# Patient Record
Sex: MALE | Race: WHITE | NOT HISPANIC OR LATINO | Employment: OTHER | URBAN - NONMETROPOLITAN AREA
[De-identification: names, ages, dates, MRNs, and addresses within clinical notes are randomized per-mention and may not be internally consistent; named-entity substitution may affect disease eponyms.]

---

## 2019-01-01 ENCOUNTER — APPOINTMENT (OUTPATIENT)
Dept: MRI IMAGING | Facility: HOSPITAL | Age: 64
End: 2019-01-01

## 2019-01-01 ENCOUNTER — APPOINTMENT (OUTPATIENT)
Dept: GENERAL RADIOLOGY | Facility: HOSPITAL | Age: 64
End: 2019-01-01

## 2019-01-01 ENCOUNTER — APPOINTMENT (OUTPATIENT)
Dept: ULTRASOUND IMAGING | Facility: HOSPITAL | Age: 64
End: 2019-01-01

## 2019-01-01 ENCOUNTER — ANESTHESIA EVENT (OUTPATIENT)
Dept: ICU | Facility: HOSPITAL | Age: 64
End: 2019-01-01

## 2019-01-01 ENCOUNTER — APPOINTMENT (OUTPATIENT)
Dept: CT IMAGING | Facility: HOSPITAL | Age: 64
End: 2019-01-01

## 2019-01-01 ENCOUNTER — APPOINTMENT (OUTPATIENT)
Dept: CARDIOLOGY | Facility: HOSPITAL | Age: 64
End: 2019-01-01

## 2019-01-01 ENCOUNTER — HOSPITAL ENCOUNTER (INPATIENT)
Facility: HOSPITAL | Age: 64
LOS: 13 days | Discharge: NURSING, REHAB OR LONG-TERM CARE | End: 2019-09-13
Attending: NEUROLOGICAL SURGERY | Admitting: NEUROLOGICAL SURGERY

## 2019-01-01 ENCOUNTER — ANESTHESIA (OUTPATIENT)
Dept: ICU | Facility: HOSPITAL | Age: 64
End: 2019-01-01

## 2019-01-01 VITALS
HEIGHT: 74 IN | RESPIRATION RATE: 18 BRPM | HEART RATE: 60 BPM | WEIGHT: 269 LBS | OXYGEN SATURATION: 95 % | BODY MASS INDEX: 34.52 KG/M2 | TEMPERATURE: 98.3 F | DIASTOLIC BLOOD PRESSURE: 54 MMHG | SYSTOLIC BLOOD PRESSURE: 133 MMHG

## 2019-01-01 DIAGNOSIS — R13.12 OROPHARYNGEAL DYSPHAGIA: Primary | ICD-10-CM

## 2019-01-01 DIAGNOSIS — E66.9 OBESITY (BMI 30-39.9): ICD-10-CM

## 2019-01-01 DIAGNOSIS — Z74.09 IMPAIRED MOBILITY AND ADLS: ICD-10-CM

## 2019-01-01 DIAGNOSIS — N18.30 CKD (CHRONIC KIDNEY DISEASE) STAGE 3, GFR 30-59 ML/MIN (HCC): ICD-10-CM

## 2019-01-01 DIAGNOSIS — N28.89 LEFT RENAL MASS: ICD-10-CM

## 2019-01-01 DIAGNOSIS — I61.0 NONTRAUMATIC SUBCORTICAL HEMORRHAGE OF LEFT CEREBRAL HEMISPHERE (HCC): ICD-10-CM

## 2019-01-01 DIAGNOSIS — R00.1 BRADYCARDIA: ICD-10-CM

## 2019-01-01 DIAGNOSIS — K59.03 DRUG-INDUCED CONSTIPATION: ICD-10-CM

## 2019-01-01 DIAGNOSIS — Z78.9 IMPAIRED MOBILITY AND ADLS: ICD-10-CM

## 2019-01-01 DIAGNOSIS — R47.1 DYSARTHRIA: ICD-10-CM

## 2019-01-01 DIAGNOSIS — E10.8 TYPE 1 DIABETES MELLITUS WITH COMPLICATION (HCC): ICD-10-CM

## 2019-01-01 DIAGNOSIS — I50.32 DIASTOLIC CHF, CHRONIC (HCC): ICD-10-CM

## 2019-01-01 DIAGNOSIS — E78.5 HYPERLIPIDEMIA, UNSPECIFIED HYPERLIPIDEMIA TYPE: ICD-10-CM

## 2019-01-01 DIAGNOSIS — I16.0 HYPERTENSIVE URGENCY: ICD-10-CM

## 2019-01-01 DIAGNOSIS — R31.0 GROSS HEMATURIA: ICD-10-CM

## 2019-01-01 DIAGNOSIS — Z74.09 IMPAIRED MOBILITY: ICD-10-CM

## 2019-01-01 DIAGNOSIS — I61.0 NONTRAUMATIC SUBCORTICAL HEMORRHAGE OF LEFT CEREBRAL HEMISPHERE (HCC): Primary | ICD-10-CM

## 2019-01-01 DIAGNOSIS — I63.412 CEREBROVASCULAR ACCIDENT (CVA) DUE TO EMBOLISM OF LEFT MIDDLE CEREBRAL ARTERY (HCC): ICD-10-CM

## 2019-01-01 LAB
ABO GROUP BLD: NORMAL
ALBUMIN SERPL-MCNC: 3.7 G/DL (ref 3.5–5)
ALBUMIN/GLOB SERPL: 1.2 G/DL (ref 1.1–2.5)
ALP SERPL-CCNC: 79 U/L (ref 24–120)
ALT SERPL W P-5'-P-CCNC: 31 U/L (ref 0–54)
ANION GAP SERPL CALCULATED.3IONS-SCNC: 10 MMOL/L (ref 5–15)
ANION GAP SERPL CALCULATED.3IONS-SCNC: 3 MMOL/L (ref 4–13)
ANION GAP SERPL CALCULATED.3IONS-SCNC: 4 MMOL/L (ref 4–13)
ANION GAP SERPL CALCULATED.3IONS-SCNC: 4 MMOL/L (ref 4–13)
ANION GAP SERPL CALCULATED.3IONS-SCNC: 5 MMOL/L (ref 4–13)
ANION GAP SERPL CALCULATED.3IONS-SCNC: 5 MMOL/L (ref 4–13)
ANION GAP SERPL CALCULATED.3IONS-SCNC: 6 MMOL/L (ref 4–13)
ANION GAP SERPL CALCULATED.3IONS-SCNC: 7 MMOL/L (ref 4–13)
ANION GAP SERPL CALCULATED.3IONS-SCNC: 8 MMOL/L (ref 5–15)
ANION GAP SERPL CALCULATED.3IONS-SCNC: 9 MMOL/L (ref 5–15)
APTT PPP: 26.7 SECONDS (ref 24.1–35)
ARTICHOKE IGE QN: 93 MG/DL (ref 0–99)
AST SERPL-CCNC: 43 U/L (ref 7–45)
BASOPHILS # BLD AUTO: 0.02 10*3/MM3 (ref 0–0.2)
BASOPHILS # BLD AUTO: 0.03 10*3/MM3 (ref 0–0.2)
BASOPHILS NFR BLD AUTO: 0.2 % (ref 0–1.5)
BASOPHILS NFR BLD AUTO: 0.3 % (ref 0–1.5)
BH CV ECHO MEAS - AO MAX PG (FULL): 8.6 MMHG
BH CV ECHO MEAS - AO MAX PG: 12.8 MMHG
BH CV ECHO MEAS - AO MEAN PG (FULL): 4 MMHG
BH CV ECHO MEAS - AO MEAN PG: 6 MMHG
BH CV ECHO MEAS - AO ROOT AREA (BSA CORRECTED): 1.3
BH CV ECHO MEAS - AO ROOT AREA: 7.1 CM^2
BH CV ECHO MEAS - AO ROOT DIAM: 3 CM
BH CV ECHO MEAS - AO V2 MAX: 179 CM/SEC
BH CV ECHO MEAS - AO V2 MEAN: 114 CM/SEC
BH CV ECHO MEAS - AO V2 VTI: 35.5 CM
BH CV ECHO MEAS - AVA(I,A): 2 CM^2
BH CV ECHO MEAS - AVA(I,D): 2 CM^2
BH CV ECHO MEAS - AVA(V,A): 2 CM^2
BH CV ECHO MEAS - AVA(V,D): 2 CM^2
BH CV ECHO MEAS - BSA(HAYCOCK): 2.5 M^2
BH CV ECHO MEAS - BSA: 2.4 M^2
BH CV ECHO MEAS - BZI_BMI: 32 KILOGRAMS/M^2
BH CV ECHO MEAS - BZI_METRIC_HEIGHT: 188 CM
BH CV ECHO MEAS - BZI_METRIC_WEIGHT: 112.9 KG
BH CV ECHO MEAS - EDV(CUBED): 135.8 ML
BH CV ECHO MEAS - EDV(MOD-SP4): 244 ML
BH CV ECHO MEAS - EDV(TEICH): 126.1 ML
BH CV ECHO MEAS - EF(CUBED): 74.7 %
BH CV ECHO MEAS - EF(MOD-SP4): 63.7 %
BH CV ECHO MEAS - EF(TEICH): 66.3 %
BH CV ECHO MEAS - ESV(CUBED): 34.3 ML
BH CV ECHO MEAS - ESV(MOD-SP4): 88.5 ML
BH CV ECHO MEAS - ESV(TEICH): 42.5 ML
BH CV ECHO MEAS - FS: 36.8 %
BH CV ECHO MEAS - IVS/LVPW: 1.4
BH CV ECHO MEAS - IVSD: 1.7 CM
BH CV ECHO MEAS - LA DIMENSION: 4.8 CM
BH CV ECHO MEAS - LA/AO: 1.6
BH CV ECHO MEAS - LAT PEAK E' VEL: 7 CM/SEC
BH CV ECHO MEAS - LV DIASTOLIC VOL/BSA (35-75): 102.3 ML/M^2
BH CV ECHO MEAS - LV MASS(C)D: 313.6 GRAMS
BH CV ECHO MEAS - LV MASS(C)DI: 131.5 GRAMS/M^2
BH CV ECHO MEAS - LV MAX PG: 4.2 MMHG
BH CV ECHO MEAS - LV MEAN PG: 2 MMHG
BH CV ECHO MEAS - LV SYSTOLIC VOL/BSA (12-30): 37.1 ML/M^2
BH CV ECHO MEAS - LV V1 MAX: 103 CM/SEC
BH CV ECHO MEAS - LV V1 MEAN: 68.9 CM/SEC
BH CV ECHO MEAS - LV V1 VTI: 20.8 CM
BH CV ECHO MEAS - LVIDD: 5.1 CM
BH CV ECHO MEAS - LVIDS: 3.3 CM
BH CV ECHO MEAS - LVLD AP4: 8.8 CM
BH CV ECHO MEAS - LVLS AP4: 7.9 CM
BH CV ECHO MEAS - LVOT AREA (M): 3.5 CM^2
BH CV ECHO MEAS - LVOT AREA: 3.5 CM^2
BH CV ECHO MEAS - LVOT DIAM: 2.1 CM
BH CV ECHO MEAS - LVPWD: 1.2 CM
BH CV ECHO MEAS - MED PEAK E' VEL: 6.89 CM/SEC
BH CV ECHO MEAS - MR MAX PG: 33.4 MMHG
BH CV ECHO MEAS - MR MAX VEL: 289 CM/SEC
BH CV ECHO MEAS - MV A MAX VEL: 64 CM/SEC
BH CV ECHO MEAS - MV DEC TIME: 0.26 SEC
BH CV ECHO MEAS - MV E MAX VEL: 99 CM/SEC
BH CV ECHO MEAS - MV E/A: 1.5
BH CV ECHO MEAS - SI(AO): 105.2 ML/M^2
BH CV ECHO MEAS - SI(CUBED): 42.5 ML/M^2
BH CV ECHO MEAS - SI(LVOT): 30.2 ML/M^2
BH CV ECHO MEAS - SI(MOD-SP4): 65.2 ML/M^2
BH CV ECHO MEAS - SI(TEICH): 35 ML/M^2
BH CV ECHO MEAS - SV(AO): 250.9 ML
BH CV ECHO MEAS - SV(CUBED): 101.5 ML
BH CV ECHO MEAS - SV(LVOT): 72 ML
BH CV ECHO MEAS - SV(MOD-SP4): 155.5 ML
BH CV ECHO MEAS - SV(TEICH): 83.5 ML
BH CV ECHO MEAS - TR MAX VEL: 311 CM/SEC
BH CV ECHO MEASUREMENTS AVERAGE E/E' RATIO: 14.25
BILIRUB SERPL-MCNC: 1.8 MG/DL (ref 0.1–1)
BLD GP AB SCN SERPL QL: NEGATIVE
BUN BLD-MCNC: 22 MG/DL (ref 8–23)
BUN BLD-MCNC: 23 MG/DL (ref 5–21)
BUN BLD-MCNC: 24 MG/DL (ref 8–23)
BUN BLD-MCNC: 26 MG/DL (ref 5–21)
BUN BLD-MCNC: 26 MG/DL (ref 8–23)
BUN BLD-MCNC: 27 MG/DL (ref 5–21)
BUN BLD-MCNC: 27 MG/DL (ref 5–21)
BUN BLD-MCNC: 28 MG/DL (ref 5–21)
BUN BLD-MCNC: 29 MG/DL (ref 5–21)
BUN/CREAT SERPL: 14.6 (ref 7–25)
BUN/CREAT SERPL: 14.9 (ref 7–25)
BUN/CREAT SERPL: 15.5 (ref 7–25)
BUN/CREAT SERPL: 16.8 (ref 7–25)
BUN/CREAT SERPL: 16.8 (ref 7–25)
BUN/CREAT SERPL: 16.9 (ref 7–25)
BUN/CREAT SERPL: 17 (ref 7–25)
BUN/CREAT SERPL: 17.1 (ref 7–25)
BUN/CREAT SERPL: 17.1 (ref 7–25)
BUN/CREAT SERPL: 17.4 (ref 7–25)
BUN/CREAT SERPL: 17.4 (ref 7–25)
BUN/CREAT SERPL: 17.5 (ref 7–25)
BUN/CREAT SERPL: 18.1 (ref 7–25)
BUN/CREAT SERPL: 18.7 (ref 7–25)
CALCIUM SPEC-SCNC: 8.2 MG/DL (ref 8.4–10.4)
CALCIUM SPEC-SCNC: 8.5 MG/DL (ref 8.4–10.4)
CALCIUM SPEC-SCNC: 8.5 MG/DL (ref 8.6–10.5)
CALCIUM SPEC-SCNC: 8.6 MG/DL (ref 8.4–10.4)
CALCIUM SPEC-SCNC: 8.6 MG/DL (ref 8.4–10.4)
CALCIUM SPEC-SCNC: 8.7 MG/DL (ref 8.4–10.4)
CALCIUM SPEC-SCNC: 8.7 MG/DL (ref 8.6–10.5)
CALCIUM SPEC-SCNC: 8.8 MG/DL (ref 8.4–10.4)
CALCIUM SPEC-SCNC: 8.8 MG/DL (ref 8.4–10.4)
CALCIUM SPEC-SCNC: 8.8 MG/DL (ref 8.6–10.5)
CALCIUM SPEC-SCNC: 8.9 MG/DL (ref 8.4–10.4)
CALCIUM SPEC-SCNC: 8.9 MG/DL (ref 8.4–10.4)
CALCIUM SPEC-SCNC: 9 MG/DL (ref 8.4–10.4)
CALCIUM SPEC-SCNC: 9.4 MG/DL (ref 8.4–10.4)
CHLORIDE SERPL-SCNC: 101 MMOL/L (ref 98–107)
CHLORIDE SERPL-SCNC: 103 MMOL/L (ref 98–107)
CHLORIDE SERPL-SCNC: 105 MMOL/L (ref 98–107)
CHLORIDE SERPL-SCNC: 107 MMOL/L (ref 98–110)
CHLORIDE SERPL-SCNC: 108 MMOL/L (ref 98–110)
CHLORIDE SERPL-SCNC: 109 MMOL/L (ref 98–110)
CHLORIDE SERPL-SCNC: 112 MMOL/L (ref 98–110)
CHLORIDE SERPL-SCNC: 112 MMOL/L (ref 98–110)
CHLORIDE SERPL-SCNC: 113 MMOL/L (ref 98–110)
CHLORIDE SERPL-SCNC: 113 MMOL/L (ref 98–110)
CHLORIDE SERPL-SCNC: 115 MMOL/L (ref 98–110)
CHLORIDE SERPL-SCNC: 117 MMOL/L (ref 98–110)
CHLORIDE SERPL-SCNC: 117 MMOL/L (ref 98–110)
CHLORIDE SERPL-SCNC: 118 MMOL/L (ref 98–110)
CHOLEST SERPL-MCNC: 149 MG/DL (ref 130–200)
CO2 SERPL-SCNC: 22 MMOL/L (ref 24–31)
CO2 SERPL-SCNC: 23 MMOL/L (ref 24–31)
CO2 SERPL-SCNC: 24 MMOL/L (ref 24–31)
CO2 SERPL-SCNC: 25 MMOL/L (ref 24–31)
CO2 SERPL-SCNC: 27 MMOL/L (ref 22–29)
CO2 SERPL-SCNC: 29 MMOL/L (ref 22–29)
CO2 SERPL-SCNC: 29 MMOL/L (ref 22–29)
CO2 SERPL-SCNC: 29 MMOL/L (ref 24–31)
CREAT BLD-MCNC: 1.51 MG/DL (ref 0.76–1.27)
CREAT BLD-MCNC: 1.52 MG/DL (ref 0.5–1.4)
CREAT BLD-MCNC: 1.52 MG/DL (ref 0.5–1.4)
CREAT BLD-MCNC: 1.54 MG/DL (ref 0.5–1.4)
CREAT BLD-MCNC: 1.55 MG/DL (ref 0.5–1.4)
CREAT BLD-MCNC: 1.55 MG/DL (ref 0.76–1.27)
CREAT BLD-MCNC: 1.55 MG/DL (ref 0.76–1.27)
CREAT BLD-MCNC: 1.59 MG/DL (ref 0.5–1.4)
CREAT BLD-MCNC: 1.6 MG/DL (ref 0.5–1.4)
CREAT BLD-MCNC: 1.6 MG/DL (ref 0.5–1.4)
CREAT BLD-MCNC: 1.61 MG/DL (ref 0.5–1.4)
CREAT BLD-MCNC: 1.61 MG/DL (ref 0.5–1.4)
DEPRECATED RDW RBC AUTO: 39.4 FL (ref 37–54)
DEPRECATED RDW RBC AUTO: 40.8 FL (ref 37–54)
DEPRECATED RDW RBC AUTO: 41.7 FL (ref 37–54)
DOPAMINE 24H UR-MRATE: 257 UG/24 HR (ref 0–510)
DOPAMINE UR-MCNC: 78 UG/L
EOSINOPHIL # BLD AUTO: 0.04 10*3/MM3 (ref 0–0.4)
EOSINOPHIL # BLD AUTO: 0.1 10*3/MM3 (ref 0–0.4)
EOSINOPHIL NFR BLD AUTO: 0.4 % (ref 0.3–6.2)
EOSINOPHIL NFR BLD AUTO: 0.8 % (ref 0.3–6.2)
EPINEPH 24H UR-MRATE: 26 UG/24 HR (ref 0–20)
EPINEPH UR-MCNC: 8 UG/L
ERYTHROCYTE [DISTWIDTH] IN BLOOD BY AUTOMATED COUNT: 12.9 % (ref 12.3–15.4)
ERYTHROCYTE [DISTWIDTH] IN BLOOD BY AUTOMATED COUNT: 13.3 % (ref 12.3–15.4)
ERYTHROCYTE [DISTWIDTH] IN BLOOD BY AUTOMATED COUNT: 13.6 % (ref 12.3–15.4)
GFR SERPL CREATININE-BSD FRML MDRD: 44 ML/MIN/1.73
GFR SERPL CREATININE-BSD FRML MDRD: 46 ML/MIN/1.73
GFR SERPL CREATININE-BSD FRML MDRD: 47 ML/MIN/1.73
GLOBULIN UR ELPH-MCNC: 3 GM/DL
GLUCOSE BLD-MCNC: 107 MG/DL (ref 65–99)
GLUCOSE BLD-MCNC: 108 MG/DL (ref 65–99)
GLUCOSE BLD-MCNC: 113 MG/DL (ref 65–99)
GLUCOSE BLD-MCNC: 113 MG/DL (ref 70–100)
GLUCOSE BLD-MCNC: 80 MG/DL (ref 70–100)
GLUCOSE BLD-MCNC: 81 MG/DL (ref 70–100)
GLUCOSE BLD-MCNC: 84 MG/DL (ref 70–100)
GLUCOSE BLD-MCNC: 85 MG/DL (ref 70–100)
GLUCOSE BLD-MCNC: 86 MG/DL (ref 70–100)
GLUCOSE BLD-MCNC: 88 MG/DL (ref 70–100)
GLUCOSE BLD-MCNC: 95 MG/DL (ref 70–100)
GLUCOSE BLD-MCNC: 96 MG/DL (ref 70–100)
GLUCOSE BLD-MCNC: 97 MG/DL (ref 70–100)
GLUCOSE BLD-MCNC: 98 MG/DL (ref 70–100)
GLUCOSE BLDC GLUCOMTR-MCNC: 100 MG/DL (ref 70–130)
GLUCOSE BLDC GLUCOMTR-MCNC: 103 MG/DL (ref 70–130)
GLUCOSE BLDC GLUCOMTR-MCNC: 103 MG/DL (ref 70–130)
GLUCOSE BLDC GLUCOMTR-MCNC: 105 MG/DL (ref 70–130)
GLUCOSE BLDC GLUCOMTR-MCNC: 106 MG/DL (ref 70–130)
GLUCOSE BLDC GLUCOMTR-MCNC: 107 MG/DL (ref 70–130)
GLUCOSE BLDC GLUCOMTR-MCNC: 107 MG/DL (ref 70–130)
GLUCOSE BLDC GLUCOMTR-MCNC: 108 MG/DL (ref 70–130)
GLUCOSE BLDC GLUCOMTR-MCNC: 109 MG/DL (ref 70–130)
GLUCOSE BLDC GLUCOMTR-MCNC: 110 MG/DL (ref 70–130)
GLUCOSE BLDC GLUCOMTR-MCNC: 112 MG/DL (ref 70–130)
GLUCOSE BLDC GLUCOMTR-MCNC: 113 MG/DL (ref 70–130)
GLUCOSE BLDC GLUCOMTR-MCNC: 116 MG/DL (ref 70–130)
GLUCOSE BLDC GLUCOMTR-MCNC: 119 MG/DL (ref 70–130)
GLUCOSE BLDC GLUCOMTR-MCNC: 119 MG/DL (ref 70–130)
GLUCOSE BLDC GLUCOMTR-MCNC: 126 MG/DL (ref 70–130)
GLUCOSE BLDC GLUCOMTR-MCNC: 129 MG/DL (ref 70–130)
GLUCOSE BLDC GLUCOMTR-MCNC: 129 MG/DL (ref 70–130)
GLUCOSE BLDC GLUCOMTR-MCNC: 138 MG/DL (ref 70–130)
GLUCOSE BLDC GLUCOMTR-MCNC: 142 MG/DL (ref 70–130)
GLUCOSE BLDC GLUCOMTR-MCNC: 147 MG/DL (ref 70–130)
GLUCOSE BLDC GLUCOMTR-MCNC: 151 MG/DL (ref 70–130)
GLUCOSE BLDC GLUCOMTR-MCNC: 62 MG/DL (ref 70–130)
GLUCOSE BLDC GLUCOMTR-MCNC: 68 MG/DL (ref 70–130)
GLUCOSE BLDC GLUCOMTR-MCNC: 70 MG/DL (ref 70–130)
GLUCOSE BLDC GLUCOMTR-MCNC: 77 MG/DL (ref 70–130)
GLUCOSE BLDC GLUCOMTR-MCNC: 78 MG/DL (ref 70–130)
GLUCOSE BLDC GLUCOMTR-MCNC: 82 MG/DL (ref 70–130)
GLUCOSE BLDC GLUCOMTR-MCNC: 85 MG/DL (ref 70–130)
GLUCOSE BLDC GLUCOMTR-MCNC: 86 MG/DL (ref 70–130)
GLUCOSE BLDC GLUCOMTR-MCNC: 87 MG/DL (ref 70–130)
GLUCOSE BLDC GLUCOMTR-MCNC: 88 MG/DL (ref 70–130)
GLUCOSE BLDC GLUCOMTR-MCNC: 90 MG/DL (ref 70–130)
GLUCOSE BLDC GLUCOMTR-MCNC: 92 MG/DL (ref 70–130)
GLUCOSE BLDC GLUCOMTR-MCNC: 92 MG/DL (ref 70–130)
GLUCOSE BLDC GLUCOMTR-MCNC: 93 MG/DL (ref 70–130)
GLUCOSE BLDC GLUCOMTR-MCNC: 94 MG/DL (ref 70–130)
GLUCOSE BLDC GLUCOMTR-MCNC: 95 MG/DL (ref 70–130)
GLUCOSE BLDC GLUCOMTR-MCNC: 96 MG/DL (ref 70–130)
GLUCOSE BLDC GLUCOMTR-MCNC: 96 MG/DL (ref 70–130)
GLUCOSE BLDC GLUCOMTR-MCNC: 98 MG/DL (ref 70–130)
GLUCOSE BLDC GLUCOMTR-MCNC: 99 MG/DL (ref 70–130)
HBA1C MFR BLD: 5 % (ref 4.8–5.9)
HCT VFR BLD AUTO: 33.3 % (ref 37.5–51)
HCT VFR BLD AUTO: 38.7 % (ref 37.5–51)
HCT VFR BLD AUTO: 39.4 % (ref 37.5–51)
HDLC SERPL-MCNC: 38 MG/DL
HGB BLD-MCNC: 10.8 G/DL (ref 13–17.7)
HGB BLD-MCNC: 12.6 G/DL (ref 13–17.7)
HGB BLD-MCNC: 12.7 G/DL (ref 13–17.7)
IMM GRANULOCYTES # BLD AUTO: 0.04 10*3/MM3 (ref 0–0.05)
IMM GRANULOCYTES # BLD AUTO: 0.05 10*3/MM3 (ref 0–0.05)
IMM GRANULOCYTES NFR BLD AUTO: 0.4 % (ref 0–0.5)
IMM GRANULOCYTES NFR BLD AUTO: 0.4 % (ref 0–0.5)
INR PPP: 0.98 (ref 0.91–1.09)
LDLC/HDLC SERPL: 2.42 {RATIO}
LEFT ATRIUM VOLUME INDEX: 38.7 ML/M2
LEFT ATRIUM VOLUME: 92.4 CM3
LV EF 2D ECHO EST: 65 %
LYMPHOCYTES # BLD AUTO: 1.24 10*3/MM3 (ref 0.7–3.1)
LYMPHOCYTES # BLD AUTO: 1.25 10*3/MM3 (ref 0.7–3.1)
LYMPHOCYTES NFR BLD AUTO: 10.5 % (ref 19.6–45.3)
LYMPHOCYTES NFR BLD AUTO: 12 % (ref 19.6–45.3)
MAXIMAL PREDICTED HEART RATE: 157 BPM
MCH RBC QN AUTO: 27 PG (ref 26.6–33)
MCH RBC QN AUTO: 27.2 PG (ref 26.6–33)
MCH RBC QN AUTO: 27.5 PG (ref 26.6–33)
MCHC RBC AUTO-ENTMCNC: 32.2 G/DL (ref 31.5–35.7)
MCHC RBC AUTO-ENTMCNC: 32.4 G/DL (ref 31.5–35.7)
MCHC RBC AUTO-ENTMCNC: 32.6 G/DL (ref 31.5–35.7)
MCV RBC AUTO: 83.3 FL (ref 79–97)
MCV RBC AUTO: 84.3 FL (ref 79–97)
MCV RBC AUTO: 84.4 FL (ref 79–97)
MONOCYTES # BLD AUTO: 0.64 10*3/MM3 (ref 0.1–0.9)
MONOCYTES # BLD AUTO: 0.89 10*3/MM3 (ref 0.1–0.9)
MONOCYTES NFR BLD AUTO: 6.2 % (ref 5–12)
MONOCYTES NFR BLD AUTO: 7.4 % (ref 5–12)
NEUTROPHILS # BLD AUTO: 8.34 10*3/MM3 (ref 1.7–7)
NEUTROPHILS # BLD AUTO: 9.64 10*3/MM3 (ref 1.7–7)
NEUTROPHILS NFR BLD AUTO: 80.7 % (ref 42.7–76)
NEUTROPHILS NFR BLD AUTO: 80.7 % (ref 42.7–76)
NOREPINEPH 24H UR-MRATE: 158 UG/24 HR (ref 0–135)
NOREPINEPH UR-MCNC: 48 UG/L
NRBC BLD AUTO-RTO: 0 /100 WBC (ref 0–0.2)
NRBC BLD AUTO-RTO: 0 /100 WBC (ref 0–0.2)
PLATELET # BLD AUTO: 189 10*3/MM3 (ref 140–450)
PLATELET # BLD AUTO: 199 10*3/MM3 (ref 140–450)
PLATELET # BLD AUTO: 233 10*3/MM3 (ref 140–450)
PMV BLD AUTO: 10.8 FL (ref 6–12)
PMV BLD AUTO: 10.9 FL (ref 6–12)
PMV BLD AUTO: 11.9 FL (ref 6–12)
POTASSIUM BLD-SCNC: 4 MMOL/L (ref 3.5–5.2)
POTASSIUM BLD-SCNC: 4 MMOL/L (ref 3.5–5.2)
POTASSIUM BLD-SCNC: 4.1 MMOL/L (ref 3.5–5.3)
POTASSIUM BLD-SCNC: 4.2 MMOL/L (ref 3.5–5.2)
POTASSIUM BLD-SCNC: 4.2 MMOL/L (ref 3.5–5.3)
POTASSIUM BLD-SCNC: 4.4 MMOL/L (ref 3.5–5.3)
POTASSIUM BLD-SCNC: 4.5 MMOL/L (ref 3.5–5.3)
POTASSIUM BLD-SCNC: 4.6 MMOL/L (ref 3.5–5.3)
POTASSIUM BLD-SCNC: 4.7 MMOL/L (ref 3.5–5.3)
POTASSIUM BLD-SCNC: 4.7 MMOL/L (ref 3.5–5.3)
POTASSIUM BLD-SCNC: 4.8 MMOL/L (ref 3.5–5.3)
POTASSIUM BLD-SCNC: 4.9 MMOL/L (ref 3.5–5.3)
PROT SERPL-MCNC: 6.7 G/DL (ref 6.3–8.7)
PROTHROMBIN TIME: 13.3 SECONDS (ref 11.9–14.6)
RBC # BLD AUTO: 4 10*6/MM3 (ref 4.14–5.8)
RBC # BLD AUTO: 4.59 10*6/MM3 (ref 4.14–5.8)
RBC # BLD AUTO: 4.67 10*6/MM3 (ref 4.14–5.8)
RH BLD: POSITIVE
SODIUM BLD-SCNC: 138 MMOL/L (ref 135–145)
SODIUM BLD-SCNC: 140 MMOL/L (ref 135–145)
SODIUM BLD-SCNC: 140 MMOL/L (ref 136–145)
SODIUM BLD-SCNC: 140 MMOL/L (ref 136–145)
SODIUM BLD-SCNC: 141 MMOL/L (ref 135–145)
SODIUM BLD-SCNC: 141 MMOL/L (ref 136–145)
SODIUM BLD-SCNC: 143 MMOL/L (ref 135–145)
SODIUM BLD-SCNC: 143 MMOL/L (ref 135–145)
SODIUM BLD-SCNC: 144 MMOL/L (ref 135–145)
SODIUM BLD-SCNC: 146 MMOL/L (ref 135–145)
STRESS TARGET HR: 133 BPM
T&S EXPIRATION DATE: NORMAL
TRIGL SERPL-MCNC: 95 MG/DL (ref 0–149)
TSH SERPL DL<=0.05 MIU/L-ACNC: 1.55 UIU/ML (ref 0.47–4.68)
VMA 24H UR-MRATE: 18.5 MG/24 HR (ref 0–7.5)
VMA UR-MCNC: 5.6 MG/L
WBC NRBC COR # BLD: 10.33 10*3/MM3 (ref 3.4–10.8)
WBC NRBC COR # BLD: 11.95 10*3/MM3 (ref 3.4–10.8)
WBC NRBC COR # BLD: 8.1 10*3/MM3 (ref 3.4–10.8)

## 2019-01-01 PROCEDURE — 85027 COMPLETE CBC AUTOMATED: CPT | Performed by: INTERNAL MEDICINE

## 2019-01-01 PROCEDURE — 99231 SBSQ HOSP IP/OBS SF/LOW 25: CPT | Performed by: NURSE PRACTITIONER

## 2019-01-01 PROCEDURE — 94762 N-INVAS EAR/PLS OXIMTRY CONT: CPT

## 2019-01-01 PROCEDURE — 82962 GLUCOSE BLOOD TEST: CPT

## 2019-01-01 PROCEDURE — 99233 SBSQ HOSP IP/OBS HIGH 50: CPT | Performed by: PSYCHIATRY & NEUROLOGY

## 2019-01-01 PROCEDURE — 25810000003 SODIUM CHLORIDE 0.9 % WITH KCL 20 MEQ 20-0.9 MEQ/L-% SOLUTION: Performed by: NEUROLOGICAL SURGERY

## 2019-01-01 PROCEDURE — 97535 SELF CARE MNGMENT TRAINING: CPT

## 2019-01-01 PROCEDURE — 97110 THERAPEUTIC EXERCISES: CPT

## 2019-01-01 PROCEDURE — 25010000002 PERFLUTREN 6.52 MG/ML SUSPENSION: Performed by: NEUROLOGICAL SURGERY

## 2019-01-01 PROCEDURE — 97535 SELF CARE MNGMENT TRAINING: CPT | Performed by: OCCUPATIONAL THERAPIST

## 2019-01-01 PROCEDURE — 92526 ORAL FUNCTION THERAPY: CPT | Performed by: SPEECH-LANGUAGE PATHOLOGIST

## 2019-01-01 PROCEDURE — 85730 THROMBOPLASTIN TIME PARTIAL: CPT | Performed by: NEUROLOGICAL SURGERY

## 2019-01-01 PROCEDURE — 94799 UNLISTED PULMONARY SVC/PX: CPT

## 2019-01-01 PROCEDURE — 97162 PT EVAL MOD COMPLEX 30 MIN: CPT

## 2019-01-01 PROCEDURE — 93880 EXTRACRANIAL BILAT STUDY: CPT | Performed by: SURGERY

## 2019-01-01 PROCEDURE — 99239 HOSP IP/OBS DSCHRG MGMT >30: CPT | Performed by: NURSE PRACTITIONER

## 2019-01-01 PROCEDURE — 80048 BASIC METABOLIC PNL TOTAL CA: CPT | Performed by: FAMILY MEDICINE

## 2019-01-01 PROCEDURE — 86850 RBC ANTIBODY SCREEN: CPT | Performed by: NEUROLOGICAL SURGERY

## 2019-01-01 PROCEDURE — 71045 X-RAY EXAM CHEST 1 VIEW: CPT

## 2019-01-01 PROCEDURE — 97110 THERAPEUTIC EXERCISES: CPT | Performed by: OCCUPATIONAL THERAPIST

## 2019-01-01 PROCEDURE — 97530 THERAPEUTIC ACTIVITIES: CPT

## 2019-01-01 PROCEDURE — 86901 BLOOD TYPING SEROLOGIC RH(D): CPT | Performed by: NEUROLOGICAL SURGERY

## 2019-01-01 PROCEDURE — 92610 EVALUATE SWALLOWING FUNCTION: CPT | Performed by: SPEECH-LANGUAGE PATHOLOGIST

## 2019-01-01 PROCEDURE — 99222 1ST HOSP IP/OBS MODERATE 55: CPT | Performed by: UROLOGY

## 2019-01-01 PROCEDURE — 83036 HEMOGLOBIN GLYCOSYLATED A1C: CPT | Performed by: NEUROLOGICAL SURGERY

## 2019-01-01 PROCEDURE — 25010000002 HYDRALAZINE PER 20 MG: Performed by: NEUROLOGICAL SURGERY

## 2019-01-01 PROCEDURE — 82384 ASSAY THREE CATECHOLAMINES: CPT | Performed by: FAMILY MEDICINE

## 2019-01-01 PROCEDURE — 99232 SBSQ HOSP IP/OBS MODERATE 35: CPT | Performed by: NEUROLOGICAL SURGERY

## 2019-01-01 PROCEDURE — 93306 TTE W/DOPPLER COMPLETE: CPT

## 2019-01-01 PROCEDURE — 84585 ASSAY OF URINE VMA: CPT | Performed by: FAMILY MEDICINE

## 2019-01-01 PROCEDURE — 92526 ORAL FUNCTION THERAPY: CPT

## 2019-01-01 PROCEDURE — 80061 LIPID PANEL: CPT | Performed by: PSYCHIATRY & NEUROLOGY

## 2019-01-01 PROCEDURE — 93306 TTE W/DOPPLER COMPLETE: CPT | Performed by: INTERNAL MEDICINE

## 2019-01-01 PROCEDURE — 70450 CT HEAD/BRAIN W/O DYE: CPT

## 2019-01-01 PROCEDURE — 76775 US EXAM ABDO BACK WALL LIM: CPT

## 2019-01-01 PROCEDURE — 97168 OT RE-EVAL EST PLAN CARE: CPT

## 2019-01-01 PROCEDURE — 85025 COMPLETE CBC W/AUTO DIFF WBC: CPT | Performed by: NEUROLOGICAL SURGERY

## 2019-01-01 PROCEDURE — 97167 OT EVAL HIGH COMPLEX 60 MIN: CPT | Performed by: OCCUPATIONAL THERAPIST

## 2019-01-01 PROCEDURE — 99232 SBSQ HOSP IP/OBS MODERATE 35: CPT | Performed by: UROLOGY

## 2019-01-01 PROCEDURE — 99223 1ST HOSP IP/OBS HIGH 75: CPT | Performed by: NEUROLOGICAL SURGERY

## 2019-01-01 PROCEDURE — 80048 BASIC METABOLIC PNL TOTAL CA: CPT | Performed by: NEUROLOGICAL SURGERY

## 2019-01-01 PROCEDURE — 99223 1ST HOSP IP/OBS HIGH 75: CPT | Performed by: PSYCHIATRY & NEUROLOGY

## 2019-01-01 PROCEDURE — 94760 N-INVAS EAR/PLS OXIMETRY 1: CPT

## 2019-01-01 PROCEDURE — 85610 PROTHROMBIN TIME: CPT | Performed by: NEUROLOGICAL SURGERY

## 2019-01-01 PROCEDURE — 70553 MRI BRAIN STEM W/O & W/DYE: CPT

## 2019-01-01 PROCEDURE — A9577 INJ MULTIHANCE: HCPCS | Performed by: NEUROLOGICAL SURGERY

## 2019-01-01 PROCEDURE — 0 GADOBENATE DIMEGLUMINE 529 MG/ML SOLUTION: Performed by: NEUROLOGICAL SURGERY

## 2019-01-01 PROCEDURE — 93880 EXTRACRANIAL BILAT STUDY: CPT

## 2019-01-01 PROCEDURE — 93005 ELECTROCARDIOGRAM TRACING: CPT | Performed by: INTERNAL MEDICINE

## 2019-01-01 PROCEDURE — 97530 THERAPEUTIC ACTIVITIES: CPT | Performed by: OCCUPATIONAL THERAPIST

## 2019-01-01 PROCEDURE — 03HY32Z INSERTION OF MONITORING DEVICE INTO UPPER ARTERY, PERCUTANEOUS APPROACH: ICD-10-PCS | Performed by: NURSE ANESTHETIST, CERTIFIED REGISTERED

## 2019-01-01 PROCEDURE — 94660 CPAP INITIATION&MGMT: CPT

## 2019-01-01 PROCEDURE — 84443 ASSAY THYROID STIM HORMONE: CPT | Performed by: PSYCHIATRY & NEUROLOGY

## 2019-01-01 PROCEDURE — 74018 RADEX ABDOMEN 1 VIEW: CPT

## 2019-01-01 PROCEDURE — 93010 ELECTROCARDIOGRAM REPORT: CPT | Performed by: INTERNAL MEDICINE

## 2019-01-01 PROCEDURE — 80053 COMPREHEN METABOLIC PANEL: CPT | Performed by: NEUROLOGICAL SURGERY

## 2019-01-01 PROCEDURE — 86900 BLOOD TYPING SEROLOGIC ABO: CPT | Performed by: NEUROLOGICAL SURGERY

## 2019-01-01 PROCEDURE — 99231 SBSQ HOSP IP/OBS SF/LOW 25: CPT | Performed by: NEUROLOGICAL SURGERY

## 2019-01-01 RX ORDER — HYDROCHLOROTHIAZIDE 25 MG/1
25 TABLET ORAL DAILY
Status: DISCONTINUED | OUTPATIENT
Start: 2019-01-01 | End: 2019-01-01 | Stop reason: HOSPADM

## 2019-01-01 RX ORDER — ACETAMINOPHEN 650 MG/1
650 SUPPOSITORY RECTAL EVERY 4 HOURS PRN
Status: DISCONTINUED | OUTPATIENT
Start: 2019-01-01 | End: 2019-01-01 | Stop reason: HOSPADM

## 2019-01-01 RX ORDER — OXYCODONE HYDROCHLORIDE AND ACETAMINOPHEN 5; 325 MG/1; MG/1
1 TABLET ORAL EVERY 4 HOURS PRN
Status: DISCONTINUED | OUTPATIENT
Start: 2019-01-01 | End: 2019-01-01 | Stop reason: HOSPADM

## 2019-01-01 RX ORDER — CLONIDINE HYDROCHLORIDE 0.1 MG/1
0.1 TABLET ORAL ONCE
Status: COMPLETED | OUTPATIENT
Start: 2019-01-01 | End: 2019-01-01

## 2019-01-01 RX ORDER — SENNA AND DOCUSATE SODIUM 50; 8.6 MG/1; MG/1
1 TABLET, FILM COATED ORAL 2 TIMES DAILY
Status: DISCONTINUED | OUTPATIENT
Start: 2019-01-01 | End: 2019-01-01 | Stop reason: HOSPADM

## 2019-01-01 RX ORDER — TRAZODONE HYDROCHLORIDE 50 MG/1
50 TABLET ORAL NIGHTLY PRN
Status: DISCONTINUED | OUTPATIENT
Start: 2019-01-01 | End: 2019-01-01

## 2019-01-01 RX ORDER — PANTOPRAZOLE SODIUM 40 MG/1
40 TABLET, DELAYED RELEASE ORAL
Status: DISCONTINUED | OUTPATIENT
Start: 2019-01-01 | End: 2019-01-01 | Stop reason: HOSPADM

## 2019-01-01 RX ORDER — NICOTINE POLACRILEX 4 MG
15 LOZENGE BUCCAL
Status: DISCONTINUED | OUTPATIENT
Start: 2019-01-01 | End: 2019-01-01 | Stop reason: HOSPADM

## 2019-01-01 RX ORDER — TERAZOSIN 10 MG/1
10 CAPSULE ORAL NIGHTLY
Status: DISCONTINUED | OUTPATIENT
Start: 2019-01-01 | End: 2019-01-01 | Stop reason: HOSPADM

## 2019-01-01 RX ORDER — ONDANSETRON 2 MG/ML
4 INJECTION INTRAMUSCULAR; INTRAVENOUS EVERY 6 HOURS PRN
Status: DISCONTINUED | OUTPATIENT
Start: 2019-01-01 | End: 2019-01-01 | Stop reason: HOSPADM

## 2019-01-01 RX ORDER — SENNA AND DOCUSATE SODIUM 50; 8.6 MG/1; MG/1
1 TABLET, FILM COATED ORAL 2 TIMES DAILY
Qty: 60 TABLET | Refills: 0
Start: 2019-01-01

## 2019-01-01 RX ORDER — CLONIDINE HYDROCHLORIDE 0.1 MG/1
0.1 TABLET ORAL EVERY 12 HOURS SCHEDULED
Status: DISCONTINUED | OUTPATIENT
Start: 2019-01-01 | End: 2019-01-01

## 2019-01-01 RX ORDER — LIDOCAINE 50 MG/G
1 PATCH TOPICAL
Status: DISCONTINUED | OUTPATIENT
Start: 2019-01-01 | End: 2019-01-01 | Stop reason: HOSPADM

## 2019-01-01 RX ORDER — SIMVASTATIN 40 MG
40 TABLET ORAL NIGHTLY
COMMUNITY

## 2019-01-01 RX ORDER — LABETALOL HYDROCHLORIDE 5 MG/ML
10 INJECTION, SOLUTION INTRAVENOUS
Status: DISCONTINUED | OUTPATIENT
Start: 2019-01-01 | End: 2019-01-01

## 2019-01-01 RX ORDER — HYDRALAZINE HYDROCHLORIDE 20 MG/ML
10 INJECTION INTRAMUSCULAR; INTRAVENOUS
Status: DISCONTINUED | OUTPATIENT
Start: 2019-01-01 | End: 2019-01-01 | Stop reason: HOSPADM

## 2019-01-01 RX ORDER — ACETAMINOPHEN 160 MG/5ML
650 SOLUTION ORAL EVERY 4 HOURS PRN
Status: DISCONTINUED | OUTPATIENT
Start: 2019-01-01 | End: 2019-01-01 | Stop reason: HOSPADM

## 2019-01-01 RX ORDER — AMLODIPINE BESYLATE 10 MG/1
10 TABLET ORAL
Status: DISCONTINUED | OUTPATIENT
Start: 2019-01-01 | End: 2019-01-01 | Stop reason: HOSPADM

## 2019-01-01 RX ORDER — TERAZOSIN 5 MG/1
5 CAPSULE ORAL ONCE
Status: COMPLETED | OUTPATIENT
Start: 2019-01-01 | End: 2019-01-01

## 2019-01-01 RX ORDER — ACETAMINOPHEN 325 MG/1
650 TABLET ORAL EVERY 4 HOURS PRN
Status: DISCONTINUED | OUTPATIENT
Start: 2019-01-01 | End: 2019-01-01 | Stop reason: HOSPADM

## 2019-01-01 RX ORDER — HYDRALAZINE HYDROCHLORIDE 20 MG/ML
10 INJECTION INTRAMUSCULAR; INTRAVENOUS
Status: DISCONTINUED | OUTPATIENT
Start: 2019-01-01 | End: 2019-01-01

## 2019-01-01 RX ORDER — BISACODYL 10 MG
10 SUPPOSITORY, RECTAL RECTAL ONCE
Status: COMPLETED | OUTPATIENT
Start: 2019-01-01 | End: 2019-01-01

## 2019-01-01 RX ORDER — LABETALOL HYDROCHLORIDE 5 MG/ML
10 INJECTION, SOLUTION INTRAVENOUS
Status: DISCONTINUED | OUTPATIENT
Start: 2019-01-01 | End: 2019-01-01 | Stop reason: HOSPADM

## 2019-01-01 RX ORDER — HYDROCHLOROTHIAZIDE 25 MG/1
12.5 TABLET ORAL DAILY
Status: DISCONTINUED | OUTPATIENT
Start: 2019-01-01 | End: 2019-01-01

## 2019-01-01 RX ORDER — TERAZOSIN 2 MG/1
2 CAPSULE ORAL NIGHTLY
Status: DISCONTINUED | OUTPATIENT
Start: 2019-01-01 | End: 2019-01-01

## 2019-01-01 RX ORDER — GLIPIZIDE 5 MG/1
5 TABLET ORAL
Status: DISCONTINUED | OUTPATIENT
Start: 2019-01-01 | End: 2019-01-01 | Stop reason: HOSPADM

## 2019-01-01 RX ORDER — LIDOCAINE 50 MG/G
1 PATCH TOPICAL
Qty: 30 PATCH | Refills: 0
Start: 2019-01-01

## 2019-01-01 RX ORDER — TRAZODONE HYDROCHLORIDE 50 MG/1
50 TABLET ORAL NIGHTLY PRN
COMMUNITY

## 2019-01-01 RX ORDER — CLONIDINE HYDROCHLORIDE 0.1 MG/1
0.1 TABLET ORAL 2 TIMES DAILY
COMMUNITY
End: 2019-01-01 | Stop reason: HOSPADM

## 2019-01-01 RX ORDER — SODIUM CHLORIDE 0.9 % (FLUSH) 0.9 %
10 SYRINGE (ML) INJECTION AS NEEDED
Status: DISCONTINUED | OUTPATIENT
Start: 2019-01-01 | End: 2019-01-01 | Stop reason: HOSPADM

## 2019-01-01 RX ORDER — DEXTROSE MONOHYDRATE 25 G/50ML
25 INJECTION, SOLUTION INTRAVENOUS
Status: DISCONTINUED | OUTPATIENT
Start: 2019-01-01 | End: 2019-01-01 | Stop reason: HOSPADM

## 2019-01-01 RX ORDER — SODIUM CHLORIDE AND POTASSIUM CHLORIDE 150; 900 MG/100ML; MG/100ML
100 INJECTION, SOLUTION INTRAVENOUS CONTINUOUS
Status: DISCONTINUED | OUTPATIENT
Start: 2019-01-01 | End: 2019-01-01

## 2019-01-01 RX ORDER — TRAZODONE HYDROCHLORIDE 100 MG/1
100 TABLET ORAL NIGHTLY
Status: DISCONTINUED | OUTPATIENT
Start: 2019-01-01 | End: 2019-01-01 | Stop reason: HOSPADM

## 2019-01-01 RX ORDER — HYDRALAZINE HYDROCHLORIDE 50 MG/1
50 TABLET, FILM COATED ORAL EVERY 8 HOURS SCHEDULED
Status: DISCONTINUED | OUTPATIENT
Start: 2019-01-01 | End: 2019-01-01

## 2019-01-01 RX ORDER — LOSARTAN POTASSIUM 50 MG/1
100 TABLET ORAL DAILY
Status: DISCONTINUED | OUTPATIENT
Start: 2019-01-01 | End: 2019-01-01 | Stop reason: SDUPTHER

## 2019-01-01 RX ORDER — TRAZODONE HYDROCHLORIDE 100 MG/1
100 TABLET ORAL NIGHTLY
Qty: 30 TABLET | Refills: 0
Start: 2019-01-01

## 2019-01-01 RX ORDER — OXYCODONE AND ACETAMINOPHEN 7.5; 325 MG/1; MG/1
2 TABLET ORAL EVERY 4 HOURS PRN
Status: DISCONTINUED | OUTPATIENT
Start: 2019-01-01 | End: 2019-01-01

## 2019-01-01 RX ORDER — ONDANSETRON 4 MG/1
4 TABLET, FILM COATED ORAL EVERY 6 HOURS PRN
Status: DISCONTINUED | OUTPATIENT
Start: 2019-01-01 | End: 2019-01-01 | Stop reason: HOSPADM

## 2019-01-01 RX ORDER — CLONIDINE HYDROCHLORIDE 0.3 MG/1
0.3 TABLET ORAL EVERY 12 HOURS SCHEDULED
Qty: 30 TABLET | Refills: 0
Start: 2019-01-01

## 2019-01-01 RX ORDER — LOSARTAN POTASSIUM 50 MG/1
100 TABLET ORAL
Status: DISCONTINUED | OUTPATIENT
Start: 2019-01-01 | End: 2019-01-01 | Stop reason: HOSPADM

## 2019-01-01 RX ORDER — ATORVASTATIN CALCIUM 10 MG/1
20 TABLET, FILM COATED ORAL NIGHTLY
Status: DISCONTINUED | OUTPATIENT
Start: 2019-01-01 | End: 2019-01-01 | Stop reason: HOSPADM

## 2019-01-01 RX ORDER — METOPROLOL SUCCINATE 100 MG/1
100 TABLET, EXTENDED RELEASE ORAL
Status: DISCONTINUED | OUTPATIENT
Start: 2019-01-01 | End: 2019-01-01

## 2019-01-01 RX ORDER — GLIMEPIRIDE 2 MG/1
2 TABLET ORAL
COMMUNITY

## 2019-01-01 RX ORDER — ECHINACEA PURPUREA EXTRACT 125 MG
2 TABLET ORAL 4 TIMES DAILY PRN
Status: DISCONTINUED | OUTPATIENT
Start: 2019-01-01 | End: 2019-01-01 | Stop reason: HOSPADM

## 2019-01-01 RX ORDER — AMLODIPINE BESYLATE 10 MG/1
10 TABLET ORAL
Qty: 30 TABLET | Refills: 0
Start: 2019-01-01

## 2019-01-01 RX ORDER — HYDRALAZINE HYDROCHLORIDE 100 MG/1
100 TABLET, FILM COATED ORAL EVERY 8 HOURS SCHEDULED
Qty: 90 TABLET | Refills: 0
Start: 2019-01-01

## 2019-01-01 RX ORDER — OXYCODONE HYDROCHLORIDE AND ACETAMINOPHEN 5; 325 MG/1; MG/1
1 TABLET ORAL EVERY 4 HOURS PRN
Qty: 30 TABLET | Refills: 0
Start: 2019-01-01

## 2019-01-01 RX ORDER — SODIUM CHLORIDE 0.9 % (FLUSH) 0.9 %
10 SYRINGE (ML) INJECTION EVERY 12 HOURS SCHEDULED
Status: DISCONTINUED | OUTPATIENT
Start: 2019-01-01 | End: 2019-01-01 | Stop reason: HOSPADM

## 2019-01-01 RX ORDER — CLONIDINE HYDROCHLORIDE 0.1 MG/1
0.1 TABLET ORAL EVERY 12 HOURS SCHEDULED
Status: DISCONTINUED | OUTPATIENT
Start: 2019-01-01 | End: 2019-01-01 | Stop reason: SDUPTHER

## 2019-01-01 RX ORDER — CLONIDINE HYDROCHLORIDE 0.2 MG/1
0.2 TABLET ORAL EVERY 12 HOURS SCHEDULED
Status: DISCONTINUED | OUTPATIENT
Start: 2019-01-01 | End: 2019-01-01

## 2019-01-01 RX ORDER — ATROPA BELLADONNA AND OPIUM 16.2; 6 MG/1; MG/1
60 SUPPOSITORY RECTAL EVERY 8 HOURS PRN
Status: DISCONTINUED | OUTPATIENT
Start: 2019-01-01 | End: 2019-01-01 | Stop reason: HOSPADM

## 2019-01-01 RX ORDER — DOXAZOSIN 2 MG/1
2 TABLET ORAL NIGHTLY
COMMUNITY

## 2019-01-01 RX ORDER — TERAZOSIN 5 MG/1
5 CAPSULE ORAL NIGHTLY
Status: DISCONTINUED | OUTPATIENT
Start: 2019-01-01 | End: 2019-01-01

## 2019-01-01 RX ORDER — AMLODIPINE BESYLATE 5 MG/1
5 TABLET ORAL
Status: DISCONTINUED | OUTPATIENT
Start: 2019-01-01 | End: 2019-01-01

## 2019-01-01 RX ORDER — HYDROCHLOROTHIAZIDE 25 MG/1
25 TABLET ORAL DAILY
Qty: 30 TABLET | Refills: 0
Start: 2019-01-01

## 2019-01-01 RX ORDER — ASPIRIN 81 MG/1
81 TABLET ORAL DAILY
COMMUNITY
End: 2019-01-01 | Stop reason: HOSPADM

## 2019-01-01 RX ORDER — LOSARTAN POTASSIUM 50 MG/1
50 TABLET ORAL
Status: DISCONTINUED | OUTPATIENT
Start: 2019-01-01 | End: 2019-01-01

## 2019-01-01 RX ORDER — CLONIDINE HYDROCHLORIDE 0.3 MG/1
0.3 TABLET ORAL EVERY 12 HOURS SCHEDULED
Status: DISCONTINUED | OUTPATIENT
Start: 2019-01-01 | End: 2019-01-01 | Stop reason: HOSPADM

## 2019-01-01 RX ORDER — LABETALOL HYDROCHLORIDE 5 MG/ML
20 INJECTION, SOLUTION INTRAVENOUS ONCE
Status: COMPLETED | OUTPATIENT
Start: 2019-01-01 | End: 2019-01-01

## 2019-01-01 RX ORDER — LOSARTAN POTASSIUM 100 MG/1
100 TABLET ORAL DAILY
COMMUNITY

## 2019-01-01 RX ORDER — METOPROLOL TARTRATE 50 MG/1
50 TABLET, FILM COATED ORAL EVERY 12 HOURS SCHEDULED
Status: DISCONTINUED | OUTPATIENT
Start: 2019-01-01 | End: 2019-01-01

## 2019-01-01 RX ORDER — OMEPRAZOLE 20 MG/1
20 CAPSULE, DELAYED RELEASE ORAL DAILY
COMMUNITY

## 2019-01-01 RX ORDER — HYDRALAZINE HYDROCHLORIDE 50 MG/1
100 TABLET, FILM COATED ORAL EVERY 8 HOURS SCHEDULED
Status: DISCONTINUED | OUTPATIENT
Start: 2019-01-01 | End: 2019-01-01 | Stop reason: HOSPADM

## 2019-01-01 RX ADMIN — SODIUM CHLORIDE 5 MG/HR: 9 INJECTION, SOLUTION INTRAVENOUS at 11:50

## 2019-01-01 RX ADMIN — METOPROLOL TARTRATE 25 MG: 25 TABLET, FILM COATED ORAL at 09:09

## 2019-01-01 RX ADMIN — HYDRALAZINE HYDROCHLORIDE 100 MG: 50 TABLET ORAL at 05:23

## 2019-01-01 RX ADMIN — HYDRALAZINE HYDROCHLORIDE 100 MG: 50 TABLET ORAL at 06:18

## 2019-01-01 RX ADMIN — SENNOSIDES AND DOCUSATE SODIUM 1 TABLET: 8.6; 5 TABLET ORAL at 08:17

## 2019-01-01 RX ADMIN — LOSARTAN POTASSIUM 100 MG: 50 TABLET, FILM COATED ORAL at 08:31

## 2019-01-01 RX ADMIN — METOPROLOL TARTRATE 25 MG: 25 TABLET, FILM COATED ORAL at 09:03

## 2019-01-01 RX ADMIN — SODIUM CHLORIDE 12.5 MG/HR: 9 INJECTION, SOLUTION INTRAVENOUS at 18:19

## 2019-01-01 RX ADMIN — OXYCODONE HYDROCHLORIDE AND ACETAMINOPHEN 1 TABLET: 5; 325 TABLET ORAL at 09:09

## 2019-01-01 RX ADMIN — HYDROCHLOROTHIAZIDE 12.5 MG: 25 TABLET ORAL at 08:50

## 2019-01-01 RX ADMIN — HYDRALAZINE HYDROCHLORIDE 100 MG: 50 TABLET ORAL at 21:18

## 2019-01-01 RX ADMIN — SODIUM CHLORIDE 15 MG/HR: 9 INJECTION, SOLUTION INTRAVENOUS at 18:37

## 2019-01-01 RX ADMIN — SODIUM CHLORIDE 12.5 MG/HR: 9 INJECTION, SOLUTION INTRAVENOUS at 14:07

## 2019-01-01 RX ADMIN — GLIPIZIDE 5 MG: 5 TABLET ORAL at 09:12

## 2019-01-01 RX ADMIN — TRAZODONE HYDROCHLORIDE 50 MG: 50 TABLET ORAL at 21:08

## 2019-01-01 RX ADMIN — OXYCODONE HYDROCHLORIDE AND ACETAMINOPHEN 1 TABLET: 5; 325 TABLET ORAL at 04:09

## 2019-01-01 RX ADMIN — POLYETHYLENE GLYCOL 3350 17 G: 17 POWDER, FOR SOLUTION ORAL at 09:29

## 2019-01-01 RX ADMIN — SODIUM CHLORIDE 15 MG/HR: 9 INJECTION, SOLUTION INTRAVENOUS at 20:25

## 2019-01-01 RX ADMIN — AMLODIPINE BESYLATE 10 MG: 10 TABLET ORAL at 08:28

## 2019-01-01 RX ADMIN — AMLODIPINE BESYLATE 10 MG: 10 TABLET ORAL at 09:54

## 2019-01-01 RX ADMIN — SENNOSIDES AND DOCUSATE SODIUM 1 TABLET: 8.6; 5 TABLET ORAL at 22:08

## 2019-01-01 RX ADMIN — SODIUM CHLORIDE 12.5 MG/HR: 9 INJECTION, SOLUTION INTRAVENOUS at 09:39

## 2019-01-01 RX ADMIN — HYDRALAZINE HYDROCHLORIDE 10 MG: 20 INJECTION INTRAMUSCULAR; INTRAVENOUS at 21:04

## 2019-01-01 RX ADMIN — OXYCODONE HYDROCHLORIDE AND ACETAMINOPHEN 1 TABLET: 5; 325 TABLET ORAL at 05:08

## 2019-01-01 RX ADMIN — SENNOSIDES AND DOCUSATE SODIUM 1 TABLET: 8.6; 5 TABLET ORAL at 09:30

## 2019-01-01 RX ADMIN — HYDRALAZINE HYDROCHLORIDE 100 MG: 50 TABLET ORAL at 14:54

## 2019-01-01 RX ADMIN — SODIUM CHLORIDE 7.5 MG/HR: 9 INJECTION, SOLUTION INTRAVENOUS at 15:30

## 2019-01-01 RX ADMIN — SODIUM CHLORIDE 12.5 MG/HR: 9 INJECTION, SOLUTION INTRAVENOUS at 09:36

## 2019-01-01 RX ADMIN — CLONIDINE HYDROCHLORIDE 0.3 MG: 0.3 TABLET ORAL at 20:28

## 2019-01-01 RX ADMIN — SODIUM CHLORIDE 12.5 MG/HR: 9 INJECTION, SOLUTION INTRAVENOUS at 21:44

## 2019-01-01 RX ADMIN — CLONIDINE HYDROCHLORIDE 0.1 MG: 0.1 TABLET ORAL at 09:12

## 2019-01-01 RX ADMIN — HYDRALAZINE HYDROCHLORIDE 100 MG: 50 TABLET ORAL at 21:24

## 2019-01-01 RX ADMIN — SODIUM CHLORIDE, PRESERVATIVE FREE 10 ML: 5 INJECTION INTRAVENOUS at 09:54

## 2019-01-01 RX ADMIN — TRAZODONE HYDROCHLORIDE 100 MG: 100 TABLET ORAL at 20:11

## 2019-01-01 RX ADMIN — TRAZODONE HYDROCHLORIDE 100 MG: 100 TABLET ORAL at 22:08

## 2019-01-01 RX ADMIN — OXYCODONE HYDROCHLORIDE AND ACETAMINOPHEN 1 TABLET: 5; 325 TABLET ORAL at 09:54

## 2019-01-01 RX ADMIN — OXYCODONE HYDROCHLORIDE AND ACETAMINOPHEN 1 TABLET: 5; 325 TABLET ORAL at 20:16

## 2019-01-01 RX ADMIN — POTASSIUM CHLORIDE AND SODIUM CHLORIDE 100 ML/HR: 900; 150 INJECTION, SOLUTION INTRAVENOUS at 07:56

## 2019-01-01 RX ADMIN — SODIUM CHLORIDE 15 MG/HR: 9 INJECTION, SOLUTION INTRAVENOUS at 19:43

## 2019-01-01 RX ADMIN — SODIUM CHLORIDE 15 MG/HR: 9 INJECTION, SOLUTION INTRAVENOUS at 12:27

## 2019-01-01 RX ADMIN — ATORVASTATIN CALCIUM 20 MG: 10 TABLET, FILM COATED ORAL at 20:17

## 2019-01-01 RX ADMIN — SODIUM CHLORIDE, PRESERVATIVE FREE 10 ML: 5 INJECTION INTRAVENOUS at 21:09

## 2019-01-01 RX ADMIN — HYDRALAZINE HYDROCHLORIDE 100 MG: 50 TABLET ORAL at 05:24

## 2019-01-01 RX ADMIN — SODIUM CHLORIDE 10 MG/HR: 9 INJECTION, SOLUTION INTRAVENOUS at 15:46

## 2019-01-01 RX ADMIN — SODIUM CHLORIDE 5 MG/HR: 9 INJECTION, SOLUTION INTRAVENOUS at 09:36

## 2019-01-01 RX ADMIN — HYDRALAZINE HYDROCHLORIDE 10 MG: 20 INJECTION INTRAMUSCULAR; INTRAVENOUS at 05:40

## 2019-01-01 RX ADMIN — HYDROCHLOROTHIAZIDE 12.5 MG: 25 TABLET ORAL at 09:53

## 2019-01-01 RX ADMIN — SODIUM CHLORIDE 15 MG/HR: 9 INJECTION, SOLUTION INTRAVENOUS at 18:27

## 2019-01-01 RX ADMIN — OXYCODONE HYDROCHLORIDE AND ACETAMINOPHEN 2 TABLET: 7.5; 325 TABLET ORAL at 21:24

## 2019-01-01 RX ADMIN — TRAZODONE HYDROCHLORIDE 100 MG: 100 TABLET ORAL at 21:24

## 2019-01-01 RX ADMIN — OXYCODONE HYDROCHLORIDE AND ACETAMINOPHEN 1 TABLET: 7.5; 325 TABLET ORAL at 15:37

## 2019-01-01 RX ADMIN — CLONIDINE HYDROCHLORIDE 0.3 MG: 0.3 TABLET ORAL at 08:49

## 2019-01-01 RX ADMIN — SODIUM CHLORIDE 15 MG/HR: 9 INJECTION, SOLUTION INTRAVENOUS at 22:14

## 2019-01-01 RX ADMIN — HYDRALAZINE HYDROCHLORIDE 100 MG: 50 TABLET ORAL at 21:14

## 2019-01-01 RX ADMIN — HYDRALAZINE HYDROCHLORIDE 100 MG: 50 TABLET ORAL at 22:01

## 2019-01-01 RX ADMIN — SODIUM CHLORIDE 7.5 MG/HR: 9 INJECTION, SOLUTION INTRAVENOUS at 05:21

## 2019-01-01 RX ADMIN — METOPROLOL TARTRATE 50 MG: 50 TABLET ORAL at 09:12

## 2019-01-01 RX ADMIN — OXYCODONE HYDROCHLORIDE AND ACETAMINOPHEN 2 TABLET: 7.5; 325 TABLET ORAL at 19:27

## 2019-01-01 RX ADMIN — SODIUM CHLORIDE 12.5 MG/HR: 9 INJECTION, SOLUTION INTRAVENOUS at 16:07

## 2019-01-01 RX ADMIN — LABETALOL HYDROCHLORIDE 10 MG: 5 INJECTION, SOLUTION INTRAVENOUS at 08:59

## 2019-01-01 RX ADMIN — HYDRALAZINE HYDROCHLORIDE 100 MG: 50 TABLET ORAL at 14:52

## 2019-01-01 RX ADMIN — SODIUM CHLORIDE, PRESERVATIVE FREE 10 ML: 5 INJECTION INTRAVENOUS at 08:51

## 2019-01-01 RX ADMIN — SODIUM CHLORIDE, PRESERVATIVE FREE 10 ML: 5 INJECTION INTRAVENOUS at 20:45

## 2019-01-01 RX ADMIN — OXYCODONE HYDROCHLORIDE AND ACETAMINOPHEN 1 TABLET: 5; 325 TABLET ORAL at 15:11

## 2019-01-01 RX ADMIN — OXYCODONE HYDROCHLORIDE AND ACETAMINOPHEN 1 TABLET: 5; 325 TABLET ORAL at 13:45

## 2019-01-01 RX ADMIN — METOPROLOL TARTRATE 25 MG: 25 TABLET, FILM COATED ORAL at 21:20

## 2019-01-01 RX ADMIN — OXYCODONE HYDROCHLORIDE AND ACETAMINOPHEN 1 TABLET: 5; 325 TABLET ORAL at 12:29

## 2019-01-01 RX ADMIN — CLONIDINE HYDROCHLORIDE 0.3 MG: 0.3 TABLET ORAL at 09:02

## 2019-01-01 RX ADMIN — LABETALOL HYDROCHLORIDE 10 MG: 5 INJECTION, SOLUTION INTRAVENOUS at 03:13

## 2019-01-01 RX ADMIN — TRAZODONE HYDROCHLORIDE 50 MG: 50 TABLET ORAL at 21:19

## 2019-01-01 RX ADMIN — AMLODIPINE BESYLATE 10 MG: 10 TABLET ORAL at 09:13

## 2019-01-01 RX ADMIN — SODIUM CHLORIDE 15 MG/HR: 9 INJECTION, SOLUTION INTRAVENOUS at 03:25

## 2019-01-01 RX ADMIN — LABETALOL HYDROCHLORIDE 10 MG: 5 INJECTION, SOLUTION INTRAVENOUS at 21:10

## 2019-01-01 RX ADMIN — OXYCODONE HYDROCHLORIDE AND ACETAMINOPHEN 1 TABLET: 5; 325 TABLET ORAL at 03:53

## 2019-01-01 RX ADMIN — SODIUM CHLORIDE 5 MG/HR: 9 INJECTION, SOLUTION INTRAVENOUS at 16:08

## 2019-01-01 RX ADMIN — SODIUM CHLORIDE 10 MG/HR: 9 INJECTION, SOLUTION INTRAVENOUS at 01:01

## 2019-01-01 RX ADMIN — OXYCODONE HYDROCHLORIDE AND ACETAMINOPHEN 1 TABLET: 5; 325 TABLET ORAL at 16:09

## 2019-01-01 RX ADMIN — POLYETHYLENE GLYCOL 3350 17 G: 17 POWDER, FOR SOLUTION ORAL at 08:18

## 2019-01-01 RX ADMIN — TERAZOSIN HYDROCHLORIDE 10 MG: 10 CAPSULE ORAL at 20:28

## 2019-01-01 RX ADMIN — TERAZOSIN HYDROCHLORIDE 10 MG: 10 CAPSULE ORAL at 21:21

## 2019-01-01 RX ADMIN — METOPROLOL SUCCINATE 100 MG: 100 TABLET, FILM COATED, EXTENDED RELEASE ORAL at 21:15

## 2019-01-01 RX ADMIN — HYDRALAZINE HYDROCHLORIDE 10 MG: 20 INJECTION INTRAMUSCULAR; INTRAVENOUS at 05:13

## 2019-01-01 RX ADMIN — TERAZOSIN HYDROCHLORIDE 10 MG: 10 CAPSULE ORAL at 22:02

## 2019-01-01 RX ADMIN — LOSARTAN POTASSIUM 100 MG: 50 TABLET, FILM COATED ORAL at 08:50

## 2019-01-01 RX ADMIN — POTASSIUM CHLORIDE AND SODIUM CHLORIDE 100 ML/HR: 900; 150 INJECTION, SOLUTION INTRAVENOUS at 03:45

## 2019-01-01 RX ADMIN — SODIUM CHLORIDE 15 MG/HR: 9 INJECTION, SOLUTION INTRAVENOUS at 17:54

## 2019-01-01 RX ADMIN — OXYCODONE HYDROCHLORIDE AND ACETAMINOPHEN 1 TABLET: 5; 325 TABLET ORAL at 02:20

## 2019-01-01 RX ADMIN — HYDRALAZINE HYDROCHLORIDE 100 MG: 50 TABLET ORAL at 08:27

## 2019-01-01 RX ADMIN — LABETALOL HYDROCHLORIDE 20 MG: 5 INJECTION INTRAVENOUS at 09:23

## 2019-01-01 RX ADMIN — LIDOCAINE 1 PATCH: 50 PATCH TOPICAL at 08:27

## 2019-01-01 RX ADMIN — POTASSIUM CHLORIDE AND SODIUM CHLORIDE 100 ML/HR: 900; 150 INJECTION, SOLUTION INTRAVENOUS at 16:08

## 2019-01-01 RX ADMIN — CLONIDINE HYDROCHLORIDE 0.3 MG: 0.3 TABLET ORAL at 08:51

## 2019-01-01 RX ADMIN — OXYCODONE HYDROCHLORIDE AND ACETAMINOPHEN 1 TABLET: 5; 325 TABLET ORAL at 12:39

## 2019-01-01 RX ADMIN — HYDRALAZINE HYDROCHLORIDE 100 MG: 50 TABLET ORAL at 20:55

## 2019-01-01 RX ADMIN — CLONIDINE HYDROCHLORIDE 0.3 MG: 0.3 TABLET ORAL at 08:28

## 2019-01-01 RX ADMIN — HYDROCHLOROTHIAZIDE 25 MG: 25 TABLET ORAL at 08:31

## 2019-01-01 RX ADMIN — HYDRALAZINE HYDROCHLORIDE 100 MG: 50 TABLET ORAL at 21:08

## 2019-01-01 RX ADMIN — SODIUM CHLORIDE 10 MG/HR: 9 INJECTION, SOLUTION INTRAVENOUS at 00:48

## 2019-01-01 RX ADMIN — TERAZOSIN HYDROCHLORIDE 10 MG: 10 CAPSULE ORAL at 21:08

## 2019-01-01 RX ADMIN — POTASSIUM CHLORIDE AND SODIUM CHLORIDE 100 ML/HR: 900; 150 INJECTION, SOLUTION INTRAVENOUS at 17:10

## 2019-01-01 RX ADMIN — SODIUM CHLORIDE, PRESERVATIVE FREE 10 ML: 5 INJECTION INTRAVENOUS at 22:02

## 2019-01-01 RX ADMIN — TRAZODONE HYDROCHLORIDE 100 MG: 100 TABLET ORAL at 22:01

## 2019-01-01 RX ADMIN — ATORVASTATIN CALCIUM 20 MG: 10 TABLET, FILM COATED ORAL at 20:55

## 2019-01-01 RX ADMIN — OXYCODONE HYDROCHLORIDE AND ACETAMINOPHEN 1 TABLET: 5; 325 TABLET ORAL at 10:13

## 2019-01-01 RX ADMIN — HYDRALAZINE HYDROCHLORIDE 10 MG: 20 INJECTION INTRAMUSCULAR; INTRAVENOUS at 12:35

## 2019-01-01 RX ADMIN — CLONIDINE HYDROCHLORIDE 0.3 MG: 0.3 TABLET ORAL at 09:30

## 2019-01-01 RX ADMIN — TERAZOSIN HYDROCHLORIDE 10 MG: 10 CAPSULE ORAL at 20:39

## 2019-01-01 RX ADMIN — SENNOSIDES AND DOCUSATE SODIUM 1 TABLET: 8.6; 5 TABLET ORAL at 20:55

## 2019-01-01 RX ADMIN — CLONIDINE HYDROCHLORIDE 0.3 MG: 0.3 TABLET ORAL at 21:24

## 2019-01-01 RX ADMIN — SODIUM CHLORIDE 15 MG/HR: 9 INJECTION, SOLUTION INTRAVENOUS at 16:59

## 2019-01-01 RX ADMIN — OXYCODONE HYDROCHLORIDE AND ACETAMINOPHEN 2 TABLET: 7.5; 325 TABLET ORAL at 23:32

## 2019-01-01 RX ADMIN — CLONIDINE HYDROCHLORIDE 0.3 MG: 0.3 TABLET ORAL at 08:09

## 2019-01-01 RX ADMIN — OXYCODONE HYDROCHLORIDE AND ACETAMINOPHEN 1 TABLET: 5; 325 TABLET ORAL at 22:32

## 2019-01-01 RX ADMIN — HYDRALAZINE HYDROCHLORIDE 100 MG: 50 TABLET ORAL at 14:18

## 2019-01-01 RX ADMIN — ATORVASTATIN CALCIUM 20 MG: 10 TABLET, FILM COATED ORAL at 22:02

## 2019-01-01 RX ADMIN — ATORVASTATIN CALCIUM 20 MG: 10 TABLET, FILM COATED ORAL at 22:08

## 2019-01-01 RX ADMIN — TERAZOSIN HYDROCHLORIDE 10 MG: 10 CAPSULE ORAL at 21:24

## 2019-01-01 RX ADMIN — AMLODIPINE BESYLATE 10 MG: 10 TABLET ORAL at 09:09

## 2019-01-01 RX ADMIN — LABETALOL HYDROCHLORIDE 10 MG: 5 INJECTION, SOLUTION INTRAVENOUS at 22:05

## 2019-01-01 RX ADMIN — HYDRALAZINE HYDROCHLORIDE 10 MG: 20 INJECTION INTRAMUSCULAR; INTRAVENOUS at 05:12

## 2019-01-01 RX ADMIN — ACETAMINOPHEN 650 MG: 325 TABLET, FILM COATED ORAL at 20:17

## 2019-01-01 RX ADMIN — OXYCODONE HYDROCHLORIDE AND ACETAMINOPHEN 1 TABLET: 5; 325 TABLET ORAL at 21:12

## 2019-01-01 RX ADMIN — SODIUM CHLORIDE 7.5 MG/HR: 9 INJECTION, SOLUTION INTRAVENOUS at 05:09

## 2019-01-01 RX ADMIN — AMLODIPINE BESYLATE 10 MG: 10 TABLET ORAL at 08:49

## 2019-01-01 RX ADMIN — OXYCODONE HYDROCHLORIDE AND ACETAMINOPHEN 1 TABLET: 7.5; 325 TABLET ORAL at 00:05

## 2019-01-01 RX ADMIN — SODIUM CHLORIDE, PRESERVATIVE FREE 10 ML: 5 INJECTION INTRAVENOUS at 21:10

## 2019-01-01 RX ADMIN — POLYETHYLENE GLYCOL 3350 17 G: 17 POWDER, FOR SOLUTION ORAL at 08:31

## 2019-01-01 RX ADMIN — LABETALOL HYDROCHLORIDE 10 MG: 5 INJECTION, SOLUTION INTRAVENOUS at 18:11

## 2019-01-01 RX ADMIN — HYDRALAZINE HYDROCHLORIDE 10 MG: 20 INJECTION INTRAMUSCULAR; INTRAVENOUS at 23:34

## 2019-01-01 RX ADMIN — CLONIDINE HYDROCHLORIDE 0.3 MG: 0.3 TABLET ORAL at 21:08

## 2019-01-01 RX ADMIN — GLIPIZIDE 5 MG: 5 TABLET ORAL at 08:49

## 2019-01-01 RX ADMIN — HYDRALAZINE HYDROCHLORIDE 75 MG: 50 TABLET ORAL at 21:08

## 2019-01-01 RX ADMIN — HYDRALAZINE HYDROCHLORIDE 100 MG: 50 TABLET ORAL at 05:12

## 2019-01-01 RX ADMIN — SODIUM CHLORIDE, PRESERVATIVE FREE 10 ML: 5 INJECTION INTRAVENOUS at 09:34

## 2019-01-01 RX ADMIN — SODIUM CHLORIDE, PRESERVATIVE FREE 10 ML: 5 INJECTION INTRAVENOUS at 22:19

## 2019-01-01 RX ADMIN — HYDRALAZINE HYDROCHLORIDE 10 MG: 20 INJECTION INTRAMUSCULAR; INTRAVENOUS at 09:10

## 2019-01-01 RX ADMIN — SODIUM CHLORIDE 5 MG: 9 INJECTION, SOLUTION INTRAVENOUS at 09:47

## 2019-01-01 RX ADMIN — SODIUM CHLORIDE, PRESERVATIVE FREE 10 ML: 5 INJECTION INTRAVENOUS at 11:55

## 2019-01-01 RX ADMIN — PANTOPRAZOLE SODIUM 40 MG: 40 TABLET, DELAYED RELEASE ORAL at 05:12

## 2019-01-01 RX ADMIN — TERAZOSIN HYDROCHLORIDE 10 MG: 10 CAPSULE ORAL at 20:17

## 2019-01-01 RX ADMIN — HYDRALAZINE HYDROCHLORIDE 10 MG: 20 INJECTION INTRAMUSCULAR; INTRAVENOUS at 08:51

## 2019-01-01 RX ADMIN — LOSARTAN POTASSIUM 100 MG: 50 TABLET, FILM COATED ORAL at 09:29

## 2019-01-01 RX ADMIN — TRAZODONE HYDROCHLORIDE 50 MG: 50 TABLET ORAL at 20:28

## 2019-01-01 RX ADMIN — SODIUM CHLORIDE, PRESERVATIVE FREE 10 ML: 5 INJECTION INTRAVENOUS at 08:02

## 2019-01-01 RX ADMIN — SODIUM CHLORIDE 10 MG/HR: 9 INJECTION, SOLUTION INTRAVENOUS at 08:25

## 2019-01-01 RX ADMIN — HYDRALAZINE HYDROCHLORIDE 50 MG: 50 TABLET ORAL at 15:02

## 2019-01-01 RX ADMIN — TERAZOSIN HYDROCHLORIDE 2 MG: 2 CAPSULE ORAL at 22:14

## 2019-01-01 RX ADMIN — PANTOPRAZOLE SODIUM 40 MG: 40 TABLET, DELAYED RELEASE ORAL at 06:12

## 2019-01-01 RX ADMIN — TERAZOSIN HYDROCHLORIDE 5 MG: 5 CAPSULE ORAL at 21:20

## 2019-01-01 RX ADMIN — CLONIDINE HYDROCHLORIDE 0.3 MG: 0.3 TABLET ORAL at 22:09

## 2019-01-01 RX ADMIN — OXYCODONE HYDROCHLORIDE AND ACETAMINOPHEN 1 TABLET: 5; 325 TABLET ORAL at 08:30

## 2019-01-01 RX ADMIN — SODIUM CHLORIDE 10 MG/HR: 9 INJECTION, SOLUTION INTRAVENOUS at 21:00

## 2019-01-01 RX ADMIN — AMLODIPINE BESYLATE 10 MG: 10 TABLET ORAL at 08:51

## 2019-01-01 RX ADMIN — HYDROCHLOROTHIAZIDE 25 MG: 25 TABLET ORAL at 08:17

## 2019-01-01 RX ADMIN — CLONIDINE HYDROCHLORIDE 0.1 MG: 0.1 TABLET ORAL at 17:38

## 2019-01-01 RX ADMIN — CLONIDINE HYDROCHLORIDE 0.3 MG: 0.3 TABLET ORAL at 08:31

## 2019-01-01 RX ADMIN — TERAZOSIN HYDROCHLORIDE 10 MG: 10 CAPSULE ORAL at 20:11

## 2019-01-01 RX ADMIN — LABETALOL HYDROCHLORIDE 10 MG: 5 INJECTION, SOLUTION INTRAVENOUS at 19:23

## 2019-01-01 RX ADMIN — METOPROLOL TARTRATE 25 MG: 25 TABLET, FILM COATED ORAL at 16:08

## 2019-01-01 RX ADMIN — BISACODYL 10 MG: 10 SUPPOSITORY RECTAL at 12:15

## 2019-01-01 RX ADMIN — TRAZODONE HYDROCHLORIDE 100 MG: 100 TABLET ORAL at 21:08

## 2019-01-01 RX ADMIN — SODIUM CHLORIDE, PRESERVATIVE FREE 10 ML: 5 INJECTION INTRAVENOUS at 09:29

## 2019-01-01 RX ADMIN — SENNOSIDES AND DOCUSATE SODIUM 1 TABLET: 8.6; 5 TABLET ORAL at 15:17

## 2019-01-01 RX ADMIN — SODIUM CHLORIDE 15 MG/HR: 9 INJECTION, SOLUTION INTRAVENOUS at 05:31

## 2019-01-01 RX ADMIN — PANTOPRAZOLE SODIUM 40 MG: 40 TABLET, DELAYED RELEASE ORAL at 05:23

## 2019-01-01 RX ADMIN — SODIUM CHLORIDE 12.5 MG/HR: 9 INJECTION, SOLUTION INTRAVENOUS at 06:45

## 2019-01-01 RX ADMIN — OXYCODONE HYDROCHLORIDE AND ACETAMINOPHEN 1 TABLET: 7.5; 325 TABLET ORAL at 16:59

## 2019-01-01 RX ADMIN — SODIUM CHLORIDE, PRESERVATIVE FREE 10 ML: 5 INJECTION INTRAVENOUS at 21:12

## 2019-01-01 RX ADMIN — HYDROCHLOROTHIAZIDE 12.5 MG: 25 TABLET ORAL at 11:52

## 2019-01-01 RX ADMIN — SODIUM CHLORIDE, PRESERVATIVE FREE 10 ML: 5 INJECTION INTRAVENOUS at 09:13

## 2019-01-01 RX ADMIN — ACETAMINOPHEN 650 MG: 650 SUPPOSITORY RECTAL at 21:08

## 2019-01-01 RX ADMIN — SODIUM CHLORIDE 15 MG/HR: 9 INJECTION, SOLUTION INTRAVENOUS at 20:16

## 2019-01-01 RX ADMIN — METOPROLOL SUCCINATE 100 MG: 100 TABLET, FILM COATED, EXTENDED RELEASE ORAL at 10:16

## 2019-01-01 RX ADMIN — OXYCODONE HYDROCHLORIDE AND ACETAMINOPHEN 2 TABLET: 7.5; 325 TABLET ORAL at 09:53

## 2019-01-01 RX ADMIN — GLIPIZIDE 5 MG: 5 TABLET ORAL at 08:09

## 2019-01-01 RX ADMIN — TRAZODONE HYDROCHLORIDE 100 MG: 100 TABLET ORAL at 20:56

## 2019-01-01 RX ADMIN — METOPROLOL TARTRATE 50 MG: 50 TABLET ORAL at 20:39

## 2019-01-01 RX ADMIN — LOSARTAN POTASSIUM 100 MG: 50 TABLET, FILM COATED ORAL at 08:18

## 2019-01-01 RX ADMIN — CLONIDINE HYDROCHLORIDE 0.3 MG: 0.3 TABLET ORAL at 08:18

## 2019-01-01 RX ADMIN — SODIUM CHLORIDE 15 MG/HR: 9 INJECTION, SOLUTION INTRAVENOUS at 14:00

## 2019-01-01 RX ADMIN — SODIUM CHLORIDE, PRESERVATIVE FREE 10 ML: 5 INJECTION INTRAVENOUS at 22:27

## 2019-01-01 RX ADMIN — SODIUM CHLORIDE 12.5 MG/HR: 9 INJECTION, SOLUTION INTRAVENOUS at 22:44

## 2019-01-01 RX ADMIN — CLONIDINE HYDROCHLORIDE 0.1 MG: 0.1 TABLET ORAL at 10:51

## 2019-01-01 RX ADMIN — LOSARTAN POTASSIUM 100 MG: 50 TABLET, FILM COATED ORAL at 08:28

## 2019-01-01 RX ADMIN — AMLODIPINE BESYLATE 10 MG: 10 TABLET ORAL at 08:31

## 2019-01-01 RX ADMIN — GLIPIZIDE 5 MG: 5 TABLET ORAL at 08:31

## 2019-01-01 RX ADMIN — SODIUM CHLORIDE 12.5 MG/HR: 9 INJECTION, SOLUTION INTRAVENOUS at 20:35

## 2019-01-01 RX ADMIN — TERAZOSIN HYDROCHLORIDE 10 MG: 10 CAPSULE ORAL at 20:56

## 2019-01-01 RX ADMIN — GADOBENATE DIMEGLUMINE 20 ML: 529 INJECTION, SOLUTION INTRAVENOUS at 16:30

## 2019-01-01 RX ADMIN — TRAZODONE HYDROCHLORIDE 50 MG: 50 TABLET ORAL at 02:37

## 2019-01-01 RX ADMIN — OXYCODONE HYDROCHLORIDE AND ACETAMINOPHEN 1 TABLET: 5; 325 TABLET ORAL at 08:14

## 2019-01-01 RX ADMIN — GLIPIZIDE 5 MG: 5 TABLET ORAL at 07:55

## 2019-01-01 RX ADMIN — CLONIDINE HYDROCHLORIDE 0.1 MG: 0.1 TABLET ORAL at 20:39

## 2019-01-01 RX ADMIN — SODIUM CHLORIDE 7.5 MG/HR: 9 INJECTION, SOLUTION INTRAVENOUS at 01:33

## 2019-01-01 RX ADMIN — SODIUM CHLORIDE 5 MG/HR: 9 INJECTION, SOLUTION INTRAVENOUS at 05:08

## 2019-01-01 RX ADMIN — HYDRALAZINE HYDROCHLORIDE 10 MG: 20 INJECTION INTRAMUSCULAR; INTRAVENOUS at 11:56

## 2019-01-01 RX ADMIN — AMLODIPINE BESYLATE 10 MG: 10 TABLET ORAL at 09:02

## 2019-01-01 RX ADMIN — GLIPIZIDE 5 MG: 5 TABLET ORAL at 08:13

## 2019-01-01 RX ADMIN — AMLODIPINE BESYLATE 5 MG: 5 TABLET ORAL at 18:19

## 2019-01-01 RX ADMIN — PERFLUTREN: 6.52 INJECTION, SUSPENSION INTRAVENOUS at 11:38

## 2019-01-01 RX ADMIN — AMLODIPINE BESYLATE 10 MG: 10 TABLET ORAL at 08:09

## 2019-01-01 RX ADMIN — TERAZOSIN HYDROCHLORIDE 5 MG: 5 CAPSULE ORAL at 11:50

## 2019-01-01 RX ADMIN — SODIUM CHLORIDE, PRESERVATIVE FREE 10 ML: 5 INJECTION INTRAVENOUS at 08:32

## 2019-01-01 RX ADMIN — LIDOCAINE 1 PATCH: 50 PATCH TOPICAL at 08:19

## 2019-01-01 RX ADMIN — GLIPIZIDE 5 MG: 5 TABLET ORAL at 09:02

## 2019-01-01 RX ADMIN — POTASSIUM CHLORIDE AND SODIUM CHLORIDE 100 ML/HR: 900; 150 INJECTION, SOLUTION INTRAVENOUS at 11:22

## 2019-01-01 RX ADMIN — OXYCODONE HYDROCHLORIDE AND ACETAMINOPHEN 2 TABLET: 7.5; 325 TABLET ORAL at 21:19

## 2019-01-01 RX ADMIN — SODIUM CHLORIDE, PRESERVATIVE FREE 10 ML: 5 INJECTION INTRAVENOUS at 21:16

## 2019-01-01 RX ADMIN — CLONIDINE HYDROCHLORIDE 0.3 MG: 0.3 TABLET ORAL at 20:56

## 2019-01-01 RX ADMIN — SENNOSIDES AND DOCUSATE SODIUM 1 TABLET: 8.6; 5 TABLET ORAL at 08:31

## 2019-01-01 RX ADMIN — SODIUM CHLORIDE, PRESERVATIVE FREE 10 ML: 5 INJECTION INTRAVENOUS at 08:14

## 2019-01-01 RX ADMIN — SODIUM CHLORIDE, PRESERVATIVE FREE 10 ML: 5 INJECTION INTRAVENOUS at 21:36

## 2019-01-01 RX ADMIN — HYDRALAZINE HYDROCHLORIDE 100 MG: 50 TABLET ORAL at 06:12

## 2019-01-01 RX ADMIN — METOPROLOL SUCCINATE 100 MG: 100 TABLET, FILM COATED, EXTENDED RELEASE ORAL at 11:56

## 2019-01-01 RX ADMIN — CLONIDINE HYDROCHLORIDE 0.3 MG: 0.3 TABLET ORAL at 22:02

## 2019-01-01 RX ADMIN — HYDRALAZINE HYDROCHLORIDE 75 MG: 50 TABLET ORAL at 05:07

## 2019-01-01 RX ADMIN — OXYCODONE HYDROCHLORIDE AND ACETAMINOPHEN 2 TABLET: 7.5; 325 TABLET ORAL at 00:53

## 2019-01-01 RX ADMIN — SODIUM CHLORIDE, PRESERVATIVE FREE 10 ML: 5 INJECTION INTRAVENOUS at 21:33

## 2019-01-01 RX ADMIN — POTASSIUM CHLORIDE AND SODIUM CHLORIDE 100 ML/HR: 900; 150 INJECTION, SOLUTION INTRAVENOUS at 03:12

## 2019-01-01 RX ADMIN — SODIUM CHLORIDE 15 MG/HR: 9 INJECTION, SOLUTION INTRAVENOUS at 15:50

## 2019-01-01 RX ADMIN — SODIUM CHLORIDE 15 MG/HR: 9 INJECTION, SOLUTION INTRAVENOUS at 14:03

## 2019-01-01 RX ADMIN — OXYCODONE HYDROCHLORIDE AND ACETAMINOPHEN 1 TABLET: 5; 325 TABLET ORAL at 04:38

## 2019-01-01 RX ADMIN — CLONIDINE HYDROCHLORIDE 0.1 MG: 0.1 TABLET ORAL at 08:13

## 2019-01-01 RX ADMIN — PANTOPRAZOLE SODIUM 40 MG: 40 TABLET, DELAYED RELEASE ORAL at 05:25

## 2019-01-01 RX ADMIN — OXYCODONE HYDROCHLORIDE AND ACETAMINOPHEN 2 TABLET: 7.5; 325 TABLET ORAL at 21:20

## 2019-01-01 RX ADMIN — ACETAMINOPHEN 650 MG: 325 TABLET, FILM COATED ORAL at 16:06

## 2019-01-01 RX ADMIN — OXYCODONE HYDROCHLORIDE AND ACETAMINOPHEN 2 TABLET: 7.5; 325 TABLET ORAL at 03:31

## 2019-01-01 RX ADMIN — POLYETHYLENE GLYCOL 3350 17 G: 17 POWDER, FOR SOLUTION ORAL at 15:16

## 2019-01-01 RX ADMIN — CLONIDINE HYDROCHLORIDE 0.3 MG: 0.3 TABLET ORAL at 20:19

## 2019-01-01 RX ADMIN — POTASSIUM CHLORIDE AND SODIUM CHLORIDE 100 ML/HR: 900; 150 INJECTION, SOLUTION INTRAVENOUS at 17:40

## 2019-01-01 RX ADMIN — CLONIDINE HYDROCHLORIDE 0.3 MG: 0.3 TABLET ORAL at 09:53

## 2019-01-01 RX ADMIN — HYDRALAZINE HYDROCHLORIDE 100 MG: 50 TABLET ORAL at 22:08

## 2019-01-01 RX ADMIN — GLIPIZIDE 5 MG: 5 TABLET ORAL at 08:06

## 2019-01-01 RX ADMIN — SENNOSIDES AND DOCUSATE SODIUM 1 TABLET: 8.6; 5 TABLET ORAL at 21:08

## 2019-01-01 RX ADMIN — ATORVASTATIN CALCIUM 20 MG: 10 TABLET, FILM COATED ORAL at 21:08

## 2019-01-01 RX ADMIN — CLONIDINE HYDROCHLORIDE 0.1 MG: 0.1 TABLET ORAL at 10:36

## 2019-01-01 RX ADMIN — CLONIDINE HYDROCHLORIDE 0.3 MG: 0.3 TABLET ORAL at 21:09

## 2019-01-01 RX ADMIN — SODIUM CHLORIDE 7.5 MG/HR: 9 INJECTION, SOLUTION INTRAVENOUS at 09:50

## 2019-01-01 RX ADMIN — OXYCODONE HYDROCHLORIDE AND ACETAMINOPHEN 1 TABLET: 5; 325 TABLET ORAL at 05:14

## 2019-01-01 RX ADMIN — SODIUM CHLORIDE 5 MG/HR: 9 INJECTION, SOLUTION INTRAVENOUS at 22:09

## 2019-01-01 RX ADMIN — SODIUM CHLORIDE 7.5 MG/HR: 9 INJECTION, SOLUTION INTRAVENOUS at 07:42

## 2019-01-01 RX ADMIN — HYDRALAZINE HYDROCHLORIDE 100 MG: 50 TABLET ORAL at 13:45

## 2019-01-01 RX ADMIN — OXYCODONE HYDROCHLORIDE AND ACETAMINOPHEN 2 TABLET: 7.5; 325 TABLET ORAL at 00:02

## 2019-01-01 RX ADMIN — OXYCODONE HYDROCHLORIDE AND ACETAMINOPHEN 1 TABLET: 7.5; 325 TABLET ORAL at 21:04

## 2019-01-01 RX ADMIN — SODIUM CHLORIDE 15 MG/HR: 9 INJECTION, SOLUTION INTRAVENOUS at 11:58

## 2019-01-01 RX ADMIN — SODIUM CHLORIDE, PRESERVATIVE FREE 10 ML: 5 INJECTION INTRAVENOUS at 09:43

## 2019-01-01 RX ADMIN — TRAZODONE HYDROCHLORIDE 100 MG: 100 TABLET ORAL at 20:17

## 2019-01-01 RX ADMIN — LIDOCAINE 1 PATCH: 50 PATCH TOPICAL at 14:18

## 2019-01-01 RX ADMIN — SODIUM CHLORIDE, PRESERVATIVE FREE 10 ML: 5 INJECTION INTRAVENOUS at 10:54

## 2019-01-01 RX ADMIN — HYDRALAZINE HYDROCHLORIDE 10 MG: 20 INJECTION INTRAMUSCULAR; INTRAVENOUS at 20:13

## 2019-01-01 RX ADMIN — LABETALOL HYDROCHLORIDE 10 MG: 5 INJECTION, SOLUTION INTRAVENOUS at 19:57

## 2019-01-01 RX ADMIN — CLONIDINE HYDROCHLORIDE 0.1 MG: 0.1 TABLET ORAL at 20:13

## 2019-01-01 RX ADMIN — GLIPIZIDE 5 MG: 5 TABLET ORAL at 09:30

## 2019-01-01 RX ADMIN — LOSARTAN POTASSIUM 50 MG: 50 TABLET, FILM COATED ORAL at 11:56

## 2019-01-01 RX ADMIN — OXYCODONE HYDROCHLORIDE AND ACETAMINOPHEN 2 TABLET: 7.5; 325 TABLET ORAL at 15:08

## 2019-01-01 RX ADMIN — OXYCODONE HYDROCHLORIDE AND ACETAMINOPHEN 1 TABLET: 5; 325 TABLET ORAL at 13:30

## 2019-01-01 RX ADMIN — SODIUM CHLORIDE, PRESERVATIVE FREE 10 ML: 5 INJECTION INTRAVENOUS at 20:17

## 2019-01-01 RX ADMIN — OXYCODONE HYDROCHLORIDE AND ACETAMINOPHEN 2 TABLET: 7.5; 325 TABLET ORAL at 16:06

## 2019-01-01 RX ADMIN — SODIUM CHLORIDE 12.5 MG/HR: 9 INJECTION, SOLUTION INTRAVENOUS at 08:13

## 2019-01-01 RX ADMIN — METFORMIN HYDROCHLORIDE 850 MG: 850 TABLET ORAL at 08:13

## 2019-01-01 RX ADMIN — AMLODIPINE BESYLATE 10 MG: 10 TABLET ORAL at 09:30

## 2019-01-01 RX ADMIN — OXYCODONE HYDROCHLORIDE AND ACETAMINOPHEN 1 TABLET: 5; 325 TABLET ORAL at 10:51

## 2019-01-01 RX ADMIN — PANTOPRAZOLE SODIUM 40 MG: 40 TABLET, DELAYED RELEASE ORAL at 05:24

## 2019-01-01 RX ADMIN — HYDRALAZINE HYDROCHLORIDE 100 MG: 50 TABLET ORAL at 13:24

## 2019-01-01 RX ADMIN — OXYCODONE HYDROCHLORIDE AND ACETAMINOPHEN 2 TABLET: 7.5; 325 TABLET ORAL at 07:10

## 2019-01-01 RX ADMIN — LABETALOL HYDROCHLORIDE 10 MG: 5 INJECTION, SOLUTION INTRAVENOUS at 20:05

## 2019-01-01 RX ADMIN — AMLODIPINE BESYLATE 10 MG: 10 TABLET ORAL at 08:19

## 2019-01-01 RX ADMIN — CLONIDINE HYDROCHLORIDE 0.3 MG: 0.3 TABLET ORAL at 20:11

## 2019-01-01 RX ADMIN — HYDROCHLOROTHIAZIDE 12.5 MG: 25 TABLET ORAL at 09:29

## 2019-01-01 RX ADMIN — OXYCODONE HYDROCHLORIDE AND ACETAMINOPHEN 1 TABLET: 5; 325 TABLET ORAL at 18:13

## 2019-01-01 RX ADMIN — HYDRALAZINE HYDROCHLORIDE 100 MG: 50 TABLET ORAL at 13:42

## 2019-01-01 RX ADMIN — LOSARTAN POTASSIUM 50 MG: 50 TABLET, FILM COATED ORAL at 08:51

## 2019-01-01 RX ADMIN — OXYCODONE HYDROCHLORIDE AND ACETAMINOPHEN 1 TABLET: 5; 325 TABLET ORAL at 11:31

## 2019-01-01 RX ADMIN — HYDRALAZINE HYDROCHLORIDE 100 MG: 50 TABLET ORAL at 05:25

## 2019-01-01 RX ADMIN — HYDRALAZINE HYDROCHLORIDE 100 MG: 50 TABLET ORAL at 15:44

## 2019-01-01 RX ADMIN — SODIUM CHLORIDE 12.5 MG/HR: 9 INJECTION, SOLUTION INTRAVENOUS at 11:30

## 2019-01-01 RX ADMIN — SODIUM CHLORIDE 5 MG/HR: 9 INJECTION, SOLUTION INTRAVENOUS at 03:35

## 2019-01-01 RX ADMIN — GLIPIZIDE 5 MG: 5 TABLET ORAL at 09:53

## 2019-01-01 RX ADMIN — SODIUM CHLORIDE, PRESERVATIVE FREE 10 ML: 5 INJECTION INTRAVENOUS at 20:07

## 2019-01-01 RX ADMIN — LABETALOL HYDROCHLORIDE 10 MG: 5 INJECTION, SOLUTION INTRAVENOUS at 02:38

## 2019-01-01 RX ADMIN — HYDRALAZINE HYDROCHLORIDE 10 MG: 20 INJECTION INTRAMUSCULAR; INTRAVENOUS at 22:09

## 2019-01-01 RX ADMIN — LOSARTAN POTASSIUM 100 MG: 50 TABLET, FILM COATED ORAL at 09:53

## 2019-01-01 RX ADMIN — TERAZOSIN HYDROCHLORIDE 10 MG: 10 CAPSULE ORAL at 22:09

## 2019-01-01 RX ADMIN — HYDRALAZINE HYDROCHLORIDE 10 MG: 20 INJECTION INTRAMUSCULAR; INTRAVENOUS at 10:07

## 2019-01-01 RX ADMIN — GLIPIZIDE 5 MG: 5 TABLET ORAL at 09:03

## 2019-08-31 PROBLEM — I61.9 ICH (INTRACEREBRAL HEMORRHAGE) (HCC): Status: ACTIVE | Noted: 2019-01-01

## 2019-09-01 PROBLEM — E78.5 HYPERLIPIDEMIA: Status: ACTIVE | Noted: 2019-01-01

## 2019-09-01 PROBLEM — E11.9 DIABETES MELLITUS (HCC): Status: ACTIVE | Noted: 2019-01-01

## 2019-09-01 PROBLEM — I16.0 HYPERTENSIVE URGENCY: Status: ACTIVE | Noted: 2019-01-01

## 2019-09-01 NOTE — PROGRESS NOTES
"Neurosurgery Daily Progress Note    Assessment:   Phan Eastman is a 64 y.o. male with a significant comorbidity diabetes with diabetic neuropathy, intracerebral ischemic stroke x3.  He presents with a new problem of sudden onset of right-sided weakness and slurred speech. Physical exam findings of right-sided hemiparesis and right facial droop.  Their imaging shows 2.7 x 2.1 acute thalamic intracerebral hemorrhage.    DDX:     ICH (intracerebral hemorrhage) (CMS/[facility])    Patient Active Problem List   Diagnosis   • ICH (intracerebral hemorrhage) (CMS/[facility])       Plan:   Neuro: mild worsening of right sided weakness and dysarthria   CT stable   Most likely worsening edema   Cont to monitor     CV: on cardene, labetolol and hydralazine with marginal blood pressures.  Consult hospitalist  Skin: Rash on legs.  Monitor for now.   Pulm: PAUL  :  PAUL  FEN: puree diet per speech.  Speech to reevaluate given verbal decline.   Endocrine: blood glucose well controlled  GI: PAUL  ID: PAUL  Heme:  DVT prophylaxis held for brain bleed.   Pain: Well controlled  Dispo: Pending BP control      Chief complaint:   Right sided weakness and speech decline    Subjective  Can not communicate    Temp:  [97.5 °F (36.4 °C)-98.2 °F (36.8 °C)] 98.1 °F (36.7 °C)  Heart Rate:  [60-95] 85  Resp:  [18-24] 24  BP: (120-169)/(51-91) 128/77  Arterial Line BP: (109-175)/(38-64) 134/45    Objective    Neurologic Exam     Mental Status   Oriented to person, place, and time.   Level of consciousness: alert  \"I can so so\".  Repetition.  Cursing intact.   Receptive aphasia and nonsensicle speech     Cranial Nerves     CN III, IV, VI   Pupils are equal, round, and reactive to light.  Extraocular motions are normal.     CN V   Facial sensation intact.     CN VII   Facial expression full, symmetric.   Left facial weakness: central    Motor Exam   Right arm pronator drift: absent  Left arm pronator drift: absent    Strength   Right deltoid: 1/5  Left deltoid: " 5/5  Right biceps: 2/5  Left biceps: 5/5  Right triceps: 2/5  Left triceps: 5/5  Right interossei: 2/5  Right iliopsoas: 1/5  Left iliopsoas: 5/5  Right quadriceps: 1/5  Left quadriceps: 5/5  Right peroneal: 1/5  Right gastroc: 1/5  Left gastroc: 5/5    Sensory Exam   Light touch normal.       Drains:      Imaging Results (last 24 hours)     Procedure Component Value Units Date/Time    CT Head Without Contrast [[id number]] Collected:  09/01/19 1230     Updated:  09/01/19 1237    Narrative:       EXAMINATION: CT HEAD WO CONTRAST- 9/1/2019 12:30 PM CDT     HISTORY: Follow-up intracranial hemorrhage. Garbled speech.  Extension  of right upper extremity weakness,.; R13.12-Dysphagia, oropharyngeal  phase.     DOSE: 777 mGycm (Automatic exposure control technique was implemented in  an effort to keep the radiation dose as low as possible without  compromising image quality)     REPORT: Spiral CT of the head was performed without contrast,  reconstructed coronal and sagittal images are also reviewed.     COMPARISON: CT head without contrast 08/31/2019.     Again demonstrated is acute focal parenchymal hematoma in the left a  ganglion, measuring approximately 2.2 x 2.5 cm, compared with 2.7 x 2.1  cm before, essentially unchanged, given differences in slice selection  and measuring technique. No intraventricular extension of hemorrhage is  seen. There is no extra-axial hemorrhage. Moderate diffuse atrophy is  identified as well as advanced chronic small vessel white matter  ischemic disease. This is stable. Small focal lacunar infarcts are noted  in the basal ganglia, these appear chronic. There is also an old infarct  within the superior-medial aspect of the right cerebellum which is  unchanged. Bone windows are unremarkable.       Impression:       Stable head CT with compared with yesterday, with no  significant change in the left basal ganglia parenchymal hematoma,  likely related to hypertensive type hemorrhage.        This report was finalized on 09/01/2019 12:34 by Dr. Figueroa Golden MD.        Lab Results (last 24 hours)     Procedure Component Value Units Date/Time    POC Glucose Once [[id number]]  (Normal) Collected:  09/01/19 1233    Specimen:  Blood Updated:  09/01/19 1256     Glucose 82 mg/dL      Comment: : [id number] Jaleesa Donislis  JMeter ID: [id number]       POC Glucose Once [[id number]]  (Normal) Collected:  09/01/19 0757    Specimen:  Blood Updated:  09/01/19 0817     Glucose 107 mg/dL      Comment: : [id number] Jaleesa Sweetie  JMeter ID: [id number]       POC Glucose Once [[id number]]  (Normal) Collected:  08/31/19 2147    Specimen:  Blood Updated:  08/31/19 2158     Glucose 129 mg/dL      Comment: : [id number] Martin AmyMeter ID: [id number]       POC Glucose Once [[id number]]  (Abnormal) Collected:  08/31/19 1734    Specimen:  Blood Updated:  08/31/19 1745     Glucose 142 mg/dL      Comment: : [id number] Garrett AmandaMeter ID: [id number]             [id number]  Raf Wyatt MD

## 2019-09-01 NOTE — CONSULTS
"      [facility] Medicine Consult  Consults    Date of Admission: 8/31/2019  Date of Consult: 09/01/19    Primary Care Physician: Emmanuel Mclain MD  Referring Physician: Dr. Georgiana Wyatt  Chief Complaint/Reason for Consultation: Management of hypertension, intracerebral hemorrhage    Subjective   History of Present Illness  This 64-year-old white male with a history of diabetes mellitus, diabetic neuropathy, ischemic strokes x3 presented to our facility from an outside hospital with sudden onset of right-sided weakness and speech difficulty.  Imaging performed on 8/31 shows an acute intraparenchymal hematoma in the left basal ganglia measuring 2.1 x 2.7 cm.  Also noted was a chronic lacunar infarction in the basal ganglia as well as a chronic appearing infarct in the medial aspect of the posterior superior cerebellum.  Repeat imaging of the brain obtained on 9/1 shows no significant change.  MRI scan of the brain showed a small 5 mm focal acute lacunar white matter infarct in the left centrum semi-ovale with no associated hemorrhage.  On evaluation the patient responds to questioning by saying \"okay\" repetitively to different questions.      Review of Systems   Constitutional: Positive for activity change.   HENT: Negative.    Eyes: Negative.    Respiratory: Negative.    Cardiovascular: Negative.    Gastrointestinal: Negative.    Endocrine: Negative.    Genitourinary: Negative.    Musculoskeletal: Positive for gait problem.   Skin: Negative.    Allergic/Immunologic: Negative.    Neurological: Positive for facial asymmetry, speech difficulty, weakness and numbness.        Otherwise complete ROS is negative except as mentioned above.    Past Medical History:   Past Medical History:   Diagnosis Date   • Diabetes mellitus (CMS/HCC)    • GERD (gastroesophageal reflux disease)    • Hyperlipidemia    • Hypertension    • Neuropathy    • Osteoarthritis    • TIA (transient " ischemic attack)      Past Surgical History:  Past Surgical History:   Procedure Laterality Date   • CARPAL TUNNEL RELEASE Bilateral    • EYE SURGERY     • KNEE SURGERY Bilateral    • NECK SURGERY     • RETINAL DETACHMENT SURGERY     • ROTATOR CUFF REPAIR       Social History:  reports that he has never smoked. He has never used smokeless tobacco. He reports that he does not drink alcohol or use drugs.    Family History: family history is not on file.     Allergies:   Allergies   Allergen Reactions   • Iodine Shortness Of Breath   • Lisinopril Rash   • Red Dye Rash     Medications: Scheduled Meds:  amLODIPine 5 mg Oral Q24H   glipiZIDE 5 mg Oral QAM AC   insulin lispro 0-9 Units Subcutaneous 4x Daily With Meals & Nightly   metFORMIN 850 mg Oral BID With Meals   metoprolol tartrate 25 mg Oral Q12H   sodium chloride 10 mL Intravenous Q12H   terazosin 5 mg Oral Nightly     Continuous Infusions:  niCARdipine 5-15 mg/hr Last Rate: 12.5 mg/hr (09/01/19 1607)   sodium chloride 0.9 % with KCl 20 mEq 100 mL/hr Last Rate: 100 mL/hr (08/31/19 1122)     PRN Meds:.•  acetaminophen **OR** acetaminophen **OR** acetaminophen  •  dextrose  •  dextrose  •  glucagon (human recombinant)  •  hydrALAZINE  •  labetalol  •  ondansetron **OR** ondansetron  •  oxyCODONE-acetaminophen  •  oxyCODONE-acetaminophen  •  sodium chloride  •  traZODone    Objective   Objective    Physical Exam:   Temp:  [97.5 °F (36.4 °C)-98.3 °F (36.8 °C)] 98.3 °F (36.8 °C)  Heart Rate:  [65-95] 89  Resp:  [18-24] 24  BP: (120-169)/(51-91) 162/65  Arterial Line BP: (109-175)/(38-66) 143/50  Physical Exam   Constitutional: He appears well-developed and well-nourished. He is cooperative. No distress.   HENT:   Head: Normocephalic and atraumatic.   Right Ear: External ear normal.   Left Ear: External ear normal.   Nose: Nose normal.   Mouth/Throat: Oropharynx is clear and moist.   Eyes: Conjunctivae and EOM are normal. No scleral icterus.   Neck: Normal range of  motion. Neck supple. No JVD present. No tracheal deviation present.   Cardiovascular: Normal rate, regular rhythm and normal heart sounds.   No murmur heard.  Pulmonary/Chest: Effort normal and breath sounds normal. No respiratory distress.   Abdominal: Soft. Bowel sounds are normal. He exhibits no mass. There is no tenderness.   Musculoskeletal: Normal range of motion. He exhibits edema (trace BLE).   Neurological: He is alert. He is disoriented. He exhibits abnormal muscle tone (right hemiparesis). Coordination abnormal.   Responds to questioning by repeating the same word.  Ignores the right side.   Skin: Skin is warm and dry. No rash noted.   Psychiatric: His speech is delayed and slurred. He is slowed. Cognition and memory are impaired. He is inattentive.       Results Reviewed:  I have personally reviewed current lab, radiology, and data and agree with results.  Lab Results (last 24 hours)     Procedure Component Value Units Date/Time    Comprehensive Metabolic Panel [[id number]]  (Abnormal) Collected:  09/01/19 1412    Specimen:  Blood Updated:  09/01/19 1435     Glucose 88 mg/dL      BUN 23 mg/dL      Creatinine 1.54 mg/dL      Sodium 141 mmol/L      Potassium 4.1 mmol/L      Chloride 112 mmol/L      CO2 23.0 mmol/L      Calcium 8.8 mg/dL      Total Protein 6.7 g/dL      Albumin 3.70 g/dL      ALT (SGPT) 31 U/L      AST (SGOT) 43 U/L      Alkaline Phosphatase 79 U/L      Total Bilirubin 1.8 mg/dL      eGFR Non African Amer 46 mL/min/1.73      Globulin 3.0 gm/dL      A/G Ratio 1.2 g/dL      BUN/Creatinine Ratio 14.9     Anion Gap 6.0 mmol/L     Narrative:       GFR Normal >60  Chronic Kidney Disease <60  Kidney Failure <15    CBC & Differential [[id number]] Collected:  09/01/19 1412    Specimen:  Blood Updated:  09/01/19 1422    Narrative:       The following orders were created for panel order CBC & Differential.  Procedure                               Abnormality         Status                     ---------                                -----------         ------                     CBC Auto Differential[[id number]]        Abnormal            Final result                 Please view results for these tests on the individual orders.    CBC Auto Differential [[id number]]  (Abnormal) Collected:  09/01/19 1412    Specimen:  Blood Updated:  09/01/19 1422     WBC 11.95 10*3/mm3      RBC 4.67 10*6/mm3      Hemoglobin 12.7 g/dL      Hematocrit 39.4 %      MCV 84.4 fL      MCH 27.2 pg      MCHC 32.2 g/dL      RDW 13.6 %      RDW-SD 41.7 fl      MPV 10.8 fL      Platelets 199 10*3/mm3      Neutrophil % 80.7 %      Lymphocyte % 10.5 %      Monocyte % 7.4 %      Eosinophil % 0.8 %      Basophil % 0.2 %      Immature Grans % 0.4 %      Neutrophils, Absolute 9.64 10*3/mm3      Lymphocytes, Absolute 1.25 10*3/mm3      Monocytes, Absolute 0.89 10*3/mm3      Eosinophils, Absolute 0.10 10*3/mm3      Basophils, Absolute 0.02 10*3/mm3      Immature Grans, Absolute 0.05 10*3/mm3      nRBC 0.0 /100 WBC     POC Glucose Once [[id number]]  (Normal) Collected:  09/01/19 1233    Specimen:  Blood Updated:  09/01/19 1256     Glucose 82 mg/dL      Comment: : [id number] Jaleesa Les  JMeter ID: [id number]       POC Glucose Once [[id number]]  (Normal) Collected:  09/01/19 0757    Specimen:  Blood Updated:  09/01/19 0817     Glucose 107 mg/dL      Comment: : [id number] Jaleesa Les  JMeter ID: [id number]       POC Glucose Once [[id number]]  (Normal) Collected:  08/31/19 2147    Specimen:  Blood Updated:  08/31/19 2158     Glucose 129 mg/dL      Comment: : [id number] Martin AmyMeter ID: [id number]       POC Glucose Once [[id number]]  (Abnormal) Collected:  08/31/19 1734    Specimen:  Blood Updated:  08/31/19 1745     Glucose 142 mg/dL      Comment: : [id number] Garrett AmandaMeter ID: [id number]           Imaging Results (last 24 hours)     Procedure Component Value Units Date/Time    MRI Brain With & Without Contrast [[id number]] Collected:   09/01/19 1545     Updated:  09/01/19 1555    Narrative:       EXAMINATION: MRI BRAIN W WO CONTRAST- 9/1/2019 3:45 PM CDT     HISTORY: Intracranial hemorrhage with worsening neuro exam. Clinical  concern for stroke or underlying mass; R13.12-Dysphagia, oropharyngeal  phase.     REPORT: Multiplanar multisequence MR imaging of the brain was performed  with and without contrast. Comparison is made with CT of the head  without contrast 09/01/2019 1214 hours.     The diffusion-weighted images demonstrate mild diffusion restriction  along the rim of the previous identified parenchymal hematoma in the  left basal ganglia. There is a separate small linear focus of diffusion  restriction within the left centrum semiovale compared with an acute  white matter infarct. No mass is identified, there is no midline shift.  There is moderate diffuse atrophy. Mild dilation of the ventricular  system is noted. There is confluent increased FLAIR and T2 signal in the  periventricular and subcortical white matter tracts compatible with  advanced chronic small vessel white matter ischemic disease. Gradient  echo images show no additional sites of intraparenchymal hemorrhage.  There are small chronic lacunar infarcts in the basal ganglia and one in  the left anterior centrum semiovale. There is no abnormal contrast  enhancement. Motion artifact degrades the postcontrast sagittal and  coronal images, which had be repeated.       Impression:       1. Small, approximately 5 mm focal acute lacunar white matter infarct in  the left centrum semiovale without associated hemorrhage.  2. Stable 2.4 cm intraparenchymal hematoma in the left basal ganglia.  This shows no abnormal enhancement. There is no evidence of intracranial  neoplasm.  3. Diffuse atrophy and advanced chronic small vessel white matter  ischemic disease. There is a small chronic lacunar infarct in the  anterior left centrum semiovale and also within the basal  ganglia  bilaterally.  This report was finalized on 09/01/2019 15:52 by Dr. Figueroa Golden MD.    CT Head Without Contrast [[id number]] Collected:  09/01/19 1230     Updated:  09/01/19 1237    Narrative:       EXAMINATION: CT HEAD WO CONTRAST- 9/1/2019 12:30 PM CDT     HISTORY: Follow-up intracranial hemorrhage. Garbled speech.  Extension  of right upper extremity weakness,.; R13.12-Dysphagia, oropharyngeal  phase.     DOSE: 777 mGycm (Automatic exposure control technique was implemented in  an effort to keep the radiation dose as low as possible without  compromising image quality)     REPORT: Spiral CT of the head was performed without contrast,  reconstructed coronal and sagittal images are also reviewed.     COMPARISON: CT head without contrast 08/31/2019.     Again demonstrated is acute focal parenchymal hematoma in the left a  ganglion, measuring approximately 2.2 x 2.5 cm, compared with 2.7 x 2.1  cm before, essentially unchanged, given differences in slice selection  and measuring technique. No intraventricular extension of hemorrhage is  seen. There is no extra-axial hemorrhage. Moderate diffuse atrophy is  identified as well as advanced chronic small vessel white matter  ischemic disease. This is stable. Small focal lacunar infarcts are noted  in the basal ganglia, these appear chronic. There is also an old infarct  within the superior-medial aspect of the right cerebellum which is  unchanged. Bone windows are unremarkable.       Impression:       Stable head CT with compared with yesterday, with no  significant change in the left basal ganglia parenchymal hematoma,  likely related to hypertensive type hemorrhage.     This report was finalized on 09/01/2019 12:34 by Dr. Figueroa Golden MD.          Assessment / Plan   Assessment:     Active Hospital Problems    Diagnosis   • **ICH (intracerebral hemorrhage) (CMS/HCC)   • Diabetes mellitus (CMS/HCC)   • Hypertensive urgency   • Hyperlipidemia             Plan:   Amlodipine 5 mg p.o. Daily  Increase Hytrin to 5 mg p.o. at at bedtime  Discontinue clonidine  Agree with sliding scale insulin  Daily BMP    I discussed the patient's findings and my recommendations with the staff.    Gaston Cabrera DO  09/01/19  4:57 PM

## 2019-09-01 NOTE — PAYOR COMM NOTE
"ADMIT INPT 8-31-19  UR PHONE    [phone]      Pritesh Alva (64 y.o. Male)     Date of Birth Social Security Number Address Home Phone MRN    [id number]  [id number]   [facility] [phone] [id number]    Hoahaoism Marital Status          Samaritan        Admission Date Admission Type Admitting Provider Attending Provider Department, Room/Bed    8/31/19 Urgent Raf Wyatt MD [place], William Lee, MD [facility] INTENSIVE CARE, I009/1    Discharge Date Discharge Disposition Discharge Destination                       Attending Provider:  Raf Wyatt MD    Allergies:  Iodine, Lisinopril, Red Dye    Isolation:  None   Infection:  None   Code Status:  CPR    Ht:  188 cm (74\")   Wt:  115 kg (252 lb 14.4 oz)    Admission Cmt:  None   Principal Problem:  None                Active Insurance as of 8/31/2019     Primary Coverage     Payor Plan Insurance Group Employer/Plan Group    HUMANA MEDICARE REPLACEMENT HUMANA MEDICARE REPL [id number]     Payor Plan Address Payor Plan Phone Number Payor Plan Fax Number Effective Dates    [address] [address] [phone]  1/1/2018 - None Entered    [facility] [id number]       Subscriber Name Subscriber Birth Date Member ID       PRITESH ALVA 1955 [id number]                 Emergency Contacts      (Rel.) Home Phone Work Phone Mobile Phone    LAKEISHA ALVA (Sister) [phone] -- --               History & Physical      Raf Wyatt MD at 8/31/2019  8:41 AM          NEUROSURGERY INITIAL HOSPITAL ENCOUNTER    Assessment/Plan:   Pritesh Alva is a 64 y.o. male with a significant comorbidity diabetes with diabetic neuropathy, intracerebral ischemic stroke x3.  He presents with a new problem of sudden onset of right-sided weakness and slurred speech. Physical exam findings of right-sided hemiparesis and right facial droop.  Their imaging shows 2.7 x 2.1 acute thalamic " intracerebral hemorrhage.    Differential Diagnosis:   Intracerebral Hemorrhage: HTN, anticoagulation, trauma, neoplasm, vascular malformation, amyloid angiopathy, idiopathic.   ICH Score    Volume: 0 Points (ICH volume < 30 cm3)  Age: 0 Points (Age < 80 years)  GCS: 0 Points (GCS 13 to 15)  Location: 0 Points (Infratentorial Origin - No )  Intraventricular Extension: 0 Points (Intraventricular Extension Absent)  Total = 0    ICH Score Mortality Rate   0 0%   1 13%   2 26%   3 72%   4 94%   5 100%   6* 100%     References:  Stroke. 2001 Apr;32(4):891-7.  Neurocrit Care. 2004;1(1):53-60.    Recommendations:  Admit to ICU for every hour neuro checks  SBP < 140.  Cardene gtt  Repeat head CT shows no expansion.  No indication antiepileptic drugs  NPO with Speech evaluation, then diabetic diet    I discussed the patients findings and my recommendations with patient and nursing staff    Thank you very much for this interesting consult.     Level of Risk: High due to:  abrupt change in neurological status  MDM: High Complexity  Mod = 87606, High=99223  ___________________________________________________________________    Reason for consult right-sided weakness and intracerebral hemorrhage    Chief Complaint: Right-sided weakness    HPI: Phan Eastman is a 64 y.o. male with a significant comorbidity diabetes, hyperlipidemia, hypertension, diabetic neuropathy of the lower extremities, intracerebral ischemic stroke x3, ACDF.  The patient normally ambulates with a walker due to severe diabetic neuropathy.  Last night he was in his normal state of health until dinnertime.  He was eating on the island in his kitchen when he fell to the ground.  He had difficulty standing up and went to his walker.  He turned this over to get his cell phone out.  He also noted slurred speech.  He had no associated sensory deficits other than his pre-existing bilateral lower extremity numbness.  Due to his right-sided weakness he called his  family members and was taken to an outside emergency room.  A noncontrast head CT was performed which showed a 2 x 2.7 cm left thalamic and posterior limb of the internal capsule stroke.  Therefore he was transferred to [facility] for further evaluation.    On admission he is currently complaining of right-sided weakness.  He feels that this is been stable since last night.  He also has slurred speech.  He has no headache.  He does have a past medical history of hypertension.  Blood pressure on admission is 215.  He has no bowel or bladder incontinence.  He is resting comfortably in his pain is a 0 out of 10.    Review of Systems   HENT: Negative.    Eyes: Negative.    Respiratory: Negative.    Cardiovascular: Negative.    Gastrointestinal: Negative.    Endocrine: Negative.    Genitourinary: Negative.    Musculoskeletal: Positive for gait problem.   Skin: Negative.    Allergic/Immunologic: Negative.    Neurological: Positive for weakness and numbness.   Hematological: Negative.    Psychiatric/Behavioral: Negative.         Past Medical History:  has a past medical history of Diabetes mellitus (CMS/[facility]), GERD (gastroesophageal reflux disease), Hyperlipidemia, Hypertension, Neuropathy, Osteoarthritis, and TIA (transient ischemic attack).    Past Surgical History:  has a past surgical history that includes Rotator cuff repair; Knee surgery (Bilateral); Carpal tunnel release (Bilateral); Neck surgery; Retinal detachment surgery; and Eye surgery.    Family History: family history is not on file.    Social History:  reports that he has never smoked. He has never used smokeless tobacco. He reports that he does not drink alcohol or use drugs.    Allergies: Iodine; Lisinopril; and Red dye    Home Medications:   Current Facility-Administered Medications:   •  acetaminophen (TYLENOL) tablet 650 mg, 650 mg, Oral, Q4H PRN **OR** acetaminophen (TYLENOL) 160 MG/5ML solution 650 mg, 650 mg, Oral, Q4H PRN **OR** acetaminophen  (TYLENOL) suppository 650 mg, 650 mg, Rectal, Q4H PRN, Raf Wyatt MD  •  dextrose (D50W) 25 g/ 50mL Intravenous Solution 25 g, 25 g, Intravenous, Q15 Min PRN, Raf Wyatt MD  •  dextrose (GLUTOSE) oral gel 15 g, 15 g, Oral, Q15 Min PRN, Raf Wyatt MD  •  glipiZIDE (GLUCOTROL) tablet 5 mg, 5 mg, Oral, QAM AC, Raf Wyatt MD  •  glucagon (human recombinant) (GLUCAGEN DIAGNOSTIC) injection 1 mg, 1 mg, Subcutaneous, PRN, Raf Wyatt MD  •  insulin lispro (humaLOG) injection 0-9 Units, 0-9 Units, Subcutaneous, 4x Daily With Meals & Nightly, Raf Wyatt MD  •  metFORMIN (GLUCOPHAGE) tablet 850 mg, 850 mg, Oral, BID With Meals, Raf Wyatt MD  •  ondansetron (ZOFRAN) tablet 4 mg, 4 mg, Oral, Q6H PRN **OR** ondansetron (ZOFRAN) injection 4 mg, 4 mg, Intravenous, Q6H PRN, Raf Wyatt MD  •  oxyCODONE-acetaminophen (PERCOCET) 5-325 MG per tablet 1 tablet, 1 tablet, Oral, Q4H PRN, Raf Wyatt MD  •  oxyCODONE-acetaminophen (PERCOCET) 7.5-325 MG per tablet 2 tablet, 2 tablet, Oral, Q4H PRN, Raf Wyatt MD  •  sodium chloride 0.9 % flush 10 mL, 10 mL, Intravenous, Q12H, Raf Wyatt MD  •  sodium chloride 0.9 % flush 10 mL, 10 mL, Intravenous, PRN, Raf Wyatt MD  •  sodium chloride 0.9 % with KCl 20 mEq/L infusion, 100 mL/hr, Intravenous, Continuous, Raf Wyatt MD  •  terazosin (HYTRIN) capsule 2 mg, 2 mg, Oral, Nightly, Raf Wyatt MD  •  traZODone (DESYREL) tablet 50 mg, 50 mg, Oral, Nightly PRN, Raf Wyatt MD    Medications: Scheduled Meds:    glipiZIDE 5 mg Oral QAM AC   insulin lispro 0-9 Units Subcutaneous 4x Daily With Meals & Nightly   metFORMIN 850 mg Oral BID With Meals   sodium chloride 10 mL Intravenous Q12H   terazosin 2 mg Oral Nightly     Continuous Infusions:    sodium chloride 0.9 % with KCl 20 mEq 100 mL/hr     PRN Meds:.•   acetaminophen **OR** acetaminophen **OR** acetaminophen  •  dextrose  •  dextrose  •  glucagon (human recombinant)  •  ondansetron **OR** ondansetron  •  oxyCODONE-acetaminophen  •  oxyCODONE-acetaminophen  •  sodium chloride  •  traZODone    Vital Signs  Temp:  [97.6 °F (36.4 °C)-97.7 °F (36.5 °C)] 97.6 °F (36.4 °C)  Heart Rate:  [64-75] 75  Resp:  [16-18] 16  BP: (188-215)/(88-92) 215/88    Physical Exam  Physical Exam   Constitutional: He is oriented to person, place, and time. He appears well-developed and well-nourished.   HENT:   Head: Normocephalic and atraumatic.   Eyes: Conjunctivae and EOM are normal. Pupils are equal, round, and reactive to light.   Fundoscopic exam:       The right eye shows no exudate, no hemorrhage and no papilledema.        The left eye shows no exudate, no hemorrhage and no papilledema.   Neck: Normal range of motion. Neck supple.   Cardiovascular:   Pulses:       Dorsalis pedis pulses are 2+ on the right side, and 2+ on the left side.        Posterior tibial pulses are 2+ on the right side, and 2+ on the left side.   Pulmonary/Chest: Effort normal. No respiratory distress.     Vascular Status -  His right foot exhibits no edema. His left foot exhibits no edema.  Neurological: He is oriented to person, place, and time. He has a normal Finger-Nose-Finger Test, a normal Heel to [place] Test and a normal Tandem Gait Test. Gait normal.   Skin: Skin is warm. No rash noted. No erythema.   Psychiatric: His speech is slurred.       Neurologic Exam     Mental Status   Oriented to person, place, and time.   Registration: recalls 3 of 3 objects. Recall at 5 minutes: recalls 3 of 3 objects.   Attention: normal. Concentration: normal.   Speech: slurred   Level of consciousness: alert  Knowledge: consistent with education.   Able to name object. Able to read. Able to repeat. Able to write. Normal comprehension.     Cranial Nerves     CN II   Visual acuity: normal    CN III, IV, VI   Pupils are equal,  round, and reactive to light.  Extraocular motions are normal.   Diplopia: none    CN V   Facial sensation intact.   Right corneal reflex: normal  Left corneal reflex: normal    CN VII   Right facial weakness: central  Left facial weakness: none    CN VIII   Hearing: intact    CN IX, X   Palate: symmetric  Right gag reflex: normal  Left gag reflex: normal    CN XI   Right trapezius strength: normal  Left trapezius strength: normal    CN XII   Tongue deviation: none    Motor Exam   Right arm tone: normal  Left arm tone: normal  Right arm pronator drift: present  Left arm pronator drift: absent  Right leg tone: normal  Left leg tone: normal    Strength   Strength 5/5 except as noted.   Right deltoid: 4/5  Right biceps: 4/5  Right triceps: 4/5  Right interossei: 4/5  Right iliopsoas: 4/5  Right quadriceps: 4/5  Right anterior tibial: 4/5  Right gastroc: 4/5    Sensory Exam   Right arm light touch: normal  Left arm light touch: normal  Right leg light touch: decreased from knee  Left leg light touch: decreased from knee  Proprioception normal.   Stocking glove distribution numbness on the bilateral lower extremities.  Stable and long-standing.     Gait, Coordination, and Reflexes     Gait  Gait: normal    Coordination   Finger to nose coordination: normal  Heel to shin coordination: normal  Tandem walking coordination: normal    Reflexes   Reflexes 2+ except as noted.   Right plantar: normal  Left plantar: normal  Right Monreal: absent  Left Monreal: absent      Results Review:   Independent review and interpretation of imaging  Imaging Results (last 24 hours)     Procedure Component Value Units Date/Time    CT Head Without Contrast [[id number]] Collected:  08/31/19 0811     Updated:  08/31/19 0827    Narrative:       EXAMINATION: CT HEAD WO CONTRAST- 8/31/2019 8:11 AM CDT     HISTORY: Intracerebral hemorrhage.     DOSE: 813 mGycm (Automatic exposure control technique was implemented in  an effort to keep the radiation  dose as low as possible without  compromising image quality)     REPORT: Spiral CT of head was performed without contrast, reconstructed  coronal and sagittal images were also reviewed. No comparison studies.     There is an acute intraparenchymal hematoma centered within the left  basal ganglia, with involvement of the putamen, external capsule and  caudate nucleus. This measures approximately 2.1 x 2.7 cm, no  intraventricular extension is identified. No extra-axial hemorrhage is  identified. There is moderate diffuse cortical atrophy. There are small  chronic appearing lacunar infarcts in the basal ganglia. Mild  ventricular megaly is present. There is decreased attenuation within the  periventricular and subcortical white matter tracts compatible with  advanced chronic small vessel white matter ischemic disease. The  posterior fossa, there is a chronic appearing infarct in the medial  aspect of the posterior superior cerebellum. Review of bone windows is  unremarkable. There is normal aeration of the visualized paranasal  sinuses and mastoid air cells.       Impression:       1. Acute intraparenchymal hematoma centered within the left basal  ganglia, measuring approximately 2.1 x 2.7 cm, probably hypertensive  type bleed.  2. Moderate diffuse cortical atrophy. Chronic lacunar infarction noted  in the basal ganglia. This also small chronic appearing infarct in the  medial aspect of the posterior superior cerebellum.  3. Mild ventriculomegaly, no more than expected for the degree of  atrophy identified. There is no midline shift.     This report was finalized on 08/31/2019 08:24 by Dr. Figueroa Golden MD.        MRI brain:  MRI spine:   CT Head:              CT c-spine:  CT t-spine:  CT l-spine:  X-ray:    I reviewed the patient's new clinical results.  Lab Results (last 24 hours)     ** No results found for the last 24 hours. **          Raf Wyatt MD          Electronically signed by Yoselin  Raf Amaral MD at 8/31/2019  9:07 AM       ICU Vital Signs     Row Name 08/31/19 2104 08/31/19 2013 08/31/19 1845 08/31/19 1740 08/31/19 1700       Vitals    Pulse  82  79  73  67  68    BP  152/51  161/54  —  —  —       Oxygen Therapy    SpO2  —  —  87 %  (Abnormal)   94 %  93 %       Art Line    Arterial Line BP  —  —  143/49  131/43  147/50    Arterial Line MAP (mmHg)  —  —  74 mmHg  66 mmHg  75 mmHg    Row Name 08/31/19 1625 08/31/19 1615 08/31/19 1600 08/31/19 1520 08/31/19 1500       Vitals    Temp  —  —  98.2 °F (36.8 °C)  —  —    Temp src  —  —  Oral  —  —    Pulse  63  —  60  66  74    Heart Rate Source  —  —  Monitor  —  —    Resp  —  —  20  —  —    Resp Rate Source  —  —  Monitor  —  —    BP  —  —  148/67  —  167/71    Noninvasive MAP (mmHg)  —  —  87  —  94    BP Location  —  —  Left arm  —  —    BP Method  —  —  Automatic  —  —    Patient Position  —  —  Lying  —  —       Oxygen Therapy    SpO2  98 %  —  95 %  98 %  97 %    Pulse Oximetry Type  —  —  Continuous  —  —    Device (Oxygen Therapy)  —  room air  —  —  —       Art Line    Arterial Line BP  138/49  —  132/46  126/45  142/52    Arterial Line MAP (mmHg)  72 mmHg  —  68 mmHg  66 mmHg  78 mmHg    Row Name 08/31/19 1405 08/31/19 1400 08/31/19 1316 08/31/19 1305 08/31/19 1301       Vitals    Pulse  66  70  72  72  69    Resp  —  —  20  —  —    Resp Rate Source  —  —  Monitor  —  —    BP  146/74  —  157/71  155/75  155/75    Noninvasive MAP (mmHg)  92  —  —  97  —       Oxygen Therapy    SpO2  93 %  94 %  98 %  94 %  94 %       Art Line    Arterial Line BP  132/46  133/48  145/47  146/47  147/46    Arterial Line MAP (mmHg)  68 mmHg  72 mmHg  —  74 mmHg  —    Row Name 08/31/19 1246 08/31/19 1231 08/31/19 1223 08/31/19 1216 08/31/19 1201       Vitals    Temp  —  98 °F (36.7 °C)  —  —  —    Temp src  —  Oral  —  —  —    Pulse  —  77  —  75  77    Resp  —  16  —  17  —    Resp Rate Source  —  —  —  Visual  —    BP  —  143/83  —  —  —       Oxygen  "Therapy    SpO2  96 %  95 %  —  97 %  98 %    Device (Oxygen Therapy)  —  —  room air  —  —       Art Line    Arterial Line BP  146/53  161/59  —  154/51  164/57    Arterial Line MAP (mmHg)  —  —  —  80 mmHg  89 mmHg    Row Name 08/31/19 1146 08/31/19 1132 08/31/19 1131 08/31/19 1116 08/31/19 1108       Vitals    Pulse  71  64  65  65  63    Resp  —  —  —  —  19    BP  —  138/56  —  162/84  161/82    Noninvasive MAP (mmHg)  —  —  78  104  —       Oxygen Therapy    SpO2  97 %  95 %  96 %  90 %  98 %       Art Line    Arterial Line BP  156/56  144/47  145/47  154/46  163/50    Arterial Line MAP (mmHg)  84 mmHg  —  74 mmHg  76 mmHg  —    Row Name 08/31/19 1104 08/31/19 1101 08/31/19 1051 08/31/19 1046 08/31/19 1033       Vitals    Pulse  65  66  66  68  67    Resp  —  —  —  —  17    BP  —  164/74  —  165/79  134/84       Oxygen Therapy    SpO2  96 %  96 %  97 %  95 %  96 %       Art Line    Arterial Line BP  173/53  —  185/55  —  —    Row Name 08/31/19 1017 08/31/19 1002 08/31/19 0946 08/31/19 0945 08/31/19 0935       Height and Weight    Weight  —  —  —  115 kg (252 lb 14.4 oz)  —    Weight Method  —  —  —  Bed scale  —       Vitals    Temp  —  —  —  97.8 °F (36.6 °C)  —    Temp src  —  —  —  Oral  —    Pulse  67  61  60  —  —    Resp  —  —  19  —  —    BP  172/93  153/91  206/92  (Abnormal)   —  —    Noninvasive MAP (mmHg)  —  —  124  —  —       Oxygen Therapy    SpO2  96 %  96 %  96 %  —  —    Device (Oxygen Therapy)  —  —  —  —  room air    Row Name 08/31/19 0916 08/31/19 0733 08/31/19 0500             Height and Weight    Height  —  —  188 cm (74\")      Weight  —  —  117 kg (257 lb 1.6 oz)      Ideal Body Weight (IBW) (kg)  —  —  87.66      BSA (Calculated - sq m)  —  —  2.42 sq meters      BMI (Calculated)  —  —  33      Weight in (lb) to have BMI = 25  —  —  194.3         Vitals    Temp  —  97.6 °F (36.4 °C)  97.7 °F (36.5 °C)      Temp src  —  Oral  Oral      Pulse  112  75  64      Heart Rate Source  —  " "Monitor  Monitor      Resp  —  16  18      Resp Rate Source  —  Visual  Visual      BP  203/96  (Abnormal)   215/88  (Abnormal)   188/92  (Abnormal)       Noninvasive MAP (mmHg)  —  122  —      BP Location  Left arm  Right arm  Right arm      BP Method  Automatic  Automatic  Automatic      Patient Position  Lying  Lying  Lying         Oxygen Therapy    SpO2  —  98 %  99 %      Pulse Oximetry Type  —  Intermittent  Intermittent      Device (Oxygen Therapy)  —  room air  room air            Lines, Drains & Airways    Active LDAs     Name:   Placement date:   Placement time:   Site:   Days:    Peripheral IV 08/31/19 0943 Left Hand   08/31/19    0943    Hand   less than 1    Peripheral IV 08/31/19 0947 Left;Medial Hand   08/31/19    0947    Hand   less than 1    Arterial Line 08/31/19 Left Radial   08/31/19    1057 created via procedure documentation    Radial   less than 1         Inactive LDAs     Name:   Placement date:   Placement time:   Removal date:   Removal time:   Site:   Days:    [REMOVED] Peripheral IV 08/31/19 Right Forearm   08/31/19    —    08/31/19    0940    Forearm   less than 1                Hospital Medications (all)       Dose Frequency Start End    acetaminophen (TYLENOL) 160 MG/5ML solution 650 mg 650 mg Every 4 Hours PRN 8/31/2019     Sig - Route: Take 20.3 mL by mouth Every 4 (Four) Hours As Needed for Mild Pain . - Oral    Linked Group 1:  \"Or\" Linked Group Details        acetaminophen (TYLENOL) suppository 650 mg 650 mg Every 4 Hours PRN 8/31/2019     Sig - Route: Insert 1 suppository into the rectum Every 4 (Four) Hours As Needed for Mild Pain . - Rectal    Linked Group 1:  \"Or\" Linked Group Details        acetaminophen (TYLENOL) tablet 650 mg 650 mg Every 4 Hours PRN 8/31/2019     Sig - Route: Take 2 tablets by mouth Every 4 (Four) Hours As Needed for Mild Pain . - Oral    Linked Group 1:  \"Or\" Linked Group Details        CloNIDine (CATAPRES) tablet 0.1 mg 0.1 mg Every 12 Hours Scheduled " 8/31/2019     Sig - Route: Take 1 tablet by mouth Every 12 (Twelve) Hours. - Oral    dextrose (D50W) 25 g/ 50mL Intravenous Solution 25 g 25 g Every 15 Minutes PRN 8/31/2019     Sig - Route: Infuse 50 mL into a venous catheter Every 15 (Fifteen) Minutes As Needed for Low Blood Sugar (Blood Sugar Less Than 70). - Intravenous    dextrose (GLUTOSE) oral gel 15 g 15 g Every 15 Minutes PRN 8/31/2019     Sig - Route: Take 15 application by mouth Every 15 (Fifteen) Minutes As Needed for Low Blood Sugar (Blood sugar less than 70). - Oral    glipiZIDE (GLUCOTROL) tablet 5 mg 5 mg Every Morning Before Breakfast 8/31/2019     Sig - Route: Take 1 tablet by mouth Every Morning Before Breakfast. - Oral    glucagon (human recombinant) (GLUCAGEN DIAGNOSTIC) injection 1 mg 1 mg As Needed 8/31/2019     Sig - Route: Inject 1 mg under the skin into the appropriate area as directed As Needed for Low Blood Sugar (Blood Glucose Less Than 70). - Subcutaneous    hydrALAZINE (APRESOLINE) injection 10 mg 10 mg Every 15 Minutes PRN 8/31/2019     Sig - Route: Infuse 0.5 mL into a venous catheter Every 15 (Fifteen) Minutes As Needed for High Blood Pressure. - Intravenous    insulin lispro (humaLOG) injection 0-9 Units 0-9 Units 4 Times Daily With Meals & Nightly 8/31/2019     Sig - Route: Inject 0-9 Units under the skin into the appropriate area as directed 4 (Four) Times a Day With Meals & at Bedtime. - Subcutaneous    labetalol (NORMODYNE,TRANDATE) injection 10 mg 10 mg Every 15 Minutes PRN 8/31/2019     Sig - Route: Infuse 2 mL into a venous catheter Every 15 (Fifteen) Minutes As Needed for High Blood Pressure. - Intravenous    labetalol (NORMODYNE,TRANDATE) injection 20 mg 20 mg Once 8/31/2019 8/31/2019    Sig - Route: Infuse 4 mL into a venous catheter 1 (One) Time. - Intravenous    metFORMIN (GLUCOPHAGE) tablet 850 mg 850 mg 2 Times Daily With Meals 8/31/2019     Sig - Route: Take 1 tablet by mouth 2 (Two) Times a Day With Meals. - Oral     "niCARdipine (CARDENE) 25 mg/250 mL (0.1 mg/mL) 0.9% NS infusion 5-15 mg/hr Titrated 8/31/2019     Sig - Route: Infuse 5-15 mg/hr into a venous catheter Dose Adjusted By Provider As Needed. - Intravenous    ondansetron (ZOFRAN) injection 4 mg 4 mg Every 6 Hours PRN 8/31/2019     Sig - Route: Infuse 2 mL into a venous catheter Every 6 (Six) Hours As Needed for Nausea or Vomiting. - Intravenous    Linked Group 2:  \"Or\" Linked Group Details        ondansetron (ZOFRAN) tablet 4 mg 4 mg Every 6 Hours PRN 8/31/2019     Sig - Route: Take 1 tablet by mouth Every 6 (Six) Hours As Needed for Nausea or Vomiting. - Oral    Linked Group 2:  \"Or\" Linked Group Details        oxyCODONE-acetaminophen (PERCOCET) 5-325 MG per tablet 1 tablet 1 tablet Every 4 Hours PRN 8/31/2019 9/10/2019    Sig - Route: Take 1 tablet by mouth Every 4 (Four) Hours As Needed for Moderate Pain . - Oral    oxyCODONE-acetaminophen (PERCOCET) 7.5-325 MG per tablet 2 tablet 2 tablet Every 4 Hours PRN 8/31/2019 9/10/2019    Sig - Route: Take 2 tablets by mouth Every 4 (Four) Hours As Needed for Severe Pain . - Oral    sodium chloride 0.9 % flush 10 mL 10 mL Every 12 Hours Scheduled 8/31/2019     Sig - Route: Infuse 10 mL into a venous catheter Every 12 (Twelve) Hours. - Intravenous    sodium chloride 0.9 % flush 10 mL 10 mL As Needed 8/31/2019     Sig - Route: Infuse 10 mL into a venous catheter As Needed for Line Care. - Intravenous    sodium chloride 0.9 % with KCl 20 mEq/L infusion 100 mL/hr Continuous 8/31/2019     Sig - Route: Infuse 100 mL/hr into a venous catheter Continuous. - Intravenous    terazosin (HYTRIN) capsule 2 mg 2 mg Nightly 8/31/2019     Sig - Route: Take 1 capsule by mouth Every Night. - Oral    traZODone (DESYREL) tablet 50 mg 50 mg Nightly PRN 8/31/2019     Sig - Route: Take 1 tablet by mouth At Night As Needed for Sleep. - Oral    hydrALAZINE (APRESOLINE) injection 10 mg (Discontinued) 10 mg Every 15 Minutes 8/31/2019 8/31/2019    Sig - " Route: Infuse 0.5 mL into a venous catheter Every 15 (Fifteen) Minutes. - Intravenous    labetalol (NORMODYNE,TRANDATE) injection 10 mg (Discontinued) 10 mg Every 15 Minutes 8/31/2019 8/31/2019    Sig - Route: Infuse 2 mL into a venous catheter Every 15 (Fifteen) Minutes. - Intravenous          Blood Administration Record (From admission, onward)    None        Lab Results (last 48 hours)     Procedure Component Value Units Date/Time    POC Glucose Once [[id number]]  (Abnormal) Collected:  08/31/19 1734    Specimen:  Blood Updated:  08/31/19 1745     Glucose 142 mg/dL      Comment: : [id number] Tucker AmandaMeter ID: [id number]       POC Glucose Once [[id number]]  (Normal) Collected:  08/31/19 1133    Specimen:  Blood Updated:  08/31/19 1144     Glucose 126 mg/dL      Comment: : [id number] John ElizabethMeter ID: [id number]       Hemoglobin A1c [[id number]]  (Normal) Collected:  08/31/19 0924    Specimen:  Blood Updated:  08/31/19 0955     Hemoglobin A1C 5.0 %     Narrative:       Less than 6.0           Non-Diabetic Range  6.0-7.0                 ADA Therapeutic Target  Greater than 7.0        Action Suggested    Basic Metabolic Panel [[id number]]  (Abnormal) Collected:  08/31/19 0924    Specimen:  Blood Updated:  08/31/19 0954     Glucose 86 mg/dL      BUN 28 mg/dL      Creatinine 1.55 mg/dL      Sodium 143 mmol/L      Potassium 4.4 mmol/L      Chloride 108 mmol/L      CO2 29.0 mmol/L      Calcium 9.4 mg/dL      eGFR Non African Amer 46 mL/min/1.73      BUN/Creatinine Ratio 18.1     Anion Gap 6.0 mmol/L     Narrative:       GFR Normal >60  Chronic Kidney Disease <60  Kidney Failure <15    aPTT [[id number]]  (Normal) Collected:  08/31/19 0924    Specimen:  Blood Updated:  08/31/19 0952     PTT 26.7 seconds     Protime-INR [[id number]]  (Normal) Collected:  08/31/19 0924    Specimen:  Blood Updated:  08/31/19 0952     Protime 13.3 Seconds      INR 0.98    CBC Auto Differential [[id number]]  (Abnormal) Collected:   08/31/19 0924    Specimen:  Blood Updated:  08/31/19 0943     WBC 10.33 10*3/mm3      RBC 4.59 10*6/mm3      Hemoglobin 12.6 g/dL      Hematocrit 38.7 %      MCV 84.3 fL      MCH 27.5 pg      MCHC 32.6 g/dL      RDW 13.3 %      RDW-SD 40.8 fl      MPV 10.9 fL      Platelets 189 10*3/mm3      Neutrophil % 80.7 %      Lymphocyte % 12.0 %      Monocyte % 6.2 %      Eosinophil % 0.4 %      Basophil % 0.3 %      Immature Grans % 0.4 %      Neutrophils, Absolute 8.34 10*3/mm3      Lymphocytes, Absolute 1.24 10*3/mm3      Monocytes, Absolute 0.64 10*3/mm3      Eosinophils, Absolute 0.04 10*3/mm3      Basophils, Absolute 0.03 10*3/mm3      Immature Grans, Absolute 0.04 10*3/mm3      nRBC 0.0 /100 WBC           Lab Results (last 48 hours)     Procedure Component Value Units Date/Time    POC Glucose Once [[id number]]  (Abnormal) Collected:  08/31/19 1734    Specimen:  Blood Updated:  08/31/19 1745     Glucose 142 mg/dL      Comment: : [id number] Tucker AmandaMeter ID: [id number]       POC Glucose Once [[id number]]  (Normal) Collected:  08/31/19 1133    Specimen:  Blood Updated:  08/31/19 1144     Glucose 126 mg/dL      Comment: : [id number] John GregoryzabethMeter ID: [id number]       Hemoglobin A1c [[id number]]  (Normal) Collected:  08/31/19 0924    Specimen:  Blood Updated:  08/31/19 0955     Hemoglobin A1C 5.0 %     Narrative:       Less than 6.0           Non-Diabetic Range  6.0-7.0                 ADA Therapeutic Target  Greater than 7.0        Action Suggested    Basic Metabolic Panel [[id number]]  (Abnormal) Collected:  08/31/19 0924    Specimen:  Blood Updated:  08/31/19 0954     Glucose 86 mg/dL      BUN 28 mg/dL      Creatinine 1.55 mg/dL      Sodium 143 mmol/L      Potassium 4.4 mmol/L      Chloride 108 mmol/L      CO2 29.0 mmol/L      Calcium 9.4 mg/dL      eGFR Non African Amer 46 mL/min/1.73      BUN/Creatinine Ratio 18.1     Anion Gap 6.0 mmol/L     Narrative:       GFR Normal >60  Chronic Kidney Disease  <60  Kidney Failure <15    aPTT [[id number]]  (Normal) Collected:  08/31/19 0924    Specimen:  Blood Updated:  08/31/19 0952     PTT 26.7 seconds     Protime-INR [[id number]]  (Normal) Collected:  08/31/19 0924    Specimen:  Blood Updated:  08/31/19 0952     Protime 13.3 Seconds      INR 0.98    CBC Auto Differential [[id number]]  (Abnormal) Collected:  08/31/19 0924    Specimen:  Blood Updated:  08/31/19 0943     WBC 10.33 10*3/mm3      RBC 4.59 10*6/mm3      Hemoglobin 12.6 g/dL      Hematocrit 38.7 %      MCV 84.3 fL      MCH 27.5 pg      MCHC 32.6 g/dL      RDW 13.3 %      RDW-SD 40.8 fl      MPV 10.9 fL      Platelets 189 10*3/mm3      Neutrophil % 80.7 %      Lymphocyte % 12.0 %      Monocyte % 6.2 %      Eosinophil % 0.4 %      Basophil % 0.3 %      Immature Grans % 0.4 %      Neutrophils, Absolute 8.34 10*3/mm3      Lymphocytes, Absolute 1.24 10*3/mm3      Monocytes, Absolute 0.64 10*3/mm3      Eosinophils, Absolute 0.04 10*3/mm3      Basophils, Absolute 0.03 10*3/mm3      Immature Grans, Absolute 0.04 10*3/mm3      nRBC 0.0 /100 WBC           Orders (last 48 hrs)     Start     Ordered    08/31/19 2100  terazosin (HYTRIN) capsule 2 mg  Nightly      08/31/19 0743    08/31/19 2100  CloNIDine (CATAPRES) tablet 0.1 mg  Every 12 Hours Scheduled      08/31/19 1841 08/31/19 1930  labetalol (NORMODYNE,TRANDATE) injection 10 mg  Every 15 Minutes,   Status:  Discontinued      08/31/19 1841 08/31/19 1930  hydrALAZINE (APRESOLINE) injection 10 mg  Every 15 Minutes,   Status:  Discontinued      08/31/19 1841 08/31/19 1852  hydrALAZINE (APRESOLINE) injection 10 mg  Every 15 Minutes PRN      08/31/19 1852 08/31/19 1851  labetalol (NORMODYNE,TRANDATE) injection 10 mg  Every 15 Minutes PRN      08/31/19 1852 08/31/19 1746  POC Glucose Once  Once      08/31/19 1734    08/31/19 1145  POC Glucose Once  Once      08/31/19 1133    08/31/19 1141  SLP Plan of Care Cert / Re-Cert  Once     Comments:  Speech Language  Pathology Plan of Care  Initial Certification  Certification Period: 2019 - 2019    Patient Name: Phan Eastman  : 1955    (R13.12) Oropharyngeal dysphagia  (primary encounter diagnosis)                Assessment  SLP Assessment  Prior Level of Function-Communication: WFL  Prior Level of Function-Swallowing: no diet consistency restrictions  SLP Swallowing Diagnosis: mild, oral dysfunction, pharyngeal dysfunction  Plan of Care Reviewed With: patient  Swallow Criteria for Skilled Therapeutic Interventions Met: demonstrates skilled criteria      IP SLP Goals     Row Name 19 1030             Oral Nutrition/Hydration Goal 1 (SLP)    Oral Nutrition/Hydration Goal 1, SLP  LTG: Patient will tolerate LRD with   no overt s/s of aspiration.  -KW      Time Frame (Oral Nutrition/Hydration Goal 1, SLP)  by discharge  -KW      Barriers (Oral Nutrition/Hydration Goal 1, SLP)  n/a  -KW      Progress/Outcomes (Oral Nutrition/Hydration Goal 1, SLP)  goal ongoing    -KW         Labial Strengthening Goal 1 (SLP)    Activity (Labial Strengthening Goal 1, SLP)  increase labial tone  -KW      Increase Labial Tone  labial resistance exercises  -KW      [place]/Accuracy (Labial Strengthening Goal 1, SLP)  independently   (over 90% accuracy)  -KW      Time Frame (Labial Strengthening Goal 1, SLP)  short term goal (STG);by   discharge  -KW      Barriers (Labial Strengthening Goal 1, SLP)  n/a  -KW      Progress/Outcomes (Labial Strengthening Goal 1, SLP)  goal ongoing  -KW           Pharyngeal Strengthening Exercise Goal 1 (SLP)    Activity (Pharyngeal Strengthening Goal 1, SLP)  increase epiglottic   inversion and retroflexion;increase tongue base retraction  -KW      Increase Epiglottic Inversion and Retroflexion  Mendelsohn;falsetto  -KW        Increase Tongue Base Retraction  hard effortful swallow;kaleb  -KW      [place]/Accuracy (Pharyngeal Strengthening Goal 1, SLP)    independently (over 90%  accuracy)  -KW      Time Frame (Pharyngeal Strengthening Goal 1, SLP)  short term goal   (STG);by discharge  -KW      Barriers (Pharyngeal Strengthening Goal 1, SLP)  n/a  -KW      Progress/Outcomes (Pharyngeal Strengthening Goal 1, SLP)  goal ongoing    -KW        User Key  (r) = Recorded By, (t) = Taken By, (c) = Cosigned By    Initials Name Provider Type    Alessandra Jo MS CCC-SLP Speech and Language Pathologist                  Plan    SLP Plan  Therapy Frequency (Swallow): at least, 3 days per week  Predicted Duration Therapy Intervention (Days): until discharge        Alessandra Bright MS CCC-SLP  8/31/2019      By cosigning this order, either electronically or physically, I certify that the therapy services are furnished while this patient is under my care, the services outline above are required by this patient, and will be reviewed every 90 days.        M.D.:__________________________________________ Date: ______________    08/31/19 1140    08/31/19 1106  Diet Regular; Nectar / Syrup Thick; Cardiac, Consistent Carbohydrate  Diet Effective Now     Comments:  Ok for spoonsful of water or ice chips between meals.    08/31/19 1105    08/31/19 1100  POC Glucose 4x Daily AC & at Bedtime  4 Times Daily Before Meals & at Bedtime      08/31/19 0743    08/31/19 1045  niCARdipine (CARDENE) 25 mg/250 mL (0.1 mg/mL) 0.9% NS infusion  Titrated      08/31/19 0956    08/31/19 1015  labetalol (NORMODYNE,TRANDATE) injection 20 mg  Once      08/31/19 0919    08/31/19 0957  Insert Arterial Line  Continuous      08/31/19 0956    08/31/19 0900  sodium chloride 0.9 % flush 10 mL  Every 12 Hours Scheduled      08/31/19 0743    08/31/19 0852  Transfer Patient  Once      08/31/19 0852    08/31/19 0830  glipiZIDE (GLUCOTROL) tablet 5 mg  Every Morning Before Breakfast      08/31/19 0743    08/31/19 0830  metFORMIN (GLUCOPHAGE) tablet 850 mg  2 Times Daily With Meals      08/31/19 0743    08/31/19 0830  sodium chloride 0.9 %  with KCl 20 mEq/L infusion  Continuous      08/31/19 0743 08/31/19 0830  insulin lispro (humaLOG) injection 0-9 Units  4 Times Daily With Meals & Nightly      08/31/19 0743 08/31/19 0822  SLP Consult: Eval & Treat RN Dysphagia Screen Failed  Once      08/31/19 0822 08/31/19 0800  Vital Signs  Every 4 Hours      08/31/19 0743    08/31/19 0800  Neuro Checks  Every 4 Hours      08/31/19 0743    08/31/19 0800  Strict Intake & Output  Every Hour      08/31/19 0743 08/31/19 0743  Do NOT Hold Basal or Correction Scale Insulin When Patient is NPO, Hold Scheduled Mealtime (Bolus) Insulin if NPO  Continuous      08/31/19 0743 08/31/19 0743  Follow [facility] Hypoglycemia Standing Orders For Blood Glucose Less Than 70 mg/dL  Until Discontinued      08/31/19 0743 08/31/19 0743  Hypoglycemia Treatment - Alert Patient That is Not NPO and Can Safely Swallow  Until Discontinued     Comments:  Administer 4 oz Fruit Juice OR 4 oz Regular Soda OR 8 oz Milk OR 15-30 grams (1 tube) of Glucose Gel.  Recheck Blood Glucose 15 Minutes After Ingestion, Repeat Treatment & Continue to Recheck Blood Sugar Every 15 Minutes Until Blood Glucose is 70 mg/dL or Higher.  Once Blood Glucose is 70 mg/dL or Higher and if It Will Be more than 60 Minutes Until the Next Meal, Provide Appropriate Snack (Including Carbohydrate Food) Based on Meal Plan Order. Give Meal Tray As Soon As Possible.    08/31/19 0743 08/31/19 0743  Hypoglycemia Treatment - Patient Has IV Access - Unresponsive, NPO or Unable To Safely Swallow  Until Discontinued     Comments:  Administer 25g (50ml) D50W IV Push.  Recheck Blood Glucose 15 Minutes After Administration, if Blood Glucose Remains Less Than 70 mg/dl, Repeat Treatment   Recheck Blood Glucose 15 Minutes After 2nd Administration, if Blood Glucose Remains Less Than 70 mg/dL After 2nd Dose of D50W, Contact Provider for Further Treatment Orders & Consider Adding IVF With D5W for Maintenance    08/31/19 0743     08/31/19 0743  Hypoglycemia Treatment - Patient Without IV Access - Unresponsive, NPO or Unable To Safely Swallow  Until Discontinued     Comments:  Administer 1mg Glucagon SQ & Establish IV Access.  Turn Patient on Side - Nausea / Vomiting May Occur.  Recheck Blood Glucose 15 Minutes After Administration.  If Blood Glucose Remains Less Than 70, Administer 25g D50W IV Push (50ml).  Recheck Blood Glucose 15 Minutes After Administration of D50W, if Blood Glucose Remains Less Than 70 mg/dl, Contact Provider for Further Treatment Orders & Consider Adding IVF With D5 for Maintenance    08/31/19 0743    08/31/19 0743  Hypoglycemia Treatment - Document Event & Patient Response to Interventions EMR, Document Medications on MAR  Continuous      08/31/19 0743    08/31/19 0743  Notify Provider - Hypoglycemia Treatment  Until Discontinued      08/31/19 0743    08/31/19 0742  dextrose (GLUTOSE) oral gel 15 g  Every 15 Minutes PRN      08/31/19 0743    08/31/19 0742  dextrose (D50W) 25 g/ 50mL Intravenous Solution 25 g  Every 15 Minutes PRN      08/31/19 0743    08/31/19 0742  glucagon (human recombinant) (GLUCAGEN DIAGNOSTIC) injection 1 mg  As Needed      08/31/19 0743    08/31/19 0742  ondansetron (ZOFRAN) tablet 4 mg  Every 6 Hours PRN      08/31/19 0743    08/31/19 0742  ondansetron (ZOFRAN) injection 4 mg  Every 6 Hours PRN      08/31/19 0743    08/31/19 0742  oxyCODONE-acetaminophen (PERCOCET) 7.5-325 MG per tablet 2 tablet  Every 4 Hours PRN      08/31/19 0743    08/31/19 0741  oxyCODONE-acetaminophen (PERCOCET) 5-325 MG per tablet 1 tablet  Every 4 Hours PRN      08/31/19 0743    08/31/19 0741  acetaminophen (TYLENOL) tablet 650 mg  Every 4 Hours PRN      08/31/19 0743    08/31/19 0741  acetaminophen (TYLENOL) 160 MG/5ML solution 650 mg  Every 4 Hours PRN      08/31/19 0743    08/31/19 0741  acetaminophen (TYLENOL) suppository 650 mg  Every 4 Hours PRN      08/31/19 0743    08/31/19 0741  aPTT  STAT      08/31/19 0773     08/31/19 0741  Hemoglobin A1c  STAT      08/31/19 0743    08/31/19 0741  Protime-INR  STAT      08/31/19 0743    08/31/19 0741  Type & Screen  Once      08/31/19 0743    08/31/19 0740  Diet Regular  Diet Effective Now,   Status:  Canceled      08/31/19 0743    08/31/19 0740  OT Consult: Eval & Treat ambulation  Once      08/31/19 0743    08/31/19 0740  Basic Metabolic Panel  STAT      08/31/19 0743    08/31/19 0740  CBC Auto Differential  STAT      08/31/19 0743    08/31/19 0739  Place Sequential Compression Device  Once      08/31/19 0743    08/31/19 0739  Maintain Sequential Compression Device  Continuous      08/31/19 0743    08/31/19 0739  Ambulate Patient  Every Shift      08/31/19 0743    08/31/19 0739  Fall Precautions  Continuous      08/31/19 0743    08/31/19 0739  Notify Provider (With Default Parameters)  Until Discontinued      08/31/19 0743    08/31/19 0738  Code Status and Medical Interventions:  Continuous      08/31/19 0743    08/31/19 0738  Intake & Output  Every Shift      08/31/19 0743    08/31/19 0738  Weigh Patient  Once      08/31/19 0743    08/31/19 0738  Oxygen Therapy- Nasal Cannula; Titrate for SPO2: 90% - 95%  Continuous      08/31/19 0743    08/31/19 0738  Insert Peripheral IV  Once      08/31/19 0743    08/31/19 0738  Saline Lock & Maintain IV Access  Continuous      08/31/19 0743    08/31/19 0738  Inpatient Admission  Once      08/31/19 0743    08/31/19 0737  sodium chloride 0.9 % flush 10 mL  As Needed      08/31/19 0743    08/31/19 0737  traZODone (DESYREL) tablet 50 mg  Nightly PRN      08/31/19 0743    08/31/19 0737  CT Head Without Contrast  1 Time Imaging,   Status:  Canceled      08/31/19 0743    08/31/19 0736  PT Consult: Eval & Treat ambulation  Once      08/31/19 0736    08/31/19 0523  CT Head Without Contrast  1 Time Imaging      08/31/19 0523    --  metFORMIN (GLUCOPHAGE) 850 MG tablet  2 Times Daily With Meals      08/31/19 0532    --  doxazosin (CARDURA) 2 MG tablet   Nightly      08/31/19 0532    --  glimepiride (AMARYL) 2 MG tablet  Every Morning Before Breakfast      08/31/19 0532    --  traZODone (DESYREL) 50 MG tablet  Nightly PRN      08/31/19 0532    --  SCANNED - TELEMETRY        08/31/19 0000    Pending  NPO Diet  Diet Effective Now     Comments:  Should remain in effect until a complete SLP evaluation occurs and a superceding MD order is received.    Pending    --  SCANNED - TELEMETRY        08/31/19 0000    --  omeprazole (priLOSEC) 20 MG capsule  Daily      08/31/19 1358    --  aspirin 81 MG EC tablet  Daily      08/31/19 1358    --  CloNIDine (CATAPRES) 0.1 MG tablet  2 Times Daily      08/31/19 1359    --  simvastatin (ZOCOR) 40 MG tablet  Nightly      08/31/19 1359    --  losartan (COZAAR) 100 MG tablet  Daily      08/31/19 1359        Ventilator/Non-Invasive Ventilation Settings (From admission, onward)    None        Operative/Procedure Notes (all)     No notes of this type exist for this encounter.        Physician Progress Notes (last 72 hours) (Notes from 8/28/2019  9:24 PM through 8/31/2019  9:24 PM)     No notes of this type exist for this encounter.        Consult Notes (last 72 hours) (Notes from 8/28/2019  9:24 PM through 8/31/2019  9:24 PM)     No notes of this type exist for this encounter.        Alessandra Bright MS CCC-SLP   Speech and Language Pathologist   Speech Therapy   Plan of Care   Signed   Date of Service:  8/31/2019 11:39 AM   Creation Time:  8/31/2019 11:39 AM            Signed           Problem: Patient Care Overview  Goal: Plan of Care Review  Outcome: Ongoing (interventions implemented as appropriate)    08/31/19 1139   Coping/Psychosocial   Plan of Care Reviewed With patient   Plan of Care Review   Progress improving   OTHER   Outcome Summary Bedside Swallow Evaluation completed. Patient with right facial droop at rest and decreased retraction with smile. Overt strong cough with thin liquids causing lots of pain in ribs from fall. No  other overt s/s of aspiration with full range of consistencies. Some mild throat clearing with spoonful of water. Rec: 1) Ok for Regular consistency diet with nectar thick liquids 2) Ok for ice chips or water between meals 3) ST to follow.

## 2019-09-02 NOTE — THERAPY EVALUATION
Patient Name: Phan Eastman  : 1955    MRN: [id number]                              Today's Date: 2019       Admit Date: 2019    Visit Dx:     ICD-10-CM ICD-9-CM   1. Oropharyngeal dysphagia R13.12 787.22   2. Impaired mobility and ADLs Z74.09 799.89   3. Impaired mobility Z74.09 799.89     Patient Active Problem List   Diagnosis   • ICH (intracerebral hemorrhage) (CMS/Formerly [facility])   • Diabetes mellitus (CMS/Formerly [facility])   • Hypertensive urgency   • Hyperlipidemia     Past Medical History:   Diagnosis Date   • Diabetes mellitus (CMS/HCC)    • GERD (gastroesophageal reflux disease)    • Hyperlipidemia    • Hypertension    • Neuropathy    • Osteoarthritis    • TIA (transient ischemic attack)      Past Surgical History:   Procedure Laterality Date   • CARPAL TUNNEL RELEASE Bilateral    • EYE SURGERY     • KNEE SURGERY Bilateral    • NECK SURGERY     • RETINAL DETACHMENT SURGERY     • ROTATOR CUFF REPAIR       General Information     Row Name 19          PT Evaluation Time/Intention    Document Type  evaluation Sudden onset right sided weakness, slurred spech, fall. Head CT: Acute intraparenchymal hematoma centered within left basal ganglia  -JAKE     Mode of Treatment  physical therapy  -JAKE     Row Name 19 07          General Information    Patient Profile Reviewed?  yes  -JAKE     Prior Level of Function  independent:;all household mobility ambulates with a RW  -JAKE     Existing Precautions/Restrictions  fall  -JAKE     Barriers to Rehab  medically complex;previous functional deficit  -JAKE     Row Name 19 0720          Relationship/Environment    Lives With  alone;sibling(s) Sister  -JAKE     Row Name 19 0720          Resource/Environmental Concerns    Current Living Arrangements  home/apartment/condo  -JAKE     Row Name 19 07          Cognitive Assessment/Intervention- PT/OT    Orientation Status (Cognition)  oriented to;person  -JAKE     Row Name 19          Safety Issues,  Functional Mobility    Impairments Affecting Function (Mobility)  balance;cognition;coordination;endurance/activity tolerance;pain;range of motion (ROM);muscle tone abnormal;sensation/sensory awareness;strength  -JAKE       User Key  (r) = Recorded By, (t) = Taken By, (c) = Cosigned By    Initials Name Provider Type    Gilmer Bruno, PT DPT Physical Therapist        Mobility     Row Name 09/02/19 0720          Bed Mobility Assessment/Treatment    Bed Mobility Assessment/Treatment  supine-sit;sit-supine  -JAKE     Sit-Supine [place] (Bed Mobility)  maximum assist (25% patient effort);2 person assist  -JAKE     Supine-Sit-Supine [place] (Bed Mobility)  maximum assist (25% patient effort);2 person assist  -JAKE     Assistive Device (Bed Mobility)  draw sheet;head of bed elevated  -JAKE     Comment (Bed Mobility)  Monitor BP once EOB, /73, assisted pt back to bed and notified RN. /62 once fowlers in bed  (Significant)   -JAKE       User Key  (r) = Recorded By, (t) = Taken By, (c) = Cosigned By    Initials Name Provider Type    Gilmer Bruno PT DPT Physical Therapist        Obj/Interventions     Row Name 09/02/19 0720          General ROM    GENERAL ROM COMMENTS  L LE AROM impaired ~ 25-50%, R ankle PROM impaired 90% (hypertonic), R hip/knee PROM impaired ~ 50% (hypertonic)  -JAKE     Row Name 09/02/19 0720          MMT (Manual Muscle Testing)    General MMT Comments  L LE functionally 3+/5, R knee 2-/5  -JAKE     Row Name 09/02/19 0720          Static Sitting Balance    Level of [place] (Unsupported Sitting, Static Balance)  maximal assist, 25 to 49% patient effort;1 person assist;2 person assist Posterior lean  -JAKE     Sitting Position (Unsupported Sitting, Static Balance)  sitting on edge of bed  -JAKE     Row Name 09/02/19 0720          Sensory Assessment/Intervention    Sensory General Assessment  — Able to withdraw to pinch sensation in R LE. Vision: Able to track left and right and locate  objects on R side.   -JAKE       User Key  (r) = Recorded By, (t) = Taken By, (c) = Cosigned By    Initials Name Provider Type    Gilmer Bruno, PT DPT Physical Therapist        Goals/Plan     Row Name 09/02/19 0720          Bed Mobility Goal 1 (PT)    Activity/Assistive Device (Bed Mobility Goal 1, PT)  sit to supine/supine to sit  -JAKE     Turner Level/Cues Needed (Bed Mobility Goal 1, PT)  moderate assist (50-74% patient effort)  -JAKE     Time Frame (Bed Mobility Goal 1, PT)  long term goal (LTG);10 days  -JAKE     Progress/Outcomes (Bed Mobility Goal 1, PT)  goal ongoing  -JAKE     Row Name 09/02/19 0720          Transfer Goal 1 (PT)    Activity/Assistive Device (Transfer Goal 1, PT)  sit-to-stand/stand-to-sit;bed-to-chair/chair-to-bed  -JAKE     [place] Level/Cues Needed (Transfer Goal 1, PT)  moderate assist (50-74% patient effort);2 person assist  -JAKE     Time Frame (Transfer Goal 1, PT)  long term goal (LTG);10 days  -JAKE     Progress/Outcome (Transfer Goal 1, PT)  goal ongoing  -JAKE       User Key  (r) = Recorded By, (t) = Taken By, (c) = Cosigned By    Initials Name Provider Type    Gilmer Bruno, PT DPT Physical Therapist        Clinical Impression     Row Name 09/02/19 0720          Pain Assessment    Additional Documentation  Pain Scale: Numbers Pre/Post-Treatment (Group)  -JAKE     Row Name 09/02/19 0720          Pain Scale: FACES Pre/Post-Treatment    Pain: FACES Scale, Pretreatment  0-->no hurt  -JAKE     Row Name 09/02/19 0720          Plan of Care Review    Plan of Care Reviewed With  patient  -JAKE     [facility] Name 09/02/19 0720          Physical Therapy Clinical Impression    Patient/Family Goals Statement (PT Clinical Impression)  PT goal to increase strength.   -JAKE     Criteria for Skilled Interventions Met (PT Clinical Impression)  yes;treatment indicated  -JAKE     Rehab Potential (PT Clinical Summary)  fair, will monitor progress closely  -JAKE     Predicted Duration of Therapy (PT)  until  d/c  -JAKE     Row Name 09/02/19 0720          Vital Signs    Intra Systolic BP Rehab  186  (Significant)   -JAKE     Intra Treatment Diastolic BP  73  -JAKE     Post Systolic BP Rehab  153  -JAKE     Post Treatment Diastolic BP  62  -JAKE     Posttreatment Heart Rate (beats/min)  76  -JAKE     Post SpO2 (%)  94  -JAKE     O2 Delivery Post Treatment  room air  -JAKE     Pre Patient Position  Supine  -JAKE     Intra Patient Position  Sitting  -JAKE     Post Patient Position  Supine  -JAKE     Row Name 09/02/19 0720          Positioning and Restraints    Pre-Treatment Position  in bed  -JAKE     Post Treatment Position  bed  -JAKE     In Bed  notified nsg;fowlers;call light within reach;encouraged to call for assist;patient within staff view;RUE elevated;legs elevated  -JAKE       User Key  (r) = Recorded By, (t) = Taken By, (c) = Cosigned By    Initials Name Provider Type    Gilmer Bruno PT DPT Physical Therapist        Outcome Measures     Row Name 09/02/19 0720          How much help from another person do you currently need...    Turning from your back to your side while in flat bed without using bedrails?  2  -JAKE     Moving from lying on back to sitting on the side of a flat bed without bedrails?  2  -JAKE     Moving to and from a bed to a chair (including a wheelchair)?  1  -JAKE     Standing up from a chair using your arms (e.g., wheelchair, bedside chair)?  1  -JAKE     Climbing 3-5 steps with a railing?  1  -JAKE     To walk in hospital room?  1  -JAKE     AM-PAC 6 Clicks Score (PT)  8  -JAKE     Row Name 09/02/19 0720          Functional Assessment    Outcome Measure Options  AM-PAC 6 Clicks Basic Mobility (PT)  -JAKE       User Key  (r) = Recorded By, (t) = Taken By, (c) = Cosigned By    Initials Name Provider Type    Gilmer Bruno PT DPT Physical Therapist        Physical Therapy Education     Title: PT OT SLP Therapies (In Progress)     Topic: Physical Therapy (In Progress)     Point: Mobility training (Done)     Learning  Progress Summary           Patient Acceptance, E, VU,NR by JAKE at 9/2/2019  7:20 AM    Comment:  Educated pt on progression of PT POC and benefits of activity                               User Key     Initials Effective Dates Name Provider Type Discipline    JAKE 08/02/16 -  Gilmer Medrano PT DPT Physical Therapist PT              PT Recommendation and Plan  Planned Therapy Interventions (PT Eval): bed mobility training, transfer training, gait training, balance training, home exercise program, patient/family education, postural re-education, strengthening, ROM (range of motion), motor coordination training, stretching  Outcome Summary/Treatment Plan (PT)  Anticipated Discharge Disposition (PT): inpatient rehabilitation facility, skilled nursing facility  Plan of Care Reviewed With: patient  Outcome Summary: PT eval completed. He presents alert, confused and with slurred speech. He demos right sided weaknes, 2-/5 in R knee. He was able to withdraw to pinch sensation to right thigh. He is hypertonic in R LE. He needed max assist x 2 for all bed mobility. BP was monitored once EOB and found at 186/73. He was assisted back to bed and BP was 153/62, RN was notified on change in BP with activity. PT will continue to progress his funcitonal mobility, balance, and strength. I anticipate he will need further therapy at acute rehab vs SNF, pending progress to tolerate 3 hours of acute rehab.      Time Calculation:   PT Charges     Row Name 09/02/19 0836             Time Calculation    Start Time  0720  -JAKE      Stop Time  0815  -JAKE      Time Calculation (min)  55 min  -JAKE      PT Received On  09/02/19  -JAKE      PT Goal Re-Cert Due Date  09/12/19  -JAKE        User Key  (r) = Recorded By, (t) = Taken By, (c) = Cosigned By    Initials Name Provider Type    Gilmer Bruno, PT DPT Physical Therapist        Therapy Charges for Today     Code Description Service Date Service Provider Modifiers Qty    [id number] HC PT EVAL  MOD COMPLEXITY 4 9/2/2019 Gilmer Medrano, PT DPT GP 1          PT G-Codes  Outcome Measure Options: AM-PAC 6 Clicks Daily Activity (OT)  AM-PAC 6 Clicks Score (PT): 8  AM-PAC 6 Clicks Score (OT): 10    Gilmer Medrano, PT DPT  9/2/2019

## 2019-09-02 NOTE — PROGRESS NOTES
"Neurosurgery Daily Progress Note    Assessment:   Phan Eastman is a 64 y.o. male with a significant comorbidity diabetes with diabetic neuropathy, intracerebral ischemic stroke x3.  He presents with a new problem of sudden onset of right-sided weakness and slurred speech. Physical exam findings of right-sided hemiparesis and right facial droop.  Their imaging shows 2.7 x 2.1 acute thalamic intracerebral hemorrhage.    DDX:     ICH (intracerebral hemorrhage) (CMS/HCC)    Diabetes mellitus (CMS/HCC)    Hypertensive urgency    Hyperlipidemia    Patient Active Problem List   Diagnosis   • ICH (intracerebral hemorrhage) (CMS/HCC)   • Diabetes mellitus (CMS/HCC)   • Hypertensive urgency   • Hyperlipidemia       Plan:   Neuro: dense right hemiparesis.  Mild improvement in speech   CT stable   MRI with small left ischemic stroke.  Does not explain decline   Most likely worsening edema surrounding ICH   Cont to monitor   CV: on cardene, labetolol and hydralazine with marginal blood pressures.  Appreciate hospitalist  Skin: Rash on legs.  Monitor for now.   Pulm: CPAP at home.  Will start here  :  PAUL  FEN: puree diet per speech.  Speech to reevaluate given verbal decline.   Endocrine: blood glucose well controlled  GI: PAUL  ID: PAUL  Heme:  DVT prophylaxis held for brain bleed.   Pain: Well controlled  Dispo: Pending BP control      Chief complaint:   Right sided weakness and speech decline    Subjective  Can not communicate    Temp:  [97.9 °F (36.6 °C)-98.4 °F (36.9 °C)] 97.9 °F (36.6 °C)  Heart Rate:  [57-93] 58  Resp:  [6-24] 6  BP: (114-186)/(53-86) 126/60  Arterial Line BP: ()/() 114/43    Objective:  Vital signs: (most recent): Blood pressure 126/60, pulse 58, temperature 97.9 °F (36.6 °C), temperature source Axillary, resp. rate (!) 6, height 188 cm (74\"), weight 118 kg (260 lb), SpO2 97 %.        Neurologic Exam     Mental Status   Oriented to person.   Disoriented to place.   Disoriented to year.   "   Speech: slurred   Level of consciousness: drowsy  Able to name object. Unable to read. Unable to repeat. Unable to write. Abnormal comprehension.   Speech improving     Cranial Nerves     CN III, IV, VI   Pupils are equal, round, and reactive to light.  Extraocular motions are normal.     CN V   Facial sensation intact.     CN VII   Facial expression full, symmetric.   Left facial weakness: central    Motor Exam   Right arm pronator drift: absent  Left arm pronator drift: absent    Strength   Right deltoid: 1/5  Left deltoid: 5/5  Right biceps: 2/5  Left biceps: 5/5  Right triceps: 2/5  Left triceps: 5/5  Right interossei: 2/5  Right iliopsoas: 1/5  Left iliopsoas: 5/5  Right quadriceps: 1/5  Left quadriceps: 5/5  Right peroneal: 1/5  Right gastroc: 1/5  Left gastroc: 5/5    Sensory Exam   Light touch normal.       Drains:      Imaging Results (last 24 hours)     Procedure Component Value Units Date/Time    MRI Brain With & Without Contrast [[id number]] Collected:  09/01/19 1545     Updated:  09/01/19 1555    Narrative:       EXAMINATION: MRI BRAIN W WO CONTRAST- 9/1/2019 3:45 PM CDT     HISTORY: Intracranial hemorrhage with worsening neuro exam. Clinical  concern for stroke or underlying mass; R13.12-Dysphagia, oropharyngeal  phase.     REPORT: Multiplanar multisequence MR imaging of the brain was performed  with and without contrast. Comparison is made with CT of the head  without contrast 09/01/2019 1214 hours.     The diffusion-weighted images demonstrate mild diffusion restriction  along the rim of the previous identified parenchymal hematoma in the  left basal ganglia. There is a separate small linear focus of diffusion  restriction within the left centrum semiovale compared with an acute  white matter infarct. No mass is identified, there is no midline shift.  There is moderate diffuse atrophy. Mild dilation of the ventricular  system is noted. There is confluent increased FLAIR and T2 signal in  the  periventricular and subcortical white matter tracts compatible with  advanced chronic small vessel white matter ischemic disease. Gradient  echo images show no additional sites of intraparenchymal hemorrhage.  There are small chronic lacunar infarcts in the basal ganglia and one in  the left anterior centrum semiovale. There is no abnormal contrast  enhancement. Motion artifact degrades the postcontrast sagittal and  coronal images, which had be repeated.       Impression:       1. Small, approximately 5 mm focal acute lacunar white matter infarct in  the left centrum semiovale without associated hemorrhage.  2. Stable 2.4 cm intraparenchymal hematoma in the left basal ganglia.  This shows no abnormal enhancement. There is no evidence of intracranial  neoplasm.  3. Diffuse atrophy and advanced chronic small vessel white matter  ischemic disease. There is a small chronic lacunar infarct in the  anterior left centrum semiovale and also within the basal ganglia  bilaterally.  This report was finalized on 09/01/2019 15:52 by Dr. Figueroa Golden MD.    CT Head Without Contrast [[id number]] Collected:  09/01/19 1230     Updated:  09/01/19 1237    Narrative:       EXAMINATION: CT HEAD WO CONTRAST- 9/1/2019 12:30 PM CDT     HISTORY: Follow-up intracranial hemorrhage. Garbled speech.  Extension  of right upper extremity weakness,.; R13.12-Dysphagia, oropharyngeal  phase.     DOSE: 777 mGycm (Automatic exposure control technique was implemented in  an effort to keep the radiation dose as low as possible without  compromising image quality)     REPORT: Spiral CT of the head was performed without contrast,  reconstructed coronal and sagittal images are also reviewed.     COMPARISON: CT head without contrast 08/31/2019.     Again demonstrated is acute focal parenchymal hematoma in the left a  ganglion, measuring approximately 2.2 x 2.5 cm, compared with 2.7 x 2.1  cm before, essentially unchanged, given differences in slice  selection  and measuring technique. No intraventricular extension of hemorrhage is  seen. There is no extra-axial hemorrhage. Moderate diffuse atrophy is  identified as well as advanced chronic small vessel white matter  ischemic disease. This is stable. Small focal lacunar infarcts are noted  in the basal ganglia, these appear chronic. There is also an old infarct  within the superior-medial aspect of the right cerebellum which is  unchanged. Bone windows are unremarkable.       Impression:       Stable head CT with compared with yesterday, with no  significant change in the left basal ganglia parenchymal hematoma,  likely related to hypertensive type hemorrhage.     This report was finalized on 09/01/2019 12:34 by Dr. Figueroa Golden MD.        Lab Results (last 24 hours)     Procedure Component Value Units Date/Time    POC Glucose Once [[id number]]  (Normal) Collected:  09/02/19 0726    Specimen:  Blood Updated:  09/02/19 0806     Glucose 110 mg/dL      Comment: : [id number] Jaleesa León ID: [id number]       Basic Metabolic Panel [[id number]]  (Abnormal) Collected:  09/02/19 0350    Specimen:  Blood Updated:  09/02/19 0436     Glucose 96 mg/dL      BUN 26 mg/dL      Creatinine 1.55 mg/dL      Sodium 141 mmol/L      Potassium 4.6 mmol/L      Chloride 113 mmol/L      CO2 24.0 mmol/L      Calcium 8.2 mg/dL      eGFR Non African Amer 46 mL/min/1.73      BUN/Creatinine Ratio 16.8     Anion Gap 4.0 mmol/L     Narrative:       GFR Normal >60  Chronic Kidney Disease <60  Kidney Failure <15    POC Glucose Once [[id number]]  (Normal) Collected:  09/01/19 2021    Specimen:  Blood Updated:  09/01/19 2033     Glucose 113 mg/dL      Comment: : [id number] Dyoln BenitezMetjuliana ID: [id number]       POC Glucose Once [[id number]]  (Normal) Collected:  09/01/19 1718    Specimen:  Blood Updated:  09/01/19 1742     Glucose 116 mg/dL      Comment: : [id number] Jaleesa JARAeter ID: [id number]       Jose  Metabolic Panel [[id number]]  (Abnormal) Collected:  09/01/19 1412    Specimen:  Blood Updated:  09/01/19 1435     Glucose 88 mg/dL      BUN 23 mg/dL      Creatinine 1.54 mg/dL      Sodium 141 mmol/L      Potassium 4.1 mmol/L      Chloride 112 mmol/L      CO2 23.0 mmol/L      Calcium 8.8 mg/dL      Total Protein 6.7 g/dL      Albumin 3.70 g/dL      ALT (SGPT) 31 U/L      AST (SGOT) 43 U/L      Alkaline Phosphatase 79 U/L      Total Bilirubin 1.8 mg/dL      eGFR Non African Amer 46 mL/min/1.73      Globulin 3.0 gm/dL      A/G Ratio 1.2 g/dL      BUN/Creatinine Ratio 14.9     Anion Gap 6.0 mmol/L     Narrative:       GFR Normal >60  Chronic Kidney Disease <60  Kidney Failure <15    CBC & Differential [[id number]] Collected:  09/01/19 1412    Specimen:  Blood Updated:  09/01/19 1422    Narrative:       The following orders were created for panel order CBC & Differential.  Procedure                               Abnormality         Status                     ---------                               -----------         ------                     CBC Auto Differential[[id number]]        Abnormal            Final result                 Please view results for these tests on the individual orders.    CBC Auto Differential [[id number]]  (Abnormal) Collected:  09/01/19 1412    Specimen:  Blood Updated:  09/01/19 1422     WBC 11.95 10*3/mm3      RBC 4.67 10*6/mm3      Hemoglobin 12.7 g/dL      Hematocrit 39.4 %      MCV 84.4 fL      MCH 27.2 pg      MCHC 32.2 g/dL      RDW 13.6 %      RDW-SD 41.7 fl      MPV 10.8 fL      Platelets 199 10*3/mm3      Neutrophil % 80.7 %      Lymphocyte % 10.5 %      Monocyte % 7.4 %      Eosinophil % 0.8 %      Basophil % 0.2 %      Immature Grans % 0.4 %      Neutrophils, Absolute 9.64 10*3/mm3      Lymphocytes, Absolute 1.25 10*3/mm3      Monocytes, Absolute 0.89 10*3/mm3      Eosinophils, Absolute 0.10 10*3/mm3      Basophils, Absolute 0.02 10*3/mm3      Immature Grans, Absolute 0.05 10*3/mm3       nRBC 0.0 /100 WBC     POC Glucose Once [[id number]]  (Normal) Collected:  09/01/19 1233    Specimen:  Blood Updated:  09/01/19 1256     Glucose 82 mg/dL      Comment: : [id number] Jaleesa León ID: [id number]             [id number]  Raf Wyatt MD

## 2019-09-02 NOTE — PLAN OF CARE
Problem: Patient Care Overview  Goal: Plan of Care Review  Outcome: Ongoing (interventions implemented as appropriate)   09/02/19 1115   Coping/Psychosocial   Plan of Care Reviewed With patient   Plan of Care Review   Progress declining   OTHER   Outcome Summary Asked to re-eval swallow due to decline in neuro status with increased slurred speech and right sided weakness. Patient observed to have significant dysarthria including vocal quality with glottal price which was not previously observed. Unintelligible at times. Slower to respond. Trials of PO did not result in any overt s/s of aspiration, however, he did have oral holding requiring cues to swallow on almost all attempts at PO. He was able to initiate swallow with cues. Episodes of inability to focus on SLP observed. RN notified. Rec: 1) Downgrade to pureed with honey thick liquids 2) 1:1 supervision and assist with meals 3) Watch for complete swallow prior to additional bites 4) Discontinue feeding if patient not swallowing when cued 5) ST to follow.

## 2019-09-02 NOTE — PLAN OF CARE
Problem: Patient Care Overview  Goal: Plan of Care Review  Outcome: Ongoing (interventions implemented as appropriate)   09/02/19 0720   Coping/Psychosocial   Plan of Care Reviewed With patient   OTHER   Outcome Summary PT naveed completed. He presents alert, confused and with slurred speech. He demos right sided weaknes, 2-/5 in R knee. He was able to withdraw to pinch sensation to right thigh. He is hypertonic in R LE. He needed max assist x 2 for all bed mobility. BP was monitored once EOB and found at 186/73. He was assisted back to bed and BP was 153/62, RN was notified on change in BP with activity. PT will continue to progress his funcitonal mobility, balance, and strength. I anticipate he will need further therapy at acute rehab vs SNF, pending progress to tolerate 3 hours of acute rehab.

## 2019-09-02 NOTE — PROGRESS NOTES
Discharge Planning Assessment  [facility]     Patient Name: Phan Eastman  MRN: [id number]  Today's Date: 9/2/2019    Admit Date: 8/31/2019    Discharge Needs Assessment     Row Name 09/02/19 0958       Living Environment    Lives With  alone    Current Living Arrangements  home/apartment/condo    Primary Care Provided by  self    Provides Primary Care For  no one    Family Caregiver if Needed  sibling(s)    Quality of Family Relationships  helpful;involved;supportive    Able to Return to Prior Arrangements  yes       Resource/Environmental Concerns    Resource/Environmental Concerns  none    Transportation Concerns  car, none       Transition Planning    Patient/Family Anticipates Transition to  home    Patient/Family Anticipated Services at Transition  none    Transportation Anticipated  family or friend will provide       Discharge Needs Assessment    Readmission Within the Last 30 Days  no previous admission in last 30 days    Concerns to be Addressed  no discharge needs identified;denies needs/concerns at this time    Equipment Currently Used at Home  walker, rolling    Anticipated Changes Related to Illness  none    Equipment Needed After Discharge  none    Discharge Coordination/Progress  Pt has RX coverage and a PCP. Pt lived alone prior to admission. PT/OT evals are completed which state that pt needs rehab. SW will follow for pt to be moved to medical floor to discuss placement. Discharge plans are acute rehab vs SNF. SW will follow.         Discharge Plan    No documentation.       Destination      No service coordination in this encounter.      Durable Medical Equipment      No service coordination in this encounter.      Dialysis/Infusion      No service coordination in this encounter.      Home Medical Care      No service coordination in this encounter.      Therapy      No service coordination in this encounter.      Community Resources      No service coordination in this encounter.           Demographic Summary    No documentation.       Functional Status    No documentation.       Psychosocial    No documentation.       Abuse/Neglect    No documentation.       Legal    No documentation.       Substance Abuse    No documentation.       Patient Forms    No documentation.           Елена Winkler

## 2019-09-02 NOTE — PROGRESS NOTES
"    [facility] Medicine Services  INPATIENT PROGRESS NOTE    Length of Stay: 2  Date of Admission: 8/31/2019  Primary Care Physician: Emmanuel Mclain MD    Subjective     Chief Complaint:     Consultation for blood pressure management    HPI     The patient's blood pressure is improved.  Substantial changes were made in the patient's blood pressure medications yesterday with dosing adjustments as well.  Cardene has been weaned from 12.5 mg to 6 mg.  The patient will be given additional dose of Fang in this a.m. with an increase this evening to 10 mg daily.  The patient's speech has improved.  He continues to speak words repetitively, such as \"help me\", despite the presence of his nurse.  When I greeted him this morning he greeted me back.  The patient's speech seems to be improved from yesterday.  He fell asleep quickly at the termination of discussion.  Platelet inhibitors as primary prevention for stroke as noted on MRI are contraindicated currently given the patient's intracranial hemorrhage.    Speech therapy has downgraded the patient's diet secondary to swallowing difficulty.  If the patient's diet continues to require downgrade he may require insertion of a feeding tube for nutrition and medication administration.  Creatinine is 1.55 this morning which is likely the patient's baseline.    Review of Systems     All pertinent negatives and positives are as above. All other systems have been reviewed and are negative unless otherwise stated.     Objective    Temp:  [97.9 °F (36.6 °C)-98.4 °F (36.9 °C)] 97.9 °F (36.6 °C)  Heart Rate:  [57-93] 58  Resp:  [6-24] 6  BP: (114-186)/(53-86) 126/60  Arterial Line BP: ()/() 114/43    Lab Results (last 24 hours)     Procedure Component Value Units Date/Time    POC Glucose Once [[id number]]  (Normal) Collected:  09/02/19 0726    Specimen:  Blood Updated:  09/02/19 0806     Glucose 110 mg/dL      Comment: " : [id number] Jaleesaevi DonisPurposeMatch (formerly [employer]eter ID: [id number]       Basic Metabolic Panel [[id number]]  (Abnormal) Collected:  09/02/19 0350    Specimen:  Blood Updated:  09/02/19 0436     Glucose 96 mg/dL      BUN 26 mg/dL      Creatinine 1.55 mg/dL      Sodium 141 mmol/L      Potassium 4.6 mmol/L      Chloride 113 mmol/L      CO2 24.0 mmol/L      Calcium 8.2 mg/dL      eGFR Non African Amer 46 mL/min/1.73      BUN/Creatinine Ratio 16.8     Anion Gap 4.0 mmol/L     Narrative:       GFR Normal >60  Chronic Kidney Disease <60  Kidney Failure <15    POC Glucose Once [[id number]]  (Normal) Collected:  09/01/19 2021    Specimen:  Blood Updated:  09/01/19 2033     Glucose 113 mg/dL      Comment: : [id number] Dylon BenitezMeter ID: [id number]       POC Glucose Once [[id number]]  (Normal) Collected:  09/01/19 1718    Specimen:  Blood Updated:  09/01/19 1742     Glucose 116 mg/dL      Comment: : [id number] TrabajoPaneleter ID: [id number]       Comprehensive Metabolic Panel [[id number]]  (Abnormal) Collected:  09/01/19 1412    Specimen:  Blood Updated:  09/01/19 1435     Glucose 88 mg/dL      BUN 23 mg/dL      Creatinine 1.54 mg/dL      Sodium 141 mmol/L      Potassium 4.1 mmol/L      Chloride 112 mmol/L      CO2 23.0 mmol/L      Calcium 8.8 mg/dL      Total Protein 6.7 g/dL      Albumin 3.70 g/dL      ALT (SGPT) 31 U/L      AST (SGOT) 43 U/L      Alkaline Phosphatase 79 U/L      Total Bilirubin 1.8 mg/dL      eGFR Non African Amer 46 mL/min/1.73      Globulin 3.0 gm/dL      A/G Ratio 1.2 g/dL      BUN/Creatinine Ratio 14.9     Anion Gap 6.0 mmol/L     Narrative:       GFR Normal >60  Chronic Kidney Disease <60  Kidney Failure <15    CBC & Differential [[id number]] Collected:  09/01/19 1412    Specimen:  Blood Updated:  09/01/19 1422    Narrative:       The following orders were created for panel order CBC & Differential.  Procedure                               Abnormality         Status                     ---------                                -----------         ------                     CBC Auto Differential[[id number]]        Abnormal            Final result                 Please view results for these tests on the individual orders.    CBC Auto Differential [[id number]]  (Abnormal) Collected:  09/01/19 1412    Specimen:  Blood Updated:  09/01/19 1422     WBC 11.95 10*3/mm3      RBC 4.67 10*6/mm3      Hemoglobin 12.7 g/dL      Hematocrit 39.4 %      MCV 84.4 fL      MCH 27.2 pg      MCHC 32.2 g/dL      RDW 13.6 %      RDW-SD 41.7 fl      MPV 10.8 fL      Platelets 199 10*3/mm3      Neutrophil % 80.7 %      Lymphocyte % 10.5 %      Monocyte % 7.4 %      Eosinophil % 0.8 %      Basophil % 0.2 %      Immature Grans % 0.4 %      Neutrophils, Absolute 9.64 10*3/mm3      Lymphocytes, Absolute 1.25 10*3/mm3      Monocytes, Absolute 0.89 10*3/mm3      Eosinophils, Absolute 0.10 10*3/mm3      Basophils, Absolute 0.02 10*3/mm3      Immature Grans, Absolute 0.05 10*3/mm3      nRBC 0.0 /100 WBC     POC Glucose Once [[id number]]  (Normal) Collected:  09/01/19 1233    Specimen:  Blood Updated:  09/01/19 1256     Glucose 82 mg/dL      Comment: : [id number] Jaleesa León ID: [id number]             Imaging Results (last 24 hours)     Procedure Component Value Units Date/Time    MRI Brain With & Without Contrast [[id number]] Collected:  09/01/19 1545     Updated:  09/01/19 1555    Narrative:       EXAMINATION: MRI BRAIN W WO CONTRAST- 9/1/2019 3:45 PM CDT     HISTORY: Intracranial hemorrhage with worsening neuro exam. Clinical  concern for stroke or underlying mass; R13.12-Dysphagia, oropharyngeal  phase.     REPORT: Multiplanar multisequence MR imaging of the brain was performed  with and without contrast. Comparison is made with CT of the head  without contrast 09/01/2019 1214 hours.     The diffusion-weighted images demonstrate mild diffusion restriction  along the rim of the previous identified parenchymal hematoma in the  left basal  ganglia. There is a separate small linear focus of diffusion  restriction within the left centrum semiovale compared with an acute  white matter infarct. No mass is identified, there is no midline shift.  There is moderate diffuse atrophy. Mild dilation of the ventricular  system is noted. There is confluent increased FLAIR and T2 signal in the  periventricular and subcortical white matter tracts compatible with  advanced chronic small vessel white matter ischemic disease. Gradient  echo images show no additional sites of intraparenchymal hemorrhage.  There are small chronic lacunar infarcts in the basal ganglia and one in  the left anterior centrum semiovale. There is no abnormal contrast  enhancement. Motion artifact degrades the postcontrast sagittal and  coronal images, which had be repeated.       Impression:       1. Small, approximately 5 mm focal acute lacunar white matter infarct in  the left centrum semiovale without associated hemorrhage.  2. Stable 2.4 cm intraparenchymal hematoma in the left basal ganglia.  This shows no abnormal enhancement. There is no evidence of intracranial  neoplasm.  3. Diffuse atrophy and advanced chronic small vessel white matter  ischemic disease. There is a small chronic lacunar infarct in the  anterior left centrum semiovale and also within the basal ganglia  bilaterally.  This report was finalized on 09/01/2019 15:52 by Dr. Figueroa Golden MD.    CT Head Without Contrast [[id number]] Collected:  09/01/19 1230     Updated:  09/01/19 1237    Narrative:       EXAMINATION: CT HEAD WO CONTRAST- 9/1/2019 12:30 PM CDT     HISTORY: Follow-up intracranial hemorrhage. Garbled speech.  Extension  of right upper extremity weakness,.; R13.12-Dysphagia, oropharyngeal  phase.     DOSE: 777 mGycm (Automatic exposure control technique was implemented in  an effort to keep the radiation dose as low as possible without  compromising image quality)     REPORT: Spiral CT of the head was  performed without contrast,  reconstructed coronal and sagittal images are also reviewed.     COMPARISON: CT head without contrast 08/31/2019.     Again demonstrated is acute focal parenchymal hematoma in the left a  ganglion, measuring approximately 2.2 x 2.5 cm, compared with 2.7 x 2.1  cm before, essentially unchanged, given differences in slice selection  and measuring technique. No intraventricular extension of hemorrhage is  seen. There is no extra-axial hemorrhage. Moderate diffuse atrophy is  identified as well as advanced chronic small vessel white matter  ischemic disease. This is stable. Small focal lacunar infarcts are noted  in the basal ganglia, these appear chronic. There is also an old infarct  within the superior-medial aspect of the right cerebellum which is  unchanged. Bone windows are unremarkable.       Impression:       Stable head CT with compared with yesterday, with no  significant change in the left basal ganglia parenchymal hematoma,  likely related to hypertensive type hemorrhage.     This report was finalized on 09/01/2019 12:34 by Dr. Figueroa Golden MD.             Intake/Output Summary (Last 24 hours) at 9/2/2019 1119  Last data filed at 9/1/2019 1230  Gross per 24 hour   Intake 380 ml   Output —   Net 380 ml       Physical Exam    Constitutional: He appears well-developed and well-nourished. He is cooperative. No distress.   HENT:   Head: Normocephalic and atraumatic.   Nose: Nose normal.   Mouth/Throat: Oropharynx is clear and moist.   Eyes: Conjunctivae are normal. No scleral icterus.   Neck: Normal range of motion. Neck supple. No JVD present. No tracheal deviation present.   Cardiovascular: Normal rate, regular rhythm and normal heart sounds.   No murmur heard.  Pulmonary/Chest: Effort normal and breath sounds normal. No respiratory distress.   Abdominal: Soft. Bowel sounds are normal. He exhibits no mass. There is no tenderness.   Musculoskeletal: Normal range of motion. He  "exhibits edema (trace BLE).   Neurological: He is alert. He is disoriented. He exhibits abnormal muscle tone (right hemiparesis). Coordination abnormal with right hemiparesis.   Responded to my reading by saying \"good morning\".  Ignores the right side.   Skin: Skin is warm and dry. No rash noted.   Psychiatric: His speech is  improved in quality this morning. He is slowed. Cognition and memory are impaired. He is inattentive.     Results Review:  I have reviewed the labs, radiology results, and diagnostic studies since my last progress note and made treatment changes reflective of the results.   I have reviewed the current medications.    Assessment/Plan     Active Hospital Problems    Diagnosis   • **ICH (intracerebral hemorrhage) (CMS/HCC)   • Diabetes mellitus (CMS/HCC)   • Hypertensive urgency   • Hyperlipidemia       PLAN:  Terazosin and 5 mg p.o. x1 dose this a.m.  Increase terazosin to 10 mg at at bedtime  Continue daily HERLINDA Cabrera DO   09/02/19   11:19 AM    "

## 2019-09-02 NOTE — PLAN OF CARE
Problem: Patient Care Overview  Goal: Plan of Care Review  Outcome: Ongoing (interventions implemented as appropriate)   09/02/19 0836   Coping/Psychosocial   Plan of Care Reviewed With patient   Plan of Care Review   Progress improving   OTHER   Outcome Summary OT eval completed. Pt A&O to self only with slurred/garbled speech. He is able to follow approx 90% of one-step commands. RUE strength 0/5, LUE strength 4-/5, LUE AROM WFL, RUE AROM 100% impaired/PROM WFL. Increased tone noted on RUE. Absent pain sensation on RUE and light touch Mild/Moderately impaired. Max A x2 for all bed mobility. Required Max A for static sitting balance at EOB, demo'd a posterior and right sided lean that he was unable to correct. Pt would benefit from skilled OT services to address these deficits. Recommend d/c to IP acute rehab vs SNF, pending pt progress and activity tolerance/endurance.

## 2019-09-02 NOTE — DISCHARGE INSTR - DIET
Following Clinical Bedside Swallow Evaluation on this date, a diet consistency of pureed with honey thick liquids was recommended by SLP.  Cues to swallow needed.  Alessandra Bright MS CCC-SLP 9/2/2019 11:20 AM

## 2019-09-02 NOTE — NURSING NOTE
"Pt's sister Yaritza had removed the patient's Bipap because he \"was throwing a huge fit\". Yaritza stated \"I do not want that put back on him\". She was educated about the importance of him wearing the Bipap to keep his oxygen levels up while he was sleeping. She still refused to allow staff to replace mask on patient.   "

## 2019-09-02 NOTE — THERAPY EVALUATION
Acute Care - Occupational Therapy Initial Evaluation  [facility]     Patient Name: Phan Eastman  : 1955  MRN: [id number]  Today's Date: 2019  Onset of Illness/Injury or Date of Surgery: 19  Date of Referral to OT: 19  Referring Physician: Dr. Wyatt     Admit Date: 2019       ICD-10-CM ICD-9-CM   1. Oropharyngeal dysphagia R13.12 787.22   2. Impaired mobility and ADLs Z74.09 799.89   3. Impaired mobility Z74.09 799.89     Patient Active Problem List   Diagnosis   • ICH (intracerebral hemorrhage) (CMS/HCC)   • Diabetes mellitus (CMS/HCC)   • Hypertensive urgency   • Hyperlipidemia     Past Medical History:   Diagnosis Date   • Diabetes mellitus (CMS/HCC)    • GERD (gastroesophageal reflux disease)    • Hyperlipidemia    • Hypertension    • Neuropathy    • Osteoarthritis    • TIA (transient ischemic attack)      Past Surgical History:   Procedure Laterality Date   • CARPAL TUNNEL RELEASE Bilateral    • EYE SURGERY     • KNEE SURGERY Bilateral    • NECK SURGERY     • RETINAL DETACHMENT SURGERY     • ROTATOR CUFF REPAIR            OT ASSESSMENT FLOWSHEET (last 12 hours)      Occupational Therapy Evaluation     Row Name 19 0723                   OT Evaluation Time/Intention    Subjective Information  no complaints  -JJ        Document Type  evaluation see MAR  -JJ        Mode of Treatment  occupational therapy;concurrent therapy  -JJ        Patient Effort  good  -JJ        Comment  Keep systolic BP under 140.   (Significant)   -JJ           General Information    Patient Profile Reviewed?  yes  -JJ        Onset of Illness/Injury or Date of Surgery  19  -JJ        Referring Physician  Dr. Wyatt   -JJ        Patient Observations  alert;cooperative;agree to therapy  -JJ        Patient/Family Observations  no family present in room   -JJ        General Observations of Patient  fowlers, in bed, IV infusing.   -JJ        Prior Level of Function  mod assist:;min assist:;bed  mobility;ADL's;independent:;all household mobility;transfer difficult time gather PLOF d/t garbled speech.   -JJ        Equipment Currently Used at Home  — difficult time gathering d/t garbled speech   -JJ        Pertinent History of Current Functional Problem  Pt t/f from [facility] with R sided hemiparesis, facial droop and slurred speech. Pt has a PMH of diabetes with diabetic neuropathy, intracerebral ischemic stroke x3. CT Head 8/31: 2.7x2.1 acute thalamic intracerebral hemorrhage. Dx: ICH, DM, hypertensive, urgency, hyperlipidemia.   -JJ        Existing Precautions/Restrictions  fall  -JJ        Risks Reviewed  patient:;LOB;nausea/vomiting;increased discomfort;dizziness;change in vital signs;increased drainage;lines disloged  -JJ        Benefits Reviewed  patient:;improve function;increase strength;increase independence;increase balance;decrease pain;decrease risk of DVT;improve skin integrity;increase knowledge  -JJ        Barriers to Rehab  previous functional deficit;medically complex  -JJ           Relationship/Environment    Lives With  alone  -JJ           Resource/Environmental Concerns    Current Living Arrangements  home/apartment/condo  -JJ        Resource/Environmental Concerns  none  -JJ           Cognitive Assessment/Interventions    Additional Documentation  Cognitive Assessment/Intervention (Group)  -JJ           Cognitive Assessment/Intervention- PT/OT    Affect/Mental Status (Cognitive)  confused;low arousal/lethargic  -JJ        Orientation Status (Cognition)  oriented to;person;disoriented to;place;situation;time  -JJ        Follows Commands (Cognition)  follows one step commands;over 90% accuracy;verbal cues/prompting required;physical/tactile prompts required  -JJ        Personal Safety Interventions  fall prevention program maintained;gait belt;muscle strengthening facilitated;nonskid shoes/slippers when out of bed;supervised activity  -JJ           Safety Issues, Functional Mobility     Impairments Affecting Function (Mobility)  balance;cognition;coordination;endurance/activity tolerance;pain;range of motion (ROM);muscle tone abnormal;sensation/sensory awareness;strength  -JJ           Bed Mobility Assessment/Treatment    Bed Mobility Assessment/Treatment  supine-sit;sit-supine;rolling right  -JJ        Rolling Right [place] (Bed Mobility)  maximum assist (25% patient effort)  -JJ        Supine-Sit [place] (Bed Mobility)  maximum assist (25% patient effort);2 person assist  -JJ        Sit-Supine [place] (Bed Mobility)  maximum assist (25% patient effort);2 person assist  -JJ        Bed Mobility, Safety Issues  decreased use of legs for bridging/pushing;decreased use of arms for pushing/pulling  -        Assistive Device (Bed Mobility)  head of bed elevated;draw sheet  -J           Functional Mobility    Functional Mobility- Ind. Level  unable to perform  -JJ           Transfer Assessment/Treatment    Comment (Transfers)  unable to perform   -JJ           ADL Assessment/Intervention    BADL Assessment/Intervention  lower body dressing  -JJ           Lower Body Dressing Assessment/Training    Comment (Lower Body Dressing)  Anticipated level of A: Max A  -JJ           BADL Safety/Performance    Impairments, BADL Safety/Performance  balance;cognition;endurance/activity tolerance;motor control;muscle tone abnormality;range of motion;strength;trunk/postural control;sensory awareness  -JJ        Cognitive Impairments, BADL Safety/Performance  awareness, need for assistance;insight into deficits/self awareness;safety precaution awareness;safety precaution follow-through  -J        Skilled BADL Treatment/Intervention  BADL process/adaptation training  -JJ           General ROM    GENERAL ROM COMMENTS  RUE PROM WFL, increased tone noted. LUE AROM WFL   -JJ           MMT (Manual Muscle Testing)    General MMT Comments  RUE strength 0/5. LUE strength 4-/5  -JJ           Motor  Assessment/Interventions    Additional Documentation  Balance (Group)  -JJ           Balance    Balance  static sitting balance  -JJ           Static Sitting Balance    Level of [place] (Unsupported Sitting, Static Balance)  maximal assist, 25 to 49% patient effort  -JJ        Sitting Position (Unsupported Sitting, Static Balance)  sitting on edge of bed  -JJ        Comment (Unsupported Sitting, Static Balance)  posterior and R sided lean demo'd during static sitting balance required Max A  -JJ           Sensory Assessment/Intervention    Sensory General Assessment  light touch sensation deficits identified;pain sensation deficits identified  -JJ        Additional Documentation  Vision Assessment/Intervention (Group)  -JJ           Light Touch Sensation Assessment    Left Upper Extremity: Light Touch Sensation Assessment  intact  -JJ        Right Upper Extremity: Light Touch Sensation Assessment  mild impairment, 75% or more correct responses;moderate impairment, 50 to 74% correct responses  -JJ           Pain Sensation Assessment    Left Upper Extremity: Pain Sensation Assessment  intact  -JJ        Right Upper Extremity: Pain Sensation Assessment  absent sensation  -JJ           Vision Assessment/Intervention    Visual Motor Impairment  other (see comments) visual tracking WFL   -JJ           Positioning and Restraints    Pre-Treatment Position  in bed  -JJ        Post Treatment Position  bed  -JJ        In Bed  fowlers;call light within reach;encouraged to call for assist;notified nsg;patient within staff view;side rails up x2  -JJ           Pain Assessment    Additional Documentation  Pain Scale: Numbers Pre/Post-Treatment (Group)  -JJ           Pain Scale: Numbers Pre/Post-Treatment    Pain Scale: Numbers, Pretreatment  0/10 - no pain  -JJ        Pain Scale: Numbers, Post-Treatment  0/10 - no pain  -JJ           Coping    Verbalized Emotional State  acceptance  -JJ           Plan of Care Review    Plan of  Care Reviewed With  patient  -JJ           Clinical Impression (OT)    Date of Referral to OT  08/31/19  -JJ        OT Diagnosis  impaired adls and mobility.   -JJ        Prognosis (OT Eval)  good  -JJ        Patient/Family Goals Statement (OT Eval)  not stated at this time.   -JJ        Criteria for Skilled Therapeutic Interventions Met (OT Eval)  yes;treatment indicated  -JJ        Rehab Potential (OT Eval)  good, to achieve stated therapy goals  -JJ        Therapy Frequency (OT Eval)  5 times/wk  -JJ        Predicted Duration of Therapy Intervention (Therapy Eval)  until d/c from facility  -JJ        Care Plan Review (OT)  evaluation/treatment results reviewed;care plan/treatment goals reviewed;risks/benefits reviewed;current/potential barriers reviewed;patient/other agree to care plan  -JJ        Anticipated Discharge Disposition (OT)  skilled nursing facility;inpatient rehabilitation facility  -JJ           Vital Signs    Pre Systolic BP Rehab  147  -JJ        Pre Treatment Diastolic BP  61  -JJ        Intra Systolic BP Rehab  186  (Significant)  nsg notified   -JJ        Intra Treatment Diastolic BP  73  (Significant)   -JJ        Post Systolic BP Rehab  153  -JJ        Post Treatment Diastolic BP  62  -JJ        Pretreatment Heart Rate (beats/min)  73  -JJ        Pretreatment Resp Rate (breaths/min)  15  -JJ        Pre SpO2 (%)  95  -JJ        O2 Delivery Pre Treatment  room air  -JJ        Pre Patient Position  Supine  -JJ        Intra Patient Position  Sitting  -JJ        Post Patient Position  Supine  -JJ           Planned OT Interventions    Planned Therapy Interventions (OT Eval)  activity tolerance training;adaptive equipment training;BADL retraining;functional balance retraining;occupation/activity based interventions;neuromuscular control/coordination retraining;patient/caregiver education/training;ROM/therapeutic exercise;strengthening exercise;transfer/mobility retraining;passive ROM/stretching  -JJ            OT Goals    Bed Mobility Goal Selection (OT)  bed mobility, OT goal 1  -JJ        Transfer Goal Selection (OT)  transfer, OT goal 1  -JJ        Dressing Goal Selection (OT)  dressing, OT goal 1  -JJ        ROM Goal Selection (OT)  —  -JJ        Strength Goal Selection (OT)  strength, OT goal 1  -JJ        Additional Documentation  Strength Goal Selection (OT) (Row)  -JJ           Bed Mobility Goal 1 (OT)    Activity/Assistive Device (Bed Mobility Goal 1, OT)  bed mobility activities, all  -JJ        [place] Level/Cues Needed (Bed Mobility Goal 1, OT)  moderate assist (50-74% patient effort)  -JJ        Time Frame (Bed Mobility Goal 1, OT)  long term goal (LTG);by discharge  -JJ        Progress/Outcomes (Bed Mobility Goal 1, OT)  goal ongoing  -JJ           Transfer Goal 1 (OT)    Activity/Assistive Device (Transfer Goal 1, OT)  transfers, all  -JJ        [place] Level/Cues Needed (Transfer Goal 1, OT)  moderate assist (50-74% patient effort);2 person assist  -JJ        Time Frame (Transfer Goal 1, OT)  long term goal (LTG);by discharge  -JJ        Progress/Outcome (Transfer Goal 1, OT)  goal ongoing  -JJ           Dressing Goal 1 (OT)    Activity/Assistive Device (Dressing Goal 1, OT)  upper body dressing  -JJ        [place]/Cues Needed (Dressing Goal 1, OT)  minimum assist (75% or more patient effort)  -JJ        Time Frame (Dressing Goal 1, OT)  long term goal (LTG);by discharge  -JJ        Progress/Outcome (Dressing Goal 1, OT)  goal ongoing  -JJ           ROM Goal 1 (OT)    ROM Goal 1 (OT)  Pt will complete AAROM of RUE in all planes of motion for joint protection and skin integrity.   -JJ        Time Frame (ROM Goal 1, OT)  long term goal (LTG);by discharge  -JJ        Progress/Outcome (ROM Goal 1, OT)  goal ongoing  -JJ           Strength Goal 1 (OT)    Strength Goal 1 (OT)  Pt will increased L UE strength to 4+/5 and R UE strength 2-/5 for increased independence with adls, t/fs, and  mobility.   -        Time Frame (Strength Goal 1, OT)  long term goal (LTG);by discharge  -        Progress/Outcome (Strength Goal 1, OT)  goal ongoing  -          User Key  (r) = Recorded By, (t) = Taken By, (c) = Cosigned By    Initials Name Effective Dates    Aster Hernández OTR/L 10/12/18 -          Occupational Therapy Education     Title: PT OT SLP Therapies (In Progress)     Topic: Occupational Therapy (In Progress)     Point: ADL training (In Progress)     Description: Instruct learner(s) on proper safety adaptation and remediation techniques during self care or transfers.   Instruct in proper use of assistive devices.    Learning Progress Summary           Patient Acceptance, E, NR by RYNA at 9/2/2019  8:35 AM    Comment:  OT POC, benefits and roles of OT, adls, t/fs, bed mobility, positioning for prevention of skin breakdown of joint protection.                   Point: Precautions (In Progress)     Description: Instruct learner(s) on prescribed precautions during self-care and functional transfers.    Learning Progress Summary           Patient Acceptance, E, NR by RYAN at 9/2/2019  8:35 AM    Comment:  OT POC, benefits and roles of OT, adls, t/fs, bed mobility, positioning for prevention of skin breakdown of joint protection.                               User Key     Initials Effective Dates Name Provider Type Discipline     10/12/18 -  Aster Suggs OTR/L Occupational Therapist OT                  OT Recommendation and Plan  Outcome Summary/Treatment Plan (OT)  Anticipated Discharge Disposition (OT): skilled nursing facility, inpatient rehabilitation facility  Planned Therapy Interventions (OT Eval): activity tolerance training, adaptive equipment training, BADL retraining, functional balance retraining, occupation/activity based interventions, neuromuscular control/coordination retraining, patient/caregiver education/training, ROM/therapeutic exercise, strengthening exercise,  transfer/mobility retraining, passive ROM/stretching  Therapy Frequency (OT Eval): 5 times/wk  Plan of Care Review  Plan of Care Reviewed With: patient  Plan of Care Reviewed With: patient  Outcome Summary: OT eval completed. Pt A&O to self only with slurred/garbled speech. He is able to follow approx 90% of one-step commands. RUE strength 0/5, LUE strength 4-/5, LUE AROM WFL, RUE AROM 100% impaired/PROM WFL. Increased tone noted on RUE. Absent pain sensation on RUE and light touch Mild/Moderately impaired. Max A x2 for all bed mobility. Required Max A for static sitting balance at EOB, demo'd a posterior and right sided lean that he was unable to correct. Pt would benefit from skilled OT services to address these deficits. Recommend d/c to IP acute rehab vs SNF, pending pt progress and activity tolerance/endurance.     Outcome Measures     Row Name 09/02/19 0800             How much help from another is currently needed...    Putting on and taking off regular lower body clothing?  1  -JJ      Bathing (including washing, rinsing, and drying)  2  -JJ      Toileting (which includes using toilet bed pan or urinal)  1  -JJ      Putting on and taking off regular upper body clothing  2  -JJ      Taking care of personal grooming (such as brushing teeth)  2  -JJ      Eating meals  2  -J      AM-PAC 6 Clicks Score (OT)  10  -         Functional Assessment    Outcome Measure Options  AM-PAC 6 Clicks Daily Activity (OT)  -        User Key  (r) = Recorded By, (t) = Taken By, (c) = Cosigned By    Initials Name Provider Type    Aster Crawford OTR/L Occupational Therapist          Time Calculation:   Time Calculation- OT     Row Name 09/02/19 0834             Time Calculation- OT    OT Start Time  0739 add 16 minutes for chart review   -      OT Stop Time  0817  -      OT Time Calculation (min)  38 min  -RYAN      OT Received On  09/02/19  -CARLY      OT Goal Re-Cert Due Date  09/12/19  -        User Key  (r) =  Recorded By, (t) = Taken By, (c) = Cosigned By    Initials Name Provider Type    Aster Crawford OTR/L Occupational Therapist        Therapy Charges for Today     Code Description Service Date Service Provider Modifiers Qty    [id number] HC OT EVAL HIGH COMPLEXITY 4 9/2/2019 Aster Suggs OTR/L GO 1               ISMAEL Best  9/2/2019

## 2019-09-02 NOTE — PLAN OF CARE
Problem: Patient Care Overview  Goal: Plan of Care Review  Outcome: Ongoing (interventions implemented as appropriate)      Problem: Fall Risk (Adult)  Goal: Absence of Fall  Outcome: Ongoing (interventions implemented as appropriate)      Problem: Pain, Acute (Adult)  Goal: Acceptable Pain Control/Comfort Level  Outcome: Ongoing (interventions implemented as appropriate)      Problem: Skin Injury Risk (Adult)  Goal: Skin Health and Integrity  Outcome: Ongoing (interventions implemented as appropriate)

## 2019-09-02 NOTE — PLAN OF CARE
Problem: Fall Risk (Adult)  Goal: Identify Related Risk Factors and Signs and Symptoms  Outcome: Ongoing (interventions implemented as appropriate)    Goal: Absence of Fall  Outcome: Ongoing (interventions implemented as appropriate)      Problem: Pain, Acute (Adult)  Goal: Identify Related Risk Factors and Signs and Symptoms  Outcome: Ongoing (interventions implemented as appropriate)    Goal: Acceptable Pain Control/Comfort Level  Outcome: Ongoing (interventions implemented as appropriate)      Problem: Skin Injury Risk (Adult)  Goal: Identify Related Risk Factors and Signs and Symptoms  Outcome: Ongoing (interventions implemented as appropriate)    Goal: Skin Health and Integrity  Outcome: Ongoing (interventions implemented as appropriate)

## 2019-09-02 NOTE — THERAPY RE-EVALUATION
Acute Care - Speech Language Pathology   Swallow Re-Evaluation [facility]     Patient Name: Phan Eastman  : 1955  MRN: [id number]  Today's Date: 2019  Onset of Illness/Injury or Date of Surgery: 19     Referring Physician: Dr. Wyatt       Admit Date: 2019    Asked to re-eval swallow due to decline in neuro status with increased slurred speech and right sided weakness.  Patient observed to have significant dysarthria including vocal quality with glottal price which was not previously observed.  Unintelligible at times.  Slower to respond.  Trials of PO did not result in any overt s/s of aspiration, however, he did have oral holding requiring cues to swallow on almost all attempts at PO.  He was able to initiate swallow with cues.  Episodes of inability to focus on SLP observed.  RN notified.  Rec:   1) Downgrade to pureed with honey thick liquids   2) 1:1 supervision and assist with meals   3) Watch for complete swallow prior to additional bites   4) Discontinue feeding if patient not swallowing when cued   5) ST to follow.   Alessandra Bright, MS CCC-SLP 2019 11:19 AM    Visit Dx:     ICD-10-CM ICD-9-CM   1. Oropharyngeal dysphagia R13.12 787.22   2. Impaired mobility and ADLs Z74.09 799.89   3. Impaired mobility Z74.09 799.89     Patient Active Problem List   Diagnosis   • ICH (intracerebral hemorrhage) (CMS/HCC)   • Diabetes mellitus (CMS/HCC)   • Hypertensive urgency   • Hyperlipidemia     Past Medical History:   Diagnosis Date   • Diabetes mellitus (CMS/HCC)    • GERD (gastroesophageal reflux disease)    • Hyperlipidemia    • Hypertension    • Neuropathy    • Osteoarthritis    • TIA (transient ischemic attack)      Past Surgical History:   Procedure Laterality Date   • CARPAL TUNNEL RELEASE Bilateral    • EYE SURGERY     • KNEE SURGERY Bilateral    • NECK SURGERY     • RETINAL DETACHMENT SURGERY     • ROTATOR CUFF REPAIR          SWALLOW EVALUATION (last 72 hours)      SLP Adult  Swallow Evaluation     Row Name 09/02/19 1000 08/31/19 1030                Rehab Evaluation    Document Type  evaluation  -KW  evaluation  -KW       Subjective Information  no complaints  -KW  no complaints  -KW       Patient Observations  alert;cooperative  -KW  alert;cooperative  -KW       Patient/Family Observations  no family present in room   -KW  —       Patient Effort  adequate  -KW  good  -KW          General Information    Patient Profile Reviewed  yes  -KW  yes  -KW       Pertinent History Of Current Problem  Decline in neuro state with slurred speech and increased right sided weakness.  -KW  ICH, fall, failed swallow screening  -KW       Current Method of Nutrition  regular textures;nectar/syrup-thick liquids  -KW  NPO  -KW       Precautions/Limitations, Vision  WFL  -KW  WFL  -KW       Precautions/Limitations, Hearing  WFL  -KW  WFL  -KW       Prior Level of Function-Communication  WFL  -KW  WFL  -KW       Prior Level of Function-Swallowing  no diet consistency restrictions  -KW  no diet consistency restrictions  -KW       Plans/Goals Discussed with  patient  -KW  patient  -KW       Barriers to Rehab  none identified  -KW  none identified  -KW       Patient's Goals for Discharge  patient could not state  -KW  return to all previous roles/activities  -KW          Pain Assessment    Additional Documentation  Pain Scale: FACES Pre/Post-Treatment (Group)  -KW  Pain Scale: FACES Pre/Post-Treatment (Group)  -KW          Pain Scale: FACES Pre/Post-Treatment    Pain: FACES Scale, Pretreatment  2-->hurts little bit  -KW  0-->no hurt  -KW       Pain: FACES Scale, Post-Treatment  2-->hurts little bit  -KW  8-->hurts whole lot  -KW       Pre/Post Treatment Pain Comment  —  Ribs from coughing on water, RN notified and checked on patient.  -KW          Oral Motor and Function    Dentition Assessment  natural, present and adequate  -KW  natural, present and adequate  -KW       Secretion Management  WNL/WFL  -KW   WNL/WFL  -KW       Mucosal Quality  moist, healthy  -KW  moist, healthy  -KW       Volitional Swallow  weak  -KW  WFL  -KW       Volitional Cough  weak  -KW  WFL  -KW          Oral Musculature and Cranial Nerve Assessment    Oral Motor General Assessment  oral labial or buccal impairment  -KW  oral labial or buccal impairment  -KW       Oral Labial or Buccal Impairment, Detail, Cranial Nerve VII (Facial):  right labial droop  -KW  right labial droop  -KW       Vocal Impairment, Detail. Cranial Nerve X (Vagus)  vocal quality abnormality (see comments)  -KW  —       Oral Motor, Comment  glottal price  -KW  —          General Eating/Swallowing Observations    Respiratory Support Currently in Use  nasal cannula  -KW  nasal cannula  -KW       Eating/Swallowing Skills  fed by SLP  -KW  fed by SLP  -KW       Positioning During Eating  upright in bed  -KW  upright in bed  -KW       Utensils Used  spoon;straw  -KW  spoon;straw  -KW       Consistencies Trialed  regular textures;thin liquids;nectar/syrup-thick liquids;honey-thick liquids;pudding thick  -KW  regular textures;thin liquids;nectar/syrup-thick liquids;honey-thick liquids;pudding thick  -KW          Clinical Swallow Eval    Oral Prep Phase  impaired  -KW  WFL  -KW       Oral Transit  impaired  -KW  WFL  -KW       Oral Residue  impaired  -KW  WFL  -KW       Pharyngeal Phase  suspected pharyngeal impairment  -KW  suspected pharyngeal impairment  -KW       Esophageal Phase  unremarkable  -KW  unremarkable  -KW       Clinical Swallow Evaluation Summary  Asked to re-eval swallow due to decline in neuro status with increased slurred speech and right sided weakness.  Patient observed to have significant dysarthria including vocal quality with glottal price which was not previously observed.  Unintelligible at times.  Slower to respond.  Trials of PO did not result in any overt s/s of aspiration, however, he did have oral holding requiring cues to swallow on almost all attempts  at PO.  He was able to initiate swallow with cues.  Episodes of inability to focus on SLP observed.  RN notified.  Rec: 1) Downgrade to pureed with honey thick liquids 2) 1:1 supervision and assist with meals 3) Watch for complete swallow prior to additional bites 4) Discontinue feeding if patient not swallowing when cued 5) ST to follow.   -KW  Bedside Swallow Evaluation completed.  Patient with right facial droop at rest and decreased retraction with smile.  Overt strong cough with thin liquids causing lots of pain in ribs from fall.  No other overt s/s of aspiration with full range of consistencies.  Some mild throat clearing with spoonful of water.  Rec: 1) Ok for Regular consistency diet with nectar thick liquids 2) Ok for ice chips or water between meals 3) ST to follow.  -KW          Oral Prep Concerns    Oral Prep Concerns  prolonged mastication;inefficient mastication;poor rotary chew  -KW  —       Prolonged Mastication  regular consistencies  -KW  —       Inefficient Mastication  regular consistencies  -KW  —          Oral Transit Concerns    Oral Transit Concerns  delayed initiation of bolus transit  -KW  —       Delayed Intiation of Bolus Transit  thin;nectar;honey;pudding;regular consistencies  -KW  —          Oral Residue Concerns    Oral Residue Concerns  diffuse residue throughout oral cavity  -KW  —       Diffuse Residue Throughout Oral Cavity  regular consistencies  -KW  —          Pharyngeal Phase Concerns    Pharyngeal Phase Concerns  other (see comments)  -KW  cough  -KW       Cough  thin  -KW  thin  -KW       Pharyngeal Phase Concerns, Comment  audible swallow  -KW  —          Clinical Impression    SLP Swallowing Diagnosis  mod-severe;oral dysfunction;pharyngeal dysfunction  -KW  mild;oral dysfunction;pharyngeal dysfunction  -KW       Functional Impact  risk of aspiration/pneumonia;risk of malnutrition;risk of dehydration  -KW  risk of aspiration/pneumonia  -KW       Rehab Potential/Prognosis,  Swallowing  adequate, monitor progress closely  -KW  good, to achieve stated therapy goals  -KW       Swallow Criteria for Skilled Therapeutic Interventions Met  demonstrates skilled criteria  -KW  demonstrates skilled criteria  -KW          Recommendations    Therapy Frequency (Swallow)  at least;3 days per week  -KW  at least;3 days per week  -KW       Predicted Duration Therapy Intervention (Days)  until discharge  -KW  until discharge  -KW       SLP Diet Recommendation  puree;honey thick liquids;ice chips between meals after oral care, with supervision  -KW  regular textures;nectar thick liquids;ice chips between meals after oral care, with supervision  -KW       Recommended Precautions and Strategies  upright posture during/after eating;small bites of food and sips of liquid;other (see comments) cue to swallow  -KW  upright posture during/after eating;small bites of food and sips of liquid  -KW       SLP Rec. for Method of Medication Administration  meds crushed;with pudding or applesauce  -KW  meds whole;with thick liquids;with pudding or applesauce  -KW       Monitor for Signs of Aspiration  notify SLP if any concerns  -KW  notify SLP if any concerns  -KW       Anticipated Dischage Disposition  inpatient rehabilitation facility  -KW  inpatient rehabilitation facility  -KW          Swallow Goals (SLP)    Oral Nutrition/Hydration Goal Selection (SLP)  oral nutrition/hydration, SLP goal 1  -KW  oral nutrition/hydration, SLP goal 1  -KW       Labial Strengthening Goal Selection (SLP)  labial strengthening, SLP goal 1  -KW  labial strengthening, SLP goal 1  -KW       Pharyngeal Strengthening Exercise Goal Selection (SLP)  pharyngeal strengthening exercise, SLP goal 1  -KW  pharyngeal strengthening exercise, SLP goal 1  -KW       Additional Documentation  labial strengthening goal selection (SLP);pharyngeal strengthening exercise goal selection (SLP)  -KW  labial strengthening goal selection (SLP);pharyngeal  strengthening exercise goal selection (SLP)  -KW          Oral Nutrition/Hydration Goal 1 (SLP)    Oral Nutrition/Hydration Goal 1, SLP  LTG: Patient will tolerate LRD with no overt s/s of aspiration.  -KW  LTG: Patient will tolerate LRD with no overt s/s of aspiration.  -KW       Time Frame (Oral Nutrition/Hydration Goal 1, SLP)  by discharge  -KW  by discharge  -KW       Barriers (Oral Nutrition/Hydration Goal 1, SLP)  n/a  -KW  n/a  -KW       Progress/Outcomes (Oral Nutrition/Hydration Goal 1, SLP)  goal ongoing  -KW  goal ongoing  -KW          Labial Strengthening Goal 1 (SLP)    Activity (Labial Strengthening Goal 1, SLP)  increase labial tone  -KW  increase labial tone  -KW       Increase Labial Tone  labial resistance exercises  -KW  labial resistance exercises  -KW       [place]/Accuracy (Labial Strengthening Goal 1, SLP)  independently (over 90% accuracy)  -KW  independently (over 90% accuracy)  -KW       Time Frame (Labial Strengthening Goal 1, SLP)  short term goal (STG);by discharge  -KW  short term goal (STG);by discharge  -KW       Barriers (Labial Strengthening Goal 1, SLP)  n/a  -KW  n/a  -KW       Progress/Outcomes (Labial Strengthening Goal 1, SLP)  goal ongoing  -KW  goal ongoing  -KW          Pharyngeal Strengthening Exercise Goal 1 (SLP)    Activity (Pharyngeal Strengthening Goal 1, SLP)  increase epiglottic inversion and retroflexion;increase tongue base retraction;increase pharyngeal sensation  -KW  increase epiglottic inversion and retroflexion;increase tongue base retraction  -KW       Increase Pharyngeal Sensation  gustatory stimulation (sour/cold)  -KW  —       Increase Epiglottic Inversion and Retroflexion  Mendelsohn;falsetto  -KW  Mendelsohn;falsetto  -KW       Increase Tongue Base Retraction  hard effortful swallow;kaleb  -KW  hard effortful swallow;kaleb  -KW       [place]/Accuracy (Pharyngeal Strengthening Goal 1, SLP)  independently (over 90% accuracy)  -KW   independently (over 90% accuracy)  -KW       Time Frame (Pharyngeal Strengthening Goal 1, SLP)  short term goal (STG);by discharge  -KW  short term goal (STG);by discharge  -KW       Barriers (Pharyngeal Strengthening Goal 1, SLP)  n/a  -KW  n/a  -KW       Progress/Outcomes (Pharyngeal Strengthening Goal 1, SLP)  goal ongoing  -KW  goal ongoing  -KW         User Key  (r) = Recorded By, (t) = Taken By, (c) = Cosigned By    Initials Name Effective Dates    SUELLEN BrightAlessandra MS [facility]-SLP 08/02/16 -           EDUCATION  The patient has been educated in the following areas:   Dysphagia (Swallowing Impairment).    SLP Recommendation and Plan  SLP Swallowing Diagnosis: mod-severe, oral dysfunction, pharyngeal dysfunction  SLP Diet Recommendation: puree, honey thick liquids, ice chips between meals after oral care, with supervision  Recommended Precautions and Strategies: upright posture during/after eating, small bites of food and sips of liquid, other (see comments)(cue to swallow)  SLP Rec. for Method of Medication Administration: meds crushed, with pudding or applesauce     Monitor for Signs of Aspiration: notify SLP if any concerns     Swallow Criteria for Skilled Therapeutic Interventions Met: demonstrates skilled criteria  Anticipated Dischage Disposition: inpatient rehabilitation facility  Rehab Potential/Prognosis, Swallowing: adequate, monitor progress closely  Therapy Frequency (Swallow): at least, 3 days per week  Predicted Duration Therapy Intervention (Days): until discharge       Plan of Care Reviewed With: patient  Progress: declining  Outcome Summary: Asked to re-eval swallow due to decline in neuro status with increased slurred speech and right sided weakness.  Patient observed to have significant dysarthria including vocal quality with glottal price which was not previously observed.  Unintelligible at times.  Slower to respond.  Trials of PO did not result in any overt s/s of aspiration, however, he did  have oral holding requiring cues to swallow on almost all attempts at PO.  He was able to initiate swallow with cues.  Episodes of inability to focus on SLP observed.  RN notified.  Rec: 1) Downgrade to pureed with honey thick liquids 2) 1:1 supervision and assist with meals 3) Watch for complete swallow prior to additional bites 4) Discontinue feeding if patient not swallowing when cued 5) ST to follow.     SLP GOALS     Row Name 09/02/19 1000 08/31/19 1030          Oral Nutrition/Hydration Goal 1 (SLP)    Oral Nutrition/Hydration Goal 1, SLP  LTG: Patient will tolerate LRD with no overt s/s of aspiration.  -KW  LTG: Patient will tolerate LRD with no overt s/s of aspiration.  -KW     Time Frame (Oral Nutrition/Hydration Goal 1, SLP)  by discharge  -KW  by discharge  -KW     Barriers (Oral Nutrition/Hydration Goal 1, SLP)  n/a  -KW  n/a  -KW     Progress/Outcomes (Oral Nutrition/Hydration Goal 1, SLP)  goal ongoing  -KW  goal ongoing  -KW        Labial Strengthening Goal 1 (SLP)    Activity (Labial Strengthening Goal 1, SLP)  increase labial tone  -KW  increase labial tone  -KW     Increase Labial Tone  labial resistance exercises  -KW  labial resistance exercises  -KW     [place]/Accuracy (Labial Strengthening Goal 1, SLP)  independently (over 90% accuracy)  -KW  independently (over 90% accuracy)  -KW     Time Frame (Labial Strengthening Goal 1, SLP)  short term goal (STG);by discharge  -KW  short term goal (STG);by discharge  -KW     Barriers (Labial Strengthening Goal 1, SLP)  n/a  -KW  n/a  -KW     Progress/Outcomes (Labial Strengthening Goal 1, SLP)  goal ongoing  -KW  goal ongoing  -KW        Pharyngeal Strengthening Exercise Goal 1 (SLP)    Activity (Pharyngeal Strengthening Goal 1, SLP)  increase epiglottic inversion and retroflexion;increase tongue base retraction;increase pharyngeal sensation  -KW  increase epiglottic inversion and retroflexion;increase tongue base retraction  -KW     Increase  Pharyngeal Sensation  gustatory stimulation (sour/cold)  -KW  —     Increase Epiglottic Inversion and Retroflexion  Mendelsohn;falsetto  -KW  Mendelsohn;falsetto  -KW     Increase Tongue Base Retraction  hard effortful swallow;kaleb  -KW  hard effortful swallow;kaleb  -KW     [place]/Accuracy (Pharyngeal Strengthening Goal 1, SLP)  independently (over 90% accuracy)  -KW  independently (over 90% accuracy)  -KW     Time Frame (Pharyngeal Strengthening Goal 1, SLP)  short term goal (STG);by discharge  -KW  short term goal (STG);by discharge  -KW     Barriers (Pharyngeal Strengthening Goal 1, SLP)  n/a  -KW  n/a  -KW     Progress/Outcomes (Pharyngeal Strengthening Goal 1, SLP)  goal ongoing  -KW  goal ongoing  -KW       User Key  (r) = Recorded By, (t) = Taken By, (c) = Cosigned By    Initials Name Provider Type    Alessandra Jo MS CCC-SLP Speech and Language Pathologist             Time Calculation:   Time Calculation- SLP     Row Name 09/02/19 1115             Time Calculation- SLP    SLP Start Time  1000  -KW      SLP Stop Time  1045  -KW      SLP Time Calculation (min)  45 min  -KW      SLP Received On  09/02/19  -KW      SLP Goal Re-Cert Due Date  09/12/19  -KW        User Key  (r) = Recorded By, (t) = Taken By, (c) = Cosigned By    Initials Name Provider Type    Alessandra Jo MS CCC-SLP Speech and Language Pathologist          Therapy Charges for Today     Code Description Service Date Service Provider Modifiers Qty    [id number] HC ST EVAL ORAL PHARYNG SWALLOW 3 9/2/2019 Alessandra Bright MS CCC-SLP GN 1               Alessandra Bright MS CCC-RAVEN  9/2/2019

## 2019-09-02 NOTE — SIGNIFICANT NOTE
"Arrived in pt's room for BIPAP alarm going off. BIPAP had been removed by pt's sister and placed on top of machine. She stated \"he just can't do it\". BIPAP was placed on standby at this time. Pt was on RA with a SpO2 of 96%. No respiratory distress was present. Pt's sister also told GUSTAVO Salazar that she did not want him put back on.   "

## 2019-09-03 PROBLEM — R47.1 DYSARTHRIA: Status: ACTIVE | Noted: 2019-01-01

## 2019-09-03 PROBLEM — G81.94 LEFT HEMIPLEGIA (HCC): Status: ACTIVE | Noted: 2019-01-01

## 2019-09-03 PROBLEM — I63.412 CEREBROVASCULAR ACCIDENT (CVA) DUE TO EMBOLISM OF LEFT MIDDLE CEREBRAL ARTERY (HCC): Status: ACTIVE | Noted: 2019-01-01

## 2019-09-03 NOTE — PLAN OF CARE
Problem: Patient Care Overview  Goal: Plan of Care Review  Outcome: Ongoing (interventions implemented as appropriate)   09/03/19 1571   Coping/Psychosocial   Plan of Care Reviewed With patient   Plan of Care Review   Progress no change   OTHER   Outcome Summary OT tx completed. Pt. appears dysarthric & tearful with possible lability. He required Max A x 2 for bed mobility & sat EOB Max A. He would benefit from cont'd OT tx to improve his indpe in self care & fxl mobility. Anticipate DC to SNF vs inpatient rehab

## 2019-09-03 NOTE — CONSULTS
Neurology Consult Note    Referring Provider: Dr. Raf Wyatt  Reason for Consultation: stroke      History of present illness:    This is a 64-year-old male who was admitted on August 31.  He has multiple stroke risk factors including diabetes mellitus, hypertension, and a previous hemorrhagic stroke.  As a baseline he ambulates using a walker secondary to a diabetic neuropathy.  He initially presented complaining of right-sided weakness and some speech ab normalities as well.  He was seen in outside hospital and found to have malignant hypertension.  He was also found to have a left thalamic hemorrhagic stroke.  He was admitted under the neurosurgery service.  It was noticed he was having waxing and waning of his speech including almost an aphasic pattern at one point which is now improving.  He also has been noted to have some worsening right-sided weakness.  An MRI of the brain shows evidence of an ischemic stroke in addition to the hemorrhagic stroke.  The ischemic stroke is in the same arterial distribution but is somewhat more superior.  The patient's home medications did include a baby aspirin.    Past Medical History:   Diagnosis Date   • Diabetes mellitus (CMS/[facility])    • GERD (gastroesophageal reflux disease)    • Hyperlipidemia    • Hypertension    • Neuropathy    • Osteoarthritis    • TIA (transient ischemic attack)        Allergies   Allergen Reactions   • Iodine Shortness Of Breath   • Lisinopril Rash   • Red Dye Rash     No current facility-administered medications on file prior to encounter.      Current Outpatient Medications on File Prior to Encounter   Medication Sig   • aspirin 81 MG EC tablet Take 81 mg by mouth Daily.   • CloNIDine (CATAPRES) 0.1 MG tablet Take 0.1 mg by mouth 2 (Two) Times a Day.   • doxazosin (CARDURA) 2 MG tablet Take 2 mg by mouth Every Night.   • glimepiride (AMARYL) 2 MG tablet Take 2 mg by mouth Every Morning Before Breakfast.   • losartan (COZAAR) 100 MG tablet  Take 100 mg by mouth Daily.   • metFORMIN (GLUCOPHAGE) 850 MG tablet Take 850 mg by mouth 2 (Two) Times a Day With Meals.   • omeprazole (priLOSEC) 20 MG capsule Take 20 mg by mouth Daily.   • simvastatin (ZOCOR) 40 MG tablet Take 40 mg by mouth Every Night.   • traZODone (DESYREL) 50 MG tablet Take 50 mg by mouth At Night As Needed for Sleep.       Social History     Socioeconomic History   • Marital status:      Spouse name: Not on file   • Number of children: Not on file   • Years of education: Not on file   • Highest education level: Not on file   Tobacco Use   • Smoking status: Never Smoker   • Smokeless tobacco: Never Used   Substance and Sexual Activity   • Alcohol use: No     Frequency: Never   • Drug use: No   • Sexual activity: Defer     History reviewed. No pertinent family history.    Review of Systems  A 14 point review of systems was reviewed and was negative except for right-sided weakness    Vital Signs   Temp:  [97.6 °F (36.4 °C)-98.4 °F (36.9 °C)] 97.6 °F (36.4 °C)  Heart Rate:  [50-89] 68  Resp:  [6-20] 17  BP: ()/() 137/65  Arterial Line BP: ()/(40-79) 126/57    General Exam:  Head:  Normal cephalic, atraumatic  HEENT:  Neck supple  Fundoscopic Exam:  No signs of disc edema  CVS:  Regular rate and rhythm.  No murmurs  Carotid Examination:  No bruits  Lungs:  Clear to auscultation  Abdomen:  Non-tender, Non-distended  Extremities:  No signs of peripheral edema  Skin:  No rashes    Neurologic Exam:    Mental Status:    -Awake, Alert, Oriented X 3  -No word finding difficulties  -No aphasia  -Mild dysarthria  -Follows simple and complex commands    CN II:  Visual fields full.  Pupils equally reactive to light  CN III, IV, VI:  Extraocular Muscles full with no signs of nystagmus  CN V:  Facial sensory is symmetric with no asymmetries.  CN VII:  Facial motor symmetric  CN VIII:  Gross hearing intact bilaterally  CN IX:  Palate elevates symmetrically  CN X:  Palate elevates  symmetrically  CN XI:  Shoulder shrug symmetric  CN XII:  Tongue is midline on protrusion    Motor: (strength out of 5:  1= minimal movement, 2 = movement in plane of gravity, 3 = movement against gravity, 4 = movement against some resistance, 5 = full strength)    -Right Upper Ext: Proximal: 2 Distal: 1  -Left Upper Ext: Proximal: 5 Distal: 5    -Right Lower Ext: Proximal: 0  -Left Lower Ext: Proximal: 5 Distal: 5    DTR:  -Right   Bicep: 2+ Tricep: 2+ Brachoradialis: 2+   Patella: 2+ Ankle: 2+ Neg Babinski  -Left   Bicep: 2+ Tricep: 2+ Brachoradialis: 2+   Patella: 2+ Ankle: 2+ Neg Babinski    Sensory:  -No drastic asymmetries.  The patient does have gradient sensory loss distal to the knees bilaterally in the lower extreme knees.    Coordination:  -Finger to nose intact          Results Review:  Lab Results (last 24 hours)     Procedure Component Value Units Date/Time    Basic Metabolic Panel [[id number]]  (Abnormal) Collected:  09/03/19 0338    Specimen:  Blood Updated:  09/03/19 0449     Glucose 97 mg/dL      BUN 26 mg/dL      Creatinine 1.52 mg/dL      Sodium 144 mmol/L      Potassium 4.8 mmol/L      Chloride 115 mmol/L      CO2 22.0 mmol/L      Calcium 8.9 mg/dL      eGFR Non African Amer 47 mL/min/1.73      BUN/Creatinine Ratio 17.1     Anion Gap 7.0 mmol/L     Narrative:       GFR Normal >60  Chronic Kidney Disease <60  Kidney Failure <15    POC Glucose Once [[id number]]  (Normal) Collected:  09/02/19 2116    Specimen:  Blood Updated:  09/02/19 2128     Glucose 119 mg/dL      Comment: : [id number] Dylon Triciajuliana ID: [id number]       POC Glucose Once [[id number]]  (Normal) Collected:  09/02/19 1705    Specimen:  Blood Updated:  09/02/19 1718     Glucose 112 mg/dL      Comment: : [id number] Jaleesa León ID: [id number]       POC Glucose Once [[id number]]  (Normal) Collected:  09/02/19 1219    Specimen:  Blood Updated:  09/02/19 1238     Glucose 129 mg/dL      Comment: : [id number] Jaleesa  Sweetie León ID: [id number]             .  Imaging Results (last 24 hours)     ** No results found for the last 24 hours. **          MRI brain reviewed by me from September 1.  There is evidence of a hematoma that is moderate in size.  It is in the left thalamus extending into the posterior internal capsule as well with some  Hemoglobin A1c is 5.0    Impression    1.  Left thalamic hemorrhagic stroke  2.  Acute ischemic stroke in the left MCA distribution which appears to be lacunar in nature and likely thrombotic  3.  Diabetes mellitus with a current hemoglobin A1c at goal  4.  Hypertension  5.  Diabetic neuropathy  6.  Dysarthria and right-sided weakness    Plan    · Not a TPA candidate secondary to hemorrhagic stroke  · Cardiac echo pending  · Carotid ultrasound  · Fasting lipid profile  · TSH  · Blood pressure management with systolic blood pressure goal less than 140  · Continue therapy consultations  · Not a candidate for antiplatelets currently secondary to hemorrhagic stroke    I discussed the patients findings and my recommendations with patient    Lei Barahona MD  09/03/19  9:09 AM

## 2019-09-03 NOTE — PROGRESS NOTES
Adult Outpatient Nutrition  Assessment/PES    Patient Name:  Phan Eastman  YOB: 1955  MRN: [id number]    Assessment Date:  9/3/2019    Comments: RDN consult for tube feeding. However per nursing pt was eating better this am, po intake avg 25% of past 6 meals. Pt has PO diet for Pureed,Cardiac,CCHO, honey thick liquids. If tube feeds desired the enteral ntn recommendations are below.       Physical Findings     Row Name 09/03/19 1632          Physical Findings    Gastrointestinal  — BM 9/2     Skin  — jere score 12             Home Nutrition Report     Row Name 09/03/19 1629          Home Nutrition Report    Typical Food/Fluid Intake  RDN consult for tube feeding; however RN reports pt has been eating better today, SLP following         Estimated/Assessed Needs     Row Name 09/03/19 1634          Calculation Measurements    Weight Used For Calculations  94.1 kg (207 lb 8 oz) Adjusted BW of 207.5 lbs        Estimated/Assessed Needs    Additional Documentation  KCAL/KG (Group);Calorie Requirements (Group);Protein Requirements (Group);[place]-St. Jeor Equation (Group);Fluid Requirements (Group)        Calorie Requirements    Estimated Calorie Requirement (kcal/day)  2166     Estimated Calorie Need Method  [place]-St Jeor AF 1.2     Estimated Calorie Requirement Comment  4645-8738 kcal/day        [place]-St. Jeor Equation    RMR ([place]-St. Jeor Equation)  1805.96        Protein Requirements    Est Protein Requirement Amount (gms/kg)  —  lbs x 1.0-1.2 gm/day  (.2 gm/day)        Fluid Requirements    Estimated Fluid Requirements (mL/day)  2166     Estimated Fluid Requirement Method  RDA Method     RDA Method (mL)  2166     [place]-Mallorie Method (over 20 kg)  3382.42           Labs/Tests/Procedures/Meds     Row Name 09/03/19 1630          Labs/Procedures/Meds    Lab Results Reviewed  reviewed, pertinent        Medications    Pertinent Medications Reviewed  reviewed, pertinent            Problem/Interventions:  Problem 1     Row Name 09/03/19 1642          Nutrition Diagnoses Problem 1    Problem 1  Inadequate Intake/Infusion     Inadequate Intake Type  Oral     Macronutrient  Kcal;Protein;Fluid     Etiology (related to)  Medical Diagnosis     Endocrine  DM2;Hyperglycemia     Neurological  CVA;Dysphagia     Signs/Symptoms (evidenced by)  Report of Mnimal PO Intake;SLP/Swallow eval;PO Intake     Percent (%) intake recorded  25 %     Over number of meals  5     Swallow eval status  Done     Type of SLP Evaluation  Bedside           Intervention Goal     Row Name 09/03/19 164          Intervention Goal    General  Improved nutrition related lab(s);Disease management/therapy;Meet nutritional needs for age/condition;Reduce/improve symptoms     PO  Meet estimated needs     TF/PN  — TF consult per MD;     Transition  TF to PO     Weight  Appropriate weight loss         Nutrition Intervention     Row Name 09/03/19 1640          Nutrition Intervention    RD/Tech Action  Follow Tx progress;Care plan reviewd;Recommend/ordered     Recommended/Ordered  EN         Nutrition Prescription     Row Name 09/03/19 1649          Nutrition Prescription EN    Enteral Prescription  Enteral begin/change;Enteral to supply     Enteral Route  NG     Product  Diabetisource     TF Delivery Method  Continuous     Continuous TF Goal Rate (mL/hr)  67 mL/hr     Continuous TF Starting Rate (mL/hr)  20 mL/hr 20 ml x 8 hrs     Continuous TF Goal Volume (mL)  1474 mL     Continuous TF Starting Volume (mL)  160 mL     Water flush (mL)   25 mL     Water Flush Frequency  Per hour     New EN Prescription Ordered?  No, recommended        EN to Supply    Kcal/Day  1768 Kcal/Day     Kcal/Kg  14.9 Kcal/Kg     Kcal/Kg Weight Method  Actual weight     Protein (gm/day)  88 gm/day     Meet Estimated Kcal Need (%)  89 %     Meet Estimated Protein Need (%)  102 %     TF Free H2O (mL)  1205 mL     Total Free H2O (mL/day)  1755 mL/day     Fiber  Per Day (gm/day)  22.3 gm/day         Education/Evaluation     Row Name 09/03/19 1657          Education    Education  No discharge needs identified at this time        Monitor/Evaluation    Monitor  Per protocol         Electronically signed by:  Luciana Eldridge MS,RDN,LD  09/03/19 4:57 PM

## 2019-09-03 NOTE — PLAN OF CARE
Problem: Patient Care Overview  Goal: Plan of Care Review  Outcome: Ongoing (interventions implemented as appropriate)    Goal: Individualization and Mutuality  Outcome: Ongoing (interventions implemented as appropriate)    Goal: Interprofessional Rounds/Family Conf  Outcome: Ongoing (interventions implemented as appropriate)      Problem: Fall Risk (Adult)  Goal: Identify Related Risk Factors and Signs and Symptoms  Outcome: Ongoing (interventions implemented as appropriate)    Goal: Absence of Fall  Outcome: Ongoing (interventions implemented as appropriate)      Problem: Pain, Acute (Adult)  Goal: Identify Related Risk Factors and Signs and Symptoms  Outcome: Ongoing (interventions implemented as appropriate)    Goal: Acceptable Pain Control/Comfort Level  Outcome: Ongoing (interventions implemented as appropriate)      Problem: Skin Injury Risk (Adult)  Goal: Identify Related Risk Factors and Signs and Symptoms  Outcome: Ongoing (interventions implemented as appropriate)    Goal: Skin Health and Integrity  Outcome: Ongoing (interventions implemented as appropriate)

## 2019-09-03 NOTE — THERAPY TREATMENT NOTE
Patient Name: Phan Eastman  : 1955    MRN: [id number]                              Today's Date: 9/3/2019       Admit Date: 2019    Visit Dx:     ICD-10-CM ICD-9-CM   1. Oropharyngeal dysphagia R13.12 787.22   2. Impaired mobility and ADLs Z74.09 799.89   3. Impaired mobility Z74.09 799.89     Patient Active Problem List   Diagnosis   • ICH (intracerebral hemorrhage) (CMS/HCC)   • Diabetes mellitus (CMS/[facility])   • Hypertensive urgency   • Hyperlipidemia   • Cerebrovascular accident (CVA) due to embolism of left middle cerebral artery (CMS/HCC)   • Dysarthria     Past Medical History:   Diagnosis Date   • Diabetes mellitus (CMS/HCC)    • GERD (gastroesophageal reflux disease)    • Hyperlipidemia    • Hypertension    • Neuropathy    • Osteoarthritis    • TIA (transient ischemic attack)      Past Surgical History:   Procedure Laterality Date   • CARPAL TUNNEL RELEASE Bilateral    • EYE SURGERY     • KNEE SURGERY Bilateral    • NECK SURGERY     • RETINAL DETACHMENT SURGERY     • ROTATOR CUFF REPAIR       General Information     Row Name 19 1327          PT Evaluation Time/Intention    Document Type  therapy note (daily note)  -AB     Mode of Treatment  physical therapy  -AB       User Key  (r) = Recorded By, (t) = Taken By, (c) = Cosigned By    Initials Name Provider Type    AB Key Sauceda PTA Physical Therapy Assistant        Mobility    No documentation.       Obj/Interventions     Row Name 19 1327          Therapeutic Exercise    Upper Extremity Range of Motion (Therapeutic Exercise)  shoulder flexion/extension, bilateral;shoulder abduction/adduction, bilateral;elbow flexion/extension, bilateral;wrist flexion/extension, bilateral  -AB     Lower Extremity (Therapeutic Exercise)  SAQ (short arc quad), left;heel slides, left;gastroc stretch, right;hamstring stretch, right  -AB     Lower Extremity Range of Motion (Therapeutic Exercise)  hip flexion/extension, right;knee flexion/extension,  right;ankle dorsiflexion/plantar flexion, bilateral  -AB     Exercise Type (Therapeutic Exercise)  AROM (active range of motion);AAROM (active assistive range of motion);PROM (passive range of motion)  -AB     Position (Therapeutic Exercise)  supine  -AB     Sets/Reps (Therapeutic Exercise)  20  -AB     Comment (Therapeutic Exercise)  R UE was AAROM for wrist and elbow and PROM for shoulder, L UE was all AROM. R LE has increased tone and drawing into flexion, worked on stretching. L LE was AAROM/AROM/  -AB       User Key  (r) = Recorded By, (t) = Taken By, (c) = Cosigned By    Initials Name Provider Type    AB Key Sauceda PTA Physical Therapy Assistant        Goals/Plan    No documentation.       Clinical Impression     Row Name 09/03/19 1327          Pain Scale: Numbers Pre/Post-Treatment    Pain Scale: Numbers, Pretreatment  0/10 - no pain  -AB     Pain Scale: Numbers, Post-Treatment  0/10 - no pain  -AB     Row Name 09/03/19 1327          Positioning and Restraints    Pre-Treatment Position  in bed  -AB     Post Treatment Position  bed  -AB     In Bed  fowlers;call light within reach  -AB       User Key  (r) = Recorded By, (t) = Taken By, (c) = Cosigned By    Initials Name Provider Type    AB Key Sauceda PTA Physical Therapy Assistant        Outcome Measures    No documentation.       Physical Therapy Education     Title: PT OT SLP Therapies (In Progress)     Topic: Physical Therapy (In Progress)     Point: Mobility training (Done)     Learning Progress Summary           Patient Acceptance, E VU,NR by JAKE at 9/2/2019  7:20 AM    Comment:  Educated pt on progression of PT POC and benefits of activity                               User Key     Initials Effective Dates Name Provider Type Discipline    JAKE 08/02/16 -  Gilmer Medrano PT DPT Physical Therapist PT              PT Recommendation and Plan     Plan of Care Reviewed With: (P) patient  Progress: (P) improving     Time Calculation:   PT Charges      Row Name 09/03/19 1327             Time Calculation    Start Time  1327  -AB      Stop Time  1405  -AB      Time Calculation (min)  38 min  -AB      PT Received On  09/03/19  -AB      PT Goal Re-Cert Due Date  09/12/19  -AB         Time Calculation- PT    Total Timed Code Minutes- PT  38 minute(s)  -AB        User Key  (r) = Recorded By, (t) = Taken By, (c) = Cosigned By    Initials Name Provider Type    AB Key Sauceda PTA Physical Therapy Assistant        Therapy Charges for Today     Code Description Service Date Service Provider Modifiers Qty    33355083668 HC PT THER PROC EA 15 MIN 9/3/2019 Key Sauceda PTA GP 3          PT G-Codes  Outcome Measure Options: AM-PAC 6 Clicks Daily Activity (OT)  AM-PAC 6 Clicks Score (PT): 8  AM-PAC 6 Clicks Score (OT): 10    Key Sauceda PTA  9/3/2019

## 2019-09-03 NOTE — THERAPY TREATMENT NOTE
Acute Care - Occupational Therapy Treatment Note  [facility]     Patient Name: Phan Eastman  : 1955  MRN: [id number]  Today's Date: 9/3/2019  Onset of Illness/Injury or Date of Surgery: 19  Date of Referral to OT: 19  Referring Physician: Dr. Wyatt     Admit Date: 2019       ICD-10-CM ICD-9-CM   1. Oropharyngeal dysphagia R13.12 787.22   2. Impaired mobility and ADLs Z74.09 799.89   3. Impaired mobility Z74. 799.89     Patient Active Problem List   Diagnosis   • ICH (intracerebral hemorrhage) (CMS/HCC)   • Diabetes mellitus (CMS/HCC)   • Hypertensive urgency   • Hyperlipidemia   • Cerebrovascular accident (CVA) due to embolism of left middle cerebral artery (CMS/HCC)   • Dysarthria     Past Medical History:   Diagnosis Date   • Diabetes mellitus (CMS/HCC)    • GERD (gastroesophageal reflux disease)    • Hyperlipidemia    • Hypertension    • Neuropathy    • Osteoarthritis    • TIA (transient ischemic attack)      Past Surgical History:   Procedure Laterality Date   • CARPAL TUNNEL RELEASE Bilateral    • EYE SURGERY     • KNEE SURGERY Bilateral    • NECK SURGERY     • RETINAL DETACHMENT SURGERY     • ROTATOR CUFF REPAIR         Therapy Treatment    Rehabilitation Treatment Summary     Row Name 19 0959             Treatment Time/Intention    Discipline  occupational therapist  -      Document Type  therapy note (daily note)  -      Mode of Treatment  occupational therapy  -      Recorded by [CH] Agatha Adams, OTR/L 19 1000      Row Name 19 0959             Bed Mobility Assessment/Treatment    Bed Mobility Assessment/Treatment  rolling left;rolling right;supine-sit;sit-supine;scooting/bridging  -      Rolling Left [place] (Bed Mobility)  maximum assist (25% patient effort);2 person assist  -      Rolling Right [place] (Bed Mobility)  maximum assist (25% patient effort)  -      Scooting/Bridging [place] (Bed Mobility)  maximum assist (25%  patient effort);2 person assist  -CH      Supine-Sit [place] (Bed Mobility)  maximum assist (25% patient effort);2 person assist  -CH      Sit-Supine [place] (Bed Mobility)  maximum assist (25% patient effort);2 person assist  -CH      Recorded by [CH] Agatha Adams OTR/L 09/03/19 1035      Row Name 09/03/19 0959             ADL Assessment/Intervention    BADL Assessment/Intervention  upper body dressing  -CH      Recorded by [CH] Agatha Adams OTR/L 09/03/19 1035      Row Name 09/03/19 0959             Upper Body Dressing Assessment/Training    Upper Body Dressing [place] Level  moderate assist (50% patient effort);don;[facility]  -      Upper Body Dressing Position  sitting up in bed  -CH      Recorded by [] Agatha Adams OTR/L 09/03/19 1035      Row Name 09/03/19 0959             Static Sitting Balance    Level of [place] (Unsupported Sitting, Static Balance)  maximal assist, 25 to 49% patient effort;1 person assist;contact guard assist Max A of 1 with CGA of another  -CH      Sitting Position (Unsupported Sitting, Static Balance)  sitting on edge of bed  -CH      Recorded by [FRANCISCA] Agatha Adams OTR/L 09/03/19 1035      Row Name 09/03/19 0959             Positioning and Restraints    Pre-Treatment Position  in bed  -CH      Post Treatment Position  bed  -CH      In Bed  call light within reach;encouraged to call for assist;patient within staff view;with nsg;with other staff;side rails up x2  -CH      Recorded by [CH] Agatha Adams OTR/L 09/03/19 1035      Row Name 09/03/19 0959             Outcome Summary/Treatment Plan (OT)    Daily Summary of Progress (OT)  progress towards functional goals is fair  -CH      Plan for Continued Treatment (OT)  cont with OT POC  -CH      Recorded by [FRANCISCA] Agatha Adams OTR/JIMENA 09/03/19 1035        User Key  (r) = Recorded By, (t) = Taken By, (c) = Cosigned By    Initials Name Effective Dates Discipline    CH Agatha Adams OTR/JIMENA 08/02/16 -   OT             Occupational Therapy Education     Title: PT OT SLP Therapies (In Progress)     Topic: Occupational Therapy (In Progress)     Point: ADL training (In Progress)     Description: Instruct learner(s) on proper safety adaptation and remediation techniques during self care or transfers.   Instruct in proper use of assistive devices.    Learning Progress Summary           Patient Acceptance, E, NR by  at 9/2/2019  8:35 AM    Comment:  OT POC, benefits and roles of OT, adls, t/fs, bed mobility, positioning for prevention of skin breakdown of joint protection.                   Point: Precautions (In Progress)     Description: Instruct learner(s) on prescribed precautions during self-care and functional transfers.    Learning Progress Summary           Patient Acceptance, E, NR by RYAN at 9/2/2019  8:35 AM    Comment:  OT POC, benefits and roles of OT, adls, t/fs, bed mobility, positioning for prevention of skin breakdown of joint protection.                               User Key     Initials Effective Dates Name Provider Type Discipline     10/12/18 -  Aster Suggs OTR/L Occupational Therapist OT                OT Recommendation and Plan  Outcome Summary/Treatment Plan (OT)  Daily Summary of Progress (OT): progress towards functional goals is fair  Plan for Continued Treatment (OT): cont with OT POC  Daily Summary of Progress (OT): progress towards functional goals is fair  Plan of Care Review  Plan of Care Reviewed With: patient  Plan of Care Reviewed With: patient  Outcome Summary: OT tx completed.  Pt. appears dysarthric & tearful with possible lability.  He required Max A x 2 for bed mobility & sat EOB Max A.  He would benefit from cont'd OT tx to improve his indpe in self care & fxl mobility.  Anticipate DC to SNF vs inpatient rehab  Outcome Measures     Row Name 09/02/19 0800             How much help from another is currently needed...    Putting on and taking off regular lower body  clothing?  1  -JJ      Bathing (including washing, rinsing, and drying)  2  -JJ      Toileting (which includes using toilet bed pan or urinal)  1  -JJ      Putting on and taking off regular upper body clothing  2  -JJ      Taking care of personal grooming (such as brushing teeth)  2  -JJ      Eating meals  2  -JJ      AM-PAC 6 Clicks Score (OT)  10  -JJ         Functional Assessment    Outcome Measure Options  AM-PAC 6 Clicks Daily Activity (OT)  -        User Key  (r) = Recorded By, (t) = Taken By, (c) = Cosigned By    Initials Name Provider Type    Asetr Hernández OTR/L Occupational Therapist           Time Calculation:   Time Calculation- OT     Row Name 09/03/19 0959             Time Calculation-     OT Start Time  0959  -      OT Stop Time  1022  -      OT Time Calculation (min)  23 min  -      Total Timed Code Minutes- OT  23 minute(s)  -      OT Received On  09/03/19  -      OT Goal Re-Cert Due Date  09/12/19  -        User Key  (r) = Recorded By, (t) = Taken By, (c) = Cosigned By    Initials Name Provider Type     Agatha Adams OTR/L Occupational Therapist        Therapy Charges for Today     Code Description Service Date Service Provider Modifiers Qty    88341235369 HC OT SELF CARE/MGMT/TRAIN EA 15 MIN 9/3/2019 Agatha Adams OTR/L GO 2               VILLA Tapia/JIMENA  9/3/2019

## 2019-09-03 NOTE — THERAPY TREATMENT NOTE
Acute Care - Speech Language Pathology   Swallow Treatment Note [facility]     Patient Name: Phan Eastman  : 1955  MRN: [id number]  Today's Date: 9/3/2019  Onset of Illness/Injury or Date of Surgery: 19     Referring Physician: Dr. Wyatt       Admit Date: 2019     Swallowing tx completed this AM. OT completing session at time of arrival. Pt was alert, cooperative, yet with flat affect, continues to have dysarthric verbalizations, with speech intelligibility being moderately to occassionally severely impaired. Pt was noted to have mild R labial weakness with smile. Difficulty initially holding lingual elevation, requiring verbal cues to obtain appropriate posture. Pt was presented a full range of consistencies excluding mechanical soft. Pt was noted to have mildly decreased labial seal on spoon, yet functional, no difficulty extracting liquid via straw. Pt was noted to have inconsistencies in attention to task, as he was noted to have delayed oral transit of the bolus at times, with instance of talking while the bolus was still in the oral cavity, at which time verbal cues were provided. He was also noted to fatigue, so despite adequate mastication with the regular solid consistency, he then stopped bolus manipulation which then became oral holding of the bolus and required cues to complete a swallow at that time. Audible swallows and multiple swallows noted with thin, with behaviors suggesting that cough would be elicited, though no cough was observed. Educated RNs on concerns noted at this time, will continue pureed diet, yet will allow pt to have some specific mechanical soft side items, with honey thick liquids. Feel pt may be able to upgrade to nectar thick liquids on 2019 if he continues to tolerate at that time. ST to continue to tx and to complete speech-language eval for dysarthria and language impairment. Thanks! Nanette Malone, ESTEFANIA-SLP 9/3/2019 10:59 AM    Visit Dx:       ICD-10-CM ICD-9-CM   1. Oropharyngeal dysphagia R13.12 787.22   2. Impaired mobility and ADLs Z74.09 799.89   3. Impaired mobility Z74.09 799.89     Patient Active Problem List   Diagnosis   • ICH (intracerebral hemorrhage) (CMS/HCC)   • Diabetes mellitus (CMS/HCC)   • Hypertensive urgency   • Hyperlipidemia   • Cerebrovascular accident (CVA) due to embolism of left middle cerebral artery (CMS/HCC)   • Dysarthria       Therapy Treatment  Rehabilitation Treatment Summary     Row Name 09/03/19 1015 09/03/19 0959          Treatment Time/Intention    Discipline  speech language pathologist  -TM  occupational therapist  -CH     Document Type  therapy note (daily note)  -TM  therapy note (daily note)  -CH     Subjective Information  no complaints  -TM  —     Mode of Treatment  individual therapy;speech-language pathology  -TM  occupational therapy  -CH     Patient/Family Observations  No family present.  -TM  —     Patient Effort  adequate  -TM  —     Recorded by [TM] Nanette Malone CCC-SLP 09/03/19 1049 [CH] Agatha Adams OTR/L 09/03/19 1000     Row Name 09/03/19 0959             Bed Mobility Assessment/Treatment    Bed Mobility Assessment/Treatment  rolling left;rolling right;supine-sit;sit-supine;scooting/bridging  -CH      Rolling Left [place] (Bed Mobility)  maximum assist (25% patient effort);2 person assist  -CH      Rolling Right [place] (Bed Mobility)  maximum assist (25% patient effort)  -CH      Scooting/Bridging [place] (Bed Mobility)  maximum assist (25% patient effort);2 person assist  -CH      Supine-Sit [place] (Bed Mobility)  maximum assist (25% patient effort);2 person assist  -CH      Sit-Supine [place] (Bed Mobility)  maximum assist (25% patient effort);2 person assist  -CH      Recorded by [CH] Agatha Adams OTR/L 09/03/19 1035      Row Name 09/03/19 0959             ADL Assessment/Intervention    BADL Assessment/Intervention  upper body dressing  -CH      Recorded by  [CH] Agatha Adams OTR/L 09/03/19 1035      Row Name 09/03/19 0959             Upper Body Dressing Assessment/Training    Upper Body Dressing [place] Level  moderate assist (50% patient effort);don;[facility]      Upper Body Dressing Position  sitting up in bed  -      Recorded by [CH] Agatha Adams OTR/L 09/03/19 1035      Row Name 09/03/19 0959             Static Sitting Balance    Level of [place] (Unsupported Sitting, Static Balance)  maximal assist, 25 to 49% patient effort;1 person assist;contact guard assist Max A of 1 with CGA of another  -      Sitting Position (Unsupported Sitting, Static Balance)  sitting on edge of bed  -      Recorded by [CH] Agatha Adams OTR/L 09/03/19 1035      Row Name 09/03/19 0959             Positioning and Restraints    Pre-Treatment Position  in bed  -CH      Post Treatment Position  bed  -CH      In Bed  call light within reach;encouraged to call for assist;patient within staff view;with nsg;with other staff;side rails up x2  -      Recorded by [CH] Agatha Adams OTR/L 09/03/19 1035      Row Name 09/03/19 1015             Pain Assessment    Additional Documentation  Pain Scale: Numbers Pre/Post-Treatment (Group)  -TM      Recorded by [TM] Nanette Malone CCC-SLP 09/03/19 1049      Row Name 09/03/19 1015             Pain Scale: Numbers Pre/Post-Treatment    Pain Scale: Numbers, Pretreatment  0/10 - no pain  -TM      Pain Scale: Numbers, Post-Treatment  0/10 - no pain  -TM      Recorded by [TM] Nanette Malone CCC-SLP 09/03/19 1049      Row Name 09/03/19 0959             Outcome Summary/Treatment Plan (OT)    Daily Summary of Progress (OT)  progress towards functional goals is fair  -CH      Plan for Continued Treatment (OT)  cont with OT POC  -CH      Recorded by [CH] Agatha Adams OTR/L 09/03/19 1035      Row Name 09/03/19 1015             Outcome Summary/Treatment Plan (SLP)    Daily Summary of Progress (SLP)  progress towards  functional goals is fair  -TM      Barriers to Overall Progress (SLP)  Cognition, dysarthria   -TM      Plan for Continued Treatment (SLP)  Continue pureed diet with specific allowance for some mechanical soft sides. Continue honey thick liquids.   -TM      Anticipated Dischage Disposition  unknown  -TM      Recorded by [TM] Nanette Malone CCC-SLP 09/03/19 1049        User Key  (r) = Recorded By, (t) = Taken By, (c) = Cosigned By    Initials Name Effective Dates Discipline     Agatha Adams, OTR/L 08/02/16 -  OT    TM Nanette Malone CCC-SLP 08/02/16 -  SLP          Outcome Summary  Outcome Summary/Treatment Plan (SLP)  Daily Summary of Progress (SLP): progress towards functional goals is fair (09/03/19 1015 : Nanette Malone CCC-SLP)  Barriers to Overall Progress (SLP): Cognition, dysarthria  (09/03/19 1015 : Nanette Malone, CCC-SLP)  Plan for Continued Treatment (SLP): Continue pureed diet with specific allowance for some mechanical soft sides. Continue honey thick liquids.  (09/03/19 1015 : Nanette Malone CCC-SLP)  Anticipated Dischage Disposition: unknown (09/03/19 1015 : Nanette Malone [facility]-SLP)      SLP GOALS     Row Name 09/03/19 1015 09/02/19 1000          Oral Nutrition/Hydration Goal 1 (SLP)    Oral Nutrition/Hydration Goal 1, SLP  LTG: Patient will tolerate LRD with no overt s/s of aspiration.  -TM  LTG: Patient will tolerate LRD with no overt s/s of aspiration.  -KW     Time Frame (Oral Nutrition/Hydration Goal 1, SLP)  by discharge  -TM  by discharge  -KW     Barriers (Oral Nutrition/Hydration Goal 1, SLP)  n/a  -TM  n/a  -KW     Progress/Outcomes (Oral Nutrition/Hydration Goal 1, SLP)  progress slower than expected  -TM  goal ongoing  -KW        Labial Strengthening Goal 1 (SLP)    Activity (Labial Strengthening Goal 1, SLP)  increase labial tone  -TM  increase labial tone  -KW     Increase Labial Tone  labial resistance exercises  -TM  labial resistance exercises  -KW      [place]/Accuracy (Labial Strengthening Goal 1, SLP)  independently (over 90% accuracy)  -TM  independently (over 90% accuracy)  -KW     Time Frame (Labial Strengthening Goal 1, SLP)  short term goal (STG);by discharge  -TM  short term goal (STG);by discharge  -KW     Barriers (Labial Strengthening Goal 1, SLP)  n/a  -TM  n/a  -KW     Progress/Outcomes (Labial Strengthening Goal 1, SLP)  goal ongoing  -TM  goal ongoing  -KW        Lingual Strengthening Goal 1 (SLP)    Activity (Lingual Strengthening Goal 1, SLP)  increase lingual tone/sensation/control/coordination/movement  -TM  —     Increase Lingual Tone/Sensation/Control/Coordination/Movement  lingual movement exercises  -TM  —     [place]/Accuracy (Lingual Strengthening Goal 1, SLP)  independently (over 90% accuracy)  -TM  —     Time Frame (Lingual Strengthening Goal 1, SLP)  by discharge  -TM  —     Barriers (Lingual Strengthening Goal 1, SLP)  n/a  -TM  —     Progress/Outcomes (Lingual Strengthening Goal 1, SLP)  progress slower than expected  -TM  —        Pharyngeal Strengthening Exercise Goal 1 (SLP)    Activity (Pharyngeal Strengthening Goal 1, SLP)  increase epiglottic inversion and retroflexion;increase tongue base retraction;increase pharyngeal sensation  -TM  increase epiglottic inversion and retroflexion;increase tongue base retraction;increase pharyngeal sensation  -KW     Increase Pharyngeal Sensation  gustatory stimulation (sour/cold)  -TM  gustatory stimulation (sour/cold)  -KW     Increase Epiglottic Inversion and Retroflexion  Mendelsohn;falsetto  -TM  Mendelsohn;falsetto  -KW     Increase Tongue Base Retraction  hard effortful swallow;kaleb  -TM  hard effortful swallow;kaleb  -KW     [place]/Accuracy (Pharyngeal Strengthening Goal 1, SLP)  independently (over 90% accuracy)  -TM  independently (over 90% accuracy)  -KW     Time Frame (Pharyngeal Strengthening Goal 1, SLP)  short term goal (STG);by discharge  -TM  short term goal  ([facility]);by discharge  -KW     Barriers (Pharyngeal Strengthening Goal 1, SLP)  n/a  -TM  n/a  -KW     Progress/Outcomes (Pharyngeal Strengthening Goal 1, SLP)  goal ongoing  -TM  goal ongoing  -KW       User Key  (r) = Recorded By, (t) = Taken By, (c) = Cosigned By    Initials Name Provider Type     Nanette Malone CCC-SLP Speech and Language Pathologist    Alessandra Jo MS CCC-SLP Speech and Language Pathologist          EDUCATION  The patient has been educated in the following areas:   Dysphagia (Swallowing Impairment).    SLP Recommendation and Plan  Daily Summary of Progress (SLP): progress towards functional goals is fair  Barriers to Overall Progress (SLP): Cognition, dysarthria   Plan for Continued Treatment (SLP): Continue pureed diet with specific allowance for some mechanical soft sides. Continue honey thick liquids.   Anticipated Dischage Disposition: unknown                    Time Calculation:   Time Calculation- SLP     Row Name 09/03/19 1058             Time Calculation- SLP    SLP Start Time  1015  -TM      SLP Stop Time  1040  -TM      SLP Time Calculation (min)  25 min  -      SLP Received On  09/03/19  -        User Key  (r) = Recorded By, (t) = Taken By, (c) = Cosigned By    Initials Name Provider Type     Nanette Malone CCC-SLP Speech and Language Pathologist          Therapy Charges for Today     Code Description Service Date Service Provider Modifiers Qty    [id number]  ST TREATMENT SWALLOW 2 9/3/2019 Nanette Malone CCC-SLP GN 1                 NESTOR Hartley  9/3/2019

## 2019-09-03 NOTE — PROGRESS NOTES
"Neurosurgery Daily Progress Note    Assessment:   Phan Eastman is a 64 y.o. male with a significant comorbidity diabetes with diabetic neuropathy, intracerebral ischemic stroke x3.  He presents with a new problem of sudden onset of right-sided weakness and slurred speech. Physical exam findings of right-sided hemiparesis and right facial droop.  Their imaging shows 2.7 x 2.1 acute thalamic intracerebral hemorrhage.    DDX:     ICH (intracerebral hemorrhage) (CMS/HCC)    Diabetes mellitus (CMS/HCC)    Hypertensive urgency    Hyperlipidemia    Patient Active Problem List   Diagnosis   • ICH (intracerebral hemorrhage) (CMS/HCC)   • Diabetes mellitus (CMS/HCC)   • Hypertensive urgency   • Hyperlipidemia     Plan:   Neuro: Stable.  Mild improvement in speech and right-sided movement   CT stable   MRI with small left ischemic stroke.     Most likely worsening edema surrounding ICH   Cont to monitor      Neurology consultant; Hx of ischemic stroke    CV: Cardene, labetolol and hydralazine with marginal blood pressures.     Transthoracic echo with bubble study today   Appreciate hospitalist  Skin: Rash on legs.  Monitor for now.   Pulm: CPAP at home.  Will start here  :  PAUL  FEN: Dobbhoff placement, KUB to confirm placement; nutrition consulted  Endocrine: blood glucose well controlled  GI: PAUL  ID: PAUL  Heme:  DVT prophylaxis held for brain bleed.   Pain: Well controlled  Dispo: Pending BP control    Chief complaint:   Right sided weakness and speech decline    Subjective  Can not communicate    Temp:  [97.6 °F (36.4 °C)-98.4 °F (36.9 °C)] 97.6 °F (36.4 °C)  Heart Rate:  [50-89] 62  Resp:  [6-20] 17  BP: ()/() 147/69  Arterial Line BP: ()/(40-79) 126/57    Objective:  Vital signs: (most recent): Blood pressure 147/69, pulse 62, temperature 97.6 °F (36.4 °C), temperature source Axillary, resp. rate 17, height 188 cm (74\"), weight 118 kg (260 lb), SpO2 100 %.        Neurologic Exam     Mental Status   "   Oriented to person.   Disoriented to place.   Disoriented to year.   Speech: slurred   Level of consciousness: alert ,  arousable by verbal stimuli  Able to name object. Unable to read. Unable to repeat. Unable to write. Abnormal comprehension.   Speech improving     Cranial Nerves     CN III, IV, VI   Pupils are equal, round, and reactive to light.  Extraocular motions are normal.     CN V   Facial sensation intact.     CN VII   Facial expression full, symmetric.   Left facial weakness: central    Motor Exam   Right arm pronator drift: absent  Left arm pronator drift: absent    Strength   Right deltoid: 3/5  Left deltoid: 5/5  Right biceps: 3/5  Left biceps: 5/5  Right triceps: 3/5  Left triceps: 5/5  Right interossei: 3/5  Right iliopsoas: 3/5  Left iliopsoas: 5/5  Right quadriceps: 3/5  Left quadriceps: 5/5  Right peroneal: 3/5  Right gastroc: 3/5  Left gastroc: 5/5  Movement on right improved antigravity     Sensory Exam   Light touch normal.     Drains:  NA    Imaging Results (last 24 hours)     ** No results found for the last 24 hours. **        Lab Results (last 24 hours)     Procedure Component Value Units Date/Time    Basic Metabolic Panel [[id number]]  (Abnormal) Collected:  09/03/19 0338    Specimen:  Blood Updated:  09/03/19 0449     Glucose 97 mg/dL      BUN 26 mg/dL      Creatinine 1.52 mg/dL      Sodium 144 mmol/L      Potassium 4.8 mmol/L      Chloride 115 mmol/L      CO2 22.0 mmol/L      Calcium 8.9 mg/dL      eGFR Non African Amer 47 mL/min/1.73      BUN/Creatinine Ratio 17.1     Anion Gap 7.0 mmol/L     Narrative:       GFR Normal >60  Chronic Kidney Disease <60  Kidney Failure <15    POC Glucose Once [[id number]]  (Normal) Collected:  09/02/19 2116    Specimen:  Blood Updated:  09/02/19 2128     Glucose 119 mg/dL      Comment: : [id number] Dylon BenitezMeter ID: [id number]       POC Glucose Once [[id number]]  (Normal) Collected:  09/02/19 1705    Specimen:  Blood Updated:  09/02/19 1718      Glucose 112 mg/dL      Comment: : [id number] Jaleesa Pitt  JMeter ID: [id number]       POC Glucose Once [[id number]]  (Normal) Collected:  09/02/19 1219    Specimen:  Blood Updated:  09/02/19 1238     Glucose 129 mg/dL      Comment: : [id number] Jaleesa Pitt  JMeter ID: [id number]           [id number]  Heriberto Avalos, APRN

## 2019-09-03 NOTE — PROGRESS NOTES
[facility] Medicine Services  INPATIENT PROGRESS NOTE    Length of Stay: 3  Date of Admission: 8/31/2019  Primary Care Physician: Emmanuel Mclain MD    Subjective     Chief Complaint:     Consultation for blood pressure management    HPI     Patient remains on a Cardene drip at 12.5 mg/h.  Blood pressure remains elevated at 163/84.  He was given a puréed diet and ate all of it without difficulty.  Visual antihypertensive medication will be added to his regimen.  The patient is currently working with physical therapy.  He has awake, alert and is able to carry on a meaningful conversation.    Review of Systems     All pertinent negatives and positives are as above. All other systems have been reviewed and are negative unless otherwise stated.     Objective    Temp:  [97.6 °F (36.4 °C)-98.4 °F (36.9 °C)] 97.6 °F (36.4 °C)  Heart Rate:  [50-89] 71  Resp:  [6-20] 17  BP: ()/() 163/84  Arterial Line BP: ()/(43-79) 126/57    Lab Results (last 24 hours)     Procedure Component Value Units Date/Time    POC Glucose Once [[id number]]  (Normal) Collected:  09/03/19 0902    Specimen:  Blood Updated:  09/03/19 0913     Glucose 93 mg/dL      Comment: : [id number] Kiara StilesMeter ID: [id number]       Basic Metabolic Panel [[id number]]  (Abnormal) Collected:  09/03/19 0338    Specimen:  Blood Updated:  09/03/19 0449     Glucose 97 mg/dL      BUN 26 mg/dL      Creatinine 1.52 mg/dL      Sodium 144 mmol/L      Potassium 4.8 mmol/L      Chloride 115 mmol/L      CO2 22.0 mmol/L      Calcium 8.9 mg/dL      eGFR Non African Amer 47 mL/min/1.73      BUN/Creatinine Ratio 17.1     Anion Gap 7.0 mmol/L     Narrative:       GFR Normal >60  Chronic Kidney Disease <60  Kidney Failure <15    POC Glucose Once [[id number]]  (Normal) Collected:  09/02/19 2116    Specimen:  Blood Updated:  09/02/19 2128     Glucose 119 mg/dL      Comment: : [id number] Dylon BenitezMeter ID:  [id number]       POC Glucose Once [[id number]]  (Normal) Collected:  09/02/19 1705    Specimen:  Blood Updated:  09/02/19 1718     Glucose 112 mg/dL      Comment: : [id number] Jaleesa JARAeter ID: [id number]       POC Glucose Once [[id number]]  (Normal) Collected:  09/02/19 1219    Specimen:  Blood Updated:  09/02/19 1238     Glucose 129 mg/dL      Comment: : [id number] Jaleesa JARAeter ID: [id number]             Imaging Results (last 24 hours)     Procedure Component Value Units Date/Time    XR Abdomen KUB [[id number]] Updated:  09/03/19 0939             Intake/Output Summary (Last 24 hours) at 9/3/2019 0959  Last data filed at 9/2/2019 1611  Gross per 24 hour   Intake 1556 ml   Output 120 ml   Net 1436 ml       Physical Exam    Constitutional: He appears well-developed and well-nourished. He is cooperative. No distress.   HENT:   Head: Normocephalic and atraumatic.   Nose: Nose normal.   Mouth/Throat: Oropharynx is clear and moist.   Eyes: Conjunctivae are normal. No scleral icterus.   Neck: Normal range of motion. Neck supple. No JVD present. No tracheal deviation present.   Cardiovascular: Normal rate, regular rhythm and normal heart sounds.   No murmur heard.  Pulmonary/Chest: Effort normal and breath sounds normal. No respiratory distress.   Abdominal: Soft. Bowel sounds are normal. He exhibits no mass. There is no tenderness.   Musculoskeletal: Normal range of motion. He exhibits edema (trace BLE).   Neurological: He is alert. He is disoriented. He exhibits abnormal muscle tone (right hemiparesis and left-sided weakness). Coordination abnormal with right hemiparesis.  Ignores the right side.   Skin: Skin is warm and dry. No rash noted.   Psychiatric: His speech is much improved today.  He is alert and oriented x3 and he is able to engage in meaningful conversation today.  Results Review:  I have reviewed the labs, radiology results, and diagnostic studies since my last progress note and made  treatment changes reflective of the results.   I have reviewed the current medications.    Assessment/Plan     Active Hospital Problems    Diagnosis   • **ICH (intracerebral hemorrhage) (CMS/HCC)   • Cerebrovascular accident (CVA) due to embolism of left middle cerebral artery (CMS/HCC)   • Dysarthria   • Diabetes mellitus (CMS/HCC)   • Hypertensive urgency   • Hyperlipidemia       PLAN:  Discontinue metformin  Continue glyburide  Continue to hold ARB  Increase metoprolol to 50 mg p.o. twice daily  Restart clonidine 0.1 mg p.o. twice daily    Gaston Cabrera DO   09/03/19   9:59 AM

## 2019-09-03 NOTE — PLAN OF CARE
Problem: Patient Care Overview  Goal: Plan of Care Review  Outcome: Ongoing (interventions implemented as appropriate)   09/03/19 1051   Coping/Psychosocial   Plan of Care Reviewed With patient;other (see comments)  (RNs)   Plan of Care Review   Progress no change   OTHER   Outcome Summary Swallowing tx completed this AM. OT completing session at time of arrival. Pt was alert, cooperative, yet with flat affect, continues to have dysarthric verbalizations, with speech intelligibility being moderately to occassionally severely impaired. Pt was noted to have mild R labial weakness with smile. Difficulty initially holding lingual elevation, requiring verbal cues to obtain appropriate posture. Pt was presented a full range of consistencies excluding mechanical soft. Pt was noted to have mildly decreased labial seal on spoon, yet functional, no difficulty extracting liquid via straw. Pt was noted to have inconsistencies in attention to task, as he was noted to have delayed oral transit of the bolus at times, with instance of talking while the bolus was still in the oral cavity, at which time verbal cues were provided. He was also noted to fatigue, so despite adequate mastication with the regular solid consistency, he then stopped bolus manipulation which then became oral holding of the bolus and required cues to complete a swallow at that time. Audible swallows and multiple swallows noted with thin, with behaviors suggesting that cough would be elicited, though no cough was observed. Educated RNs on concerns noted at this time, will continue pureed diet, yet will allow pt to have some specific mechanical soft side items, with honey thick liquids. Feel pt may be able to upgrade to nectar thick liquids on 09/04/2019 if he continues to tolerate at that time. ST to continue to tx and to complete speech-language eval for dysarthria and language impairment. Thanks!

## 2019-09-04 NOTE — THERAPY TREATMENT NOTE
Acute Care - Occupational Therapy Treatment Note  [facility]     Patient Name: Phan Eastman  : 1955  MRN: [id number]  Today's Date: 2019  Onset of Illness/Injury or Date of Surgery: 19  Date of Referral to OT: 19  Referring Physician: Dr. Wyatt     Admit Date: 2019       ICD-10-CM ICD-9-CM   1. Oropharyngeal dysphagia R13.12 787.22   2. Impaired mobility and ADLs Z74.09 799.89   3. Impaired mobility Z74. 799.89     Patient Active Problem List   Diagnosis   • ICH (intracerebral hemorrhage) (CMS/HCC)   • Diabetes mellitus (CMS/HCC)   • Hypertensive urgency   • Hyperlipidemia   • Cerebrovascular accident (CVA) due to embolism of left middle cerebral artery (CMS/HCC)   • Dysarthria     Past Medical History:   Diagnosis Date   • Diabetes mellitus (CMS/HCC)    • GERD (gastroesophageal reflux disease)    • Hyperlipidemia    • Hypertension    • Neuropathy    • Osteoarthritis    • TIA (transient ischemic attack)      Past Surgical History:   Procedure Laterality Date   • CARPAL TUNNEL RELEASE Bilateral    • EYE SURGERY     • KNEE SURGERY Bilateral    • NECK SURGERY     • RETINAL DETACHMENT SURGERY     • ROTATOR CUFF REPAIR         Therapy Treatment    Rehabilitation Treatment Summary     Row Name 19 1059 19 1014 19 0733       Treatment Time/Intention    Discipline  occupational therapist  -CH  speech language pathologist  -MM  physical therapy assistant  -TITUS    Document Type  therapy note (daily note)  -CH  therapy note (daily note)  -MM  therapy note (daily note)  -TITUS    Subjective Information  —  no complaints  -MM  no complaints  -JP2    Mode of Treatment  —  individual therapy;speech-language pathology  -MM  —    Patient/Family Observations  —  No family present  -MM  —    Therapy Frequency (Swallow)  —  at least;3 days per week  -MM  —    Patient Effort  —  adequate  -MM  —    Existing Precautions/Restrictions  —  —  fall  -JP2    Treatment Considerations/Comments   —  —  R side weakness  -JP2    Recorded by [CH] Agatha Adams, OTR/L 09/04/19 1059 [MM] Krystal Babcock, MS CCC-SLP 09/04/19 1030 [TITUS] Kathrine Ramsay, PTA 09/04/19 0733  [JP2] Kathrine Ramsay, PTA 09/04/19 0807    Row Name 09/04/19 0733             Vital Signs    Pre Systolic BP Rehab  137  -TITUS      Pre Treatment Diastolic BP  69  -TITUS      Pretreatment Heart Rate (beats/min)  66  -JP2      Posttreatment Heart Rate (beats/min)  67  -JP2      Pre SpO2 (%)  97  -TITUS      O2 Delivery Pre Treatment  room air  -TITUS      Post SpO2 (%)  95  -JP2      O2 Delivery Post Treatment  room air  -JP2      Recorded by [TITUS] Kathrine Ramsay, PTA 09/04/19 0736  [JP2] Kathrine Ramsay, PTA 09/04/19 0807      Row Name 09/04/19 0733             Bed Mobility Assessment/Treatment    Rolling Left [place] (Bed Mobility)  verbal cues;maximum assist (25% patient effort) able to reach & grab rail   -TITUS      Scooting/Bridging [place] (Bed Mobility)  maximum assist (25% patient effort);2 person assist to HOB  -TITUS      Supine-Sit [place] (Bed Mobility)  verbal cues;maximum assist (25% patient effort)  -TITUS      Sit-Supine [place] (Bed Mobility)  verbal cues;maximum assist (25% patient effort) able to get L LE on bed  -TITUS      Recorded by [TITUS] Kathrine Ramsay, PTA 09/04/19 0807      Row Name 09/04/19 1059             Motor Skills Assessment/Interventions    Additional Documentation  Therapeutic Exercise (Group);Therapeutic Exercise Interventions (Group)  -      Recorded by [CH] Agatha Adams OTR/L 09/04/19 1108      Row Name 09/04/19 1059 09/04/19 0733          Therapeutic Exercise    Upper Extremity Range of Motion (Therapeutic Exercise)  shoulder flexion/extension, right;shoulder abduction/adduction, right;shoulder horizontal abduction/adduction, right;shoulder internal/external rotation, right;elbow flexion/extension, right;forearm supination/pronation, right;wrist flexion/extension, right  -CH  —     Hand  (Therapeutic Exercise)  hand , right  -CH  —     Lower Extremity (Therapeutic Exercise)  —  heel slides, left;LAQ (long arc quad), left  -TITUS     Lower Extremity Range of Motion (Therapeutic Exercise)  —  hip flexion/extension, left;ankle dorsiflexion/plantar flexion, left  -TITUS     Exercise Type (Therapeutic Exercise)  —  AAROM (active assistive range of motion);AROM (active range of motion) L LE  -TITUS     Position (Therapeutic Exercise)  —  seated;supine  -TITUS     Sets/Reps (Therapeutic Exercise)  —  2x10/1x10in sitting  -TITUS     Expected Outcome (Therapeutic Exercise)  improve functional tolerance, self-care activity;improve functional tolerance, single extremity activity  -  —     Comment (Therapeutic Exercise)  —  R LE mostly PROM. in supine can flex hip& knee-A. in sitting PROM  -TITUS     Recorded by [CH] Agatha Adams, OTR/L 09/04/19 1108 [TITUS] Kathrine Ramsay, PTA 09/04/19 0807     Row Name 09/04/19 0733             Static Sitting Balance    Level of [place] (Supported Sitting, Static Balance)  moderate assist, 50 to 74% patient effort;maximal assist, 25 to 49% patient effort  -TITUS      Sitting Position (Supported Sitting, Static Balance)  sitting on edge of bed  -TITUS      Time Able to Maintain Position (Supported Sitting, Static Balance)  less than 15 seconds  -TITUS      Comment (Supported Sitting, Static Balance)  W L support only 3-4 seconds. With R UE supported by PTA approx 20 seconds.. Worked on finding & staying at neutral  -TITUS      Recorded by [TITUS] Kathrine Ramsay PTA 09/04/19 0807      Row Name 09/04/19 1059 09/04/19 0733          Positioning and Restraints    Pre-Treatment Position  in bed  -CH  in bed  -TITUS     Post Treatment Position  bed  -  bed  -TITUS     In Bed  fowlers;call light within reach;encouraged to call for assist;with family/caregiver;side rails up x2  -CH  side lying right;call light within reach;encouraged to call for assist;exit alarm on;with nsg;side rails up x3;RUE  elevated;LUE elevated;pillow between legs;heels elevated  -TITUS     Recorded by [CH] Agatha Adams, OTR/L 09/04/19 1108 [TITUS] Kathrine Ramsay, PTA 09/04/19 0807     Row Name 09/04/19 1014             Pain Assessment    Additional Documentation  Pain Scale: FACES Pre/Post-Treatment (Group)  -MM      Recorded by [MM] Krystal Babcock MS CCC-SLP 09/04/19 1030      Row Name 09/04/19 0733             Pain Scale: Numbers Pre/Post-Treatment    Pain Scale: Numbers, Pretreatment  0/10 - no pain  -TITUS      Pain Scale: Numbers, Post-Treatment  0/10 - no pain  -TITUS      Recorded by [TITUS] Kathrine Ramsay, PTA 09/04/19 0807      Row Name 09/04/19 1014             Pain Scale: FACES Pre/Post-Treatment    Pain: FACES Scale, Pretreatment  2-->hurts little bit  -MM      Pain: FACES Scale, Post-Treatment  2-->hurts little bit  -MM      Pre/Post Treatment Pain Comment  Ribs from coughinf with water.   -MM      Recorded by [MM] Krystal Babcock MS CCC-SLP 09/04/19 1030      Row Name 09/04/19 1014             Outcome Summary/Treatment Plan (SLP)    Daily Summary of Progress (SLP)  progress towards functional goals is fair  -MM      Barriers to Overall Progress (SLP)  Cogntion, dysarthria   -MM      Plan for Continued Treatment (SLP)  Upgrade to mechancial soft diet. Continue honey thick.   -MM      Anticipated Dischage Disposition  unknown  -MM      Recorded by [MM] Krystal Babcock MS CCC-SLP 09/04/19 1030        User Key  (r) = Recorded By, (t) = Taken By, (c) = Cosigned By    Initials Name Effective Dates Discipline     Agatha Adams, OTR/L 08/02/16 -  OT    TITUS Kathrine Ramsay, PTA 08/02/16 -  PT    MM Krystal Babcock, MS CCC-SLP 05/24/19 -  SLP           Rehab Goal Summary     Row Name 09/04/19 1014             Swallow Goals (SLP)    Oral Nutrition/Hydration Goal Selection (SLP)  oral nutrition/hydration, SLP goal 1  -MM      Labial Strengthening Goal Selection (SLP)  labial strengthening, SLP goal 1  -MM       Lingual Strengthening Goal Selection (SLP)  lingual strengthening, SLP goal 1  -MM      Pharyngeal Strengthening Exercise Goal Selection (SLP)  pharyngeal strengthening exercise, SLP goal 1  -MM      Additional Documentation  lingual strengthening goal selection (SLP)  -MM         Oral Nutrition/Hydration Goal 1 (SLP)    Oral Nutrition/Hydration Goal 1, SLP  LTG: Patient will tolerate LRD with no overt s/s of aspiration.  -MM      Time Frame (Oral Nutrition/Hydration Goal 1, SLP)  by discharge  -MM      Barriers (Oral Nutrition/Hydration Goal 1, SLP)  n/a  -MM      Progress/Outcomes (Oral Nutrition/Hydration Goal 1, SLP)  continuing progress toward goal  -MM         Labial Strengthening Goal 1 (SLP)    Activity (Labial Strengthening Goal 1, SLP)  increase labial tone  -MM      Increase Labial Tone  labial resistance exercises  -MM      Bowman/Accuracy (Labial Strengthening Goal 1, SLP)  independently (over 90% accuracy)  -MM      Time Frame (Labial Strengthening Goal 1, SLP)  short term goal (STG);by discharge  -MM      Barriers (Labial Strengthening Goal 1, SLP)  n/a  -MM      Progress/Outcomes (Labial Strengthening Goal 1, SLP)  continuing progress toward goal  -MM         Lingual Strengthening Goal 1 (SLP)    Activity (Lingual Strengthening Goal 1, SLP)  increase lingual tone/sensation/control/coordination/movement  -MM      Increase Lingual Tone/Sensation/Control/Coordination/Movement  lingual movement exercises  -MM      Bowman/Accuracy (Lingual Strengthening Goal 1, SLP)  independently (over 90% accuracy)  -MM      Time Frame (Lingual Strengthening Goal 1, SLP)  by discharge  -MM      Barriers (Lingual Strengthening Goal 1, SLP)  n/a  -MM      Progress/Outcomes (Lingual Strengthening Goal 1, SLP)  progress slower than expected  -MM         Pharyngeal Strengthening Exercise Goal 1 (SLP)    Activity (Pharyngeal Strengthening Goal 1, SLP)  increase epiglottic inversion and retroflexion;increase tongue  base retraction;increase pharyngeal sensation  -MM      Increase Pharyngeal Sensation  gustatory stimulation (sour/cold)  -MM      Increase Epiglottic Inversion and Retroflexion  Mendelsohn;falsetto  -MM      Increase Tongue Base Retraction  hard effortful swallow;kaleb  -MM      Vanduser/Accuracy (Pharyngeal Strengthening Goal 1, SLP)  independently (over 90% accuracy)  -MM      Time Frame (Pharyngeal Strengthening Goal 1, SLP)  short term goal (STG);by discharge  -MM      Barriers (Pharyngeal Strengthening Goal 1, SLP)  n/a  -MM      Progress/Outcomes (Pharyngeal Strengthening Goal 1, SLP)  goal ongoing  -MM        User Key  (r) = Recorded By, (t) = Taken By, (c) = Cosigned By    Initials Name Provider Type Discipline    Krystal Monroy MS CCC-SLP Speech and Language Pathologist SLP        Occupational Therapy Education     Title: PT OT SLP Therapies (In Progress)     Topic: Occupational Therapy (In Progress)     Point: ADL training (In Progress)     Description: Instruct learner(s) on proper safety adaptation and remediation techniques during self care or transfers.   Instruct in proper use of assistive devices.    Learning Progress Summary           Patient Acceptance, E, NR by CARLY at 9/2/2019  8:35 AM    Comment:  OT POC, benefits and roles of OT, adls, t/fs, bed mobility, positioning for prevention of skin breakdown of joint protection.                   Point: Precautions (In Progress)     Description: Instruct learner(s) on prescribed precautions during self-care and functional transfers.    Learning Progress Summary           Patient Acceptance, E, NR by RYAN at 9/2/2019  8:35 AM    Comment:  OT POC, benefits and roles of OT, adls, t/fs, bed mobility, positioning for prevention of skin breakdown of joint protection.                               User Key     Initials Effective Dates Name Provider Type Discipline    CARLY 10/12/18 -  Aster Suggs OTR/L Occupational Therapist OT                 OT Recommendation and Plan  Outcome Summary/Treatment Plan (OT)  Daily Summary of Progress (OT): progress towards functional goals is fair  Plan for Continued Treatment (OT): cont with OT POC  Daily Summary of Progress (OT): progress towards functional goals is fair  Plan of Care Review  Plan of Care Reviewed With: patient  Plan of Care Reviewed With: patient  Outcome Summary: OT tx completed.  Pt. appears dysarthric & tearful with possible lability.  He required Max A x 2 for bed mobility & sat EOB Max A.  He would benefit from cont'd OT tx to improve his indpe in self care & fxl mobility.  Anticipate DC to SNF vs inpatient rehab  Outcome Measures     Row Name 09/02/19 0800             How much help from another is currently needed...    Putting on and taking off regular lower body clothing?  1  -JJ      Bathing (including washing, rinsing, and drying)  2  -JJ      Toileting (which includes using toilet bed pan or urinal)  1  -JJ      Putting on and taking off regular upper body clothing  2  -JJ      Taking care of personal grooming (such as brushing teeth)  2  -JJ      Eating meals  2  -JJ      AM-PAC 6 Clicks Score (OT)  10  -JJ         Functional Assessment    Outcome Measure Options  AM-PAC 6 Clicks Daily Activity (OT)  -        User Key  (r) = Recorded By, (t) = Taken By, (c) = Cosigned By    Initials Name Provider Type    Aster Crawford OTR/L Occupational Therapist           Time Calculation:   Time Calculation- OT     Row Name 09/04/19 1156             Time Calculation- OT    OT Start Time  1059  -      OT Stop Time  1122  -      OT Time Calculation (min)  23 min  -      Total Timed Code Minutes- OT  23 minute(s)  -      OT Received On  09/04/19  -      OT Goal Re-Cert Due Date  09/12/19  -        User Key  (r) = Recorded By, (t) = Taken By, (c) = Cosigned By    Initials Name Provider Type     Agatha Adams, OTR/L Occupational Therapist        Therapy Charges for Today     Code  Description Service Date Service Provider Modifiers Qty    [id number]  OT SELF CARE/MGMT/TRAIN EA 15 MIN 9/3/2019 Agatha Adams OTR/L GO 2    [id number]  OT THERAPEUTIC ACT EA 15 MIN 9/4/2019 Agatha Adams OTR/L GO 2               VILLA Tapia/JIMENA  9/4/2019

## 2019-09-04 NOTE — PLAN OF CARE
Problem: Patient Care Overview  Goal: Plan of Care Review  Outcome: Ongoing (interventions implemented as appropriate)   09/04/19 1031   Coping/Psychosocial   Plan of Care Reviewed With patient;other (see comments)  (GUSTAVO Diaz )   Plan of Care Review   Progress improving   OTHER   Outcome Summary Swallow therapy completed. Patient requires cues to remain alert at times. He completed ice chips, spoonfuls of water, and water via straw. Delayed coughing with straw. Oral holding is observed. Completed trials of honey thick and nectar with no overt s/s. Completed regular solid trial with functional rotary chew. Cues to remain alert are needed. Patient OK to upgrade to mechanical soft diet with 1:1 supervision to ensure proper level of alertness for PO intake. Continue honey thick liquids. OK for spoonfuls of thin water following oral care. SLP will continue to follow and treat.

## 2019-09-04 NOTE — PROGRESS NOTES
Neurology Progress Note      Chief Complaint:  stroke    Subjective     Subjective:    No new issues overnight.  Working with therapy.  Nursing staff has nothing to report.    Medications:  Current Facility-Administered Medications   Medication Dose Route Frequency Provider Last Rate Last Dose   • acetaminophen (TYLENOL) tablet 650 mg  650 mg Oral Q4H PRN Raf Wyatt MD   650 mg at 09/02/19 1606    Or   • acetaminophen (TYLENOL) 160 MG/5ML solution 650 mg  650 mg Oral Q4H PRN Raf Wyatt MD        Or   • acetaminophen (TYLENOL) suppository 650 mg  650 mg Rectal Q4H PRN Raf Wyatt MD       • amLODIPine (NORVASC) tablet 10 mg  10 mg Oral Q24H Gaston Cabrera DO   10 mg at 09/03/19 0902   • CloNIDine (CATAPRES) tablet 0.1 mg  0.1 mg Oral Q12H Gaston Cabrera DO   0.1 mg at 09/03/19 2039   • dextrose (D50W) 25 g/ 50mL Intravenous Solution 25 g  25 g Intravenous Q15 Min PRN Raf Wyatt MD       • dextrose (GLUTOSE) oral gel 15 g  15 g Oral Q15 Min PRN Raf Wyatt MD       • glipiZIDE (GLUCOTROL) tablet 5 mg  5 mg Oral QAM AC Raf Wyatt MD   5 mg at 09/03/19 0903   • glucagon (human recombinant) (GLUCAGEN DIAGNOSTIC) injection 1 mg  1 mg Subcutaneous PRN Raf Wyatt MD       • hydrALAZINE (APRESOLINE) injection 10 mg  10 mg Intravenous Q15 Min PRN Raf Wyatt MD   10 mg at 09/03/19 2334   • insulin lispro (humaLOG) injection 0-9 Units  0-9 Units Subcutaneous 4x Daily With Meals & Nightly Raf Wyatt MD       • labetalol (NORMODYNE,TRANDATE) injection 10 mg  10 mg Intravenous Q15 Min PRN Raf Wyatt MD   10 mg at 09/03/19 0313   • metoprolol tartrate (LOPRESSOR) tablet 50 mg  50 mg Oral Q12H Gaston Cabrera DO   50 mg at 09/03/19 2039   • niCARdipine (CARDENE) 25 mg/250 mL (0.1 mg/mL) 0.9% NS infusion  5-15 mg/hr Intravenous Titrated Raf Wyatt  mL/hr at 09/04/19 0618  12.5 mg/hr at 09/04/19 0645   • ondansetron (ZOFRAN) tablet 4 mg  4 mg Oral Q6H PRN Raf Wyatt MD        Or   • ondansetron (ZOFRAN) injection 4 mg  4 mg Intravenous Q6H PRN Raf Wyatt MD       • oxyCODONE-acetaminophen (PERCOCET) 5-325 MG per tablet 1 tablet  1 tablet Oral Q4H PRN Raf Wyatt MD   1 tablet at 09/03/19 1051   • oxyCODONE-acetaminophen (PERCOCET) 7.5-325 MG per tablet 2 tablet  2 tablet Oral Q4H PRN Raf Wyatt MD   2 tablet at 09/04/19 0331   • sodium chloride 0.9 % flush 10 mL  10 mL Intravenous Q12H Raf Wyatt MD   10 mL at 09/03/19 2045   • sodium chloride 0.9 % flush 10 mL  10 mL Intravenous PRN Raf Wyatt MD       • sodium chloride 0.9 % with KCl 20 mEq/L infusion  100 mL/hr Intravenous Continuous Raf Wyatt  mL/hr at 09/03/19 1710 100 mL/hr at 09/03/19 1710   • terazosin (HYTRIN) capsule 10 mg  10 mg Oral Nightly Gaston Cabrera DO   10 mg at 09/03/19 2039   • traZODone (DESYREL) tablet 50 mg  50 mg Oral Nightly PRN Raf Wyatt MD   50 mg at 09/01/19 2119       Review of Systems:   -A 14 point review of systems is completed and is negative except for stroke      Objective      Vital Signs  Temp:  [97.4 °F (36.3 °C)-98.3 °F (36.8 °C)] 97.9 °F (36.6 °C)  Heart Rate:  [53-85] 72  Resp:  [16-23] 18  BP: (107-163)/() 133/82    Physical Exam:    General Exam:  Head:  Normal cephalic, atraumatic  HEENT:  Neck supple  Fundoscopic Exam:  No signs of disc edema  CVS:  Regular rate and rhythm.  No murmurs  Carotid Examination:  No bruits  Lungs:  Clear to auscultation  Abdomen:  Non-tender, Non-distended  Extremities:  No signs of peripheral edema  Skin:  No rashes     Neurologic Exam:     Mental Status:    -Awake, Alert, Oriented X 3  -No word finding difficulties  -No aphasia  -Mild dysarthria  -Follows simple and complex commands     CN II:  Visual fields full.  Pupils equally reactive  to light  CN III, IV, VI:  Extraocular Muscles full with no signs of nystagmus  CN V:  Facial sensory is symmetric with no asymmetries.  CN VII:  Facial motor symmetric  CN VIII:  Gross hearing intact bilaterally  CN IX:  Palate elevates symmetrically  CN X:  Palate elevates symmetrically  CN XI:  Shoulder shrug symmetric  CN XII:  Tongue is midline on protrusion     Motor: (strength out of 5:  1= minimal movement, 2 = movement in plane of gravity, 3 = movement against gravity, 4 = movement against some resistance, 5 = full strength)     -Right Upper Ext: Proximal: 2 Distal: 1  -Left Upper Ext: Proximal: 5   Distal: 5     -Right Lower Ext: Proximal: 0  -Left Lower Ext: Proximal: 5   Distal: 5     DTR:  -Right              Bicep: 2+         Tricep: 2+        Brachoradialis: 2+              Patella: 2+       Ankle: 2+         Neg Babinski  -Left              Bicep: 2+         Tricep: 2+        Brachoradialis: 2+              Patella: 2+       Ankle: 2+         Neg Babinski     Sensory:  -No drastic asymmetries.  The patient does have gradient sensory loss distal to the knees bilaterally in the lower extremities.     Coordination:  -Finger to nose intact        Results Review:    I reviewed the patient's new clinical results.    Results from last 7 days   Lab Units 09/01/19  1412 08/31/19  0924   WBC 10*3/mm3 11.95* 10.33   HEMOGLOBIN g/dL 12.7* 12.6*   HEMATOCRIT % 39.4 38.7   PLATELETS 10*3/mm3 199 189        Results from last 7 days   Lab Units 09/04/19  0358 09/03/19  0338 09/02/19  0350 09/01/19  1412   SODIUM mmol/L 144 144 141 141   POTASSIUM mmol/L 4.5 4.8 4.6 4.1   CHLORIDE mmol/L 117* 115* 113* 112*   CO2 mmol/L 23.0* 22.0* 24.0 23.0*   BUN mg/dL 27* 26* 26* 23*   CREATININE mg/dL 1.59* 1.52* 1.55* 1.54*   CALCIUM mg/dL 8.6 8.9 8.2* 8.8   BILIRUBIN mg/dL  --   --   --  1.8*   ALK PHOS U/L  --   --   --  79   ALT (SGPT) U/L  --   --   --  31   AST (SGOT) U/L  --   --   --  43   GLUCOSE mg/dL 113* 97 96 88           Lab Results   Component Value Date    PROTIME 13.3 08/31/2019    INR 0.98 08/31/2019     No components found for: POCGLUC  No components found for: A1C  Lab Results   Component Value Date    HDL 38 (L) 09/04/2019    LDL 93 09/04/2019     No components found for: B12  Lab Results   Component Value Date    TSH 1.550 09/04/2019       MRI brain reviewed by me from September 1.  There is evidence of a hematoma that is moderate in size.  It is in the left thalamus extending into the posterior internal capsule as well with some  Hemoglobin A1c is 5.0  Assessment/Plan     Hospital Problem List      ICH (intracerebral hemorrhage) (CMS/[facility])    Diabetes mellitus (CMS/[facility])    Hypertensive urgency    Hyperlipidemia    Cerebrovascular accident (CVA) due to embolism of left middle cerebral artery (CMS/[facility])    Dysarthria    Impression:  1.  Left thalamic hemorrhagic stroke  2.  Acute ischemic stroke in the left MCA distribution which appears to be lacunar in nature and likely thrombotic  3.  Diabetes mellitus with a current hemoglobin A1c at goal  4.  Hypertension  5.  Diabetic neuropathy  6.  Dysarthria and right-sided weakness    Plan:  · Cardiac echo pending - 65% EF and no sig valve abnormalities  · Carotid ultrasound - no sig stenosis  · Fasting lipid profile - LDL: 93  · TSH - 1.5  · Blood pressure management with systolic blood pressure goal less than 140  · Continue therapy consultations  · Not a candidate for antiplatelets currently secondary to hemorrhagic stroke        Lei Barahona MD  09/04/19  8:52 AM

## 2019-09-04 NOTE — PROGRESS NOTES
Neurosurgery Daily Progress Note    Assessment:   Phan Eastman is a 64 y.o. male with a significant comorbidity diabetes with diabetic neuropathy, intracerebral ischemic stroke x3.  He presents with a new problem of sudden onset of right-sided weakness and slurred speech. Physical exam findings of right-sided hemiparesis and right facial droop.  Their imaging shows 2.7 x 2.1 acute thalamic intracerebral hemorrhage.    DDX:     ICH (intracerebral hemorrhage) (CMS/HCC)    Diabetes mellitus (CMS/HCC)    Hypertensive urgency    Hyperlipidemia    Cerebrovascular accident (CVA) due to embolism of left middle cerebral artery (CMS/HCC)    Dysarthria    Patient Active Problem List   Diagnosis   • ICH (intracerebral hemorrhage) (CMS/HCC)   • Diabetes mellitus (CMS/HCC)   • Hypertensive urgency   • Hyperlipidemia   • Cerebrovascular accident (CVA) due to embolism of left middle cerebral artery (CMS/HCC)   • Dysarthria     Plan:   Neuro: Stable.  Mild improvement in speech and right-sided strength/movement   CT stable   MRI with small left ischemic stroke.     Most likely worsening edema surrounding ICH   Cont to monitor      Hx of ischemic stroke; appreciate neurology    CV: Continuing to require Cardene gtt to keep SBP <160; labetolol and hydralazine with marginal blood pressures.     -163   Oral antihypertensives adjusted per hospitalist   Appreciate hospitalist     TTE: Moderate right LVH, mild tricuspid regurg, EF 65%   Carotid US; right: 50 - 69%; left: <50%  Skin: Rash on legs.  Monitor for now.   Pulm: CPAP at home.  Will start here; CXR today  :  PAUL  FEN: Tolerating puréed diet   Appreciate speech nutrition  Endocrine: blood glucose well controlled  GI: PAUL  ID: PAUL  Heme:  DVT prophylaxis held for brain bleed; SCDs  Pain: Well controlled  Dispo: PT/OT   DC to floor pending BP control of Cardene    Chief complaint:   Right sided weakness and speech decline    Subjective  Can not communicate    Temp:  [97.4  "°F (36.3 °C)-98.3 °F (36.8 °C)] 97.9 °F (36.6 °C)  Heart Rate:  [53-85] 72  Resp:  [16-23] 18  BP: (107-163)/() 133/82    Objective:  Vital signs: (most recent): Blood pressure 133/82, pulse 72, temperature 97.9 °F (36.6 °C), temperature source Axillary, resp. rate 18, height 188 cm (74\"), weight 122 kg (269 lb 14.4 oz), SpO2 95 %.        Neurologic Exam     Mental Status   Oriented to person.   Disoriented to place.   Disoriented to year.   Speech: slurred   Level of consciousness: alert ,  arousable by verbal stimuli  Able to name object. Unable to read. Unable to repeat. Unable to write. Abnormal comprehension.   Speech improving     Cranial Nerves     CN III, IV, VI   Pupils are equal, round, and reactive to light.  Extraocular motions are normal.     CN V   Facial sensation intact.     CN VII   Facial expression full, symmetric.   Left facial weakness: central    Motor Exam   Right arm pronator drift: absent  Left arm pronator drift: absent    Strength   Right deltoid: 3/5  Left deltoid: 5/5  Right biceps: 3/5  Left biceps: 5/5  Right triceps: 3/5  Left triceps: 5/5  Right interossei: 2/5  Right iliopsoas: 2/5  Left iliopsoas: 5/5  Right quadriceps: 2/5  Left quadriceps: 5/5  Right peroneal: 3/5  Right gastroc: 3/5  Left gastroc: 5/5  Movement on right improving     Sensory Exam   Light touch normal.     Drains:  NA    Imaging Results (last 24 hours)     Procedure Component Value Units Date/Time    US Carotid Bilateral [[id number]] Collected:  09/03/19 1455     Updated:  09/03/19 1459    Narrative:       History: Carotid occlusive disease       Impression:       Impression:  1. There is 50-69% stenosis of the right internal carotid artery.  2. There is less than 50% stenosis of the left internal carotid artery.  3. Antegrade flow is demonstrated in bilateral vertebral arteries.     Comments: Bilateral carotid vertebral arterial duplex scan was  performed.     Grayscale imaging shows intimal thickening and " calcified elements at the  carotid bifurcation. The right internal carotid artery peak systolic  velocity is 152 cm/sec. The end-diastolic velocity is 22.0 cm/sec. The  right ICA/CCA ratio is approximately 2.13 . These findings correlate  with 50-69% stenosis of the right internal carotid artery.     Grayscale imaging shows intimal thickening and calcified elements at the  carotid bifurcation. The left internal carotid artery peak systolic  velocity is 160 cm/sec. The end-diastolic velocity is 11.8 cm/sec. The  left ICA/CCA ratio is approximately 1.18 . These findings correlate with  less than 50% stenosis of the left internal carotid artery.     Antegrade flow is demonstrated in bilateral vertebral arteries.  Greater than 50% stenosis of the proximal bilateral external carotid  arteries.  This report was finalized on 09/03/2019 14:56 by Dr. Heriberto Levin MD.        Lab Results (last 24 hours)     Procedure Component Value Units Date/Time    TSH [[id number]]  (Normal) Collected:  09/04/19 0358    Specimen:  Blood Updated:  09/04/19 0455     TSH 1.550 uIU/mL     Lipid Panel [[id number]]  (Abnormal) Collected:  09/04/19 0358    Specimen:  Blood Updated:  09/04/19 0435     Total Cholesterol 149 mg/dL      Triglycerides 95 mg/dL      HDL Cholesterol 38 mg/dL      LDL Cholesterol  93 mg/dL      LDL/HDL Ratio 2.42    Basic Metabolic Panel [[id number]]  (Abnormal) Collected:  09/04/19 0358    Specimen:  Blood Updated:  09/04/19 0425     Glucose 113 mg/dL      BUN 27 mg/dL      Creatinine 1.59 mg/dL      Sodium 144 mmol/L      Potassium 4.5 mmol/L      Chloride 117 mmol/L      CO2 23.0 mmol/L      Calcium 8.6 mg/dL      eGFR Non African Amer 44 mL/min/1.73      BUN/Creatinine Ratio 17.0     Anion Gap 4.0 mmol/L     Narrative:       GFR Normal >60  Chronic Kidney Disease <60  Kidney Failure <15    POC Glucose Once [[id number]]  (Normal) Collected:  09/03/19 2038    Specimen:  Blood Updated:  09/03/19 2050     Glucose 112 mg/dL       Comment: : [id number] Martin AmyMeter ID: [id number]       POC Glucose Once [[id number]]  (Normal) Collected:  09/03/19 1629    Specimen:  Blood Updated:  09/03/19 1657     Glucose 105 mg/dL      Comment: : [id number] Jaleesa Pitt  JMeter ID: [id number]       POC Glucose Once [[id number]]  (Normal) Collected:  09/03/19 1053    Specimen:  Blood Updated:  09/03/19 1113     Glucose 96 mg/dL      Comment: : [id number] Jolienavin BalajiieMeter ID: [id number]       POC Glucose Once [[id number]]  (Normal) Collected:  09/03/19 0902    Specimen:  Blood Updated:  09/03/19 0913     Glucose 93 mg/dL      Comment: : [id number] Kiara StilesMeter ID: [id number]           [id number]  Heriberto Avalos, APRN

## 2019-09-04 NOTE — PROGRESS NOTES
[facility] Medicine Services  INPATIENT PROGRESS NOTE    Length of Stay: 4  Date of Admission: 8/31/2019  Primary Care Physician: Emmanuel Mclain MD    Subjective     Chief Complaint:     Elevated blood pressure    HPI     The patient's blood pressure is improved.  He was off Cardene for a period of time last evening with good blood pressure control but his blood pressure has gradually increased and Cardene was restarted earlier this morning.  Clonidine will be increased to 0.2 mg p.o. twice daily.  I will convert metoprolol tartrate to metoprolol succinate 100 mg daily.  SCDs have been discontinued because the patient cannot tolerate them.  He is not a candidate for Lovenox prophylaxis for DVT because of his brain bleed.  Patient has bilateral lower extremity petechiae likely secondary to SCD irritation.  The patient is alert but disoriented at present.  He is able to converse and answer simple questions.    Review of Systems     All pertinent negatives and positives are as above. All other systems have been reviewed and are negative unless otherwise stated.     Objective    Temp:  [97.4 °F (36.3 °C)-98.3 °F (36.8 °C)] 97.9 °F (36.6 °C)  Heart Rate:  [53-85] 72  Resp:  [16-23] 18  BP: (107-154)/() 133/82    Lab Results (last 24 hours)     Procedure Component Value Units Date/Time    POC Glucose Once [[id number]]  (Normal) Collected:  09/04/19 0803    Specimen:  Blood Updated:  09/04/19 0836     Glucose 98 mg/dL      Comment: : [id number] Dimitry Owenjuliana ID: [id number]       TSH [[id number]]  (Normal) Collected:  09/04/19 0358    Specimen:  Blood Updated:  09/04/19 0455     TSH 1.550 uIU/mL     Lipid Panel [[id number]]  (Abnormal) Collected:  09/04/19 0358    Specimen:  Blood Updated:  09/04/19 0435     Total Cholesterol 149 mg/dL      Triglycerides 95 mg/dL      HDL Cholesterol 38 mg/dL      LDL Cholesterol  93 mg/dL      LDL/HDL Ratio 2.42    Basic  Metabolic Panel [[id number]]  (Abnormal) Collected:  09/04/19 0358    Specimen:  Blood Updated:  09/04/19 0425     Glucose 113 mg/dL      BUN 27 mg/dL      Creatinine 1.59 mg/dL      Sodium 144 mmol/L      Potassium 4.5 mmol/L      Chloride 117 mmol/L      CO2 23.0 mmol/L      Calcium 8.6 mg/dL      eGFR Non African Amer 44 mL/min/1.73      BUN/Creatinine Ratio 17.0     Anion Gap 4.0 mmol/L     Narrative:       GFR Normal >60  Chronic Kidney Disease <60  Kidney Failure <15    POC Glucose Once [[id number]]  (Normal) Collected:  09/03/19 2038    Specimen:  Blood Updated:  09/03/19 2050     Glucose 112 mg/dL      Comment: : [id number] Martin AmyMeter ID: [id number]       POC Glucose Once [[id number]]  (Normal) Collected:  09/03/19 1629    Specimen:  Blood Updated:  09/03/19 1657     Glucose 105 mg/dL      Comment: : [id number] Jaleesa Pitt  JMeter ID: [id number]       POC Glucose Once [[id number]]  (Normal) Collected:  09/03/19 1053    Specimen:  Blood Updated:  09/03/19 1113     Glucose 96 mg/dL      Comment: : [id number] Liya CarpioieMeter ID: [id number]             Imaging Results (last 24 hours)     Procedure Component Value Units Date/Time    US Carotid Bilateral [[id number]] Collected:  09/03/19 1455     Updated:  09/03/19 1459    Narrative:       History: Carotid occlusive disease       Impression:       Impression:  1. There is 50-69% stenosis of the right internal carotid artery.  2. There is less than 50% stenosis of the left internal carotid artery.  3. Antegrade flow is demonstrated in bilateral vertebral arteries.     Comments: Bilateral carotid vertebral arterial duplex scan was  performed.     Grayscale imaging shows intimal thickening and calcified elements at the  carotid bifurcation. The right internal carotid artery peak systolic  velocity is 152 cm/sec. The end-diastolic velocity is 22.0 cm/sec. The  right ICA/CCA ratio is approximately 2.13 . These findings correlate  with 50-69% stenosis of  the right internal carotid artery.     Grayscale imaging shows intimal thickening and calcified elements at the  carotid bifurcation. The left internal carotid artery peak systolic  velocity is 160 cm/sec. The end-diastolic velocity is 11.8 cm/sec. The  left ICA/CCA ratio is approximately 1.18 . These findings correlate with  less than 50% stenosis of the left internal carotid artery.     Antegrade flow is demonstrated in bilateral vertebral arteries.  Greater than 50% stenosis of the proximal bilateral external carotid  arteries.  This report was finalized on 09/03/2019 14:56 by Dr. Heriberto Levin MD.             Intake/Output Summary (Last 24 hours) at 9/4/2019 0936  Last data filed at 9/4/2019 0400  Gross per 24 hour   Intake 6658.7 ml   Output —   Net 6658.7 ml       Physical Exam  Patient was seen and evaluated with his nurse, Emily.  No family members are present in the room.  Constitutional: He appears well-developed and well-nourished. He is cooperative. No distress.   HENT:   Head: Normocephalic and atraumatic.   Nose: Nose normal.   Mouth/Throat: Oropharynx is clear and moist.   Eyes: Conjunctivae are normal. No scleral icterus.   Neck: Normal range of motion. Neck supple. No JVD present. No tracheal deviation present.   Cardiovascular: Normal rate, regular rhythm and normal heart sounds.   No murmur heard.  Pulmonary/Chest: Effort normal and breath sounds normal. No respiratory distress.   Abdominal: Soft. Bowel sounds are normal. He exhibits no mass. There is no tenderness.   Musculoskeletal: Normal range of motion. He exhibits no lower extremity edema.  Neurological: He is alert. He is disoriented. He exhibits abnormal muscle tone (right hemiparesis and left-sided weakness). Coordination abnormal with right hemiparesis.  Ignores the right side.   Skin: Skin is warm and dry. No rash noted.   Psychiatric: His speech is stable compared to yesterday.  He is alert but not oriented to the year.  He is  oriented to place and person.    Results Review:  I have reviewed the labs, radiology results, and diagnostic studies since my last progress note and made treatment changes reflective of the results.   I have reviewed the current medications.    Assessment/Plan     Active Hospital Problems    Diagnosis   • **ICH (intracerebral hemorrhage) (CMS/HCC)   • Cerebrovascular accident (CVA) due to embolism of left middle cerebral artery (CMS/HCC)   • Dysarthria   • Diabetes mellitus (CMS/HCC)   • Hypertensive urgency   • Hyperlipidemia       PLAN:  Discontinue metoprolol tartrate in favor of metoprolol succinate 100 mg p.o. Daily  Increase clonidine to 0.2 mg p.o. twice daily  Continue amlodipine  I will continue to hold ARB secondary to renal insufficiency.    Gaston Cabrera DO   09/04/19   9:36 AM

## 2019-09-04 NOTE — PLAN OF CARE
Problem: Patient Care Overview  Goal: Plan of Care Review  Outcome: Ongoing (interventions implemented as appropriate)   09/04/19 0684   Coping/Psychosocial   Plan of Care Reviewed With patient   Plan of Care Review   Progress no change   OTHER   Outcome Summary Patient alert and oriented X 4. ROBB on command. Speech continues to be garbled. Reported pain X 2. HR did have sinus em with higher dose of Metoprolol into 50's. Was off cardene for short amount of time. UOP adequate. Continues to be incontient of urine. Patient stated that he was hungry, ws able to eat magic cup and two cups of applesauce without difficulty.      Goal: Individualization and Mutuality  Outcome: Ongoing (interventions implemented as appropriate)    Goal: Interprofessional Rounds/Family Conf  Outcome: Ongoing (interventions implemented as appropriate)      Problem: Fall Risk (Adult)  Goal: Identify Related Risk Factors and Signs and Symptoms  Outcome: Ongoing (interventions implemented as appropriate)    Goal: Absence of Fall  Outcome: Ongoing (interventions implemented as appropriate)      Problem: Pain, Acute (Adult)  Goal: Identify Related Risk Factors and Signs and Symptoms  Outcome: Ongoing (interventions implemented as appropriate)    Goal: Acceptable Pain Control/Comfort Level  Outcome: Ongoing (interventions implemented as appropriate)      Problem: Skin Injury Risk (Adult)  Goal: Identify Related Risk Factors and Signs and Symptoms  Outcome: Ongoing (interventions implemented as appropriate)    Goal: Skin Health and Integrity  Outcome: Ongoing (interventions implemented as appropriate)

## 2019-09-04 NOTE — PLAN OF CARE
Problem: Patient Care Overview  Goal: Plan of Care Review  Outcome: Ongoing (interventions implemented as appropriate)   09/04/19 0807   Coping/Psychosocial   Plan of Care Reviewed With patient   OTHER   Outcome Summary Pt. able to move L LE to EOB and when assisted to roll reaches & grabs reis. Max assist for supine to sit and sit to supine. Moderate assist for sitting. Able to support self with L UE for approx 4 seconds. Assist for LE ex's on L to increase ROM. More assist for R LE, not really participating w R LE. Will continue to benefit from bed mobility , strengthening and increase activity as tolerates.

## 2019-09-04 NOTE — PROGRESS NOTES
Discharge Planning Assessment  [facility]     Patient Name: Phan Eastman  MRN: [id number]  Today's Date: 9/4/2019    Admit Date: 8/31/2019    Discharge Needs Assessment     Row Name 09/04/19 1119       Living Environment    Lives With  alone    Current Living Arrangements  home/apartment/condo    Primary Care Provided by  self    Provides Primary Care For  no one    Family Caregiver if Needed  sibling(s)    Quality of Family Relationships  supportive;helpful;involved       Resource/Environmental Concerns    Resource/Environmental Concerns  none       Transition Planning    Patient/Family Anticipates Transition to  long term care facility    Patient/Family Anticipated Services at Transition  skilled nursing    Transportation Anticipated  family or friend will provide       Discharge Needs Assessment    Readmission Within the Last 30 Days  no previous admission in last 30 days    Concerns to be Addressed  discharge planning    Equipment Currently Used at Home  walker, rolling    Anticipated Changes Related to Illness  inability to care for self    Outpatient/Agency/Support Group Needs  skilled nursing facility    Discharge Facility/Level of Care Needs  nursing facility, skilled    Current Discharge Risk  chronically ill;lives alone    Discharge Coordination/Progress  SW spoke with patient regarding discharge plan/needs.  Patient resides alone.  Patient states he has been to [facility] in the past.  Patient is agreeable to return to [facility] again but would like us to discuss with him prior to making referral.  Patient's insurance would also require precert.  SW will follow patient's progress.        Discharge Plan    No documentation.       Destination      No service coordination in this encounter.      Durable Medical Equipment      No service coordination in this encounter.      Dialysis/Infusion      No service coordination in this encounter.      Home Medical Care      No service  coordination in this encounter.      Therapy      No service coordination in this encounter.      Community Resources      No service coordination in this encounter.          Demographic Summary    No documentation.       Functional Status    No documentation.       Psychosocial    No documentation.       Abuse/Neglect    No documentation.       Legal    No documentation.       Substance Abuse    No documentation.       Patient Forms    No documentation.           DENA Santiago

## 2019-09-04 NOTE — PLAN OF CARE
Problem: Patient Care Overview  Goal: Plan of Care Review  Outcome: Ongoing (interventions implemented as appropriate)   09/04/19 7545   Coping/Psychosocial   Plan of Care Reviewed With patient   Plan of Care Review   Progress no change   OTHER   Outcome Summary Pt still requiring cardene gtt even though his oral meds were adjusted twice. Some of the problem could be attributed to anxiety, he does not really understand what happened to him, why he is here. He would like to get up and go home. He has had his bed changed numerous times due to incontinence. A condom cath was attempted without success.       Problem: Fall Risk (Adult)  Goal: Identify Related Risk Factors and Signs and Symptoms  Outcome: Ongoing (interventions implemented as appropriate)      Problem: Pain, Acute (Adult)  Goal: Identify Related Risk Factors and Signs and Symptoms  Outcome: Ongoing (interventions implemented as appropriate)      Problem: Skin Injury Risk (Adult)  Goal: Identify Related Risk Factors and Signs and Symptoms  Outcome: Ongoing (interventions implemented as appropriate)

## 2019-09-04 NOTE — THERAPY TREATMENT NOTE
Acute Care - Speech Language Pathology   Swallow Treatment Note [facility]     Patient Name: Phan Eastman  : 1955  MRN: [id number]  Today's Date: 2019  Onset of Illness/Injury or Date of Surgery: 19     Referring Physician: Dr. Wyatt       Admit Date: 2019  Swallow therapy completed. Patient requires cues to remain alert at times. He completed ice chips, spoonfuls of water, and water via straw. Delayed coughing with straw. Oral holding is observed. Completed trials of honey thick and nectar with no overt s/s. Completed regular solid trial with functional rotary chew. Cues to remain alert are needed. Patient OK to upgrade to mechanical soft diet with 1:1 supervision to ensure proper level of alertness for PO intake. Continue honey thick liquids. OK for spoonfuls of thin water following oral care. SLP will continue to follow and treat.     Patient is only known to SLP on this date. Unsure of severity of dysarthria on previous date; however, patient seems improved today. Continue to follow and possible speech eval if not baseline.   Krystal Babcock, MS CCC-SLP 2019 10:35 AM    Visit Dx:      ICD-10-CM ICD-9-CM   1. Oropharyngeal dysphagia R13.12 787.22   2. Impaired mobility and ADLs Z74.09 799.89   3. Impaired mobility Z74.09 799.89     Patient Active Problem List   Diagnosis   • ICH (intracerebral hemorrhage) (CMS/HCC)   • Diabetes mellitus (CMS/HCC)   • Hypertensive urgency   • Hyperlipidemia   • Cerebrovascular accident (CVA) due to embolism of left middle cerebral artery (CMS/HCC)   • Dysarthria       Therapy Treatment  Rehabilitation Treatment Summary     Row Name 19 1014 19 0733          Treatment Time/Intention    Discipline  speech language pathologist  -MM  physical therapy assistant  -TITUS     Document Type  therapy note (daily note)  -MM  therapy note (daily note)  -TITUS     Subjective Information  no complaints  -MM  no complaints  -JP2     Mode of Treatment   individual therapy;speech-language pathology  -MM  —     Patient/Family Observations  No family present  -MM  —     Therapy Frequency (Swallow)  at least;3 days per week  -MM  —     Patient Effort  adequate  -MM  —     Existing Precautions/Restrictions  —  fall  -JP2     Treatment Considerations/Comments  —  R side weakness  -JP2     Recorded by [MM] Krystal Babcock MS CCC-SLP 09/04/19 1030 [TITUS] Kathrine Ramsay, PTA 09/04/19 0733  [JP2] Kathrine Ramsay, [facility] 09/04/19 0807     Row Name 09/04/19 0733             Vital Signs    Pre Systolic BP Rehab  137  -TITUS      Pre Treatment Diastolic BP  69  -TITUS      Pretreatment Heart Rate (beats/min)  66  -JP2      Posttreatment Heart Rate (beats/min)  67  -JP2      Pre SpO2 (%)  97  -TITUS      O2 Delivery Pre Treatment  room air  -TITUS      Post SpO2 (%)  95  -JP2      O2 Delivery Post Treatment  room air  -JP2      Recorded by [TITUS] Kathrine Ramsay, PTA 09/04/19 0736  [JP2] Kathrine Ramsay, [facility] 09/04/19 0807      Row Name 09/04/19 0733             Bed Mobility Assessment/Treatment    Rolling Left [place] (Bed Mobility)  verbal cues;maximum assist (25% patient effort) able to reach & grab rail   -TITUS      Scooting/Bridging [place] (Bed Mobility)  maximum assist (25% patient effort);2 person assist to HOB  -TITUS      Supine-Sit [place] (Bed Mobility)  verbal cues;maximum assist (25% patient effort)  -TITUS      Sit-Supine [place] (Bed Mobility)  verbal cues;maximum assist (25% patient effort) able to get L LE on bed  -TITUS      Recorded by [TITUS] Kathrine Ramsay, PTA 09/04/19 0807      Row Name 09/04/19 0733             Therapeutic Exercise    Lower Extremity (Therapeutic Exercise)  heel slides, left;LAQ (long arc quad), left  -TITUS      Lower Extremity Range of Motion (Therapeutic Exercise)  hip flexion/extension, left;ankle dorsiflexion/plantar flexion, left  -TITUS      Exercise Type (Therapeutic Exercise)  AAROM (active assistive range of motion);AROM (active  range of motion) L LE  -TITUS      Position (Therapeutic Exercise)  seated;supine  -TITUS      Sets/Reps (Therapeutic Exercise)  2x10/1x10in sitting  -TITUS      Comment (Therapeutic Exercise)  R LE mostly PROM. in supine can flex hip& knee-A. in sitting PROM  -TITUS      Recorded by [TITUS] Kathrine Ramsay, PTA 09/04/19 0807      Row Name 09/04/19 0733             Static Sitting Balance    Level of [place] (Supported Sitting, Static Balance)  moderate assist, 50 to 74% patient effort;maximal assist, 25 to 49% patient effort  -TITUS      Sitting Position (Supported Sitting, Static Balance)  sitting on edge of bed  -TITUS      Time Able to Maintain Position (Supported Sitting, Static Balance)  less than 15 seconds  -TITUS      Comment (Supported Sitting, Static Balance)  W L support only 3-4 seconds. With R UE supported by PTA approx 20 seconds.. Worked on finding & staying at neutral  -TITUS      Recorded by [TITUS] Kathrine Ramsay, PTA 09/04/19 0807      Row Name 09/04/19 0733             Positioning and Restraints    Pre-Treatment Position  in bed  -TITUS      Post Treatment Position  bed  -TITUS      In Bed  side lying right;call light within reach;encouraged to call for assist;exit alarm on;with nsg;side rails up x3;RUE elevated;LUE elevated;pillow between legs;heels elevated  -TITUS      Recorded by [TITUS] Kathrine Ramsay, PTA 09/04/19 0807      Row Name 09/04/19 1014             Pain Assessment    Additional Documentation  Pain Scale: FACES Pre/Post-Treatment (Group)  -MM      Recorded by [MM] Krystal Babcock MS CCC-SLP 09/04/19 1030      Row Name 09/04/19 0733             Pain Scale: Numbers Pre/Post-Treatment    Pain Scale: Numbers, Pretreatment  0/10 - no pain  -TITUS      Pain Scale: Numbers, Post-Treatment  0/10 - no pain  -TITUS      Recorded by [TITUS] Kathrine Ramsay, PTA 09/04/19 0807      Row Name 09/04/19 1014             Pain Scale: FACES Pre/Post-Treatment    Pain: FACES Scale, Pretreatment  2-->hurts little bit  -MM      Pain:  FACES Scale, Post-Treatment  2-->hurts little bit  -MM      Pre/Post Treatment Pain Comment  Ribs from coughinf with water.   -MM      Recorded by [MM] Krystal Babcock, MS CCC-SLP 09/04/19 1030      Row Name 09/04/19 1014             Outcome Summary/Treatment Plan (SLP)    Daily Summary of Progress (SLP)  progress towards functional goals is fair  -MM      Barriers to Overall Progress (SLP)  Cogntion, dysarthria   -MM      Plan for Continued Treatment (SLP)  Upgrade to mechancial soft diet. Continue honey thick.   -MM      Anticipated Dischage Disposition  unknown  -MM      Recorded by [MM] Krystal Babcock, MS CCC-SLP 09/04/19 1030        User Key  (r) = Recorded By, (t) = Taken By, (c) = Cosigned By    Initials Name Effective Dates Discipline    TITUS Kathrine Ramsay, PTA 08/02/16 -  PT    MM Krystal Babcock, MS CCC-SLP 05/24/19 -  SLP          Outcome Summary  Outcome Summary/Treatment Plan (SLP)  Daily Summary of Progress (SLP): progress towards functional goals is fair (09/04/19 1014 : Krystal Babcock, MS CCC-SLP)  Barriers to Overall Progress (SLP): Cogntion, dysarthria  (09/04/19 1014 : Krystal Babcock, MS CCC-SLP)  Plan for Continued Treatment (SLP): Upgrade to mechancial soft diet. Continue honey thick.  (09/04/19 1014 : Krystal Babcock, MS CCC-SLP)  Anticipated Dischage Disposition: unknown (09/04/19 1014 : Krystal Babcock, [facility])      SLP GOALS     Row Name 09/04/19 1014 09/03/19 1015 09/02/19 1000       Oral Nutrition/Hydration Goal 1 (SLP)    Oral Nutrition/Hydration Goal 1, SLP  LTG: Patient will tolerate LRD with no overt s/s of aspiration.  -MM  LTG: Patient will tolerate LRD with no overt s/s of aspiration.  -TM  LTG: Patient will tolerate LRD with no overt s/s of aspiration.  -KW    Time Frame (Oral Nutrition/Hydration Goal 1, SLP)  by discharge  -MM  by discharge  -TM  by discharge  -KW    Barriers (Oral Nutrition/Hydration Goal 1, SLP)  n/a  -MM  n/a  -TM  n/a  -KW     Progress/Outcomes (Oral Nutrition/Hydration Goal 1, SLP)  continuing progress toward goal  -MM  progress slower than expected  -TM  goal ongoing  -KW       Labial Strengthening Goal 1 (SLP)    Activity (Labial Strengthening Goal 1, SLP)  increase labial tone  -MM  increase labial tone  -TM  increase labial tone  -KW    Increase Labial Tone  labial resistance exercises  -MM  labial resistance exercises  -TM  labial resistance exercises  -KW    [place]/Accuracy (Labial Strengthening Goal 1, SLP)  independently (over 90% accuracy)  -MM  independently (over 90% accuracy)  -TM  independently (over 90% accuracy)  -KW    Time Frame (Labial Strengthening Goal 1, SLP)  short term goal (STG);by discharge  -MM  short term goal (STG);by discharge  -TM  short term goal (STG);by discharge  -KW    Barriers (Labial Strengthening Goal 1, SLP)  n/a  -MM  n/a  -TM  n/a  -KW    Progress/Outcomes (Labial Strengthening Goal 1, SLP)  continuing progress toward goal  -MM  goal ongoing  -TM  goal ongoing  -KW       Lingual Strengthening Goal 1 (SLP)    Activity (Lingual Strengthening Goal 1, SLP)  increase lingual tone/sensation/control/coordination/movement  -MM  increase lingual tone/sensation/control/coordination/movement  -TM  —    Increase Lingual Tone/Sensation/Control/Coordination/Movement  lingual movement exercises  -MM  lingual movement exercises  -TM  —    [place]/Accuracy (Lingual Strengthening Goal 1, SLP)  independently (over 90% accuracy)  -MM  independently (over 90% accuracy)  -TM  —    Time Frame (Lingual Strengthening Goal 1, SLP)  by discharge  -MM  by discharge  -TM  —    Barriers (Lingual Strengthening Goal 1, SLP)  n/a  -MM  n/a  -TM  —    Progress/Outcomes (Lingual Strengthening Goal 1, SLP)  progress slower than expected  -MM  progress slower than expected  -TM  —       Pharyngeal Strengthening Exercise Goal 1 (SLP)    Activity (Pharyngeal Strengthening Goal 1, SLP)  increase epiglottic inversion and  retroflexion;increase tongue base retraction;increase pharyngeal sensation  -MM  increase epiglottic inversion and retroflexion;increase tongue base retraction;increase pharyngeal sensation  -TM  increase epiglottic inversion and retroflexion;increase tongue base retraction;increase pharyngeal sensation  -KW    Increase Pharyngeal Sensation  gustatory stimulation (sour/cold)  -MM  gustatory stimulation (sour/cold)  -TM  gustatory stimulation (sour/cold)  -KW    Increase Epiglottic Inversion and Retroflexion  [place];falsetto  -MM  Mendelsohn;falsetto  -TM  Mendelsohn;falsetto  -KW    Increase Tongue Base Retraction  hard effortful swallow;kaleb  -MM  hard effortful swallow;kaleb  -TM  hard effortful swallow;kaleb  -KW    [place]/Accuracy (Pharyngeal Strengthening Goal 1, SLP)  independently (over 90% accuracy)  -MM  independently (over 90% accuracy)  -TM  independently (over 90% accuracy)  -KW    Time Frame (Pharyngeal Strengthening Goal 1, SLP)  short term goal (STG);by discharge  -MM  short term goal (STG);by discharge  -TM  short term goal (STG);by discharge  -KW    Barriers (Pharyngeal Strengthening Goal 1, SLP)  n/a  -MM  n/a  -TM  n/a  -KW    Progress/Outcomes (Pharyngeal Strengthening Goal 1, SLP)  goal ongoing  -MM  goal ongoing  -TM  goal ongoing  -KW      User Key  (r) = Recorded By, (t) = Taken By, (c) = Cosigned By    Initials Name Provider Type    Nanette Lawrence CCC-SLP Speech and Language Pathologist    KW Alessandra Bright, MS CCC-SLP Speech and Language Pathologist    MM Krystal Babcock, MS CCC-SLP Speech and Language Pathologist          EDUCATION  The patient has been educated in the following areas:   Dysphagia (Swallowing Impairment) Oral Care/Hydration.    SLP Recommendation and Plan  Daily Summary of Progress (SLP): progress towards functional goals is fair  Barriers to Overall Progress (SLP): Cogntion, dysarthria   Plan for Continued Treatment (SLP): Upgrade to mechancial  soft diet. Continue honey thick.   Anticipated Dischage Disposition: unknown                    Time Calculation:   Time Calculation- SLP     Row Name 09/04/19 1033             Time Calculation- SLP    SLP Start Time  1014  -MM      SLP Stop Time  1037  -MM      SLP Time Calculation (min)  23 min  -MM      SLP Received On  09/04/19  -MM        User Key  (r) = Recorded By, (t) = Taken By, (c) = Cosigned By    Initials Name Provider Type     Krystal Babcock, MS CCC-SLP Speech and Language Pathologist          Therapy Charges for Today     Code Description Service Date Service Provider Modifiers Qty    07253841144  ST TREATMENT SWALLOW 2 9/4/2019 Krystal Babcock, MS CCC-SLP GN 1                 Krystal Babcock MS CCC-SLP  9/4/2019

## 2019-09-05 NOTE — PLAN OF CARE
Problem: Patient Care Overview  Goal: Plan of Care Review  Outcome: Ongoing (interventions implemented as appropriate)   09/05/19 7491   Coping/Psychosocial   Plan of Care Reviewed With patient   OTHER   Outcome Summary Pt. progressing fairly, Rue weak and requires assist with functional use !

## 2019-09-05 NOTE — PLAN OF CARE
Problem: Patient Care Overview  Goal: Plan of Care Review  Outcome: Ongoing (interventions implemented as appropriate)   09/05/19 0902   Coping/Psychosocial   Plan of Care Reviewed With patient   Plan of Care Review   Progress improving   OTHER   Outcome Summary With exercises he is AA-AROM on L LE and AA-PROM R LE. Was max assist for bed mobility and for sitting balance. PT will cont to follow for strengthening and mobility.

## 2019-09-05 NOTE — THERAPY TREATMENT NOTE
Patient Name: Phan Eastman  : 1955    MRN: [id number]                              Today's Date: 2019       Admit Date: 2019    Visit Dx:     ICD-10-CM ICD-9-CM   1. Oropharyngeal dysphagia R13.12 787.22   2. Impaired mobility and ADLs Z74.09 799.89   3. Impaired mobility Z74. 799.89     Patient Active Problem List   Diagnosis   • ICH (intracerebral hemorrhage) (CMS/HCC)   • Diabetes mellitus (CMS/HCC)   • Hypertensive urgency   • Hyperlipidemia   • Cerebrovascular accident (CVA) due to embolism of left middle cerebral artery (CMS/HCC)   • Dysarthria     Past Medical History:   Diagnosis Date   • Diabetes mellitus (CMS/HCC)    • GERD (gastroesophageal reflux disease)    • Hyperlipidemia    • Hypertension    • Neuropathy    • Osteoarthritis    • TIA (transient ischemic attack)      Past Surgical History:   Procedure Laterality Date   • CARPAL TUNNEL RELEASE Bilateral    • EYE SURGERY     • KNEE SURGERY Bilateral    • NECK SURGERY     • RETINAL DETACHMENT SURGERY     • ROTATOR CUFF REPAIR       General Information     Row Name 19          PT Evaluation Time/Intention    Document Type  therapy note (daily note)  -AB     Mode of Treatment  physical therapy  -AB     Row Name 19          Safety Issues, Functional Mobility    Safety Issues Affecting Function (Mobility)  ability to follow commands  -AB       User Key  (r) = Recorded By, (t) = Taken By, (c) = Cosigned By    Initials Name Provider Type    AB Key Sauceda PTA Physical Therapy Assistant        Mobility     Row Name 19          Bed Mobility Assessment/Treatment    Rolling Right [place] (Bed Mobility)  verbal cues;maximum assist (25% patient effort)  -AB     Supine-Sit [place] (Bed Mobility)  verbal cues;maximum assist (25% patient effort)  -AB     Sit-Supine [place] (Bed Mobility)  verbal cues;maximum assist (25% patient effort)  -AB       User Key  (r) = Recorded By, (t) = Taken By, (c)  = Cosigned By    Initials Name Provider Type    AB Key Sauceda PTA Physical Therapy Assistant        Obj/Interventions     Row Name 09/05/19 0902          Therapeutic Exercise    Lower Extremity (Therapeutic Exercise)  heel slides, bilateral  -AB     Lower Extremity Range of Motion (Therapeutic Exercise)  hip abduction/adduction, bilateral;ankle dorsiflexion/plantar flexion, bilateral;hip internal/external rotation, bilateral  -AB     Exercise Type (Therapeutic Exercise)  AAROM (active assistive range of motion);AROM (active range of motion);PROM (passive range of motion) R side was AA-PROM and L side was AA-AROM  -AB     Position (Therapeutic Exercise)  supine  -AB     Sets/Reps (Therapeutic Exercise)  2 x 10  -AB     Row Name 09/05/19 0902          Static Sitting Balance    Level of [place] (Unsupported Sitting, Static Balance)  maximal assist, 25 to 49% patient effort  -AB     Sitting Position (Unsupported Sitting, Static Balance)  sitting on edge of bed  -AB       User Key  (r) = Recorded By, (t) = Taken By, (c) = Cosigned By    Initials Name Provider Type    AB Key Sauceda PTA Physical Therapy Assistant        Goals/Plan    No documentation.       Clinical Impression     Row Name 09/05/19 0902          Pain Assessment    Additional Documentation  Pain Scale: FACES Pre/Post-Treatment (Group)  -AB     [facility] Name 09/05/19 0902          Pain Scale: Numbers Pre/Post-Treatment    Pain Scale: Numbers, Pretreatment  0/10 - no pain  -AB     Pain Scale: Numbers, Post-Treatment  4/10  -AB     Pain Location - Side  Right  -AB     Pain Location  — ribs from falling  -AB     Pain Intervention(s)  Repositioned  -AB     Row Name 09/05/19 0902          Positioning and Restraints    Pre-Treatment Position  in bed  -AB     Post Treatment Position  bed  -AB     In Bed  fowlers;exit alarm on;call light within reach  -AB       User Key  (r) = Recorded By, (t) = Taken By, (c) = Cosigned By    Initials Name Provider  Type    AB Key Sauceda PTA Physical Therapy Assistant        Outcome Measures    No documentation.       Physical Therapy Education     Title: PT OT SLP Therapies (In Progress)     Topic: Physical Therapy (In Progress)     Point: Mobility training (Done)     Learning Progress Summary           Patient Acceptance, E, VU,NR by TITUS at 9/4/2019  8:11 AM    Comment:  Rolling in bed. Bed mobility.    Acceptance, E, VU,NR by JAKE at 9/2/2019  7:20 AM    Comment:  Educated pt on progression of PT POC and benefits of activity                               User Key     Initials Effective Dates Name Provider Type Discipline    JAKE 08/02/16 -  Gilmer Medrano, PT DPT Physical Therapist PT    TITUS 08/02/16 -  Kathrine Ramsay PTA Physical Therapy Assistant PT              PT Recommendation and Plan     Plan of Care Reviewed With: (P) patient  Progress: (P) improving     Time Calculation:   PT Charges     Row Name 09/05/19 0902             Time Calculation    Start Time  0902  -AB      Stop Time  0928  -AB      Time Calculation (min)  26 min  -AB      PT Received On  09/05/19  -AB      PT Goal Re-Cert Due Date  09/12/19  -AB         Time Calculation- PT    Total Timed Code Minutes- PT  26 minute(s)  -AB        User Key  (r) = Recorded By, (t) = Taken By, (c) = Cosigned By    Initials Name Provider Type    AB Key Sauceda PTA Physical Therapy Assistant        Therapy Charges for Today     Code Description Service Date Service Provider Modifiers Qty    75294672058 HC PT THER PROC EA 15 MIN 9/5/2019 Key Sauceda PTA GP 1    70188081078 HC PT THERAPEUTIC ACT EA 15 MIN 9/5/2019 Key Sauceda PTA GP 1          PT G-Codes  Outcome Measure Options: AM-PAC 6 Clicks Daily Activity (OT)  AM-PAC 6 Clicks Score (PT): 8  AM-PAC 6 Clicks Score (OT): 10    Key Sauceda PTA  9/5/2019

## 2019-09-05 NOTE — PROGRESS NOTES
Neurosurgery Daily Progress Note    Assessment:   Phan Eastman is a 64 y.o. male with a significant comorbidity diabetes with diabetic neuropathy, intracerebral ischemic stroke x3.  He presents with a new problem of sudden onset of right-sided weakness and slurred speech. Physical exam findings of right-sided hemiparesis and right facial droop.  Their imaging shows 2.7 x 2.1 acute thalamic intracerebral hemorrhage.    DDX:     ICH (intracerebral hemorrhage) (CMS/HCC)    Diabetes mellitus (CMS/HCC)    Hypertensive urgency    Hyperlipidemia    Cerebrovascular accident (CVA) due to embolism of left middle cerebral artery (CMS/HCC)    Dysarthria    Patient Active Problem List   Diagnosis   • ICH (intracerebral hemorrhage) (CMS/HCC)   • Diabetes mellitus (CMS/HCC)   • Hypertensive urgency   • Hyperlipidemia   • Cerebrovascular accident (CVA) due to embolism of left middle cerebral artery (CMS/HCC)   • Dysarthria     Plan:   Neuro: Stable.  Continued improvement in speech and right-sided strength/movement   CT stable   MRI with small left ischemic stroke.     Most likely worsening edema surrounding ICH   Cont to monitor      Hx of ischemic stroke; appreciate neurology    CV: Continuing to require Cardene gtt to keep SBP <160; labetolol and hydralazine with marginal blood pressures.     -186   Oral antihypertensives adjusted per hospitalist   Appreciate hospitalist     TTE: Moderate right LVH, mild tricuspid regurg, EF 65%   Carotid US; right: 50 - 69%; left: <50%  Skin: Rash on legs.  Monitor for now.   Pulm: CPAP at home.  CXR: Moderate cardiomegaly no evidence pulmonary vascular congestion.    : Renal ultrasound: 2.8 cm solid mass suspected in the upper pole of the level left  kidney, further imaging characterization with CT, renal mass protocol, recommended.  Right nephrogenic cyst    FEN: Tolerating puréed diet   Appreciate speech nutrition  Endocrine: blood glucose well controlled  GI: PAUL  ID: PAUL  Heme:  " DVT prophylaxis held for brain bleed; SCDs  Pain: Well controlled  Dispo: PT/OT   DC to floor pending BP control of Cardene    Chief complaint:   Right sided weakness and speech decline    Subjective  Can not communicate    Temp:  [97.9 °F (36.6 °C)-98.2 °F (36.8 °C)] 97.9 °F (36.6 °C)  Heart Rate:  [50-86] 54  Resp:  [14-19] 15  BP: (109-186)/(58-91) 151/76    Objective:  Vital signs: (most recent): Blood pressure 151/76, pulse 54, temperature 97.9 °F (36.6 °C), temperature source Oral, resp. rate 15, height 188 cm (74\"), weight 123 kg (270 lb 12.8 oz), SpO2 98 %.        Neurologic Exam     Mental Status   Oriented to person.   Oriented to place.   Oriented to year.   Speech: slurred   Level of consciousness: alert ,  arousable by verbal stimuli  Able to name object. Unable to read. Unable to repeat. Unable to write. Abnormal comprehension.   Speech remains slurred but clearing.  Follows commands     Cranial Nerves     CN III, IV, VI   Pupils are equal, round, and reactive to light.  Extraocular motions are normal.     CN V   Facial sensation intact.     CN VII   Facial expression full, symmetric.   Left facial weakness: central    Motor Exam   Right arm pronator drift: absent  Left arm pronator drift: absent    Strength   Right deltoid: 4/5  Left deltoid: 5/5  Right biceps: 4/5  Left biceps: 5/5  Right triceps: 4/5  Left triceps: 5/5  Right interossei: 3/5  Right iliopsoas: 2/5  Left iliopsoas: 5/5  Right quadriceps: 2/5  Left quadriceps: 5/5  Right peroneal: 3/5  Right gastroc: 3/5  Left gastroc: 5/5  Strength/movement on right improving     Sensory Exam   Light touch normal.     Gait, Coordination, and Reflexes     Reflexes   Right : 4+  Left : 4+    Drains:  NA    Imaging Results (last 24 hours)     Procedure Component Value Units Date/Time    XR Chest 1 View [[id number]] Collected:  09/04/19 1008     Updated:  09/04/19 1011    Narrative:       Frontal upright radiograph of the chest 9/4/2019 10:06 AM " CDT     COMPARISON: None     HISTORY: Congestion.     FINDINGS:   The lungs are clear. Cardiac silhouettes moderately enlarged..      The osseous structures and surrounding soft tissues demonstrate no acute  abnormality.       Impression:       1. Moderate cardiomegaly no evidence pulmonary vascular congestion.        This report was finalized on 09/04/2019 10:08 by Dr. Karl Cohen MD.        Lab Results (last 24 hours)     Procedure Component Value Units Date/Time    POC Glucose Once [[id number]]  (Normal) Collected:  09/05/19 0710    Specimen:  Blood Updated:  09/05/19 0721     Glucose 93 mg/dL      Comment: : [id number] Garrett AmandaMeter ID: [id number]       Basic Metabolic Panel [[id number]]  (Abnormal) Collected:  09/05/19 0245    Specimen:  Blood Updated:  09/05/19 0318     Glucose 98 mg/dL      BUN 29 mg/dL      Creatinine 1.55 mg/dL      Sodium 146 mmol/L      Potassium 4.7 mmol/L      Chloride 118 mmol/L      CO2 23.0 mmol/L      Calcium 8.7 mg/dL      eGFR Non African Amer 46 mL/min/1.73      BUN/Creatinine Ratio 18.7     Anion Gap 5.0 mmol/L     Narrative:       GFR Normal >60  Chronic Kidney Disease <60  Kidney Failure <15    POC Glucose Once [[id number]]  (Normal) Collected:  09/04/19 2032    Specimen:  Blood Updated:  09/04/19 2044     Glucose 107 mg/dL      Comment: : [id number] Fabrizio DELVALLEeter ID: [id number]       POC Glucose Once [[id number]]  (Normal) Collected:  09/04/19 1616    Specimen:  Blood Updated:  09/04/19 1637     Glucose 103 mg/dL      Comment: : [id number] Jaleesaevi Pitt  JMeter ID: [id number]       POC Glucose Once [[id number]]  (Normal) Collected:  09/04/19 1159    Specimen:  Blood Updated:  09/04/19 1248     Glucose 78 mg/dL      Comment: : [id number] Jaleesa Sweetie  JMeter ID: [id number]       POC Glucose Once [[id number]]  (Normal) Collected:  09/04/19 0803    Specimen:  Blood Updated:  09/04/19 0836     Glucose 98 mg/dL      Comment: : [id number] Dimitry  Aidan ID: [id number]           [id number]  Heriberto Avalos, APRN

## 2019-09-05 NOTE — PLAN OF CARE
Problem: Patient Care Overview  Goal: Plan of Care Review  Outcome: Ongoing (interventions implemented as appropriate)   09/05/19 7574   Coping/Psychosocial   Plan of Care Reviewed With patient;family   Plan of Care Review   Progress improving   OTHER   Outcome Summary RDN follow up. Pt being fed lunch by family at bedside at time of visit. Obtained food prefernces. Pt does not like thickend liquids. Adding vanilla Boost Glucose Control, nectar thickened with lunch daily. Oral intake per available data 28.5% of past 7 meals. Cont to follow.       Problem: Nutrition, Imbalanced: Inadequate Oral Intake (Adult)  Goal: Identify Related Risk Factors and Signs and Symptoms  Outcome: Ongoing (interventions implemented as appropriate)    Goal: Improved Oral Intake  Outcome: Ongoing (interventions implemented as appropriate)    Goal: Prevent Further Weight Loss  Outcome: Ongoing (interventions implemented as appropriate)

## 2019-09-05 NOTE — THERAPY TREATMENT NOTE
Acute Care - Speech Language Pathology   Swallow Treatment Note [facility]     Patient Name: Phan Eastman  : 1955  MRN: [id number]  Today's Date: 2019  Onset of Illness/Injury or Date of Surgery: 19     Referring Physician: Dr. Wyatt       Admit Date: 2019  Swallow tx completed. Patient alert and cooperative. Less fatigued today than during previous sessions. GUSTAVO Beard reports toleration of mechanical soft upgrade. He completed trials of regular solid with functional rotary chew and clearance of oral residue with liquid wash. He completed trials of honey thick, nectar thick, and thin liquids. 1x throat clear with thin liquids. No overt s/s with hony or nectar. OK to upgrade to nectar thick liquids and continue mechanical soft diet. If increased lung congestion or s/s with nectar thick occur, patient to be downgraded back to honey thick. SLP will follow up to ensure diet tolerance.   Krystal Babcock MS CCC-SLP 2019 3:36 PM    Visit Dx:      ICD-10-CM ICD-9-CM   1. Oropharyngeal dysphagia R13.12 787.22   2. Impaired mobility and ADLs Z74.09 799.89   3. Impaired mobility Z74.09 799.89     Patient Active Problem List   Diagnosis   • ICH (intracerebral hemorrhage) (CMS/HCC)   • Diabetes mellitus (CMS/HCC)   • Hypertensive urgency   • Hyperlipidemia   • Cerebrovascular accident (CVA) due to embolism of left middle cerebral artery (CMS/HCC)   • Dysarthria       Therapy Treatment  Rehabilitation Treatment Summary     Row Name 19 1429 19 1347          Treatment Time/Intention    Discipline  speech language pathologist  -MM  occupational therapy assistant  -CJ     Document Type  therapy note (daily note)  -MM  therapy note (daily note)  -CJ     Subjective Information  no complaints  -MM  complains of;weakness;fatigue  -CJ     Mode of Treatment  individual therapy;speech-language pathology  -MM  occupational therapy  -CJ     Patient/Family Observations  No family present.  -MM  —      Care Plan Review  care plan/treatment goals reviewed  -MM  —     Therapy Frequency (Swallow)  at least;3 days per week  -MM  —     Patient Effort  adequate  -MM  adequate  -CJ     Existing Precautions/Restrictions  —  fall R.-sided weakness and edema!  -CJ     Recorded by [MM] Krystal Babcock, MS CCC-SLP 09/05/19 1527 [CJ] Emmanuel Osborne COTA/L 09/05/19 1450     Row Name 09/05/19 1347             Bed Mobility Assessment/Treatment    Comment (Bed Mobility)  fowlers in bed!  -CJ      Recorded by [CJ] Emmanuel Osborne DOS SANTOS/L 09/05/19 1450      Row Name 09/05/19 1347             ADL Assessment/Intervention    39285 - OT Self Care/Mgmt Minutes  12  -CJ      BADL Assessment/Intervention  grooming  -CJ      Recorded by [CJ] Emmanuel Osborne COTA/L 09/05/19 1450      Row Name 09/05/19 1347             Grooming Assessment/Training    [place] (Grooming)  wash face, hands;set up;supervision;minimum assist (75% patient effort) Min. assist with Rue!  -CJ      Assistive Devices (Grooming)  — washcloth!  -CJ      Grooming Position  supported sitting  -CJ      Recorded by [CJ] Emmanuel Osborne COTA/L 09/05/19 1450      Row Name 09/05/19 1347             General ROM    RT Upper Ext  Rt Shoulder ABduction;Rt Shoulder Extension;Rt Shoulder Flexion;Rt Elbow Extension/Flexion;Rt Elbow Supination;Rt Elbow Pronation;Rt Wrist Flexion;Rt Wrist Extension Prom/aarom!  -CJ      Recorded by [CJ] Emmanuel Osborne COTA/L 09/05/19 1450      Row Name 09/05/19 1347             Motor Skills Assessment/Interventions    Additional Documentation  Therapeutic Exercise (Group)  -CJ      Recorded by [CJ] Emmanuel Osborne COTA/L 09/05/19 1450      Row Name 09/05/19 1347             Therapeutic Exercise    Upper Extremity (Therapeutic Exercise)  bicep curl, bilateral;tricep extension, bilateral;wand exercises  -CJ      Upper Extremity Range of Motion (Therapeutic Exercise)  elbow flexion/extension, bilateral;wrist  flexion/extension, bilateral  -CJ      Weight/Resistance (Therapeutic Exercise)  2 pounds;3 pounds;yellow  -CJ      Exercise Type (Therapeutic Exercise)  AROM (active range of motion);AAROM (active assistive range of motion);resistive exercises  -CJ      Position (Therapeutic Exercise)  seated  -CJ      Sets/Reps (Therapeutic Exercise)  1 set x 12 reps!  -CJ      Equipment (Therapeutic Exercise)  dowel carina/wand;free weight, barbell;resistive bands  -CJ      Expected Outcome (Therapeutic Exercise)  facilitate normal movement patterns;improve functional stability;improve functional tolerance, self-care activity;improve motor control;improve neuromuscular control;improve performance, BADLs;improve performance, fine motor coordination skills;improve performance, gait skills;improve performance, transfer skills;improve postural control;increase active range of motion  -CJ      Recorded by [CJ] Emmanuel Osborne COTA/L 09/05/19 1450      Row Name 09/05/19 1347             Positioning and Restraints    Pre-Treatment Position  in bed  -CJ      Post Treatment Position  bed  -CJ      In Bed  fowlers;call light within reach;encouraged to call for assist;exit alarm on;side rails up x3  -CJ      Recorded by [CJ] Emmanuel Osborne COTA/L 09/05/19 1450      Row Name 09/05/19 1429 09/05/19 1347          Pain Assessment    Additional Documentation  Pain Scale: FACES Pre/Post-Treatment (Group)  -MM  Pain Scale 2: Word Pre/Post-Treatment (Group)  -CJ     Recorded by [MM] Krystal Babcock MS CCC-SLP 09/05/19 1527 [CJ] Emmanuel Osborne COTA/L 09/05/19 1450     Row Name 09/05/19 1347             Pain Scale: Numbers Pre/Post-Treatment    Pain Scale: Numbers, Pretreatment  0/10 - no pain  -      Pain Scale: Numbers, Post-Treatment  0/10 - no pain  -CJ      Recorded by [CJ] Emmanuel Osborne COTA/L 09/05/19 1450      Row Name 09/05/19 1429             Pain Scale: FACES Pre/Post-Treatment    Pain: FACES Scale, Pretreatment   0-->no hurt  -MM      Pain: FACES Scale, Post-Treatment  0-->no hurt  -MM      Recorded by [MM] Krystal Babcock MS CCC-SLP 09/05/19 1527      Row Name 09/05/19 1347             Outcome Summary/Treatment Plan (OT)    Daily Summary of Progress (OT)  progress towards functional goals is fair  -CJ      Barriers to Overall Progress (OT)  fall/R. sided weakness/edema Rue!  -CJ      Plan for Continued Treatment (OT)  continue with ot poc!  -CJ      Recorded by [CJ] Emmanuel Osborne COTA/L 09/05/19 1450      Row Name 09/05/19 1429             Outcome Summary/Treatment Plan (SLP)    Daily Summary of Progress (SLP)  progress towards functional goals is fair  -MM      Barriers to Overall Progress (SLP)  Cognition, dysarthria   -MM      Plan for Continued Treatment (SLP)  Upgrade to nectar thick. Continue mech soft.   -MM      Anticipated Dischage Disposition  unknown  -MM      Recorded by [MM] Krystal Babcock MS CCC-SLP 09/05/19 1527        User Key  (r) = Recorded By, (t) = Taken By, (c) = Cosigned By    Initials Name Effective Dates Discipline    CJ Emmanuel Osborne DOS SANTOS/L 08/02/16 -  OT    MM Krystal Babcock MS CCC-SLP 05/24/19 -  SLP          Outcome Summary  Outcome Summary/Treatment Plan (SLP)  Daily Summary of Progress (SLP): progress towards functional goals is fair (09/05/19 1429 : Krystal Babcock, MS CCC-SLP)  Barriers to Overall Progress (SLP): Cognition, dysarthria  (09/05/19 1429 : Krystal Babcock, MS CCC-SLP)  Plan for Continued Treatment (SLP): Upgrade to nectar thick. Continue mech soft.  (09/05/19 1429 : Krystal Babcock, MS CCC-SLP)  Anticipated Dischage Disposition: unknown (09/05/19 1429 : Krystal Babcock, MS [facility]-SLP)      SLP GOALS     Row Name 09/05/19 1429 09/04/19 1014 09/03/19 1015       Oral Nutrition/Hydration Goal 1 (SLP)    Oral Nutrition/Hydration Goal 1, SLP  LTG: Patient will tolerate LRD with no overt s/s of aspiration.  -MM  LTG: Patient will tolerate LRD  with no overt s/s of aspiration.  -MM  LTG: Patient will tolerate LRD with no overt s/s of aspiration.  -TM    Time Frame (Oral Nutrition/Hydration Goal 1, SLP)  by discharge  -MM  by discharge  -MM  by discharge  -TM    Barriers (Oral Nutrition/Hydration Goal 1, SLP)  n/a  -MM  n/a  -MM  n/a  -TM    Progress/Outcomes (Oral Nutrition/Hydration Goal 1, SLP)  continuing progress toward goal  -MM  continuing progress toward goal  -MM  progress slower than expected  -TM       Labial Strengthening Goal 1 (SLP)    Activity (Labial Strengthening Goal 1, SLP)  increase labial tone  -MM  increase labial tone  -MM  increase labial tone  -TM    Increase Labial Tone  labial resistance exercises  -MM  labial resistance exercises  -MM  labial resistance exercises  -TM    Hume/Accuracy (Labial Strengthening Goal 1, SLP)  independently (over 90% accuracy)  -MM  independently (over 90% accuracy)  -MM  independently (over 90% accuracy)  -TM    Time Frame (Labial Strengthening Goal 1, SLP)  short term goal (STG);by discharge  -MM  short term goal (STG);by discharge  -MM  short term goal (STG);by discharge  -TM    Barriers (Labial Strengthening Goal 1, SLP)  n/a  -MM  n/a  -MM  n/a  -TM    Progress/Outcomes (Labial Strengthening Goal 1, SLP)  continuing progress toward goal  -MM  continuing progress toward goal  -MM  goal ongoing  -TM       Lingual Strengthening Goal 1 (SLP)    Activity (Lingual Strengthening Goal 1, SLP)  increase lingual tone/sensation/control/coordination/movement  -MM  increase lingual tone/sensation/control/coordination/movement  -MM  increase lingual tone/sensation/control/coordination/movement  -TM    Increase Lingual Tone/Sensation/Control/Coordination/Movement  lingual movement exercises  -MM  lingual movement exercises  -MM  lingual movement exercises  -TM    Hume/Accuracy (Lingual Strengthening Goal 1, SLP)  independently (over 90% accuracy)  -MM  independently (over 90% accuracy)  -MM   independently (over 90% accuracy)  -TM    Time Frame (Lingual Strengthening Goal 1, SLP)  by discharge  -MM  by discharge  -MM  by discharge  -TM    Barriers (Lingual Strengthening Goal 1, SLP)  n/a  -MM  n/a  -MM  n/a  -TM    Progress/Outcomes (Lingual Strengthening Goal 1, SLP)  progress slower than expected  -MM  progress slower than expected  -MM  progress slower than expected  -TM       Pharyngeal Strengthening Exercise Goal 1 (SLP)    Activity (Pharyngeal Strengthening Goal 1, SLP)  increase epiglottic inversion and retroflexion;increase tongue base retraction;increase pharyngeal sensation  -MM  increase epiglottic inversion and retroflexion;increase tongue base retraction;increase pharyngeal sensation  -MM  increase epiglottic inversion and retroflexion;increase tongue base retraction;increase pharyngeal sensation  -TM    Increase Pharyngeal Sensation  gustatory stimulation (sour/cold)  -MM  gustatory stimulation (sour/cold)  -MM  gustatory stimulation (sour/cold)  -TM    Increase Epiglottic Inversion and Retroflexion  Mendelsohn;falsetto  -MM  Mendelsohn;falsetto  -MM  Mendelsohn;falsetto  -TM    Increase Tongue Base Retraction  hard effortful swallow;kaleb  -MM  hard effortful swallow;kaleb  -MM  hard effortful swallow;kaleb  -TM    Murray/Accuracy (Pharyngeal Strengthening Goal 1, SLP)  independently (over 90% accuracy)  -MM  independently (over 90% accuracy)  -MM  independently (over 90% accuracy)  -TM    Time Frame (Pharyngeal Strengthening Goal 1, SLP)  short term goal (STG);by discharge  -MM  short term goal (STG);by discharge  -MM  short term goal (STG);by discharge  -TM    Barriers (Pharyngeal Strengthening Goal 1, SLP)  n/a  -MM  n/a  -MM  n/a  -TM    Progress/Outcomes (Pharyngeal Strengthening Goal 1, SLP)  goal ongoing  -MM  goal ongoing  -MM  goal ongoing  -TM      User Key  (r) = Recorded By, (t) = Taken By, (c) = Cosigned By    Initials Name Provider Type    TM Nanette Malone CCC-SLP  Speech and Language Pathologist    Krystal Monroy, MS CCC-SLP Speech and Language Pathologist          EDUCATION  The patient has been educated in the following areas:   Dysphagia (Swallowing Impairment) Oral Care/Hydration Modified Diet Instruction.    SLP Recommendation and Plan  Daily Summary of Progress (SLP): progress towards functional goals is fair  Barriers to Overall Progress (SLP): Cognition, dysarthria   Plan for Continued Treatment (SLP): Upgrade to nectar thick. Continue mech soft.   Anticipated Dischage Disposition: unknown                    Time Calculation:   Time Calculation- SLP     Row Name 09/05/19 1532             Time Calculation- SLP    SLP Start Time  1429  -MM      SLP Stop Time  1441  -MM      SLP Time Calculation (min)  12 min  -MM      SLP Received On  09/05/19  -MM        User Key  (r) = Recorded By, (t) = Taken By, (c) = Cosigned By    Initials Name Provider Type    Krystal Monroy MS CCC-SLP Speech and Language Pathologist          Therapy Charges for Today     Code Description Service Date Service Provider Modifiers Qty    04978333745 HC ST TREATMENT SWALLOW 2 9/4/2019 Krystal Babcock MS CCC-SLP GN 1    [id number] HC ST TREATMENT SWALLOW 1 9/5/2019 Krystal Babcock MS CCC-SLP GN 1                 MS NESTOR Vargas  9/5/2019

## 2019-09-05 NOTE — PROGRESS NOTES
[facility] Medicine Services  INPATIENT PROGRESS NOTE    Length of Stay: 5  Date of Admission: 8/31/2019  Primary Care Physician: Emmanuel Mclain MD    Subjective     Chief Complaint:     Elevated blood pressure    HPI     The patient's blood pressure seems to be improved currently.  Cardene has been weaned to 5 mg.  Oral hydralazine will be added to his regimen.  Clonidine has been maximized at 0.3 mg p.o. twice daily.  His heart rate is currently 56 on 100 mg of metoprolol succinate.  The patient is alert and conversant.  He is able to follow commands and engage in meaningful conversation.  A work-up for renal artery stenosis will be ordered and I will also check 24-hour urine for VMA and catecholamines to rule out pheochromocytoma.  Petechiae noted on BLE are improving.  Renal function is stable with a creatinine of 1.55 which is likely the patient's baseline.  Because of the patient's depressed renal function I am hesitant to add a substantial dose of ACE inhibitor or ARB.    Review of Systems     All pertinent negatives and positives are as above. All other systems have been reviewed and are negative unless otherwise stated.     Objective    Temp:  [97.9 °F (36.6 °C)-98.2 °F (36.8 °C)] 97.9 °F (36.6 °C)  Heart Rate:  [50-86] 56  Resp:  [14-19] 16  BP: (109-186)/(58-91) 143/84    Lab Results (last 24 hours)     Procedure Component Value Units Date/Time    Catecholamine+VMA, 24-Hr Urine - Urine, Clean Catch [[id number]] Updated:  09/05/19 1035    Specimen:  Urine, Clean Catch     17-Hydroxycorticosteroids - Urine, Clean Catch [[id number]] Updated:  09/05/19 1034    Specimen:  Urine, Clean Catch     POC Glucose Once [[id number]]  (Normal) Collected:  09/05/19 0710    Specimen:  Blood Updated:  09/05/19 0721     Glucose 93 mg/dL      Comment: : [id number] Garrett AmandaMeter ID: [id number]       Basic Metabolic Panel [[id number]]  (Abnormal) Collected:  09/05/19 5561      Specimen:  Blood Updated:  09/05/19 0318     Glucose 98 mg/dL      BUN 29 mg/dL      Creatinine 1.55 mg/dL      Sodium 146 mmol/L      Potassium 4.7 mmol/L      Chloride 118 mmol/L      CO2 23.0 mmol/L      Calcium 8.7 mg/dL      eGFR Non African Amer 46 mL/min/1.73      BUN/Creatinine Ratio 18.7     Anion Gap 5.0 mmol/L     Narrative:       GFR Normal >60  Chronic Kidney Disease <60  Kidney Failure <15    POC Glucose Once [[id number]]  (Normal) Collected:  09/04/19 2032    Specimen:  Blood Updated:  09/04/19 2044     Glucose 107 mg/dL      Comment: : [id number] Fabrizio DELVALLEeter ID: [id number]       POC Glucose Once [[id number]]  (Normal) Collected:  09/04/19 1616    Specimen:  Blood Updated:  09/04/19 1637     Glucose 103 mg/dL      Comment: : [id number] Jaleesa Pitt  JMeter ID: [id number]       POC Glucose Once [[id number]]  (Normal) Collected:  09/04/19 1159    Specimen:  Blood Updated:  09/04/19 1248     Glucose 78 mg/dL      Comment: : [id number] Jaleesa Pitt  JMeter ID: [id number]             Imaging Results (last 24 hours)     ** No results found for the last 24 hours. **             Intake/Output Summary (Last 24 hours) at 9/5/2019 1107  Last data filed at 9/5/2019 0338  Gross per 24 hour   Intake 3212.4 ml   Output 575 ml   Net 2637.4 ml       Physical Exam    Patient was seen and evaluated with his nurse, Meng.  No family members are present in the room.  Constitutional: He appears well-developed and well-nourished. He is cooperative. No distress.   HENT:   Head: Normocephalic and atraumatic.   Nose: Nose normal.   Mouth/Throat: Oropharynx is clear and moist.   Eyes: Conjunctivae are normal. No scleral icterus.   Neck: Normal range of motion. Neck supple. No JVD present. No tracheal deviation present.   Cardiovascular: Normal rate, regular rhythm and normal heart sounds.   No murmur heard.  Pulmonary/Chest: Effort normal and breath sounds normal. No respiratory distress.    Abdominal: Soft. Bowel sounds are normal. He exhibits no mass. There is no tenderness.   Musculoskeletal: Normal range of motion. He exhibits no lower extremity edema.  Neurological: He is alert. He exhibits abnormal muscle tone (right hemiparesis and left-sided weakness). Coordination abnormal with right hemiparesis.  Ignores the right side.   Skin: Skin is warm and dry. No rash noted.  Resolving petechia noted on both lower extremities.  Psychiatric: His speech is stable compared to yesterday.  He is alert but not oriented to the year.  He is oriented to place and person.    Results Review:  I have reviewed the labs, radiology results, and diagnostic studies since my last progress note and made treatment changes reflective of the results.   I have reviewed the current medications.    Assessment/Plan     Active Hospital Problems    Diagnosis   • **ICH (intracerebral hemorrhage) (CMS/HCC)   • Cerebrovascular accident (CVA) due to embolism of left middle cerebral artery (CMS/HCC)   • Dysarthria   • Diabetes mellitus (CMS/HCC)   • Hypertensive urgency   • Hyperlipidemia       PLAN:  Clonidine increased yesterday afternoon to 0.3 mg p.o. twice daily  Add hydralazine 50 mg p.o. every 8 hours  Continue amlodipine and Toprol-XL  24-hour urine for catecholamines and VMA  Bilateral renal artery ultrasound    Gaston Cabrera DO   09/05/19   11:07 AM

## 2019-09-05 NOTE — PLAN OF CARE
Problem: Patient Care Overview  Goal: Plan of Care Review  Outcome: Ongoing (interventions implemented as appropriate)   09/05/19 8175   Coping/Psychosocial   Plan of Care Reviewed With patient;other (see comments)  (GUSTAVO Beard )   Plan of Care Review   Progress improving   OTHER   Outcome Summary Swallow tx completed. Patient alert and cooperative. Less fatigued today than during previous sessions. GUSTAVO Beard reports toleration of mechanical soft upgrade. He completed trials of regular solid with functional rotary chew and clearance of oral residue with liquid wash. He completed trials of honey thick, nectar thick, and thin liquids. 1x throat clear with thin liquids. No overt s/s with hony or nectar. OK to upgrade to nectar thick liquids and continue mechanical soft diet. If increased lung congestion or s/s with nectar thick occur, patient to be downgraded back to honey thick. SLP will follow up to ensure diet tolerance.

## 2019-09-06 PROBLEM — N28.89 LEFT RENAL MASS: Status: ACTIVE | Noted: 2019-01-01

## 2019-09-06 PROBLEM — N18.30 CKD (CHRONIC KIDNEY DISEASE) STAGE 3, GFR 30-59 ML/MIN (HCC): Status: ACTIVE | Noted: 2019-01-01

## 2019-09-06 NOTE — PROGRESS NOTES
Continued Stay Note   Hans     Patient Name: Phan Eastman  MRN: [id number]  Today's Date: 9/6/2019    Admit Date: 8/31/2019    Discharge Plan     Row Name 09/06/19 1000       Plan    Plan  SNF placement    Patient/Family in Agreement with Plan  yes    Plan Comments  Patient has been to [facility] in the past and is agreeable to return to same facility for rehab upon discharge.        Discharge Codes    No documentation.             DENA Santiago

## 2019-09-06 NOTE — PLAN OF CARE
Problem: Patient Care Overview  Goal: Plan of Care Review  Outcome: Ongoing (interventions implemented as appropriate)   09/06/19 1031   Coping/Psychosocial   Plan of Care Reviewed With patient   Plan of Care Review   Progress improving   OTHER   Outcome Summary OT tx completed. Pt much more alert this session. Required Max Ax2 for supine <> sit at EOB and scooting/bridging and Max A for rolling left/right. Max A for donning socks. Set-up and verbal cues for washing face. 1x10 reps of BUE AROM/AAROM completed. Continue OT POC to address these deficits.

## 2019-09-06 NOTE — PROGRESS NOTES
Neurosurgery Daily Progress Note    Assessment:   Phan Eastman is a 64 y.o. male with a significant comorbidity diabetes with diabetic neuropathy, intracerebral ischemic stroke x3.  He presents with a new problem of sudden onset of right-sided weakness and slurred speech. Physical exam findings of right-sided hemiparesis and right facial droop.  Their imaging shows 2.7 x 2.1 acute thalamic intracerebral hemorrhage.    DDX:     ICH (intracerebral hemorrhage) (CMS/HCC)    Diabetes mellitus (CMS/HCC)    Hypertensive urgency    Hyperlipidemia    Cerebrovascular accident (CVA) due to embolism of left middle cerebral artery (CMS/HCC)    Dysarthria    Patient Active Problem List   Diagnosis   • ICH (intracerebral hemorrhage) (CMS/HCC)   • Diabetes mellitus (CMS/HCC)   • Hypertensive urgency   • Hyperlipidemia   • Cerebrovascular accident (CVA) due to embolism of left middle cerebral artery (CMS/HCC)   • Dysarthria     Plan:   Neuro: Stable.  Continued improvement in speech and right-sided strength/movement   CT stable   MRI with small left ischemic stroke.     Most likely worsening edema surrounding ICH   Cont to monitor      Hx of ischemic stroke; appreciate neurology    CV: Continuing to require Cardene gtt to keep SBP <160; labetolol and hydralazine with marginal blood pressures.     -199   Oral antihypertensives adjusted per hospitalist:    PLAN:  Clonidine increased yesterday afternoon to 0.3 mg p.o. twice daily  Add hydralazine 50 mg p.o. every 8 hours  Continue amlodipine and Toprol-XL     Appreciate hospitalist     TTE: Moderate right LVH, mild tricuspid regurg, EF 65%   Carotid US; right: 50 - 69%; left: <50%  Skin: Rash on legs.  Monitor for now.   Pulm: CPAP at home.  CXR: Moderate cardiomegaly no evidence pulmonary vascular congestion.    : Renal ultrasound: 2.8 cm solid mass suspected in the upper pole of the level left  kidney, further imaging characterization with CT, renal mass protocol,  "recommended.  Right nephrogenic cyst.    PLAN:  24-hour urine for catecholamines and VMA  Bilateral renal artery ultrasound    Appreciate hospitalist    FEN: Tolerating puréed diet   Appreciate speech/ nutrition  Endocrine: blood glucose well controlled  GI: PAUL  ID: PAUL  Heme:  DVT prophylaxis held for brain bleed; SCDs  Pain: Well controlled  Dispo: PT/OT   DC to floor pending BP control off Cardene     Chief complaint:   Right sided weakness and speech decline    Subjective  Can not communicate    Temp:  [98.1 °F (36.7 °C)-98.2 °F (36.8 °C)] 98.1 °F (36.7 °C)  Heart Rate:  [47-78] 62  Resp:  [16-20] 20  BP: (109-199)/(56-93) 152/69    Objective:  Vital signs: (most recent): Blood pressure 152/69, pulse 62, temperature 98.1 °F (36.7 °C), temperature source Oral, resp. rate 20, height 188 cm (74\"), weight 124 kg (273 lb 12.8 oz), SpO2 96 %.        Neurologic Exam     Mental Status   Oriented to person.   Oriented to place.   Oriented to year.   Speech: slurred   Level of consciousness: alert ,  arousable by verbal stimuli  Able to name object. Unable to read. Unable to repeat. Unable to write. Abnormal comprehension.   Speech remains slurred but clearing.  Follows commands     Cranial Nerves     CN III, IV, VI   Pupils are equal, round, and reactive to light.  Extraocular motions are normal.     CN V   Facial sensation intact.     CN VII   Facial expression full, symmetric.   Left facial weakness: central    Motor Exam   Right arm pronator drift: absent  Left arm pronator drift: absent    Strength   Right deltoid: 4/5  Left deltoid: 5/5  Right biceps: 4/5  Left biceps: 5/5  Right triceps: 4/5  Left triceps: 5/5  Right interossei: 3/5  Right iliopsoas: 2/5  Left iliopsoas: 5/5  Right quadriceps: 2/5  Left quadriceps: 5/5  Right peroneal: 3/5  Right gastroc: 3/5  Left gastroc: 5/5  Strength/movement on right improving     Sensory Exam   Light touch normal.     Gait, Coordination, and Reflexes     Reflexes   Right " : 4+  Left : 4+    Drains:  NA    Imaging Results (last 24 hours)     Procedure Component Value Units Date/Time    US Renal Bilateral [[id number]] Collected:  09/05/19 1433     Updated:  09/05/19 1439    Narrative:       EXAMINATION:  US RENAL BILATERAL-  9/5/2019 12:55 PM CDT     HISTORY: refractory HTN; R13.12-Dysphagia, oropharyngeal phase;  Z74.09-Other reduced mobility; Z74.09-Other reduced mobility      COMPARISON: No comparison study.     TECHNIQUE: Bilateral renal ultrasound was performed.     The examination is limited due to the patient's inability to hold her  breath and be still for the examination.     The renal arteries were unable to perform at this time. The renal  arteries and aorta were obscured by bowel gas.     FINDINGS:      The left kidney measures 12.2 cm in xhbz-bp-qlsb length.     The right kidney measures 12.3 cm in pcep-xl-rtgq length.     In the upper pole of the left kidney there is a 2.8 cm solid  pedunculated-appearing mass. Renal cell malignancy considered. CT  imaging with renal mass protocol suggested.     A 1 cm cyst noted in the upper pole the right kidney.     Normal echogenicity and renal cortex observed.     There is no pelvocaliectasis or obstructive uropathy changes. There are  no perinephric abnormalities.     Urinary bladder is decompressed with a Alejo catheter.     A small amount of perihepatic ascites.       Impression:       1. 2.8 cm solid mass suspected in the upper pole of the level left  kidney, further imaging characterization with CT, renal mass protocol,  recommended.  2. Right nephrogenic cyst.  This report was finalized on 09/05/2019 14:36 by Dr. Misha Osborne MD.        Lab Results (last 24 hours)     Procedure Component Value Units Date/Time    POC Glucose Once [[id number]]  (Normal) Collected:  09/06/19 0720    Specimen:  Blood Updated:  09/06/19 0730     Glucose 92 mg/dL      Comment: : [id number] Garrett AmandaMeter ID: [id number]       POC Glucose  Once [[id number]]  (Normal) Collected:  09/06/19 0515    Specimen:  Blood Updated:  09/06/19 0526     Glucose 94 mg/dL      Comment: : [id number] Dylon KevinMeter ID: [id number]       Basic Metabolic Panel [[id number]]  (Abnormal) Collected:  09/06/19 0350    Specimen:  Blood Updated:  09/06/19 0421     Glucose 80 mg/dL      BUN 27 mg/dL      Creatinine 1.60 mg/dL      Sodium 144 mmol/L      Potassium 4.7 mmol/L      Chloride 117 mmol/L      CO2 24.0 mmol/L      Calcium 8.5 mg/dL      eGFR Non African Amer 44 mL/min/1.73      BUN/Creatinine Ratio 16.9     Anion Gap 3.0 mmol/L     Narrative:       GFR Normal >60  Chronic Kidney Disease <60  Kidney Failure <15    POC Glucose Once [[id number]]  (Normal) Collected:  09/05/19 2129    Specimen:  Blood Updated:  09/05/19 2141     Glucose 110 mg/dL      Comment: : [id number] Dylon KevinMeter ID: [id number]       POC Glucose Once [[id number]]  (Normal) Collected:  09/05/19 1154    Specimen:  Blood Updated:  09/05/19 1207     Glucose 70 mg/dL      Comment: : [id number] Garrett AmandaMeter ID: [id number]       Catecholamine+VMA, 24-Hr Urine - Urine, Clean Catch [[id number]] Updated:  09/05/19 1035    Specimen:  Urine, Clean Catch         [id number]  Heriberto Avalos, EFRA

## 2019-09-06 NOTE — THERAPY TREATMENT NOTE
Acute Care - Occupational Therapy Treatment Note  [facility]     Patient Name: Phan Eastman  : 1955  MRN: [id number]  Today's Date: 2019  Onset of Illness/Injury or Date of Surgery: 19  Date of Referral to OT: 19  Referring Physician: Dr. Wyatt     Admit Date: 2019       ICD-10-CM ICD-9-CM   1. Oropharyngeal dysphagia R13.12 787.22   2. Impaired mobility and ADLs Z74.09 799.89   3. Impaired mobility Z74. 799.89     Patient Active Problem List   Diagnosis   • ICH (intracerebral hemorrhage) (CMS/HCC)   • Diabetes mellitus (CMS/HCC)   • Hypertensive urgency   • Hyperlipidemia   • Cerebrovascular accident (CVA) due to embolism of left middle cerebral artery (CMS/HCC)   • Dysarthria   • CKD (chronic kidney disease) stage 3, GFR 30-59 ml/min (CMS/HCC)   • Left renal mass     Past Medical History:   Diagnosis Date   • Diabetes mellitus (CMS/HCC)    • GERD (gastroesophageal reflux disease)    • Hyperlipidemia    • Hypertension    • Neuropathy    • Osteoarthritis    • TIA (transient ischemic attack)      Past Surgical History:   Procedure Laterality Date   • CARPAL TUNNEL RELEASE Bilateral    • EYE SURGERY     • KNEE SURGERY Bilateral    • NECK SURGERY     • RETINAL DETACHMENT SURGERY     • ROTATOR CUFF REPAIR         Therapy Treatment    Rehabilitation Treatment Summary     Row Name 19 0936 19 0900 19 0822       Treatment Time/Intention    Discipline  occupational therapist  -JJ  speech language pathologist  -MM  —    Document Type  therapy note (daily note)  -JJ  therapy note (daily note)  -MM  —  -JJ    Subjective Information  complains of  -JJ  no complaints  -MM  —  -JJ    Mode of Treatment  occupational therapy  -JJ  individual therapy;speech-language pathology  -MM  —  -JJ    Patient/Family Observations  no family present   -JJ  No family present.  -MM  —    Care Plan Review  —  care plan/treatment goals reviewed  -MM  —    Therapy Frequency (OT Eval)  5 times/wk   -JJ  —  —  -JJ    Therapy Frequency (Swallow)  —  at least;3 days per week  -MM  —    Patient Effort  —  good  -MM  —    Existing Precautions/Restrictions  fall  -JJ  —  —  -JJ    Treatment Considerations/Comments  R sided weakness  -JJ  —  —  -JJ    Recorded by [JJ] Aster Suggs OTR/L 09/06/19 0937 [MM] Krystal Babcock, MS CCC-SLP 09/06/19 0927 [JJ] Aster Suggs, OTR/L 09/06/19 0825    Row Name 09/06/19 0936 09/06/19 0822          Vital Signs    Pre Systolic BP Rehab  142  -JJ  —  -JJ     Pre Treatment Diastolic BP  81  -JJ  —  -JJ     Pretreatment Heart Rate (beats/min)  52  -JJ  —  -JJ     Pretreatment Resp Rate (breaths/min)  14  -JJ  —  -JJ     Pre SpO2 (%)  96  -JJ  —  -JJ     O2 Delivery Pre Treatment  supplemental O2  -JJ2  —  -JJ     Pre Patient Position  Supine  -JJ2  —     Intra Patient Position  Sitting  -JJ2  —     Post Patient Position  Supine  -JJ2  —     Recorded by [JJ] Aster Suggs OTR/L 09/06/19 0937  [JJ2] Aster Suggs OTR/L 09/06/19 1028 [JJ] Aster Suggs OTR/L 09/06/19 0825     Row Name 09/06/19 0936             Bed Mobility Assessment/Treatment    Bed Mobility Assessment/Treatment  rolling left;rolling right;scooting/bridging;supine-sit;sit-supine  -JJ      Rolling Left [place] Level (Bed Mobility)  verbal cues;maximum assist (25% patient effort)  -JJ      Rolling Right [place] Level (Bed Mobility)  maximum assist (25% patient effort);verbal cues  -JJ      Scooting/Bridging [place] Level (Bed Mobility)  maximum assist (25% patient effort);2 person assist  -JJ      Supine-Sit [place] Level (Bed Mobility)  maximum assist (25% patient effort);2 person assist  -JJ      Sit-Supine [place] Level (Bed Mobility)  maximum assist (25% patient effort);2 person assist  -JJ      Bed Mobility, Safety Issues  decreased use of arms for pushing/pulling;decreased use of legs for bridging/pushing  -JJ      Assistive Device (Bed Mobility)  head of bed elevated;draw sheet  -RYAN       Recorded by [JJ] Aster Suggs OTR/L 09/06/19 1028      Row Name 09/06/19 0936             ADL Assessment/Intervention    BADL Assessment/Intervention  lower body dressing  -JJ      Recorded by [JJ] Aster Suggs OTR/L 09/06/19 1028      Row Name 09/06/19 0936             Lower Body Dressing Assessment/Training    Lower Body Dressing [place] Level  don;socks;maximum assist (25% patient effort)  -JJ      Lower Body Dressing Position  supine  -JJ      Recorded by [JCARLY] Aster Suggs OTR/L 09/06/19 1028      Row Name 09/06/19 0936             Grooming Assessment/Training    [place] Level (Grooming)  wash face, hands;set up;verbal cues  -JJ      Grooming Position  supported sitting  -JJ      Recorded by [RYAN] Aster Suggs OTR/L 09/06/19 1028      Row Name 09/06/19 0936             Motor Skills Assessment/Interventions    Additional Documentation  Therapeutic Exercise (Group)  -JJ      Recorded by [RYAN] Aster Suggs OTR/L 09/06/19 1028      Row Name 09/06/19 0936             Therapeutic Exercise    Upper Extremity Range of Motion (Therapeutic Exercise)  shoulder flexion/extension, bilateral;elbow flexion/extension, bilateral;wrist flexion/extension, bilateral  -JJ      Weight/Resistance (Therapeutic Exercise)  2 pounds;3 pounds;yellow  -JJ      Exercise Type (Therapeutic Exercise)  AROM (active range of motion);AAROM (active assistive range of motion)  -JJ      Position (Therapeutic Exercise)  seated  -JJ      Sets/Reps (Therapeutic Exercise)  1x10  -JJ      Expected Outcome (Therapeutic Exercise)  facilitate normal movement patterns;improve functional stability;improve functional tolerance, household activity;improve functional tolerance, self-care activity;improve performance, BADLs;improve performance, fine motor coordination skills;improve performance, transfer skills;increase active range of motion  -JJ      Recorded by [RYAN] Aster Suggs OTR/L 09/06/19 1028      Row  Name 09/06/19 0936             Balance    Balance  static sitting balance  -JJ      Recorded by [JJ] Aster Suggs OTR/L 09/06/19 1028      Row Name 09/06/19 0936             Static Sitting Balance    Level of [place] (Supported Sitting, Static Balance)  moderate assist, 50 to 74% patient effort;maximal assist, 25 to 49% patient effort  -JJ      Sitting Position (Supported Sitting, Static Balance)  sitting on edge of bed  -JJ      Comment (Supported Sitting, Static Balance)  verbal cues for postural control with flucuating assist required.   -JJ      Recorded by [JJ] Aster Suggs OTR/L 09/06/19 1028      Row Name 09/06/19 0936             Positioning and Restraints    Pre-Treatment Position  in bed  -JJ      Post Treatment Position  bed  -JJ      In Bed  fowlers;call light within reach;encouraged to call for assist;notified nsg;side lying right;patient within staff view;side rails up x3;RUE elevated;LUE elevated;pillow between legs  -JJ      Restraints  released: nsg okay'd to leave restraint off d/t swelling of L hand/wri  -JJ      Recorded by [JJ] Aster Suggs OTR/L 09/06/19 1028      Row Name 09/06/19 0936 09/06/19 0900          Pain Assessment    Additional Documentation  Pain Scale: FACES Pre/Post-Treatment (Group)  -JJ  Pain Scale: FACES Pre/Post-Treatment (Group)  -MM     Recorded by [JCARLY] Aster Suggs OTR/JIMENA 09/06/19 1028 [MM] Krystal Babcock MS CCC-SLP 09/06/19 0927     Row Name 09/06/19 0936             Pain Scale: Numbers Pre/Post-Treatment    Pain Location - Side  Left  -JJ      Pain Location  wrist  -JJ      Pain Intervention(s)  Repositioned;Ambulation/increased activity  -JJ      Recorded by [JCARLY] Aster Suggs OTR/L 09/06/19 1028      Row Name 09/06/19 0936 09/06/19 0900          Pain Scale: FACES Pre/Post-Treatment    Pain: FACES Scale, Pretreatment  2-->hurts little bit  -JJ  0-->no hurt  -MM     Pain: FACES Scale, Post-Treatment  4-->hurts little more  -JJ   0-->no hurt  -MM     Recorded by [JJ] Aster Suggs OTR/L 09/06/19 1028 [MM] Krystal Babcock, MS CCC-SLP 09/06/19 0927     Row Name 09/06/19 0936             Plan of Care Review    Plan of Care Reviewed With  patient  -JJ      Recorded by [JJ] Aster Suggs OTR/L 09/06/19 1028      Row Name 09/06/19 0936             Outcome Summary/Treatment Plan (OT)    Daily Summary of Progress (OT)  progress towards functional goals is fair  -JJ      Barriers to Overall Progress (OT)  Rsided weakness  -JJ      Anticipated Discharge Disposition (OT)  skilled nursing facility;inpatient rehabilitation facility  -JJ      Recorded by [JJ] Aster Suggs OTR/L 09/06/19 1028      Row Name 09/06/19 0900             Outcome Summary/Treatment Plan (SLP)    Daily Summary of Progress (SLP)  progress towards functional goals is fair  -MM      Barriers to Overall Progress (SLP)  Cognition   -MM      Plan for Continued Treatment (SLP)  Continue to follow. MS/NT  -MM      Anticipated Dischage Disposition  unknown  -MM      Recorded by [MM] Krystal Babcock MS CCC-SLP 09/06/19 0927        User Key  (r) = Recorded By, (t) = Taken By, (c) = Cosigned By    Initials Name Effective Dates Discipline    MM Krystal Babcock, MS CCC-SLP 05/24/19 -  SLP    JJ Aster Suggs OTR/L 10/12/18 -  OT           Rehab Goal Summary     Row Name 09/06/19 0900             Swallow Goals (SLP)    Oral Nutrition/Hydration Goal Selection (SLP)  oral nutrition/hydration, SLP goal 1  -MM      Labial Strengthening Goal Selection (SLP)  labial strengthening, SLP goal 1  -MM      Lingual Strengthening Goal Selection (SLP)  lingual strengthening, SLP goal 1  -MM      Pharyngeal Strengthening Exercise Goal Selection (SLP)  pharyngeal strengthening exercise, SLP goal 1  -MM      Additional Documentation  lingual strengthening goal selection (SLP)  -MM         Oral Nutrition/Hydration Goal 1 (SLP)    Oral Nutrition/Hydration Goal 1, SLP  LTG:  Patient will tolerate LRD with no overt s/s of aspiration.  -MM      Time Frame (Oral Nutrition/Hydration Goal 1, SLP)  by discharge  -MM      Barriers (Oral Nutrition/Hydration Goal 1, SLP)  n/a  -MM      Progress/Outcomes (Oral Nutrition/Hydration Goal 1, SLP)  continuing progress toward goal  -MM         Labial Strengthening Goal 1 (SLP)    Activity (Labial Strengthening Goal 1, SLP)  increase labial tone  -MM      Increase Labial Tone  labial resistance exercises  -MM      [place]/Accuracy (Labial Strengthening Goal 1, SLP)  independently (over 90% accuracy)  -MM      Time Frame (Labial Strengthening Goal 1, SLP)  short term goal (STG);by discharge  -MM      Barriers (Labial Strengthening Goal 1, SLP)  n/a  -MM      Progress/Outcomes (Labial Strengthening Goal 1, SLP)  continuing progress toward goal  -MM         Lingual Strengthening Goal 1 (SLP)    Activity (Lingual Strengthening Goal 1, SLP)  increase lingual tone/sensation/control/coordination/movement  -MM      Increase Lingual Tone/Sensation/Control/Coordination/Movement  lingual movement exercises  -MM      [place]/Accuracy (Lingual Strengthening Goal 1, SLP)  independently (over 90% accuracy)  -MM      Time Frame (Lingual Strengthening Goal 1, SLP)  by discharge  -MM      Barriers (Lingual Strengthening Goal 1, SLP)  n/a  -MM      Progress/Outcomes (Lingual Strengthening Goal 1, SLP)  continuing progress toward goal  -MM         Pharyngeal Strengthening Exercise Goal 1 (SLP)    Activity (Pharyngeal Strengthening Goal 1, SLP)  increase epiglottic inversion and retroflexion;increase tongue base retraction;increase pharyngeal sensation  -MM      Increase Pharyngeal Sensation  gustatory stimulation (sour/cold)  -MM      Increase Epiglottic Inversion and Retroflexion  Mendelsohn;falsetto  -MM      Increase Tongue Base Retraction  hard effortful swallow;kaleb  -MM      [place]/Accuracy (Pharyngeal Strengthening Goal 1, SLP)  independently (over  90% accuracy)  -MM      Time Frame (Pharyngeal Strengthening Goal 1, SLP)  short term goal (STG);by discharge  -MM      Barriers (Pharyngeal Strengthening Goal 1, SLP)  n/a  -MM      Progress/Outcomes (Pharyngeal Strengthening Goal 1, SLP)  goal ongoing  -MM        User Key  (r) = Recorded By, (t) = Taken By, (c) = Cosigned By    Initials Name Provider Type Discipline    Krystal Monroy MS CCC-SLP Speech and Language Pathologist SLP        Occupational Therapy Education     Title: PT OT SLP Therapies (In Progress)     Topic: Occupational Therapy (Done)     Point: ADL training (Done)     Description: Instruct learner(s) on proper safety adaptation and remediation techniques during self care or transfers.   Instruct in proper use of assistive devices.    Learning Progress Summary           Patient Acceptance, E, VU by RYAN at 9/6/2019 10:30 AM    Comment:  OT POC, adls, t/fs, bed mobility, importance of positioning, neuromuscular reeducation techniques.    Acceptance, E,TB, VU,DU,NR by YUDI at 9/5/2019  2:50 PM    Comment:  Pt. takes washcloth presented to him by this orellana/l and with min. assist using Rue washes his face and hands, no issues with Lue rom with washcloth! Ue exs performed with Lue Ind'ly, min. assist with Rue!    Acceptance, E, NR by RYAN at 9/2/2019  8:35 AM    Comment:  OT POC, benefits and roles of OT, adls, t/fs, bed mobility, positioning for prevention of skin breakdown of joint protection.                   Point: Home exercise program (Done)     Description: Instruct learner(s) on appropriate technique for monitoring, assisting and/or progressing therapeutic exercises/activities.    Learning Progress Summary           Patient Acceptance, E,TB, VU,DU,NR by YUDI at 9/5/2019  2:50 PM    Comment:  Pt. takes washcloth presented to him by this orellana/l and with min. assist using Rue washes his face and hands, no issues with Lue rom with washcloth! Ue exs performed with Lue Ind'ly, min. assist with Rue!                    Point: Precautions (Done)     Description: Instruct learner(s) on prescribed precautions during self-care and functional transfers.    Learning Progress Summary           Patient Acceptance, E, VU by RYAN at 9/6/2019 10:30 AM    Comment:  OT POC, adls, t/fs, bed mobility, importance of positioning, neuromuscular reeducation techniques.    Acceptance, E,TB, VU,DU,NR by  at 9/5/2019  2:50 PM    Comment:  Pt. takes washcloth presented to him by this orellana/l and with min. assist using Rue washes his face and hands, no issues with Lue rom with washcloth! Ue exs performed with Lue Ind'ly, min. assist with Rue!    Acceptance, E, NR by RYAN at 9/2/2019  8:35 AM    Comment:  OT POC, benefits and roles of OT, adls, t/fs, bed mobility, positioning for prevention of skin breakdown of joint protection.                   Point: Body mechanics (Done)     Description: Instruct learner(s) on proper positioning and spine alignment during self-care, functional mobility activities and/or exercises.    Learning Progress Summary           Patient Acceptance, E,TB, VU,DU,NR by  at 9/5/2019  2:50 PM    Comment:  Pt. takes washcloth presented to him by this orellana/l and with min. assist using Rue washes his face and hands, no issues with Lue rom with washcloth! Ue exs performed with Lue Ind'ly, min. assist with Rue!                               User Key     Initials Effective Dates Name Provider Type Discipline     08/02/16 -  Emmanuel Osborne COTA/L Occupational Therapy Assistant OT     10/12/18 -  Aster Suggs OTR/L Occupational Therapist OT                OT Recommendation and Plan  Outcome Summary/Treatment Plan (OT)  Daily Summary of Progress (OT): progress towards functional goals is fair  Barriers to Overall Progress (OT): Rsided weakness  Anticipated Discharge Disposition (OT): skilled nursing facility, inpatient rehabilitation facility  Planned Therapy Interventions (OT Eval): activity tolerance  training, adaptive equipment training, BADL retraining, functional balance retraining, occupation/activity based interventions, neuromuscular control/coordination retraining, patient/caregiver education/training, ROM/therapeutic exercise, strengthening exercise, transfer/mobility retraining, passive ROM/stretching  Therapy Frequency (OT Eval): 5 times/wk  Daily Summary of Progress (OT): progress towards functional goals is fair  Plan of Care Review  Plan of Care Reviewed With: patient  Plan of Care Reviewed With: patient  Outcome Summary: OT tx completed. Pt much more alert this session. Required Max Ax2 for supine <> sit at EOB and scooting/bridging and Max A for rolling left/right. Max A for donning socks. Set-up and verbal cues for washing face. 1x10 reps of BUE AROM/AAROM completed. Continue OT POC to address these deficits.   Outcome Measures     Row Name 09/06/19 1000 09/05/19 1347          How much help from another is currently needed...    Putting on and taking off regular lower body clothing?  1  -JJ  1  -CJ     Bathing (including washing, rinsing, and drying)  2  -JJ  2  -CJ     Toileting (which includes using toilet bed pan or urinal)  1  -JJ  1  -CJ     Putting on and taking off regular upper body clothing  2  -JJ  2  -CJ     Taking care of personal grooming (such as brushing teeth)  2  -JJ  2  -CJ     Eating meals  2  -JJ  2  -CJ     AM-PAC 6 Clicks Score (OT)  10  -J  10  -        Functional Assessment    Outcome Measure Options  AM-PAC 6 Clicks Daily Activity (OT)  -  AM-PAC 6 Clicks Daily Activity (OT)  -       User Key  (r) = Recorded By, (t) = Taken By, (c) = Cosigned By    Initials Name Provider Type    CJ Emmanuel Osborne DOS SANTOS/L Occupational Therapy Assistant    Aster Crawford OTR/L Occupational Therapist           Time Calculation:   Time Calculation- OT     Row Name 09/06/19 1028             Time Calculation- OT    OT Start Time  0936  -J      OT Stop Time  1021  -J      OT  Time Calculation (min)  45 min  -RYAN      Total Timed Code Minutes- OT  45 minute(s)  -RYAN      OT Received On  09/06/19  -RYAN        User Key  (r) = Recorded By, (t) = Taken By, (c) = Cosigned By    Initials Name Provider Type    Aster Crawford OTR/L Occupational Therapist        Therapy Charges for Today     Code Description Service Date Service Provider Modifiers Qty    [id number]  OT SELF CARE/MGMT/TRAIN EA 15 MIN 9/6/2019 Aster Suggs OTR/L GO 2    [id number]  OT THER PROC EA 15 MIN 9/6/2019 Aster Suggs OTR/L GO 1               VILLA Best/JIMENA  9/6/2019

## 2019-09-06 NOTE — THERAPY TREATMENT NOTE
Patient Name: Phan Eastman  : 1955    MRN: [id number]                              Today's Date: 2019       Admit Date: 2019    Visit Dx:     ICD-10-CM ICD-9-CM   1. Oropharyngeal dysphagia R13.12 787.22   2. Impaired mobility and ADLs Z74.09 799.89   3. Impaired mobility Z74.09 799.89     Patient Active Problem List   Diagnosis   • ICH (intracerebral hemorrhage) (CMS/HCC)   • Diabetes mellitus (CMS/[facility])   • Hypertensive urgency   • Hyperlipidemia   • Cerebrovascular accident (CVA) due to embolism of left middle cerebral artery (CMS/[facility])   • Dysarthria   • CKD (chronic kidney disease) stage 3, GFR 30-59 ml/min (CMS/[facility])   • Left renal mass     Past Medical History:   Diagnosis Date   • Diabetes mellitus (CMS/HCC)    • GERD (gastroesophageal reflux disease)    • Hyperlipidemia    • Hypertension    • Neuropathy    • Osteoarthritis    • TIA (transient ischemic attack)      Past Surgical History:   Procedure Laterality Date   • CARPAL TUNNEL RELEASE Bilateral    • EYE SURGERY     • KNEE SURGERY Bilateral    • NECK SURGERY     • RETINAL DETACHMENT SURGERY     • ROTATOR CUFF REPAIR       General Information     Row Name 19 1115          PT Evaluation Time/Intention    Document Type  therapy note (daily note)  -AB     Mode of Treatment  physical therapy  -AB       User Key  (r) = Recorded By, (t) = Taken By, (c) = Cosigned By    Initials Name Provider Type    AB Key Sauceda PTA Physical Therapy Assistant        Mobility     Row Name 19 1115          Bed Mobility Assessment/Treatment    Scooting/Bridging [place] (Bed Mobility)  maximum assist (25% patient effort);2 person assist  -AB     Supine-Sit [place] (Bed Mobility)  maximum assist (25% patient effort);2 person assist  -AB     Sit-Supine [place] (Bed Mobility)  maximum assist (25% patient effort);2 person assist  -AB     Supine-Sit-Supine [place] (Bed Mobility)  maximum assist (25% patient effort);2 person  assist  -AB     Assistive Device (Bed Mobility)  head of bed elevated;draw sheet  -AB       User Key  (r) = Recorded By, (t) = Taken By, (c) = Cosigned By    Initials Name Provider Type    Key Thibodeaux PTA Physical Therapy Assistant        Obj/Interventions     Row Name 09/06/19 1115          Therapeutic Exercise    Lower Extremity Range of Motion (Therapeutic Exercise)  hip abduction/adduction, bilateral;hip flexion/extension, bilateral;ankle dorsiflexion/plantar flexion, bilateral  -AB     Exercise Type (Therapeutic Exercise)  AAROM (active assistive range of motion);AROM (active range of motion);PROM (passive range of motion)  -AB     Position (Therapeutic Exercise)  supine  -AB     Sets/Reps (Therapeutic Exercise)  2 set 10 reps  -AB     Comment (Therapeutic Exercise)  increased tone in L LE, AAROM-PROM Left side AROM-AAROM Right side  -AB     Row Name 09/06/19 1115          Static Sitting Balance    Level of [place] (Unsupported Sitting, Static Balance)  maximal assist, 25 to 49% patient effort  -AB     Sitting Position (Unsupported Sitting, Static Balance)  sitting edge of mat  -AB       User Key  (r) = Recorded By, (t) = Taken By, (c) = Cosigned By    Initials Name Provider Type    Key Thibodeaux PTA Physical Therapy Assistant        Goals/Plan    No documentation.       Clinical Impression     Row Name 09/06/19 1115          Pain Assessment    Additional Documentation  Pain Scale: FACES Pre/Post-Treatment (Group)  -AB     Row Name 09/06/19 1115          Pain Scale: FACES Pre/Post-Treatment    Pain: FACES Scale, Pretreatment  0-->no hurt  -AB     Pain: FACES Scale, Post-Treatment  6-->hurts even more  -AB     Pre/Post Treatment Pain Comment  ribs  -AB     Row Name 09/06/19 1115          Positioning and Restraints    Pre-Treatment Position  in bed  -AB     Post Treatment Position  bed  -AB     In Bed  fowlers;call light within reach  -AB       User Key  (r) = Recorded By, (t) = Taken By, (c)  = Cosigned By    Initials Name Provider Type    AB Key Sauceda PTA Physical Therapy Assistant        Outcome Measures    No documentation.       Physical Therapy Education     Title: PT OT SLP Therapies (In Progress)     Topic: Physical Therapy (In Progress)     Point: Mobility training (Done)     Learning Progress Summary           Patient Acceptance, E, VU,NR by TITUS at 9/4/2019  8:11 AM    Comment:  Rolling in bed. Bed mobility.    Acceptance, E, VU,NR by JAKE at 9/2/2019  7:20 AM    Comment:  Educated pt on progression of PT POC and benefits of activity                               User Key     Initials Effective Dates Name Provider Type Discipline    JAKE 08/02/16 -  Gilmer Medrano, PT DPT Physical Therapist PT    TITUS 08/02/16 -  Kathrine Ramsay PTA Physical Therapy Assistant PT              PT Recommendation and Plan     Plan of Care Reviewed With: patient  Progress: improving  Outcome Summary: With exercises he is AA-AROM on L LE and AA-PROM R LE. Was max assist for bed mobility and for sitting balance. PT will cont to follow for strengthening and mobility.     Time Calculation:   PT Charges     Row Name 09/06/19 1115             Time Calculation    Start Time  1105 also 12 min worth from 9:47 to 9: 59  -AB      Stop Time  1123  -AB      Time Calculation (min)  18 min  -AB      PT Received On  09/06/19  -AB      PT Goal Re-Cert Due Date  09/12/19  -AB         Time Calculation- PT    Total Timed Code Minutes- PT  30 minute(s)  -AB        User Key  (r) = Recorded By, (t) = Taken By, (c) = Cosigned By    Initials Name Provider Type    AB Key Sauceda PTA Physical Therapy Assistant        Therapy Charges for Today     Code Description Service Date Service Provider Modifiers Qty    98291317342 HC PT THER PROC EA 15 MIN 9/5/2019 Key Sauceda, PTA GP 1    49749261029 HC PT THERAPEUTIC ACT EA 15 MIN 9/5/2019 Key Sauceda, RAJAN GP 1    00924371357 HC PT THER PROC EA 15 MIN 9/6/2019 Key Sauceda,  PTA GP 1    35485382146 HC PT THERAPEUTIC ACT EA 15 MIN 9/6/2019 Key Sauceda, PTA GP 1          PT G-Codes  Outcome Measure Options: AM-PAC 6 Clicks Daily Activity (OT)  AM-PAC 6 Clicks Score (PT): 8  AM-PAC 6 Clicks Score (OT): 10    Key Sauceda PTA  9/6/2019

## 2019-09-06 NOTE — THERAPY TREATMENT NOTE
Acute Care - Speech Language Pathology   Swallow Treatment Note [facility]     Patient Name: Phan Eastman  : 1955  MRN: [id number]  Today's Date: 2019  Onset of Illness/Injury or Date of Surgery: 19     Referring Physician: Dr. Wyatt       Admit Date: 2019  Diet toleration completed. Patient was alert and cooperative. Dysarthria seems improved from previous sessions. RN Elisabeth says it was worse this AM though. Patient completed mechanical soft breakfast tray and nectar thick liquids. Patient continues to require intermittant cues to remain alert during mechanical soft trials. He also requires alternating sips/bites to clear mild oral residue. He requested thin water to drink. SLP trialed patient but explained that he should only complete thin water 30 mins after any PO intake. 1x immediate and persistive coughing is noted with large sip of thin liquid water. Patient OK to continue mechanical soft diet with nectar thick liquids. OK for spoonfuls of thin water and ice chips after oral care. SLP will continue to follow and treat.   Krystal Babcock, MS CCC-SLP 2019 9:33 AM    Visit Dx:      ICD-10-CM ICD-9-CM   1. Oropharyngeal dysphagia R13.12 787.22   2. Impaired mobility and ADLs Z74.09 799.89   3. Impaired mobility Z74.09 799.89     Patient Active Problem List   Diagnosis   • ICH (intracerebral hemorrhage) (CMS/HCC)   • Diabetes mellitus (CMS/HCC)   • Hypertensive urgency   • Hyperlipidemia   • Cerebrovascular accident (CVA) due to embolism of left middle cerebral artery (CMS/HCC)   • Dysarthria       Therapy Treatment  Rehabilitation Treatment Summary     Row Name 19 0900 19 0822          Treatment Time/Intention    Discipline  speech language pathologist  -MM  —     Document Type  therapy note (daily note)  -MM  —  -JJ     Subjective Information  no complaints  -MM  —  -JJ     Mode of Treatment  individual therapy;speech-language pathology  -MM  —  -CARLYJ      Patient/Family Observations  No family present.  -MM  —     Care Plan Review  care plan/treatment goals reviewed  -MM  —     Therapy Frequency (OT Eval)  —  —  -JJ     Therapy Frequency (Swallow)  at least;3 days per week  -MM  —     Patient Effort  good  -MM  —     Existing Precautions/Restrictions  —  —  -JJ     Treatment Considerations/Comments  —  —  -JJ     Recorded by [MM] Krystal Babcock, MS CCC-SLP 09/06/19 0927 [JJ] Aster Suggs OTR/L 09/06/19 0825     Row Name 09/06/19 0822             Vital Signs    Pre Systolic BP Rehab  —  -JJ      Pre Treatment Diastolic BP  —  -JJ      Pretreatment Heart Rate (beats/min)  —  -JJ      Pretreatment Resp Rate (breaths/min)  —  -JJ      Pre SpO2 (%)  —  -JJ      O2 Delivery Pre Treatment  —  -JJ      Recorded by [JJ] Aster Suggs OTR/L 09/06/19 0825      Row Name 09/06/19 0900             Pain Assessment    Additional Documentation  Pain Scale: FACES Pre/Post-Treatment (Group)  -MM      Recorded by [MM] Krystal Babcock MS CCC-SLP 09/06/19 0927      Row Name 09/06/19 0900             Pain Scale: FACES Pre/Post-Treatment    Pain: FACES Scale, Pretreatment  0-->no hurt  -MM      Pain: FACES Scale, Post-Treatment  0-->no hurt  -MM      Recorded by [MM] Krystal Babcock, MS CCC-SLP 09/06/19 0927      Row Name 09/06/19 0900             Outcome Summary/Treatment Plan (SLP)    Daily Summary of Progress (SLP)  progress towards functional goals is fair  -MM      Barriers to Overall Progress (SLP)  Cognition   -MM      Plan for Continued Treatment (SLP)  Continue to follow. MS/NT  -MM      Anticipated Dischage Disposition  unknown  -MM      Recorded by [MM] Krystal Babcock MS CCC-SLP 09/06/19 0927        User Key  (r) = Recorded By, (t) = Taken By, (c) = Cosigned By    Initials Name Effective Dates Discipline    MM Krystal Babcock, MS CCC-SLP 05/24/19 -  SLP    JJ Aster Suggs OTR/L 10/12/18 -  OT          Outcome Summary  Outcome  Summary/Treatment Plan (SLP)  Daily Summary of Progress (SLP): progress towards functional goals is fair (09/06/19 0900 : Krystal Babcock, MS CCC-SLP)  Barriers to Overall Progress (SLP): Cognition  (09/06/19 0900 : Krystal Babcock, MS CCC-SLP)  Plan for Continued Treatment (SLP): Continue to follow. MS/NT (09/06/19 0900 : Krystal Babcock, MS CCC-SLP)  Anticipated Dischage Disposition: unknown (09/06/19 0900 : Krystal Babcock, MS [facility])      SLP GOALS     Row Name 09/06/19 0900 09/05/19 1429 09/04/19 1014       Oral Nutrition/Hydration Goal 1 (SLP)    Oral Nutrition/Hydration Goal 1, SLP  LTG: Patient will tolerate LRD with no overt s/s of aspiration.  -MM  LTG: Patient will tolerate LRD with no overt s/s of aspiration.  -MM  LTG: Patient will tolerate LRD with no overt s/s of aspiration.  -MM    Time Frame (Oral Nutrition/Hydration Goal 1, SLP)  by discharge  -MM  by discharge  -MM  by discharge  -MM    Barriers (Oral Nutrition/Hydration Goal 1, SLP)  n/a  -MM  n/a  -MM  n/a  -MM    Progress/Outcomes (Oral Nutrition/Hydration Goal 1, SLP)  continuing progress toward goal  -MM  continuing progress toward goal  -MM  continuing progress toward goal  -MM       Labial Strengthening Goal 1 (SLP)    Activity (Labial Strengthening Goal 1, SLP)  increase labial tone  -MM  increase labial tone  -MM  increase labial tone  -MM    Increase Labial Tone  labial resistance exercises  -MM  labial resistance exercises  -MM  labial resistance exercises  -MM    [place]/Accuracy (Labial Strengthening Goal 1, SLP)  independently (over 90% accuracy)  -MM  independently (over 90% accuracy)  -MM  independently (over 90% accuracy)  -MM    Time Frame (Labial Strengthening Goal 1, SLP)  short term goal (STG);by discharge  -MM  short term goal (STG);by discharge  -MM  short term goal (STG);by discharge  -MM    Barriers (Labial Strengthening Goal 1, SLP)  n/a  -MM  n/a  -MM  n/a  -MM    Progress/Outcomes (Labial  Strengthening Goal 1, SLP)  continuing progress toward goal  -MM  continuing progress toward goal  -MM  continuing progress toward goal  -MM       Lingual Strengthening Goal 1 (SLP)    Activity (Lingual Strengthening Goal 1, SLP)  increase lingual tone/sensation/control/coordination/movement  -MM  increase lingual tone/sensation/control/coordination/movement  -MM  increase lingual tone/sensation/control/coordination/movement  -MM    Increase Lingual Tone/Sensation/Control/Coordination/Movement  lingual movement exercises  -MM  lingual movement exercises  -MM  lingual movement exercises  -MM    [place]/Accuracy (Lingual Strengthening Goal 1, SLP)  independently (over 90% accuracy)  -MM  independently (over 90% accuracy)  -MM  independently (over 90% accuracy)  -MM    Time Frame (Lingual Strengthening Goal 1, SLP)  by discharge  -MM  by discharge  -MM  by discharge  -MM    Barriers (Lingual Strengthening Goal 1, SLP)  n/a  -MM  n/a  -MM  n/a  -MM    Progress/Outcomes (Lingual Strengthening Goal 1, SLP)  continuing progress toward goal  -MM  progress slower than expected  -MM  progress slower than expected  -MM       Pharyngeal Strengthening Exercise Goal 1 (SLP)    Activity (Pharyngeal Strengthening Goal 1, SLP)  increase epiglottic inversion and retroflexion;increase tongue base retraction;increase pharyngeal sensation  -MM  increase epiglottic inversion and retroflexion;increase tongue base retraction;increase pharyngeal sensation  -MM  increase epiglottic inversion and retroflexion;increase tongue base retraction;increase pharyngeal sensation  -MM    Increase Pharyngeal Sensation  gustatory stimulation (sour/cold)  -MM  gustatory stimulation (sour/cold)  -MM  gustatory stimulation (sour/cold)  -MM    Increase Epiglottic Inversion and Retroflexion  Mendel[place]hn;falsetto  -MM  Mendelsohn;falsetto  -MM  Mendelsohn;falsetto  -MM    Increase Tongue Base Retraction  hard effortful swallow;kaleb  -MM  hard effortful  swallow;kaleb  -MM  hard effortful swallow;kaleb  -MM    [place]/Accuracy (Pharyngeal Strengthening Goal 1, SLP)  independently (over 90% accuracy)  -MM  independently (over 90% accuracy)  -MM  independently (over 90% accuracy)  -MM    Time Frame (Pharyngeal Strengthening Goal 1, SLP)  short term goal (STG);by discharge  -MM  short term goal (STG);by discharge  -MM  short term goal (STG);by discharge  -MM    Barriers (Pharyngeal Strengthening Goal 1, SLP)  n/a  -MM  n/a  -MM  n/a  -MM    Progress/Outcomes (Pharyngeal Strengthening Goal 1, SLP)  goal ongoing  -MM  goal ongoing  -MM  goal ongoing  -MM    Row Name 09/03/19 1015             Oral Nutrition/Hydration Goal 1 (SLP)    Oral Nutrition/Hydration Goal 1, SLP  LTG: Patient will tolerate LRD with no overt s/s of aspiration.  -TM      Time Frame (Oral Nutrition/Hydration Goal 1, SLP)  by discharge  -TM      Barriers (Oral Nutrition/Hydration Goal 1, SLP)  n/a  -TM      Progress/Outcomes (Oral Nutrition/Hydration Goal 1, SLP)  progress slower than expected  -TM         Labial Strengthening Goal 1 (SLP)    Activity (Labial Strengthening Goal 1, SLP)  increase labial tone  -TM      Increase Labial Tone  labial resistance exercises  -TM      [place]/Accuracy (Labial Strengthening Goal 1, SLP)  independently (over 90% accuracy)  -TM      Time Frame (Labial Strengthening Goal 1, SLP)  short term goal (STG);by discharge  -TM      Barriers (Labial Strengthening Goal 1, SLP)  n/a  -TM      Progress/Outcomes (Labial Strengthening Goal 1, SLP)  goal ongoing  -TM         Lingual Strengthening Goal 1 (SLP)    Activity (Lingual Strengthening Goal 1, SLP)  increase lingual tone/sensation/control/coordination/movement  -TM      Increase Lingual Tone/Sensation/Control/Coordination/Movement  lingual movement exercises  -TM      [place]/Accuracy (Lingual Strengthening Goal 1, SLP)  independently (over 90% accuracy)  -TM      Time Frame (Lingual Strengthening Goal 1,  SLP)  by discharge  -TM      Barriers (Lingual Strengthening Goal 1, SLP)  n/a  -TM      Progress/Outcomes (Lingual Strengthening Goal 1, SLP)  progress slower than expected  -TM         Pharyngeal Strengthening Exercise Goal 1 (SLP)    Activity (Pharyngeal Strengthening Goal 1, SLP)  increase epiglottic inversion and retroflexion;increase tongue base retraction;increase pharyngeal sensation  -TM      Increase Pharyngeal Sensation  gustatory stimulation (sour/cold)  -TM      Increase Epiglottic Inversion and Retroflexion  Mendelsohn;falsetto  -TM      Increase Tongue Base Retraction  hard effortful swallow;kaleb  -TM      [place]/Accuracy (Pharyngeal Strengthening Goal 1, SLP)  independently (over 90% accuracy)  -TM      Time Frame (Pharyngeal Strengthening Goal 1, SLP)  short term goal (STG);by discharge  -TM      Barriers (Pharyngeal Strengthening Goal 1, SLP)  n/a  -TM      Progress/Outcomes (Pharyngeal Strengthening Goal 1, SLP)  goal ongoing  -TM        User Key  (r) = Recorded By, (t) = Taken By, (c) = Cosigned By    Initials Name Provider Type    TM Nnaette Malone, CCC-SLP Speech and Language Pathologist    MM Krystal Babcock, MS CCC-SLP Speech and Language Pathologist          EDUCATION  The patient has been educated in the following areas:   Dysphagia (Swallowing Impairment) Oral Care/Hydration.    SLP Recommendation and Plan  Daily Summary of Progress (SLP): progress towards functional goals is fair  Barriers to Overall Progress (SLP): Cognition   Plan for Continued Treatment (SLP): Continue to follow. MS/NT  Anticipated Dischage Disposition: unknown                    Time Calculation:   Time Calculation- SLP     Row Name 09/06/19 0932             Time Calculation- SLP    SLP Start Time  0900  -MM      SLP Stop Time  0926  -MM      SLP Time Calculation (min)  26 min  -MM      SLP Received On  09/06/19  -MM        User Key  (r) = Recorded By, (t) = Taken By, (c) = Cosigned By    Initials Name  Provider Type     Krystal Babcock, MS CCC-SLP Speech and Language Pathologist          Therapy Charges for Today     Code Description Service Date Service Provider Modifiers Qty    11003604648 HC ST TREATMENT SWALLOW 1 9/5/2019 Krystal Babcock, MS CCC-SLP GN 1    11340522342 HC ST TREATMENT SWALLOW 2 9/6/2019 Krystal Babcock, MS ESTEFANIA-SLP GN 1                 Krystal Babcock MS CCC-SLP  9/6/2019

## 2019-09-06 NOTE — PROGRESS NOTES
[facility]  INPATIENT PROGRESS NOTE    Length of Stay: 6  Date of Admission: 8/31/2019  Primary Care Physician: Emmanuel Mclain MD    Subjective     Chief Complaint:     Blood pressure management    HPI     Patient continues to be intermittently hypertensive requiring continuation of Cardene drip.  Nurses have noticed a significant component of anxiety likely driving his blood pressure elevations.  I will add trazodone 100 mg p.o. at at bedtime to his regimen.  Hydralazine will be increased to 100 p.o. every 8 hours.  Renal artery ultrasound was unable to visualize renal arteries secondary to overlying bowel gas.  A left upper pole 2.8 cm mass was noted however.  The patient will require further work-up of this lesion.    The patient is currently working with physical therapy and participating fully.    Review of Systems     All pertinent negatives and positives are as above. All other systems have been reviewed and are negative unless otherwise stated.     Objective    Temp:  [98.1 °F (36.7 °C)-98.2 °F (36.8 °C)] 98.1 °F (36.7 °C)  Heart Rate:  [47-78] 62  Resp:  [17-20] 20  BP: (109-199)/(56-93) 152/69    Lab Results (last 24 hours)     Procedure Component Value Units Date/Time    POC Glucose Once [[id number]]  (Normal) Collected:  09/06/19 0720    Specimen:  Blood Updated:  09/06/19 0730     Glucose 92 mg/dL      Comment: : [id number] Tucker AmandaMeter ID: [id number]       POC Glucose Once [[id number]]  (Normal) Collected:  09/06/19 0515    Specimen:  Blood Updated:  09/06/19 0526     Glucose 94 mg/dL      Comment: : [id number] Dylon KevinMeter ID: [id number]       Basic Metabolic Panel [[id number]]  (Abnormal) Collected:  09/06/19 0350    Specimen:  Blood Updated:  09/06/19 0421     Glucose 80 mg/dL      BUN 27 mg/dL      Creatinine 1.60 mg/dL      Sodium 144 mmol/L      Potassium 4.7 mmol/L      Chloride 117 mmol/L      CO2 24.0 mmol/L        Calcium 8.5 mg/dL      eGFR Non African Amer 44 mL/min/1.73      BUN/Creatinine Ratio 16.9     Anion Gap 3.0 mmol/L     Narrative:       GFR Normal >60  Chronic Kidney Disease <60  Kidney Failure <15    POC Glucose Once [[id number]]  (Normal) Collected:  09/05/19 2129    Specimen:  Blood Updated:  09/05/19 2141     Glucose 110 mg/dL      Comment: : [id number] Dylon KevinMeter ID: [id number]       POC Glucose Once [[id number]]  (Normal) Collected:  09/05/19 1154    Specimen:  Blood Updated:  09/05/19 1207     Glucose 70 mg/dL      Comment: : [id number] Garrett AmandaMeter ID: [id number]       Catecholamine+VMA, 24-Hr Urine - Urine, Clean Catch [[id number]] Updated:  09/05/19 1035    Specimen:  Urine, Clean Catch           Imaging Results (last 24 hours)     Procedure Component Value Units Date/Time    US Renal Bilateral [[id number]] Collected:  09/05/19 1433     Updated:  09/05/19 1439    Narrative:       EXAMINATION:  US RENAL BILATERAL-  9/5/2019 12:55 PM CDT     HISTORY: refractory HTN; R13.12-Dysphagia, oropharyngeal phase;  Z74.09-Other reduced mobility; Z74.09-Other reduced mobility      COMPARISON: No comparison study.     TECHNIQUE: Bilateral renal ultrasound was performed.     The examination is limited due to the patient's inability to hold her  breath and be still for the examination.     The renal arteries were unable to perform at this time. The renal  arteries and aorta were obscured by bowel gas.     FINDINGS:      The left kidney measures 12.2 cm in cegh-pk-rfka length.     The right kidney measures 12.3 cm in lhoc-cr-wweg length.     In the upper pole of the left kidney there is a 2.8 cm solid  pedunculated-appearing mass. Renal cell malignancy considered. CT  imaging with renal mass protocol suggested.     A 1 cm cyst noted in the upper pole the right kidney.     Normal echogenicity and renal cortex observed.     There is no pelvocaliectasis or obstructive uropathy changes. There are  no  perinephric abnormalities.     Urinary bladder is decompressed with a Alejo catheter.     A small amount of perihepatic ascites.       Impression:       1. 2.8 cm solid mass suspected in the upper pole of the level left  kidney, further imaging characterization with CT, renal mass protocol,  recommended.  2. Right nephrogenic cyst.  This report was finalized on 09/05/2019 14:36 by Dr. Misha Osborne MD.             Intake/Output Summary (Last 24 hours) at 9/6/2019 1018  Last data filed at 9/6/2019 0400  Gross per 24 hour   Intake 1606.7 ml   Output 1975 ml   Net -368.3 ml       Physical Exam    Patient was seen and evaluated with his nurse,  Elisabeth.  No family members are present in the room.  He is working with physical therapy at the time of my visit.  Constitutional: He appears well-developed and well-nourished. He is cooperative. No distress.   HENT:   Head: Normocephalic and atraumatic.   Nose: Nose normal.   Mouth/Throat: Oropharynx is clear and moist.   Eyes: Conjunctivae are normal. No scleral icterus.   Neck: Normal range of motion. Neck supple. No JVD present. No tracheal deviation present.   Cardiovascular: Normal rate, regular rhythm and normal heart sounds.   No murmur heard.  Pulmonary/Chest: Effort normal and breath sounds normal. No respiratory distress.   Abdominal: Soft. Bowel sounds are normal. He exhibits no mass. There is no tenderness.   Musculoskeletal: Normal range of motion. He exhibits no lower extremity edema.  Neurological: He is alert. He exhibits abnormal muscle tone (right hemiparesis and left-sided weakness). Coordination abnormal with right hemiparesis.  Ignores the right side.   Skin: Skin is warm and dry. No rash noted.  Resolving petechia noted on both lower extremities.  Psychiatric: His speech is stable compared to yesterday.  He is alert but not oriented to the year.  He is oriented to place and person.    Results Review:  I have reviewed the labs, radiology results, and  diagnostic studies since my last progress note and made treatment changes reflective of the results.   I have reviewed the current medications.    Assessment/Plan     Active Hospital Problems    Diagnosis   • **ICH (intracerebral hemorrhage) (CMS/HCC)   • CKD (chronic kidney disease) stage 3, GFR 30-59 ml/min (CMS/HCC)   • Left renal mass   • Cerebrovascular accident (CVA) due to embolism of left middle cerebral artery (CMS/HCC)   • Dysarthria   • Diabetes mellitus (CMS/HCC)   • Hypertensive urgency   • Hyperlipidemia       PLAN:  Trazodone 100 mg p.o. at at bedtime  Increase hydralazine to 100 mg p.o. nightly hours  Restart losartan at a lower dose of 50 mg p.o. daily with careful monitoring of renal function  Urology consultation for evaluation of left renal mass    Gaston Cabrera DO   09/06/19   10:18 AM

## 2019-09-06 NOTE — THERAPY TREATMENT NOTE
Acute Care - Physical Therapy Treatment Note  [facility]     Patient Name: Phan Eastman  : 1955  MRN: [id number]  Today's Date: 2019  Onset of Illness/Injury or Date of Surgery: 19     Referring Physician: Dr. Wyatt     Admit Date: 2019    Visit Dx:    ICD-10-CM ICD-9-CM   1. Oropharyngeal dysphagia R13.12 787.22   2. Impaired mobility and ADLs Z74.09 799.89   3. Impaired mobility Z74.09 799.89     Patient Active Problem List   Diagnosis   • ICH (intracerebral hemorrhage) (CMS/HCC)   • Diabetes mellitus (CMS/[facility])   • Hypertensive urgency   • Hyperlipidemia   • Cerebrovascular accident (CVA) due to embolism of left middle cerebral artery (CMS/[facility])   • Dysarthria   • CKD (chronic kidney disease) stage 3, GFR 30-59 ml/min (CMS/[facility])   • Left renal mass       Therapy Treatment    Rehabilitation Treatment Summary     Row Name 19 1300 19 0936 19 0900       Treatment Time/Intention    Discipline  physical therapy assistant  -AB  occupational therapist  -JJ  speech language pathologist  -MM    Document Type  therapy note (daily note)  -AB  therapy note (daily note)  -JJ  therapy note (daily note)  -MM    Subjective Information  no complaints  -AB  complains of  -JJ  no complaints  -MM    Mode of Treatment  physical therapy  -AB  occupational therapy  -JJ  individual therapy;speech-language pathology  -MM    Patient/Family Observations  —  no family present   -JJ  No family present.  -MM    Care Plan Review  —  —  care plan/treatment goals reviewed  -MM    Therapy Frequency (OT Eval)  —  5 times/wk  -JJ  —    Therapy Frequency (Swallow)  —  —  at least;3 days per week  -MM    Patient Effort  —  —  good  -MM    Existing Precautions/Restrictions  fall  -AB  fall  -JJ  —    Treatment Considerations/Comments  R sided weakness, old stroke  -AB  R sided weakness  -JJ  —    Recorded by [AB] Key Sauceda, PTA 19 1342 [JJ] Aster Suggs OTR/JIMENA 19 0937 [MM] Sadiq  Krystal HESS MS CCC-SLP 09/06/19 0927    Row Name 09/06/19 0822             Treatment Time/Intention    Document Type  —  -JJ      Subjective Information  —  -JJ      Mode of Treatment  —  -JJ      Therapy Frequency (OT Eval)  —  -JJ      Existing Precautions/Restrictions  —  -JJ      Treatment Considerations/Comments  —  -JJ      Recorded by [JJ] Aster Suggs, OTR/L 09/06/19 0825      Row Name 09/06/19 0936 09/06/19 0822          Vital Signs    Pre Systolic BP Rehab  142  -JJ  —  -JJ     Pre Treatment Diastolic BP  81  -JJ  —  -JJ     Pretreatment Heart Rate (beats/min)  52  -JJ  —  -JJ     Pretreatment Resp Rate (breaths/min)  14  -JJ  —  -JJ     Pre SpO2 (%)  96  -JJ  —  -JJ     O2 Delivery Pre Treatment  supplemental O2  -JJ2  —  -JJ     Pre Patient Position  Supine  -JJ2  —     Intra Patient Position  Sitting  -JJ2  —     Post Patient Position  Supine  -JJ2  —     Recorded by [JJ] Aster Suggs, OTR/L 09/06/19 0937  [JJ2] Aster Suggs OTR/L 09/06/19 1028 [JJ] Aster Suggs, OTR/L 09/06/19 0825     Row Name 09/06/19 0936             Bed Mobility Assessment/Treatment    Bed Mobility Assessment/Treatment  rolling left;rolling right;scooting/bridging;supine-sit;sit-supine  -JJ      Rolling Left [place] (Bed Mobility)  verbal cues;maximum assist (25% patient effort)  -JJ      Rolling Right [place] (Bed Mobility)  maximum assist (25% patient effort);verbal cues  -JJ      Scooting/Bridging [place] (Bed Mobility)  maximum assist (25% patient effort);2 person assist  -JJ      Supine-Sit [place] (Bed Mobility)  maximum assist (25% patient effort);2 person assist  -JJ      Sit-Supine [place] (Bed Mobility)  maximum assist (25% patient effort);2 person assist  -JJ      Bed Mobility, Safety Issues  decreased use of arms for pushing/pulling;decreased use of legs for bridging/pushing  -JJ      Assistive Device (Bed Mobility)  head of bed elevated;draw sheet  -JJ      Recorded by  [JJ] Aster Suggs OTR/L 09/06/19 1028      Row Name 09/06/19 0936             ADL Assessment/Intervention    BADL Assessment/Intervention  lower body dressing  -JJ      Recorded by [JJ] Aster Suggs OTR/L 09/06/19 1028      Row Name 09/06/19 0936             Lower Body Dressing Assessment/Training    Lower Body Dressing [place] Level  don;socks;maximum assist (25% patient effort)  -JJ      Lower Body Dressing Position  supine  -JJ      Recorded by [JJ] Aster Suggs OTR/L 09/06/19 1028      Row Name 09/06/19 0936             Grooming Assessment/Training    [place] Level (Grooming)  wash face, hands;set up;verbal cues  -JJ      Grooming Position  supported sitting  -JJ      Recorded by [JJ] Aster Suggs OTR/L 09/06/19 1028      Row Name 09/06/19 0936             Motor Skills Assessment/Interventions    Additional Documentation  Therapeutic Exercise (Group)  -JJ      Recorded by [JJ] Aster Suggs OTR/L 09/06/19 1028      Row Name 09/06/19 1300 09/06/19 0936          Therapeutic Exercise    Upper Extremity Range of Motion (Therapeutic Exercise)  —  shoulder flexion/extension, bilateral;elbow flexion/extension, bilateral;wrist flexion/extension, bilateral  -JJ     Lower Extremity (Therapeutic Exercise)  heel slides, bilateral;SAQ (short arc quad), left  -AB  —     Lower Extremity Range of Motion (Therapeutic Exercise)  hip abduction/adduction, bilateral;ankle dorsiflexion/plantar flexion, bilateral  -AB  —     Weight/Resistance (Therapeutic Exercise)  —  2 pounds;3 pounds;yellow  -JJ     Exercise Type (Therapeutic Exercise)  AAROM (active assistive range of motion);AROM (active range of motion);PROM (passive range of motion)  -AB  AROM (active range of motion);AAROM (active assistive range of motion)  -JJ     Position (Therapeutic Exercise)  supine  -AB  seated  -JJ     Sets/Reps (Therapeutic Exercise)  2 sets 10 reps  -AB  1x10  -JJ     Expected Outcome (Therapeutic Exercise)   —  facilitate normal movement patterns;improve functional stability;improve functional tolerance, household activity;improve functional tolerance, self-care activity;improve performance, BADLs;improve performance, fine motor coordination skills;improve performance, transfer skills;increase active range of motion  -JJ     Comment (Therapeutic Exercise)  He needed constant vc's to stay awake and cont with exercises L LE AROM-AAROM and R LE was PROM  -AB  —     Recorded by [AB] Key Sauceda, PTA 09/06/19 1416 [JJ] Aster Suggs OTR/L 09/06/19 1028     Row Name 09/06/19 0936             Balance    Balance  static sitting balance  -JJ      Recorded by [JJ] Aster Suggs OTR/L 09/06/19 1028      Row Name 09/06/19 0936             Static Sitting Balance    Level of [place] (Supported Sitting, Static Balance)  moderate assist, 50 to 74% patient effort;maximal assist, 25 to 49% patient effort  -JJ      Sitting Position (Supported Sitting, Static Balance)  sitting on edge of bed  -JJ      Comment (Supported Sitting, Static Balance)  verbal cues for postural control with flucuating assist required.   -JJ      Recorded by [JJ] Aster Suggs OTR/L 09/06/19 1028      Row Name 09/06/19 1300 09/06/19 0936          Positioning and Restraints    Pre-Treatment Position  in bed  -AB  in bed  -JJ     Post Treatment Position  bed  -AB  bed  -JJ     In Bed  fowlers;call light within reach  -AB  fowlers;call light within reach;encouraged to call for assist;notified nsg;side lying right;patient within staff view;side rails up x3;RUE elevated;LUE elevated;pillow between legs  -JJ     Restraints  —  released: nsg okay'd to leave restraint off d/t swelling of L hand/wri  -JJ     Recorded by [AB] Key Sauceda, PTA 09/06/19 1416 [JJ] Aster Suggs OTR/L 09/06/19 1028     Row Name 09/06/19 0936 09/06/19 0900          Pain Assessment    Additional Documentation  Pain Scale: FACES Pre/Post-Treatment (Group)   -JJ  Pain Scale: FACES Pre/Post-Treatment (Group)  -MM     Recorded by [JJ] Aster Suggs OTR/L 09/06/19 1028 [MM] Krystal Babcock MS CCC-SLP 09/06/19 0927     Row Name 09/06/19 1300 09/06/19 0936          Pain Scale: Numbers Pre/Post-Treatment    Pain Scale: Numbers, Pretreatment  0/10 - no pain  -AB  —     Pain Scale: Numbers, Post-Treatment  0/10 - no pain  -AB  —     Pain Location - Side  —  Left  -JJ     Pain Location  —  wrist  -JJ     Pain Intervention(s)  —  Repositioned;Ambulation/increased activity  -JJ     Recorded by [AB] Key Sauceda, PTA 09/06/19 1416 [JJ] Aster Suggs OTR/L 09/06/19 1028     Row Name 09/06/19 0936 09/06/19 0900          Pain Scale: FACES Pre/Post-Treatment    Pain: FACES Scale, Pretreatment  2-->hurts little bit  -JJ  0-->no hurt  -MM     Pain: FACES Scale, Post-Treatment  4-->hurts little more  -JJ  0-->no hurt  -MM     Recorded by [JJ] Aster Suggs OTR/JIMENA 09/06/19 1028 [MM] Krystal Babcock MS CCC-SLP 09/06/19 0927     Row Name 09/06/19 0936             Plan of Care Review    Plan of Care Reviewed With  patient  -JJ      Recorded by [JJ] Aster Suggs OTR/L 09/06/19 1028      Row Name 09/06/19 0936             Outcome Summary/Treatment Plan (OT)    Daily Summary of Progress (OT)  progress towards functional goals is fair  -JJ      Barriers to Overall Progress (OT)  Rsided weakness  -JJ      Anticipated Discharge Disposition (OT)  skilled nursing facility;inpatient rehabilitation facility  -JJ      Recorded by [JJ] Aster Suggs OTR/L 09/06/19 1028      Row Name 09/06/19 0900             Outcome Summary/Treatment Plan (SLP)    Daily Summary of Progress (SLP)  progress towards functional goals is fair  -MM      Barriers to Overall Progress (SLP)  Cognition   -MM      Plan for Continued Treatment (SLP)  Continue to follow. MS/NT  -MM      Anticipated Dischage Disposition  unknown  -MM      Recorded by [MM] Krystal Babcock, MS CCC-SLP  09/06/19 0927        User Key  (r) = Recorded By, (t) = Taken By, (c) = Cosigned By    Initials Name Effective Dates Discipline    AB Key Sauceda, PTA 08/02/16 -  PT    Krystal Monroy, MS CCC-SLP 05/24/19 -  SLP    Aster Crawford, OTR/L 10/12/18 -  OT               Rehab Goal Summary     Row Name 09/06/19 0900             Swallow Goals (SLP)    Oral Nutrition/Hydration Goal Selection (SLP)  oral nutrition/hydration, SLP goal 1  -MM      Labial Strengthening Goal Selection (SLP)  labial strengthening, SLP goal 1  -MM      Lingual Strengthening Goal Selection (SLP)  lingual strengthening, SLP goal 1  -MM      Pharyngeal Strengthening Exercise Goal Selection (SLP)  pharyngeal strengthening exercise, SLP goal 1  -MM      Additional Documentation  lingual strengthening goal selection (SLP)  -MM         Oral Nutrition/Hydration Goal 1 (SLP)    Oral Nutrition/Hydration Goal 1, SLP  LTG: Patient will tolerate LRD with no overt s/s of aspiration.  -MM      Time Frame (Oral Nutrition/Hydration Goal 1, SLP)  by discharge  -MM      Barriers (Oral Nutrition/Hydration Goal 1, SLP)  n/a  -MM      Progress/Outcomes (Oral Nutrition/Hydration Goal 1, SLP)  continuing progress toward goal  -MM         Labial Strengthening Goal 1 (SLP)    Activity (Labial Strengthening Goal 1, SLP)  increase labial tone  -MM      Increase Labial Tone  labial resistance exercises  -MM      [place]/Accuracy (Labial Strengthening Goal 1, SLP)  independently (over 90% accuracy)  -MM      Time Frame (Labial Strengthening Goal 1, SLP)  short term goal (STG);by discharge  -MM      Barriers (Labial Strengthening Goal 1, SLP)  n/a  -MM      Progress/Outcomes (Labial Strengthening Goal 1, SLP)  continuing progress toward goal  -MM         Lingual Strengthening Goal 1 (SLP)    Activity (Lingual Strengthening Goal 1, SLP)  increase lingual tone/sensation/control/coordination/movement  -MM      Increase Lingual  Tone/Sensation/Control/Coordination/Movement  lingual movement exercises  -MM      [place]/Accuracy (Lingual Strengthening Goal 1, SLP)  independently (over 90% accuracy)  -MM      Time Frame (Lingual Strengthening Goal 1, SLP)  by discharge  -MM      Barriers (Lingual Strengthening Goal 1, SLP)  n/a  -MM      Progress/Outcomes (Lingual Strengthening Goal 1, SLP)  continuing progress toward goal  -MM         Pharyngeal Strengthening Exercise Goal 1 (SLP)    Activity (Pharyngeal Strengthening Goal 1, SLP)  increase epiglottic inversion and retroflexion;increase tongue base retraction;increase pharyngeal sensation  -MM      Increase Pharyngeal Sensation  gustatory stimulation (sour/cold)  -MM      Increase Epiglottic Inversion and Retroflexion  Mendelsohn;falsetto  -MM      Increase Tongue Base Retraction  hard effortful swallow;kaleb  -MM      [place]/Accuracy (Pharyngeal Strengthening Goal 1, SLP)  independently (over 90% accuracy)  -MM      Time Frame (Pharyngeal Strengthening Goal 1, SLP)  short term goal (STG);by discharge  -MM      Barriers (Pharyngeal Strengthening Goal 1, SLP)  n/a  -MM      Progress/Outcomes (Pharyngeal Strengthening Goal 1, SLP)  goal ongoing  -MM        User Key  (r) = Recorded By, (t) = Taken By, (c) = Cosigned By    Initials Name Provider Type Discipline    Krystal Monroy, MS CCC-SLP Speech and Language Pathologist SLP          Physical Therapy Education     Title: PT OT SLP Therapies (In Progress)     Topic: Physical Therapy (In Progress)     Point: Mobility training (Done)     Learning Progress Summary           Patient Acceptance, E, VU,NR by TITUS at 9/4/2019  8:11 AM    Comment:  Rolling in bed. Bed mobility.    Acceptance, E, VU,NR by JAKE at 9/2/2019  7:20 AM    Comment:  Educated pt on progression of PT POC and benefits of activity                               User Key     Initials Effective Dates Name Provider Type Discipline    JAKE 08/02/16 -  Gilmer Medrano, PT  DPT Physical Therapist PT     08/02/16 -  Kathrine Ramsay PTA Physical Therapy Assistant PT                PT Recommendation and Plan     Plan of Care Reviewed With: patient  Progress: improving  Outcome Summary: With exercises he is AA-AROM on L LE and AA-PROM R LE. Was max assist for bed mobility and for sitting balance. PT will cont to follow for strengthening and mobility.  Outcome Measures     Row Name 09/06/19 1000 09/05/19 1347          How much help from another is currently needed...    Putting on and taking off regular lower body clothing?  1  -JJ  1  -CJ     Bathing (including washing, rinsing, and drying)  2  -JJ  2  -CJ     Toileting (which includes using toilet bed pan or urinal)  1  -JJ  1  -CJ     Putting on and taking off regular upper body clothing  2  -JJ  2  -CJ     Taking care of personal grooming (such as brushing teeth)  2  -JJ  2  -CJ     Eating meals  2  -JJ  2  -CJ     AM-PAC 6 Clicks Score (OT)  10  -JJ  10  -CJ        Functional Assessment    Outcome Measure Options  AM-PAC 6 Clicks Daily Activity (OT)  -JJ  AM-PAC 6 Clicks Daily Activity (OT)  -CJ       User Key  (r) = Recorded By, (t) = Taken By, (c) = Cosigned By    Initials Name Provider Type    Emmanuel Garcia COTA/L Occupational Therapy Assistant    JAster Hernández OTR/L Occupational Therapist         Time Calculation:   PT Charges     Row Name 09/06/19 1300 09/06/19 1115          Time Calculation    Start Time  1300  -AB  1105 also 12 min worth from 9:47 to 9: 59  -AB     Stop Time  1324  -AB  1123  -AB     Time Calculation (min)  24 min  -AB  18 min  -AB     PT Received On  —  09/06/19  -AB     PT Goal Re-Cert Due Date  —  09/12/19  -AB        Time Calculation- PT    Total Timed Code Minutes- PT  24 minute(s)  -AB  30 minute(s)  -AB       User Key  (r) = Recorded By, (t) = Taken By, (c) = Cosigned By    Initials Name Provider Type    Key Thibodeaux, PTA Physical Therapy Assistant        Therapy Charges for  Today     Code Description Service Date Service Provider Modifiers Qty    09926571967 HC PT THER PROC EA 15 MIN 9/5/2019 Key Sauceda, PTA GP 1    41607342254 HC PT THERAPEUTIC ACT EA 15 MIN 9/5/2019 Key Sauceda, PTA GP 1    19614685312 HC PT THER PROC EA 15 MIN 9/6/2019 Key Sauceda, PTA GP 1    85433554651 HC PT THERAPEUTIC ACT EA 15 MIN 9/6/2019 Key Sauceda, PTA GP 1    05087839327 HC PT THER PROC EA 15 MIN 9/6/2019 Key Sauceda, PTA GP 2          PT G-Codes  Outcome Measure Options: AM-PAC 6 Clicks Daily Activity (OT)  AM-PAC 6 Clicks Score (PT): 8  AM-PAC 6 Clicks Score (OT): 10    Key Sauceda PTA  9/6/2019

## 2019-09-06 NOTE — PLAN OF CARE
Problem: Patient Care Overview  Goal: Plan of Care Review  Outcome: Ongoing (interventions implemented as appropriate)   09/06/19 0969   Coping/Psychosocial   Plan of Care Reviewed With patient;other (see comments)  (GUSTAVO Barber )   Plan of Care Review   Progress no change   OTHER   Outcome Summary Diet toleration completed. Patient was alert and cooperative. Dysarthria seems improved from previous sessions. GUSTAVO Barber says it was worse this AM though. Patient completed mechanical soft breakfast tray and nectar thick liquids. Patient continues to require intermittant cues to remain alert during mechanical soft trials. He also requires alternating sips/bites to clear mild oral residue. He requested thin water to drink. SLP trialed patient but explained that he should only complete thin water 30 mins after any PO intake. 1x immediate and persistive coughing is noted with large sip of thin liquid water. Patient OK to continue mechanical soft diet with nectar thick liquids. OK for spoonfuls of thin water and ice chips after oral care. SLP will continue to follow and treat.

## 2019-09-06 NOTE — CONSULTS
Consults         Referring Provider: Rick  Reason for Consultation: Renal Mass    Chief complaint Renal Mass    Subjective .     History of present illness:  Renal Mass  Patient here for evaluation of renal mass.  The renal mass was found incidentally.  The location of the mass is the left kidney.  The mass has been present for1 day.  The mass is described as solid.  The size of the mass is 2.8cm.  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  The mass was found on evaluation of hypertension.  Associated symptoms include denies flank pain/hematuria.      Review of Systems  The following systems were reviewed and negative;  eyes, ENT, respiratory, integument, breast, hematologic / lymphatic, behavioral/psych, endocrine and allergies / immunologic     History  Past Medical History:   Diagnosis Date   • Diabetes mellitus (CMS/HCC)    • GERD (gastroesophageal reflux disease)    • Hyperlipidemia    • Hypertension    • Neuropathy    • Osteoarthritis    • TIA (transient ischemic attack)        Past Surgical History:   Procedure Laterality Date   • CARPAL TUNNEL RELEASE Bilateral    • EYE SURGERY     • KNEE SURGERY Bilateral    • NECK SURGERY     • RETINAL DETACHMENT SURGERY     • ROTATOR CUFF REPAIR         History reviewed. No pertinent family history.    Social History     Socioeconomic History   • Marital status:      Spouse name: Not on file   • Number of children: Not on file   • Years of education: Not on file   • Highest education level: Not on file   Tobacco Use   • Smoking status: Never Smoker   • Smokeless tobacco: Never Used   Substance and Sexual Activity   • Alcohol use: No     Frequency: Never   • Drug use: No   • Sexual activity: Defer       Current Facility-Administered Medications   Medication Dose Route Frequency Provider Last Rate Last Dose   • acetaminophen (TYLENOL) tablet 650 mg  650 mg Oral Q4H PRN Raf Wyatt MD   650 mg at 09/02/19 1606    Or   • acetaminophen (TYLENOL) 160 MG/5ML  solution 650 mg  650 mg Oral Q4H PRN Raf Wyatt MD        Or   • acetaminophen (TYLENOL) suppository 650 mg  650 mg Rectal Q4H PRN Raf Wyatt MD       • amLODIPine (NORVASC) tablet 10 mg  10 mg Oral Q24H Gaston Cabrera DO   10 mg at 09/06/19 0902   • CloNIDine (CATAPRES) tablet 0.3 mg  0.3 mg Oral Q12H Gaston Cabrera DO   0.3 mg at 09/06/19 0902   • dextrose (D50W) 25 g/ 50mL Intravenous Solution 25 g  25 g Intravenous Q15 Min PRN Raf Wyatt MD       • dextrose (GLUTOSE) oral gel 15 g  15 g Oral Q15 Min PRN Raf Wyatt MD       • glipiZIDE (GLUCOTROL) tablet 5 mg  5 mg Oral QAM AC Raf Wyatt MD   5 mg at 09/06/19 0902   • glucagon (human recombinant) (GLUCAGEN DIAGNOSTIC) injection 1 mg  1 mg Subcutaneous PRN Raf Wyatt MD       • hydrALAZINE (APRESOLINE) injection 10 mg  10 mg Intravenous Q15 Min PRN Raf Wyatt MD   10 mg at 09/05/19 0513   • hydrALAZINE (APRESOLINE) tablet 100 mg  100 mg Oral Q8H Gaston Cabrera DO   100 mg at 09/06/19 1342   • insulin lispro (humaLOG) injection 0-9 Units  0-9 Units Subcutaneous 4x Daily With Meals & Nightly Raf Wyatt MD       • labetalol (NORMODYNE,TRANDATE) injection 10 mg  10 mg Intravenous Q15 Min PRN Raf Wyatt MD   10 mg at 09/05/19 2110   • losartan (COZAAR) tablet 50 mg  50 mg Oral Q24H Gaston Cabrera DO   50 mg at 09/06/19 1156   • metoprolol succinate XL (TOPROL-XL) 24 hr tablet 100 mg  100 mg Oral Q24H Gaston Cabrera DO   100 mg at 09/06/19 1156   • niCARdipine (CARDENE) 25 mg/250 mL (0.1 mg/mL) 0.9% NS infusion  5-15 mg/hr Intravenous Titrated Raf Wyatt MD   Stopped at 09/06/19 1135   • ondansetron (ZOFRAN) tablet 4 mg  4 mg Oral Q6H PRN Raf Wyatt MD        Or   • ondansetron (ZOFRAN) injection 4 mg  4 mg Intravenous Q6H PRN Raf Wyatt MD       • oxyCODONE-acetaminophen (PERCOCET)  5-325 MG per tablet 1 tablet  1 tablet Oral Q4H PRN Raf Wyatt MD   1 tablet at 09/06/19 0508   • oxyCODONE-acetaminophen (PERCOCET) 7.5-325 MG per tablet 2 tablet  2 tablet Oral Q4H PRN Raf Wyatt MD   2 tablet at 09/05/19 2332   • sodium chloride 0.9 % flush 10 mL  10 mL Intravenous Q12H Raf Wyatt MD   10 mL at 09/06/19 1054   • sodium chloride 0.9 % flush 10 mL  10 mL Intravenous PRN Raf Wyatt MD       • terazosin (HYTRIN) capsule 10 mg  10 mg Oral Nightly Gaston Cabrera DO   10 mg at 09/05/19 2108   • traZODone (DESYREL) tablet 100 mg  100 mg Oral Nightly Gaston Cabrera DO            Allergies   Allergen Reactions   • Iodine Shortness Of Breath   • Lisinopril Rash   • Red Dye Rash       Objective     Vital Signs   Temp:  [98.1 °F (36.7 °C)-98.4 °F (36.9 °C)] 98.4 °F (36.9 °C)  Heart Rate:  [47-78] 56  Resp:  [20] 20  BP: (109-199)/(56-93) 123/58    Intake/Output Summary (Last 24 hours) at 9/6/2019 1526  Last data filed at 9/6/2019 1300  Gross per 24 hour   Intake 2086.7 ml   Output 2675 ml   Net -588.3 ml       Physical Exam:     General Appearance:    Alert, cooperative, in no acute distress   Head:    Normocephalic, without obvious abnormality, atraumatic   Eyes:            Lids and lashes normal, conjunctivae and sclerae normal, no   icterus, no pallor, corneas clear, PERRLA   Ears:    Ears appear intact with no abnormalities noted   Throat:   No oral lesions, no thrush, oral mucosa moist   Neck:   No adenopathy, supple, trachea midline, no thyromegaly, no   carotid bruit, no JVD   Back:     No kyphosis present, no scoliosis present, no skin lesions,      erythema or scars, no tenderness to percussion or                   palpation,   range of motion normal   Lungs:   Respirations unlabored    Heart:   Regular rate   Chest Wall:    No abnormalities observed   Abdomen:     No masses, no organomegaly, soft        non-tender, non-distended, no  guarding, no rebound                Tenderness     Genitorurinary:   Deferred   Extremities:   Moves all extremities well, no edema, no cyanosis, no             redness   Pulses:   Pulses palpable and equal bilaterally   Skin:   No bleeding, bruising or rash   Lymph nodes:   No palpable adenopathy   Neurologic:   Right sided weakness       Results Review:   I reviewed the patient's new clinical results.      No results found for: WBC, RBC, HGB, HCT, MCV, MCH, MCHC, RDW, RDWSD, MPV, PLT, NEUTRORELPCT, LYMPHORELPCT, MONORELPCT, EOSRELPCT, BASORELPCT, AUTOIGPER, NEUTROABS, LYMPHSABS, MONOSABS, EOSABS, BASOSABS, AUTOIGNUM, NRBC    Basic Metabolic Panel    Sodium Sodium   Date Value Ref Range Status   09/06/2019 144 135 - 145 mmol/L Final   09/05/2019 146 (H) 135 - 145 mmol/L Final   09/04/2019 144 135 - 145 mmol/L Final      Potassium Potassium   Date Value Ref Range Status   09/06/2019 4.7 3.5 - 5.3 mmol/L Final   09/05/2019 4.7 3.5 - 5.3 mmol/L Final   09/04/2019 4.5 3.5 - 5.3 mmol/L Final      Chloride Chloride   Date Value Ref Range Status   09/06/2019 117 (H) 98 - 110 mmol/L Final   09/05/2019 118 (H) 98 - 110 mmol/L Final   09/04/2019 117 (H) 98 - 110 mmol/L Final      Bicarbonate No results found for: PLASMABICARB   BUN BUN   Date Value Ref Range Status   09/06/2019 27 (H) 5 - 21 mg/dL Final   09/05/2019 29 (H) 5 - 21 mg/dL Final   09/04/2019 27 (H) 5 - 21 mg/dL Final      Creatinine Creatinine   Date Value Ref Range Status   09/06/2019 1.60 (H) 0.50 - 1.40 mg/dL Final   09/05/2019 1.55 (H) 0.50 - 1.40 mg/dL Final   09/04/2019 1.59 (H) 0.50 - 1.40 mg/dL Final      Calcium Calcium   Date Value Ref Range Status   09/06/2019 8.5 8.4 - 10.4 mg/dL Final   09/05/2019 8.7 8.4 - 10.4 mg/dL Final   09/04/2019 8.6 8.4 - 10.4 mg/dL Final      Glucose      No components found for: GLUCOSE.*       Imaging Results (last 24 hours)     ** No results found for the last 24 hours. **        Renal ultrasound independent  review    The renal ultrasound is available for me to review.  Treatment recommendations require an independent review.  This film has been reviewed by the radiologist to determine any non urologic abnormalities that are presents.  However, I very closely inspected the kidneys for size, symmetry, contour, parenchymal thickness, perinephric reaction, presence of calcifications, and intrarenal dilation of the collecting system.       The right kidney appears normal on this ultrasound.  The renal parenchymal is normal in thickness.  There are no solid masses or cysts.  There is no hydronephrosis.  There are no stones.      The left kidney appears 2.8cm solid mass upper pole    The bladder appears normal on thisultrsaound.  The bladder appears normal in thickness.  There no masses or stones seen on this exam.         Assessment/Plan       ICH (intracerebral hemorrhage) (CMS/HCC)    Diabetes mellitus (CMS/HCC)    Hypertensive urgency    Hyperlipidemia    Cerebrovascular accident (CVA) due to embolism of left middle cerebral artery (CMS/HCC)    Dysarthria    CKD (chronic kidney disease) stage 3, GFR 30-59 ml/min (CMS/HCC)    Left renal mass      Left Renal Mass    Patient with CKD and a left renal mass.  This is 2.8 cm on ultrasound.  He has a creatinine 1.6 and a GFR 44.  I discussed with him he needs further imaging to fully characterize this.  Typically I would prefer a CT with and without contrast.  However this cannot be obtained due to his renal function.  I have recommended an MRI with and without contrast to fully evaluate this lesion.  The patient stated he is claustrophobic.  I discussed with him that this could be a cancerous lesion.  However renal cell carcinomas do not metastasize with less than 3 cm.  In addition on average these masses grow 0.3 cm/year.  With all the other issues he has ongoing, I think is reasonable to set up an MRI with and without contrast on an outpatient basis and follow-up with me on  an outpatient basis.      I discussed the patients findings and my recommendations with patient and nursing staff    Anjel Cook MD  09/06/19  3:26 PM

## 2019-09-06 NOTE — PLAN OF CARE
Problem: Fall Risk (Adult)  Goal: Identify Related Risk Factors and Signs and Symptoms  Outcome: Ongoing (interventions implemented as appropriate)    Goal: Absence of Fall  Outcome: Ongoing (interventions implemented as appropriate)      Problem: Pain, Acute (Adult)  Goal: Identify Related Risk Factors and Signs and Symptoms  Outcome: Ongoing (interventions implemented as appropriate)    Goal: Acceptable Pain Control/Comfort Level  Outcome: Ongoing (interventions implemented as appropriate)      Problem: Skin Injury Risk (Adult)  Goal: Identify Related Risk Factors and Signs and Symptoms  Outcome: Ongoing (interventions implemented as appropriate)    Goal: Skin Health and Integrity  Outcome: Ongoing (interventions implemented as appropriate)      Problem: Nutrition, Imbalanced: Inadequate Oral Intake (Adult)  Goal: Identify Related Risk Factors and Signs and Symptoms  Outcome: Ongoing (interventions implemented as appropriate)    Goal: Improved Oral Intake  Outcome: Ongoing (interventions implemented as appropriate)    Goal: Prevent Further Weight Loss  Outcome: Ongoing (interventions implemented as appropriate)      Problem: Restraint, Nonbehavioral (Nonviolent)  Goal: Rationale and Justification  Outcome: Ongoing (interventions implemented as appropriate)    Goal: Nonbehavioral (Nonviolent) Restraint: Absence of Injury/Harm  Outcome: Ongoing (interventions implemented as appropriate)    Goal: Nonbehavioral (Nonviolent) Restraint: Achievement of Discontinuation Criteria  Outcome: Ongoing (interventions implemented as appropriate)    Goal: Nonbehavioral (Nonviolent) Restraint: Preservation of Dignity and Wellbeing  Outcome: Ongoing (interventions implemented as appropriate)

## 2019-09-07 PROBLEM — E66.9 OBESITY (BMI 30-39.9): Status: ACTIVE | Noted: 2019-01-01

## 2019-09-07 PROBLEM — I50.32 DIASTOLIC CHF, CHRONIC (HCC): Status: ACTIVE | Noted: 2019-01-01

## 2019-09-07 PROBLEM — R00.1 BRADYCARDIA: Status: ACTIVE | Noted: 2019-01-01

## 2019-09-07 NOTE — PLAN OF CARE
Problem: Patient Care Overview  Goal: Plan of Care Review  Outcome: Ongoing (interventions implemented as appropriate)   09/07/19 0543   Coping/Psychosocial   Plan of Care Reviewed With patient   Plan of Care Review   Progress improving   OTHER   Outcome Summary VSS overnight. Patient was NSR to SB 40s-60s. Cardene remained off. 1x dose of PRN hydralazine given. 1x dose of PRN pain medication given. Patient rested well the majority of the night. UOP adequate. Bloody drainage around catheter from trauma when patient removed F/C. Disoriented to time. 2L NC O2 sats 90s. Afebrile.      Goal: Individualization and Mutuality  Outcome: Ongoing (interventions implemented as appropriate)    Goal: Discharge Needs Assessment  Outcome: Ongoing (interventions implemented as appropriate)    Goal: Interprofessional Rounds/Family Conf  Outcome: Ongoing (interventions implemented as appropriate)      Problem: Fall Risk (Adult)  Goal: Identify Related Risk Factors and Signs and Symptoms  Outcome: Ongoing (interventions implemented as appropriate)    Goal: Absence of Fall  Outcome: Ongoing (interventions implemented as appropriate)      Problem: Pain, Acute (Adult)  Goal: Identify Related Risk Factors and Signs and Symptoms  Outcome: Ongoing (interventions implemented as appropriate)    Goal: Acceptable Pain Control/Comfort Level  Outcome: Ongoing (interventions implemented as appropriate)      Problem: Skin Injury Risk (Adult)  Goal: Identify Related Risk Factors and Signs and Symptoms  Outcome: Ongoing (interventions implemented as appropriate)    Goal: Skin Health and Integrity  Outcome: Ongoing (interventions implemented as appropriate)      Problem: Nutrition, Imbalanced: Inadequate Oral Intake (Adult)  Goal: Identify Related Risk Factors and Signs and Symptoms  Outcome: Ongoing (interventions implemented as appropriate)    Goal: Improved Oral Intake  Outcome: Ongoing (interventions implemented as appropriate)    Goal: Prevent  Further Weight Loss  Outcome: Ongoing (interventions implemented as appropriate)

## 2019-09-07 NOTE — PROGRESS NOTES
[facility] Medicine Services  INPATIENT PROGRESS NOTE    Patient Name: Phan Eastman  Date of Admission: 8/31/2019  Today's Date: 09/07/19  Length of Stay: 7  Primary Care Physician: Emmanuel Mclain MD    Subjective   Chief Complaint: right-sided weakness, confusion   HPI     Patient seen and examined at bedside.  Patient has no significant complaints this morning.  Patient has had to receive a few doses of PRN.  Otherwise stable.          Review of Systems   Constitutional: Negative for chills and fever.   Respiratory: Negative for cough and shortness of breath.    Cardiovascular: Negative for chest pain and palpitations.   Gastrointestinal: Negative for abdominal distention, abdominal pain, constipation, diarrhea, nausea and vomiting.        All pertinent negatives and positives are as above. All other systems have been reviewed and are negative unless otherwise stated.     Objective    Temp:  [97.8 °F (36.6 °C)-98.4 °F (36.9 °C)] 97.8 °F (36.6 °C)  Heart Rate:  [45-91] 49  Resp:  [12-22] 16  BP: (118-174)/() 173/80  Physical Exam   Constitutional: No distress.   HENT:   Head: Normocephalic and atraumatic.   Eyes: Conjunctivae are normal. No scleral icterus.   Neck: Neck supple. No JVD present.   Cardiovascular: Intact distal pulses. Exam reveals no gallop and no friction rub.   Murmur heard.  bradycardia   Pulmonary/Chest: Effort normal and breath sounds normal. No stridor. No respiratory distress. He has no wheezes.   Abdominal: Soft. Bowel sounds are normal. He exhibits no distension and no mass. There is no tenderness. There is no guarding.   Musculoskeletal: He exhibits no edema.   Neurological: He is alert.   Oriented to name and place; not time; right sided weakness, confused    Skin: Skin is warm and dry. He is not diaphoretic. No erythema.   Psychiatric: He has a normal mood and affect. His behavior is normal.   Nursing note and vitals  reviewed.        Results Review:  I have reviewed the labs, radiology results, and diagnostic studies.    Laboratory Data:   Results from last 7 days   Lab Units 09/01/19  1412 08/31/19  0924   WBC 10*3/mm3 11.95* 10.33   HEMOGLOBIN g/dL 12.7* 12.6*   HEMATOCRIT % 39.4 38.7   PLATELETS 10*3/mm3 199 189        Results from last 7 days   Lab Units 09/07/19  0331 09/06/19  0350 09/05/19  0245  09/01/19  1412   SODIUM mmol/L 143 144 146*   < > 141   POTASSIUM mmol/L 4.9 4.7 4.7   < > 4.1   CHLORIDE mmol/L 113* 117* 118*   < > 112*   CO2 mmol/L 25.0 24.0 23.0*   < > 23.0*   BUN mg/dL 28* 27* 29*   < > 23*   CREATININE mg/dL 1.60* 1.60* 1.55*   < > 1.54*   CALCIUM mg/dL 9.0 8.5 8.7   < > 8.8   BILIRUBIN mg/dL  --   --   --   --  1.8*   ALK PHOS U/L  --   --   --   --  79   ALT (SGPT) U/L  --   --   --   --  31   AST (SGOT) U/L  --   --   --   --  43   GLUCOSE mg/dL 85 80 98   < > 88    < > = values in this interval not displayed.       Culture Data:        Radiology Data:   Imaging Results (last 24 hours)     ** No results found for the last 24 hours. **          I have reviewed the patient's current medications.     Assessment/Plan     Active Hospital Problems    Diagnosis   • **ICH (intracerebral hemorrhage) (CMS/HCC)   • Obesity (BMI 30-39.9)   • CKD (chronic kidney disease) stage 3, GFR 30-59 ml/min (CMS/[facility])   • Left renal mass   • Cerebrovascular accident (CVA) due to embolism of left middle cerebral artery (CMS/HCC)   • Dysarthria   • Diabetes mellitus (CMS/[facility])   • Hypertensive urgency   • Hyperlipidemia     Plan:  1.  Off cardene, received PRN dose of hydralazine  2.  Will d/c metoprolol, plan to switch to carvedilol tomorrow if HR tolerates it  3.  Overnight pulse oximeter as patient has bradycardia at night that is worse with sleeping, suspect due to habitus may have component of sleep apnea.  4.  Increase Losartan to 100 mg; continue clonidine 0.3 mg, amlodipine 10 mg, hydralazine 100 mg, and terazosin 10 mg    5.  ECG to evaluate bradycardia further   6.  Echo reviewed, changes consistent with hypertension  7.  Left renal mass may required further outpatient evaluation with MRI with and without contrast per Dr. Cook   8.  Creatinine is stable   9.  Case discussed with bedside GUSTAVO Salazar and Dr. Wyatt (Neurosurgery)  10.  D/C obrien           Discharge Planning: I expect the patient to be discharged to ? Per attending.      Tanner Rodgers MD   09/07/19   9:02 AM

## 2019-09-07 NOTE — PLAN OF CARE
Problem: Restraint, Nonbehavioral (Nonviolent)  Goal: Rationale and Justification  Outcome: Outcome(s) achieved Date Met: 09/07/19    Goal: Nonbehavioral (Nonviolent) Restraint: Absence of Injury/Harm  Outcome: Outcome(s) achieved Date Met: 09/07/19    Goal: Nonbehavioral (Nonviolent) Restraint: Achievement of Discontinuation Criteria  Outcome: Outcome(s) achieved Date Met: 09/07/19    Goal: Nonbehavioral (Nonviolent) Restraint: Preservation of Dignity and Wellbeing  Outcome: Outcome(s) achieved Date Met: 09/07/19

## 2019-09-07 NOTE — PROGRESS NOTES
Neurosurgery Daily Progress Note    Assessment:   Phan Eastman is a 64 y.o. male with a significant comorbidity diabetes with diabetic neuropathy, intracerebral ischemic stroke x3.  He presents with a new problem of sudden onset of right-sided weakness and slurred speech. Physical exam findings of right-sided hemiparesis and right facial droop.  Their imaging shows 2.7 x 2.1 acute thalamic intracerebral hemorrhage.    DDX:     ICH (intracerebral hemorrhage) (CMS/HCC)    Diabetes mellitus (CMS/HCC)    Hypertensive urgency    Hyperlipidemia    Cerebrovascular accident (CVA) due to embolism of left middle cerebral artery (CMS/HCC)    Dysarthria    CKD (chronic kidney disease) stage 3, GFR 30-59 ml/min (CMS/HCC)    Left renal mass    Obesity (BMI 30-39.9)    Diastolic CHF, chronic (CMS/HCC)    Bradycardia    Patient Active Problem List   Diagnosis   • ICH (intracerebral hemorrhage) (CMS/HCC)   • Diabetes mellitus (CMS/HCC)   • Hypertensive urgency   • Hyperlipidemia   • Cerebrovascular accident (CVA) due to embolism of left middle cerebral artery (CMS/HCC)   • Dysarthria   • CKD (chronic kidney disease) stage 3, GFR 30-59 ml/min (CMS/HCC)   • Left renal mass   • Obesity (BMI 30-39.9)   • Diastolic CHF, chronic (CMS/HCC)   • Bradycardia     Plan:   Neuro: Stable.  Aphasia and right-sided hemiparesis   CT stable   MRI with small left ischemic stroke.     Most likely worsening edema surrounding ICH   Cont to monitor   Hx of ischemic stroke; appreciate neurology    CV: Continuing to require Cardene gtt to keep SBP <160; labetolol and hydralazine with marginal blood pressures.     -174   Oral antihypertensives adjusted per hospitalist:   Appreciate hospitalist   Echo WNL.   Carotid US; right: 50 - 69%; left: <50%  Skin: Rash on legs.  Monitor for now.   Pulm: CPAP at home.  CXR: Moderate cardiomegaly no evidence pulmonary vascular congestion.  : Renal ultrasound: 2.8 cm solid mass suspected in the upper pole of  "the level left   Appreciate Urology  FEN: Tolerating puréed diet   Appreciate speech/ nutrition  Endocrine: blood glucose well controlled  GI: PAUL  ID: PAUL  Heme:  DVT prophylaxis held for brain bleed; SCDs  Pain: Well controlled  Dispo: PT/OT   DC to floor pending BP control off gtt    Chief complaint:   Right sided weakness and speech decline    Subjective  Can not communicate    Temp:  [97.8 °F (36.6 °C)-98.4 °F (36.9 °C)] 97.8 °F (36.6 °C)  Heart Rate:  [45-91] 49  Resp:  [12-22] 16  BP: (118-174)/() 171/84    Objective:  Vital signs: (most recent): Blood pressure 171/84, pulse (!) 49, temperature 97.8 °F (36.6 °C), temperature source Axillary, resp. rate 16, height 188 cm (74\"), weight 124 kg (273 lb 12.8 oz), SpO2 97 %.        Neurologic Exam     Mental Status   Oriented to person.   Oriented to place.   Oriented to year.   Speech: slurred   Level of consciousness: alert ,  arousable by verbal stimuli  Able to name object. Unable to read. Unable to repeat. Unable to write. Abnormal comprehension.   Speech remains slurred but clearing.  Follows commands     Cranial Nerves     CN III, IV, VI   Pupils are equal, round, and reactive to light.  Extraocular motions are normal.     CN V   Facial sensation intact.     CN VII   Facial expression full, symmetric.   Left facial weakness: central    Motor Exam   Right arm pronator drift: absent  Left arm pronator drift: absent    Strength   Right deltoid: 4/5  Left deltoid: 5/5  Right biceps: 4/5  Left biceps: 5/5  Right triceps: 4/5  Left triceps: 5/5  Right interossei: 3/5  Right iliopsoas: 2/5  Left iliopsoas: 5/5  Right quadriceps: 2/5  Left quadriceps: 5/5  Right peroneal: 3/5  Right gastroc: 3/5  Left gastroc: 5/5  Strength/movement on right improving     Sensory Exam   Light touch normal.     Gait, Coordination, and Reflexes     Reflexes   Right : 4+  Left : 4+    Drains:  NA    Imaging Results (last 24 hours)     ** No results found for the last 24 " hours. **        Lab Results (last 24 hours)     Procedure Component Value Units Date/Time    POC Glucose Once [[id number]]  (Normal) Collected:  09/07/19 0828    Specimen:  Blood Updated:  09/07/19 0838     Glucose 93 mg/dL      Comment: : [id number] Gabe MendozalinMeter ID: [id number]       Basic Metabolic Panel [[id number]]  (Abnormal) Collected:  09/07/19 0331    Specimen:  Blood Updated:  09/07/19 0406     Glucose 85 mg/dL      BUN 28 mg/dL      Creatinine 1.60 mg/dL      Sodium 143 mmol/L      Potassium 4.9 mmol/L      Chloride 113 mmol/L      CO2 25.0 mmol/L      Calcium 9.0 mg/dL      eGFR Non African Amer 44 mL/min/1.73      BUN/Creatinine Ratio 17.5     Anion Gap 5.0 mmol/L     Narrative:       GFR Normal >60  Chronic Kidney Disease <60  Kidney Failure <15    POC Glucose Once [[id number]]  (Normal) Collected:  09/06/19 2113    Specimen:  Blood Updated:  09/06/19 2128     Glucose 99 mg/dL      Comment: : [id number] Héctor AnnaMeter ID: [id number]       POC Glucose Once [[id number]]  (Normal) Collected:  09/06/19 1740    Specimen:  Blood Updated:  09/06/19 1753     Glucose 87 mg/dL      Comment: : [id number] Florian StacyMeter ID: [id number]       Catecholamine+VMA, 24-Hr Urine - Urine, Clean Catch [[id number]] Collected:  09/06/19 1650    Specimen:  Urine, Clean Catch Updated:  09/06/19 1702    POC Glucose Once [[id number]]  (Normal) Collected:  09/06/19 1157    Specimen:  Blood Updated:  09/06/19 1209     Glucose 77 mg/dL      Comment: : [id number] Tucker AmandaMeter ID: [id number]       POC Glucose Once [[id number]]  (Abnormal) Collected:  09/06/19 1132    Specimen:  Blood Updated:  09/06/19 1143     Glucose 62 mg/dL      Comment: : [id number] Tucker AmandaMeter ID: [id number]           [id number]  Raf Wyatt MD

## 2019-09-07 NOTE — THERAPY TREATMENT NOTE
Acute Care - Physical Therapy Treatment Note  [facility]     Patient Name: Phan Eastman  : 1955  MRN: [id number]  Today's Date: 2019  Onset of Illness/Injury or Date of Surgery: 19     Referring Physician: Dr. Wyatt     Admit Date: 2019    Visit Dx:    ICD-10-CM ICD-9-CM   1. Oropharyngeal dysphagia R13.12 787.22   2. Impaired mobility and ADLs Z74.09 799.89   3. Impaired mobility Z74.09 799.89     Patient Active Problem List   Diagnosis   • ICH (intracerebral hemorrhage) (CMS/HCC)   • Diabetes mellitus (CMS/HCC)   • Hypertensive urgency   • Hyperlipidemia   • Cerebrovascular accident (CVA) due to embolism of left middle cerebral artery (CMS/HCC)   • Dysarthria   • CKD (chronic kidney disease) stage 3, GFR 30-59 ml/min (CMS/HCC)   • Left renal mass   • Obesity (BMI 30-39.9)   • Diastolic CHF, chronic (CMS/HCC)   • Bradycardia       Therapy Treatment    Rehabilitation Treatment Summary     Row Name 19             Treatment Time/Intention    Discipline  physical therapy assistant  -LG      Document Type  therapy note (daily note)  -LG2      Subjective Information  complains of;weakness;fatigue;pain  -LG2      Mode of Treatment  individual therapy;physical therapy  -LG2      Patient/Family Observations  no family present  -LG2      Therapy Frequency (PT Clinical Impression)  daily  -LG3      Therapy Frequency (OT Eval)  5 times/wk  -LG3      Therapy Frequency (Swallow)  at least;3 days per week  -LG3      Patient Effort  adequate  -LG2      Existing Precautions/Restrictions  fall  -LG2      Treatment Considerations/Comments  R sided weaknesss  -LG2      Recorded by [LG] John Sher, PTA 19 0825  [LG2] John Sher, PTA 19 0930  [LG3] John Sher, PTA 19 0949      Row Name 1923             Vital Signs    Pre Systolic BP Rehab  184  -LG      Pre Treatment Diastolic BP  89  -LG      Post Systolic BP Rehab  208  -LG      Post Treatment Diastolic BP   64  -LG      Pre Patient Position  Supine  -LG      Intra Patient Position  Sitting  -LG      Post Patient Position  Supine  -LG      Recorded by [LG] John Sher, PTA 09/07/19 0949      Row Name 09/07/19 0823             Cognitive Assessment/Intervention- PT/OT    Affect/Mental Status (Cognitive)  confused;low arousal/lethargic  -LG      Orientation Status (Cognition)  oriented to;person;disoriented to;place;situation;time  -LG      Follows Commands (Cognition)  follows one step commands;over 90% accuracy;verbal cues/prompting required;physical/tactile prompts required  -LG      Recorded by [LG] John Sher, PTA 09/07/19 0949      Row Name 09/07/19 0823             Oral Motor and Function    Dentition Assessment  —  -LG      Secretion Management  —  -LG      Mucosal Quality  —  -LG      Volitional Swallow  —  -LG      Volitional Cough  —  -LG      Recorded by [LG] John Sher, PTA 09/07/19 0949      Row Name 09/07/19 0823             Oral Musculature and Cranial Nerve Assessment    Oral Motor General Assessment  —  -LG      Oral Labial or Buccal Impairment, Detail, Cranial Nerve VII (Facial):  —  -LG      Vocal Impairment, Detail. Cranial Nerve X (Vagus)  —  -LG      Recorded by [LG] John Sher, PTA 09/07/19 0949      Row Name 09/07/19 0823             General Eating/Swallowing Observations    Respiratory Support Currently in Use  —  -LG      Eating/Swallowing Skills  —  -LG      Positioning During Eating  —  -LG      Utensils Used  —  -LG      Consistencies Trialed  —  -LG      Recorded by [LG] John Sher, PTA 09/07/19 0949      Row Name 09/07/19 0823             Clinical Swallow Eval    Oral Prep Phase  —  -LG      Oral Transit  —  -LG      Oral Residue  —  -LG      Pharyngeal Phase  —  -LG      Esophageal Phase  —  -LG      Recorded by [LG] John Sher, PTA 09/07/19 0949      Row Name 09/07/19 0823             Oral Prep Concerns    Oral Prep Concerns  —  -LG      Prolonged Mastication  —  -LG       Inefficient Mastication  —  -LG      Recorded by [LG] John Sher, PTA 09/07/19 0949      Row Name 09/07/19 0823             Oral Transit Concerns    Oral Transit Concerns  —  -LG      Delayed Intiation of [place] Transit  —  -LG      Recorded by [LG] John Sher, PTA 09/07/19 0949      Row Name 09/07/19 0823             Oral Residue Concerns    Oral Residue Concerns  —  -LG      Diffuse Residue Throughout Oral Cavity  —  -LG      Recorded by [LG] John Sher, PTA 09/07/19 0949      Row Name 09/07/19 0823             Pharyngeal Phase Concerns    Pharyngeal Phase Concerns  —  -LG      Cough  —  -LG      Recorded by [LG] John Sher, PTA 09/07/19 0949      Row Name 09/07/19 0823             Clinical Impression    SLP Swallowing Diagnosis  —  -LG      Functional Impact  —  -LG      Rehab Potential/Prognosis, Swallowing  —  -LG      Swallow Criteria for Skilled Therapeutic Interventions Met  —  -LG      Recorded by [LG] John Sher, PTA 09/07/19 0949      Row Name 09/07/19 0823             Recommendations    Predicted Duration Therapy Intervention (Days)  —  -LG      SLP Diet Recommendation  —  -LG      Recommended Precautions and Strategies  —  -LG      SLP Rec. for Method of Medication Administration  —  -LG      Monitor for Signs of Aspiration  —  -LG      Recorded by [LG] John Sher, PTA 09/07/19 0949      Row Name 09/07/19 0823             Safety Issues, Functional Mobility    Impairments Affecting Function (Mobility)  —  -LG      Recorded by [LG] John Sher, PTA 09/07/19 0949      Row Name 09/07/19 0823             Bed Mobility Assessment/Treatment    Bed Mobility Assessment/Treatment  rolling left;rolling right;scooting/bridging;supine-sit;sit-supine  -LG      Rolling Left [place] (Bed Mobility)  verbal cues;maximum assist (25% patient effort)  -LG      Rolling Right [place] (Bed Mobility)  maximum assist (25% patient effort);verbal cues  -LG      Scooting/Bridging [place] (Bed  Mobility)  maximum assist (25% patient effort);2 person assist  -LG      Supine-Sit [place] (Bed Mobility)  maximum assist (25% patient effort);2 person assist  -LG      Sit-Supine [place] (Bed Mobility)  maximum assist (25% patient effort);2 person assist  -LG      Supine-Sit-Supine [place] (Bed Mobility)  maximum assist (25% patient effort);2 person assist  -LG      Bed Mobility, Safety Issues  decreased use of arms for pushing/pulling;decreased use of legs for bridging/pushing  -LG      Assistive Device (Bed Mobility)  head of bed elevated;draw sheet  -LG      Recorded by [LG] John Sher, PTA 09/07/19 0949      Row Name 09/07/19 0823             Functional Mobility    Functional Mobility- Ind. Level  unable to perform  -LG      Recorded by [LG] John Sher, PTA 09/07/19 0949      Row Name 09/07/19 0823             Upper Body Dressing Assessment/Training    Upper Body Dressing [place] Level  —  -LG      Upper Body Dressing Position  —  -LG      Recorded by [LG] John Sher, PTA 09/07/19 0949      Row Name 09/07/19 0823             Lower Body Dressing Assessment/Training    Lower Body Dressing [place] Level  —  -LG      Lower Body Dressing Position  —  -LG      Recorded by [LG] John Sher, PTA 09/07/19 0949      Row Name 09/07/19 0823             Grooming Assessment/Training    [place] Level (Grooming)  —  -LG      Assistive Devices (Grooming)  —  -LG      Grooming Position  —  -LG      Recorded by [LG] John Sher, PTA 09/07/19 0949      Row Name 09/07/19 0823             Therapeutic Exercise    Comment (Therapeutic Exercise)  B LE AAROM x 10 reps  -LG      Recorded by [LG] John Sher, PTA 09/07/19 0949      Row Name 09/07/19 0823             Positioning and Restraints    Pre-Treatment Position  in bed  -LG      Post Treatment Position  bed  -LG      In Bed  fowlers;with nsg  -LG      Recorded by [LG] John Sher, PTA 09/07/19 0949      Row Name 09/07/19 0823              Pain Scale: Numbers Pre/Post-Treatment    Pain Scale: Numbers, Pretreatment  0/10 - no pain  -LG      Pain Scale: Numbers, Post-Treatment  0/10 - no pain  -LG      Pain Location - Side  Left intermittent pain when touched  -LG      Pain Location  wrist  -LG      Recorded by [LG] John Sher, PTA 09/07/19 0949      Row Name 09/07/19 0823             Pain Scale: FACES Pre/Post-Treatment    Pain: FACES Scale, Pretreatment  0-->no hurt  -LG      Pain: FACES Scale, Post-Treatment  6-->hurts even more  -LG      Recorded by [LG] John Sher, PTA 09/07/19 0949      Row Name 09/07/19 0823             Sensory Assessment/Intervention    Sensory General Assessment  light touch sensation deficits identified;pain sensation deficits identified  -LG      Recorded by [LG] John Sher, PTA 09/07/19 0949      Row Name 09/07/19 0823             Vision Assessment/Intervention    Visual Motor Impairment  other (see comments) visual tracking WFL   -LG      Recorded by [LG] John Sher, PTA 09/07/19 0949      Row Name 09/07/19 0823             Light Touch Sensation Assessment    Left Upper Extremity: Light Touch Sensation Assessment  intact  -LG      Right Upper Extremity: Light Touch Sensation Assessment  mild impairment, 75% or more correct responses;moderate impairment, 50 to 74% correct responses  -LG      Recorded by [LG] John Sher, PTA 09/07/19 0949      Row Name 09/07/19 0823             Pain Sensation Assessment    Left Upper Extremity: Pain Sensation Assessment  intact  -LG      Right Upper Extremity: Pain Sensation Assessment  absent sensation  -LG      Recorded by [LG] John Sher, PTA 09/07/19 0949      Row Name 09/07/19 0823             Respiratory WDL    Respiratory WDL  ex  -LG      Rhythm/Pattern, Respiratory  apneic when sleeping; refused CPAP  -LG      Expansion/Accessory Muscles/Retractions  no retractions;no use of accessory muscles;expansion symmetric  -LG      Recorded by [LG] John Sher, PTA  09/07/19 0949      Row Name 09/07/19 0823             Breath Sounds    Breath Sounds  All Fields  -LG      All Lung Fields Breath Sounds  Anterior:;Lateral:;clear;equal bilaterally  -LG      JEFF Breath Sounds  Anterior:;clear  -LG      LLL Breath Sounds  Lateral:;diminished  -LG      RUL Breath Sounds  Anterior:;clear  -LG      RLL Breath Sounds  Lateral:;diminished  -LG      Recorded by [] John Sher, PTA 09/07/19 0949      Row Name 09/07/19 0823             Skin WDL    Skin WDL  ex;characteristics  -LG      Skin Color/Characteristics  petechiae;redness blanchable  -LG      Skin Temperature  warm  -LG      Skin Moisture  dry  -LG      Skin Elasticity  other (see comments) abrasion r shin  -LG      Skin Integrity  other (see comments);bruised (ecchymotic) abrasion rt shin, brusing to back/flank  -LG      Recorded by [] John Sher, PTA 09/07/19 0949      Row Name 09/07/19 0823             Coping    Observed Emotional State  calm;cooperative  -LG      Verbalized Emotional State  acceptance  -LG      Recorded by [] John Sher, PTA 09/07/19 0949      Row Name 09/07/19 0823             Outcome Summary/Treatment Plan (OT)    Anticipated Discharge Disposition (OT)  skilled nursing facility;inpatient rehabilitation facility  -LG      Recorded by [] John Sher, PTA 09/07/19 0949      Row Name 09/07/19 0823             Outcome Summary/Treatment Plan (PT)    Anticipated Discharge Disposition (PT)  inpatient rehabilitation facility;skilled nursing facility  -LG      Recorded by [] John Sher, PTA 09/07/19 0949      Row Name 09/07/19 0823             Outcome Summary/Treatment Plan (SLP)    Anticipated Dischage Disposition  unknown  -LG      Recorded by [] John Sher, PTA 09/07/19 0949        User Key  (r) = Recorded By, (t) = Taken By, (c) = Cosigned By    Initials Name Effective Dates Discipline     John Sher, PTA 08/02/16 -  PT                   Physical Therapy Education     Title: PT OT  SLP Therapies (In Progress)     Topic: Physical Therapy (In Progress)     Point: Mobility training (Done)     Learning Progress Summary           Patient Acceptance, MICHEL, CHARLY,NR by TITUS at 9/4/2019  8:11 AM    Comment:  Rolling in bed. Bed mobility.    Acceptance, MICHEL, CHARLY,NR by JAKE at 9/2/2019  7:20 AM    Comment:  Educated pt on progression of PT POC and benefits of activity                               User Key     Initials Effective Dates Name Provider Type Discipline    JAKE 08/02/16 -  Gilmer Medrano, PT DPT Physical Therapist PT    TITUS 08/02/16 -  Kathrine Ramsay, PTA Physical Therapy Assistant PT                PT Recommendation and Plan  Anticipated Discharge Disposition (PT): inpatient rehabilitation facility, skilled nursing facility  Therapy Frequency (PT Clinical Impression): daily  Outcome Summary/Treatment Plan (PT)  Anticipated Discharge Disposition (PT): inpatient rehabilitation facility, skilled nursing facility  Outcome Measures     Row Name 09/06/19 1000 09/05/19 1347          How much help from another is currently needed...    Putting on and taking off regular lower body clothing?  1  -JJ  1  -CJ     Bathing (including washing, rinsing, and drying)  2  -JJ  2  -CJ     Toileting (which includes using toilet bed pan or urinal)  1  -JJ  1  -CJ     Putting on and taking off regular upper body clothing  2  -JJ  2  -CJ     Taking care of personal grooming (such as brushing teeth)  2  -JJ  2  -CJ     Eating meals  2  -JJ  2  -CJ     AM-PAC 6 Clicks Score (OT)  10  -JJ  10  -CJ        Functional Assessment    Outcome Measure Options  AM-PAC 6 Clicks Daily Activity (OT)  -JJ  AM-PAC 6 Clicks Daily Activity (OT)  -       User Key  (r) = Recorded By, (t) = Taken By, (c) = Cosigned By    Initials Name Provider Type    CJ Emmanuel Osborne COTA/L Occupational Therapy Assistant    Aster Crawford OTR/L Occupational Therapist         Time Calculation:   PT Charges     Row Name 09/07/19 5935              Time Calculation    Start Time  0823  -      Stop Time  0846  -      Time Calculation (min)  23 min  -LG      PT Received On  09/07/19  -      PT Goal Re-Cert Due Date  09/12/19  -         Time Calculation- PT    Total Timed Code Minutes- PT  23 minute(s)  -LG        User Key  (r) = Recorded By, (t) = Taken By, (c) = Cosigned By    Initials Name Provider Type     John Sher, PTA Physical Therapy Assistant        Therapy Charges for Today     Code Description Service Date Service Provider Modifiers Qty    26809 MT THERAPEUT ACTVITY DIRECT PT CONTACT EACH 15 MIN 9/7/2019 John Sher, PTA  1    97325 MT THERAPEUTIC EXERCISES 9/7/2019 John Sher, RAJAN  1          PT G-Codes  Outcome Measure Options: AM-PAC 6 Clicks Daily Activity (OT)  AM-PAC 6 Clicks Score (PT): 8  AM-PAC 6 Clicks Score (OT): 10    John Sher PTA  9/7/2019

## 2019-09-07 NOTE — NURSING NOTE
"Patient's daughter RYAN called. Patient's sister Yaritza does not communicate with his children and had not told them that he was here. RYAN lives in [place], [place] and plans to come Sunday night or Monday morning to speak with CM or SW regarding pursing POA or guardianship. She wishes to move her father to a nursing home near her so that she can oversee his care. Gabe is one of the patient's sons and he will be here today to see the patient. He was also unaware that his father was here. RYAN states \"we are estranged from Yaritza and she does not notify us of changes in our Dad's condition\". There is also another son named Crow per the patient.   "

## 2019-09-08 NOTE — PLAN OF CARE
Problem: Patient Care Overview  Goal: Plan of Care Review  Outcome: Ongoing (interventions implemented as appropriate)   09/08/19 3317   Coping/Psychosocial   Plan of Care Reviewed With patient   Plan of Care Review   Progress improving   OTHER   Outcome Summary Pt new admit to room 340 from ICU this afternoon. Alert, disoriented to time. No neuro changes. BP improved. Condom cath in place. VSS. Will cont to monitor.        Problem: Fall Risk (Adult)  Goal: Identify Related Risk Factors and Signs and Symptoms  Outcome: Ongoing (interventions implemented as appropriate)    Goal: Absence of Fall  Outcome: Ongoing (interventions implemented as appropriate)      Problem: Pain, Acute (Adult)  Goal: Identify Related Risk Factors and Signs and Symptoms  Outcome: Ongoing (interventions implemented as appropriate)    Goal: Acceptable Pain Control/Comfort Level  Outcome: Ongoing (interventions implemented as appropriate)      Problem: Skin Injury Risk (Adult)  Goal: Identify Related Risk Factors and Signs and Symptoms  Outcome: Ongoing (interventions implemented as appropriate)    Goal: Skin Health and Integrity  Outcome: Ongoing (interventions implemented as appropriate)      Problem: Nutrition, Imbalanced: Inadequate Oral Intake (Adult)  Goal: Identify Related Risk Factors and Signs and Symptoms  Outcome: Ongoing (interventions implemented as appropriate)    Goal: Improved Oral Intake  Outcome: Ongoing (interventions implemented as appropriate)    Goal: Prevent Further Weight Loss  Outcome: Ongoing (interventions implemented as appropriate)      Problem: Stroke (Hemorrhagic) (Adult)  Goal: Signs and Symptoms of Listed Potential Problems Will be Absent, Minimized or Managed (Stroke)  Outcome: Ongoing (interventions implemented as appropriate)

## 2019-09-08 NOTE — PROGRESS NOTES
[facility] Medicine Services  INPATIENT PROGRESS NOTE    Patient Name: Phan Eastman  Date of Admission: 8/31/2019  Today's Date: 09/08/19  Length of Stay: 8  Primary Care Physician: Emmanuel Mclain MD    Subjective   Chief Complaint: congestion   HPI     Patient was seen and examined at bedside.  Patient showing some signs of congestion, it was reviewed on his overnight pulse ox last night, it showed that he saturated 388 times, but never for an extended period of time.  Patient would benefit from a nocturnal O2.  Patient blood pressure has improved, he still has increased diastolic pressures at times, however I feel as though some of the blood pressures are not accurate, as 131/121 is not a physiologic blood pressure.        Review of Systems   Constitutional: Negative for activity change, appetite change, chills, fatigue and fever.   HENT: Positive for congestion.    Respiratory: Negative for cough and shortness of breath.    Cardiovascular: Negative for chest pain and palpitations.   Gastrointestinal: Negative for abdominal distention, constipation, diarrhea, nausea and vomiting.        All pertinent negatives and positives are as above. All other systems have been reviewed and are negative unless otherwise stated.     Objective    Temp:  [97.7 °F (36.5 °C)-99.1 °F (37.3 °C)] 99.1 °F (37.3 °C)  Heart Rate:  [43-71] 66  Resp:  [14-19] 16  BP: (123-203)/() 131/121  Physical Exam  Constitutional: No distress.   HENT:   Head: Normocephalic and atraumatic.   Eyes: Conjunctivae are normal. No scleral icterus.   Neck: Neck supple. No JVD present.   Cardiovascular: Intact distal pulses. Exam reveals no gallop and no friction rub.   Murmur heard.  bradycardia   Pulmonary/Chest: Effort normal and breath sounds normal. No stridor. No respiratory distress. He has no wheezes.   Abdominal: Soft. Bowel sounds are normal. He exhibits no distension and no mass. There is  no tenderness. There is no guarding.   Musculoskeletal: He exhibits no edema.   Neurological: He is alert.   Oriented to name and place; not time; right sided weakness    Skin: Skin is warm and dry. He is not diaphoretic. No erythema.   Psychiatric: He has a normal mood and affect. His behavior is normal.   Nursing note and vitals reviewed.      Results Review:  I have reviewed the labs, radiology results, and diagnostic studies.    Laboratory Data:   Results from last 7 days   Lab Units 09/01/19  1412   WBC 10*3/mm3 11.95*   HEMOGLOBIN g/dL 12.7*   HEMATOCRIT % 39.4   PLATELETS 10*3/mm3 199        Results from last 7 days   Lab Units 09/08/19  0250 09/07/19  0331 09/06/19  0350  09/01/19  1412   SODIUM mmol/L 141 143 144   < > 141   POTASSIUM mmol/L 4.4 4.9 4.7   < > 4.1   CHLORIDE mmol/L 112* 113* 117*   < > 112*   CO2 mmol/L 23.0* 25.0 24.0   < > 23.0*   BUN mg/dL 28* 28* 27*   < > 23*   CREATININE mg/dL 1.61* 1.60* 1.60*   < > 1.54*   CALCIUM mg/dL 8.9 9.0 8.5   < > 8.8   BILIRUBIN mg/dL  --   --   --   --  1.8*   ALK PHOS U/L  --   --   --   --  79   ALT (SGPT) U/L  --   --   --   --  31   AST (SGOT) U/L  --   --   --   --  43   GLUCOSE mg/dL 81 85 80   < > 88    < > = values in this interval not displayed.       Culture Data:        Radiology Data:   Imaging Results (last 24 hours)     ** No results found for the last 24 hours. **          I have reviewed the patient's current medications.     Assessment/Plan     Active Hospital Problems    Diagnosis   • **ICH (intracerebral hemorrhage) (CMS/HCC)   • Obesity (BMI 30-39.9)   • Diastolic CHF, chronic (CMS/HCC)   • Bradycardia   • CKD (chronic kidney disease) stage 3, GFR 30-59 ml/min (CMS/HCC)   • Left renal mass   • Cerebrovascular accident (CVA) due to embolism of left middle cerebral artery (CMS/HCC)   • Dysarthria   • Diabetes mellitus (CMS/HCC)   • Hypertensive urgency   • Hyperlipidemia       Plan:  1.  Continue losartan at 100 mg, clonidine at 0.3 mg,  amlodipine 10 mg, hydralazine 100 mg, and terazosin 10 mg  2.  PRN antihypertensives  3.  Overnight pulse oximetry showed 388 desaturations, would recommend 2L NC at night   4.  Outpatient MRI with and without contrast for left renal mass  5.  Nasal saline q4h   6.  Alejo was d/c'ed   7.  Creatinine stable   8.  HCTZ 12.5 mg started today, GFR > 30 so should be effective, if GFR <30, thiazides are not helpful            Discharge Planning: I expect the patient to be discharged to ? Per attending.      Tanner Rodgers MD   09/08/19   9:56 AM

## 2019-09-08 NOTE — PLAN OF CARE
Problem: Patient Care Overview  Goal: Plan of Care Review   09/08/19 0502   OTHER   Outcome Summary VSS overnight. Pain medication given x2 for back and rib pain. 2 large incontinent episodes. Disoriented to time. 2L NC at 0200 to keep sats above 90%. Overnight pulse ox completed. Afebrile.

## 2019-09-08 NOTE — PROGRESS NOTES
Neurosurgery Daily Progress Note    Assessment:   Phan Eastman is a 64 y.o. male with a significant comorbidity diabetes with diabetic neuropathy, intracerebral ischemic stroke x3.  He presents with a new problem of sudden onset of right-sided weakness and slurred speech. Physical exam findings of right-sided hemiparesis and right facial droop.  Their imaging shows 2.7 x 2.1 acute thalamic intracerebral hemorrhage.    DDX:     ICH (intracerebral hemorrhage) (CMS/HCC)    Diabetes mellitus (CMS/HCC)    Hypertensive urgency    Hyperlipidemia    Cerebrovascular accident (CVA) due to embolism of left middle cerebral artery (CMS/HCC)    Dysarthria    CKD (chronic kidney disease) stage 3, GFR 30-59 ml/min (CMS/HCC)    Left renal mass    Obesity (BMI 30-39.9)    Diastolic CHF, chronic (CMS/HCC)    Bradycardia    Patient Active Problem List   Diagnosis   • ICH (intracerebral hemorrhage) (CMS/HCC)   • Diabetes mellitus (CMS/HCC)   • Hypertensive urgency   • Hyperlipidemia   • Cerebrovascular accident (CVA) due to embolism of left middle cerebral artery (CMS/HCC)   • Dysarthria   • CKD (chronic kidney disease) stage 3, GFR 30-59 ml/min (CMS/HCC)   • Left renal mass   • Obesity (BMI 30-39.9)   • Diastolic CHF, chronic (CMS/HCC)   • Bradycardia     Plan:   Neuro: Stable.  Confusion with minor aphasia and right-sided hemiparesis   CT stable   MRI with small left ischemic stroke.     Hx of ischemic stroke; appreciate neurology    CV: Off Cardene. SBP <160; labetolol and hydralazine with marginal blood pressures.     -180s   Oral antihypertensives adjusted per hospitalist:   Appreciate hospitalist   Echo WNL.   Carotid US; right: 50 - 69%; left: <50%  Skin: Rash on legs.  Monitor for now.   Pulm: CPAP at home.  CXR: Moderate cardiomegaly no evidence pulmonary vascular congestion.  : Renal ultrasound: 2.8 cm solid mass suspected in the upper pole of the level left   Appreciate Urology  FEN: Tolerating puréed  "diet   Appreciate speech/ nutrition  Endocrine: blood glucose well controlled  GI: PAUL  ID: PAUL  Heme:  DVT prophylaxis held for brain bleed; SCDs  Pain: Well controlled  Dispo: PT/OT   DC to floor today    Chief complaint:   Right sided weakness and speech decline    Subjective  Can not communicate    Temp:  [97.7 °F (36.5 °C)-99.1 °F (37.3 °C)] 99.1 °F (37.3 °C)  Heart Rate:  [43-71] 52  Resp:  [14-19] 16  BP: (123-208)/(58-97) 131/76    Objective:  Vital signs: (most recent): Blood pressure 131/76, pulse 52, temperature 99.1 °F (37.3 °C), temperature source Axillary, resp. rate 16, height 188 cm (74\"), weight 121 kg (267 lb), SpO2 98 %.        Neurologic Exam     Mental Status   Oriented to person.   Oriented to place.   Oriented to year.   Speech: slurred   Level of consciousness: alert ,  arousable by verbal stimuli  Able to name object. Unable to read. Unable to repeat. Unable to write. Abnormal comprehension.   Speech remains slurred but clearing.  Follows commands     Cranial Nerves     CN III, IV, VI   Pupils are equal, round, and reactive to light.  Extraocular motions are normal.     CN V   Facial sensation intact.     CN VII   Facial expression full, symmetric.   Left facial weakness: central    Motor Exam   Right arm pronator drift: absent  Left arm pronator drift: absent    Strength   Right deltoid: 4/5  Left deltoid: 5/5  Right biceps: 4/5  Left biceps: 5/5  Right triceps: 4/5  Left triceps: 5/5  Right interossei: 3/5  Right iliopsoas: 2/5  Left iliopsoas: 5/5  Right quadriceps: 2/5  Left quadriceps: 5/5  Right peroneal: 3/5  Right gastroc: 3/5  Left gastroc: 5/5  Strength/movement on right improving     Sensory Exam   Light touch normal.     Gait, Coordination, and Reflexes     Reflexes   Right : 4+  Left : 4+    Drains:  NA    Imaging Results (last 24 hours)     ** No results found for the last 24 hours. **        Lab Results (last 24 hours)     Procedure Component Value Units Date/Time    " Basic Metabolic Panel [[id number]]  (Abnormal) Collected:  09/08/19 0250    Specimen:  Blood Updated:  09/08/19 0344     Glucose 81 mg/dL      BUN 28 mg/dL      Creatinine 1.61 mg/dL      Sodium 141 mmol/L      Potassium 4.4 mmol/L      Chloride 112 mmol/L      CO2 23.0 mmol/L      Calcium 8.9 mg/dL      eGFR Non African Amer 44 mL/min/1.73      BUN/Creatinine Ratio 17.4     Anion Gap 6.0 mmol/L     Narrative:       GFR Normal >60  Chronic Kidney Disease <60  Kidney Failure <15    POC Glucose Once [[id number]]  (Abnormal) Collected:  09/07/19 2051    Specimen:  Blood Updated:  09/07/19 2102     Glucose 147 mg/dL      Comment: : [id number] Hung CodyMeter ID: [id number]       POC Glucose Once [[id number]]  (Normal) Collected:  09/07/19 1820    Specimen:  Blood Updated:  09/07/19 1849     Glucose 106 mg/dL      Comment: : [id number] Dimitry KimblecyMeter ID: [id number]       POC Glucose Once [[id number]]  (Normal) Collected:  09/07/19 1202    Specimen:  Blood Updated:  09/07/19 1213     Glucose 119 mg/dL      Comment: : [id number] Gabe MendozalinMeter ID: [id number]       POC Glucose Once [[id number]]  (Normal) Collected:  09/07/19 0828    Specimen:  Blood Updated:  09/07/19 0838     Glucose 93 mg/dL      Comment: : [id number] Gabe MendozalinMeter ID: [id number]           [id number]  Raf Wyatt MD

## 2019-09-08 NOTE — PLAN OF CARE
Problem: Patient Care Overview  Goal: Plan of Care Review  Outcome: Ongoing (interventions implemented as appropriate)      Problem: Fall Risk (Adult)  Goal: Identify Related Risk Factors and Signs and Symptoms  Outcome: Ongoing (interventions implemented as appropriate)    Goal: Absence of Fall  Outcome: Ongoing (interventions implemented as appropriate)      Problem: Pain, Acute (Adult)  Goal: Identify Related Risk Factors and Signs and Symptoms  Outcome: Ongoing (interventions implemented as appropriate)    Goal: Acceptable Pain Control/Comfort Level  Outcome: Ongoing (interventions implemented as appropriate)      Problem: Skin Injury Risk (Adult)  Goal: Identify Related Risk Factors and Signs and Symptoms  Outcome: Ongoing (interventions implemented as appropriate)    Goal: Skin Health and Integrity  Outcome: Ongoing (interventions implemented as appropriate)      Problem: Nutrition, Imbalanced: Inadequate Oral Intake (Adult)  Goal: Identify Related Risk Factors and Signs and Symptoms  Outcome: Ongoing (interventions implemented as appropriate)    Goal: Improved Oral Intake  Outcome: Ongoing (interventions implemented as appropriate)    Goal: Prevent Further Weight Loss  Outcome: Ongoing (interventions implemented as appropriate)

## 2019-09-09 NOTE — PROGRESS NOTES
[facility] Medicine Services  INPATIENT PROGRESS NOTE    Patient Name: Phan Eastman  Date of Admission: 8/31/2019  Today's Date: 09/09/19  Length of Stay: 9  Primary Care Physician: Emmanuel Mclain MD    Subjective   Chief Complaint: crying  HPI     Patient was seen and examined at bedside.  Patient had his son and daughter at bedside.  Patient was tearful.  Patient has some ecchymosis on his right side, that he is complaining of some mild pain.  Patient has been more emotional today.  BP has been better controlled, denies headache or visual changes.        Review of Systems   Constitutional: Negative for chills and fever.   Respiratory: Negative for cough and shortness of breath.    Cardiovascular: Negative for chest pain and palpitations.   Gastrointestinal: Negative for abdominal distention, abdominal pain, constipation, diarrhea, nausea and vomiting.        All pertinent negatives and positives are as above. All other systems have been reviewed and are negative unless otherwise stated.     Objective    Temp:  [98.3 °F (36.8 °C)-99.4 °F (37.4 °C)] 98.4 °F (36.9 °C)  Heart Rate:  [56-74] 56  Resp:  [16-18] 18  BP: (117-162)/(45-98) 128/58  Physical Exam  Constitutional: No distress.   HENT:   Head: Normocephalic and atraumatic.   Eyes: Conjunctivae are normal. No scleral icterus.   Neck: Neck supple. No JVD present.   Cardiovascular: Intact distal pulses. Exam reveals no gallop and no friction rub.   Murmur heard. Normal rate  Pulmonary/Chest: Effort normal and breath sounds normal. No stridor. No respiratory distress. He has no wheezes.   Abdominal: Soft. Bowel sounds are normal. He exhibits no distension and no mass. There is no tenderness. There is no guarding.   Musculoskeletal: He exhibits no edema.   Neurological: He is alert.   Oriented to name and place; not time; right sided weakness    Skin: Skin is warm and dry. He is not diaphoretic. No erythema.      Psychiatric: He has a normal mood and affect. His behavior is normal.   Nursing note and vitals reviewed.      Results Review:  I have reviewed the labs, radiology results, and diagnostic studies.    Laboratory Data:          Results from last 7 days   Lab Units 09/09/19  0658 09/08/19  0250 09/07/19  0331   SODIUM mmol/L 140 141 143   POTASSIUM mmol/L 4.4 4.4 4.9   CHLORIDE mmol/L 109 112* 113*   CO2 mmol/L 24.0 23.0* 25.0   BUN mg/dL 28* 28* 28*   CREATININE mg/dL 1.61* 1.61* 1.60*   CALCIUM mg/dL 8.8 8.9 9.0   GLUCOSE mg/dL 95 81 85       Culture Data:        Radiology Data:   Imaging Results (last 24 hours)     Procedure Component Value Units Date/Time    XR Abdomen KUB [[id number]] Updated:  09/09/19 5394          I have reviewed the patient's current medications.     Assessment/Plan     Active Hospital Problems    Diagnosis   • **ICH (intracerebral hemorrhage) (CMS/[facility])   • Obesity (BMI 30-39.9)   • Diastolic CHF, chronic (CMS/HCC)   • Bradycardia   • CKD (chronic kidney disease) stage 3, GFR 30-59 ml/min (CMS/[facility])   • Left renal mass   • Cerebrovascular accident (CVA) due to embolism of left middle cerebral artery (CMS/HCC)   • Dysarthria   • Diabetes mellitus (CMS/[facility])   • Hypertensive urgency   • Hyperlipidemia       Plan:  1.  Continue losartan at 100 mg, clonidine at 0.3 mg, amlodipine 10 mg, hydralazine 100 mg, and terazosin 10 mg  2.  PRN antihypertensives  3.  Overnight pulse oximetry showed 388 desaturations, would recommend 2L NC at night   4.  Outpatient MRI with and without contrast for left renal mass  5.  Nasal saline q4h   6.  Creatinine stable   7.  Continue HCTZ 12.5 mg     If BP stays stable, will likely sign off tomorrow.  Patient awaiting rehab placement.              Discharge Planning: I expect the patient to be discharged to rehab in ? days.    Tanner Rodgers MD   09/09/19   3:57 PM

## 2019-09-09 NOTE — THERAPY TREATMENT NOTE
Acute Care - Physical Therapy Treatment Note  [facility]     Patient Name: Phan Eastman  : 1955  MRN: [id number]  Today's Date: 2019  Onset of Illness/Injury or Date of Surgery: 19     Referring Physician: Dr. Wyatt     Admit Date: 2019    Visit Dx:    ICD-10-CM ICD-9-CM   1. Oropharyngeal dysphagia R13.12 787.22   2. Impaired mobility and ADLs Z74.09 799.89   3. Impaired mobility Z74.09 799.89   4. Left renal mass N28.89 593.9     Patient Active Problem List   Diagnosis   • ICH (intracerebral hemorrhage) (CMS/HCC)   • Diabetes mellitus (CMS/HCC)   • Hypertensive urgency   • Hyperlipidemia   • Cerebrovascular accident (CVA) due to embolism of left middle cerebral artery (CMS/HCC)   • Dysarthria   • CKD (chronic kidney disease) stage 3, GFR 30-59 ml/min (CMS/HCC)   • Left renal mass   • Obesity (BMI 30-39.9)   • Diastolic CHF, chronic (CMS/HCC)   • Bradycardia       Therapy Treatment    Rehabilitation Treatment Summary     Row Name 19 1345 19 1147          Treatment Time/Intention    Discipline  physical therapy assistant  -KJ  physical therapy assistant  -KJ     Document Type  therapy note (daily note)  -KJ  therapy note (daily note)  -KJ2     Subjective Information  complains of;pain  -KJ  complains of;pain  -KJ2     Mode of Treatment  physical therapy  -KJ  physical therapy  -KJ2     Patient/Family Observations  children present  -KJ  no family present  -KJ2     Patient Effort  good  -KJ  fair  -KJ2     Comment  keep systolic <140  (Significant)   -KJ  keep systolic <140  -KJ2     Existing Precautions/Restrictions  fall  -KJ  fall  -KJ2     Treatment Considerations/Comments  R side weakness  -KJ  R side weakness  -KJ2     Recorded by [KJ] Debby Garcia, PTA 19 1428 [KJ] Debby Garcia, PTA 19 1148  [KJ2] Debby Garcia, PTA 19 1210     Row Name 19 1345             Vital Signs    Pre Systolic BP Rehab  —  (Significant)  BP's WNL  -KJ       Recorded by [KJ] Debby Garcia, PTA 09/09/19 1428      Row Name 09/09/19 1345             Bed Mobility Assessment/Treatment    Rolling Left Garza (Bed Mobility)  moderate assist (50% patient effort);2 person assist;verbal cues  -KJ      Rolling Right Garza (Bed Mobility)  verbal cues;maximum assist (25% patient effort);2 person assist  -KJ      Scooting/Bridging Garza (Bed Mobility)  maximum assist (25% patient effort);2 person assist  -KJ      Supine-Sit Garza (Bed Mobility)  verbal cues;maximum assist (25% patient effort);2 person assist  -KJ      Sit-Supine Garza (Bed Mobility)  verbal cues;maximum assist (25% patient effort);2 person assist  -KJ      Bed Mobility, Safety Issues  decreased use of legs for bridging/pushing;impaired trunk control for bed mobility;decreased use of arms for pushing/pulling  -KJ      Assistive Device (Bed Mobility)  head of bed elevated;bed rails  -KJ      Comment (Bed Mobility)  sit edge of bed x 15 minutes max assist initially, but sitting balance improved with time. Able to sit Evan at times  -KJ      Recorded by [KJ] Debby Garcia, PTA 09/09/19 1428      Row Name 09/09/19 1345             Gait/Stairs Assessment/Training    Comment (Gait/Stairs)  not appropriate  -KJ      Recorded by [KJ] Debby Garcia, [facility] 09/09/19 1428      Row Name 09/09/19 1147             Motor Skills Assessment/Interventions    Additional Documentation  Therapeutic Exercise (Group)  -KJ      Recorded by [KJ] Debby Garcia, [facility] 09/09/19 1210      Row Name 09/09/19 1345 09/09/19 1147          Therapeutic Exercise    Exercise Type (Therapeutic Exercise)  —  AAROM (active assistive range of motion);PROM (passive range of motion)  -KJ     Position (Therapeutic Exercise)  —  supine  -KJ     Sets/Reps (Therapeutic Exercise)  —  2 sets of 10  -KJ     Comment (Therapeutic Exercise)  lateral shift to down on elbow on L to stretch R obiliques, forward weight shifting multiple  times and cueing to correct sitting balance. Reaching all planes LUE. Weight bearing RUE. Rythmic stabs all planes  -KJ  —     Recorded by [KJ] Debby Garcia, [facility] 09/09/19 1428 [KJ] Debby Garcia, [facility] 09/09/19 1210     Row Name 09/09/19 1345 09/09/19 1147          Positioning and Restraints    Pre-Treatment Position  in bed  -KJ  in bed  -KJ     Post Treatment Position  bed  -KJ  bed  -KJ     Recorded by [KJ] Debby Garcia, [facility] 09/09/19 1428 [KJ] Debby Garcia, [facility] 09/09/19 1210     Row Name 09/09/19 1345 09/09/19 1147          Pain Scale: Numbers Pre/Post-Treatment    Pain Scale: Numbers, Pretreatment  8/10  -KJ  4/10  -KJ     Pain Scale: Numbers, Post-Treatment  8/10  -KJ  4/10  -KJ     Pain Location - Side  Right  -KJ  —     Pain Location - Orientation  lower  -KJ  —     Pain Location  flank  -KJ  — generalized  -KJ     Recorded by [KJ] Debby Garcia, [facility] 09/09/19 1428 [KJ] Debby Garcia, [facility] 09/09/19 1210       User Key  (r) = Recorded By, (t) = Taken By, (c) = Cosigned By    Initials Name Effective Dates Discipline     Debby Garcia, [facility] 08/02/16 -  PT                   Physical Therapy Education     Title: PT OT SLP Therapies (In Progress)     Topic: Physical Therapy (In Progress)     Point: Mobility training (Done)     Learning Progress Summary           Patient Acceptance, E, VU,NR by TITUS at 9/4/2019  8:11 AM    Comment:  Rolling in bed. Bed mobility.    Acceptance, E, VU,NR by JAKE at 9/2/2019  7:20 AM    Comment:  Educated pt on progression of PT POC and benefits of activity                               User Key     Initials Effective Dates Name Provider Type Discipline    JAKE 08/02/16 -  Gilmer Medrano, PT DPT Physical Therapist PT    TITUS 08/02/16 -  Kathrine Ramsay PTA Physical Therapy Assistant PT                PT Recommendation and Plan     Plan of Care Reviewed With: patient  Progress: no change  Outcome Summary: PT tx completed. Pt supine in bed. BP's WNL during tx. Became alittle  low towards end of tx. MaxA x 2 bed mobility. Sitting balance max assist initially, but improved the longer he sat. At end of tx Evan to sit edge of bed. Right side weakness and R rib pn. Nursing made aware of pn. Recommend SNF for con'td PT.     Time Calculation:   PT Charges     Row Name 09/09/19 1428 09/09/19 1211          Time Calculation    Start Time  1345  -KJ  1147  -KJ     Stop Time  1418  -KJ  1211  -KJ     Time Calculation (min)  33 min  -KJ  24 min  -KJ     PT Received On  09/09/19  -KJ  09/09/19  -KJ     PT Goal Re-Cert Due Date  09/12/19  -KJ  09/12/19  -KJ        Time Calculation- PT    Total Timed Code Minutes- PT  33 minute(s)  -KJ  24 minute(s)  -KJ       User Key  (r) = Recorded By, (t) = Taken By, (c) = Cosigned By    Initials Name Provider Type    Debby Thompson PTA Physical Therapy Assistant        Therapy Charges for Today     Code Description Service Date Service Provider Modifiers Qty    65523098515 HC PT THER PROC EA 15 MIN 9/9/2019 Debby Garcia PTA GP 2    83434135316 HC PT THERAPEUTIC ACT EA 15 MIN 9/9/2019 Debby Garcia PTA GP 2          PT G-Codes  Outcome Measure Options: AM-PAC 6 Clicks Daily Activity (OT)  AM-PAC 6 Clicks Score (PT): 8  AM-PAC 6 Clicks Score (OT): 10    Debby Garcia PTA  9/9/2019

## 2019-09-09 NOTE — PLAN OF CARE
Problem: Patient Care Overview  Goal: Plan of Care Review  Outcome: Ongoing (interventions implemented as appropriate)   09/09/19 6830   Coping/Psychosocial   Plan of Care Reviewed With patient;family   Plan of Care Review   Progress improving   OTHER   Outcome Summary Right sided weakness is improving. Speech improved. Orders to assist with feeding. Monitor skin for breakdown and turn for comfort and skin care. PO medication of c/o pain. Safety maintained. SW looking for rehab placement near [place] to be closer to family.        Problem: Fall Risk (Adult)  Goal: Absence of Fall  Outcome: Ongoing (interventions implemented as appropriate)      Problem: Pain, Acute (Adult)  Goal: Acceptable Pain Control/Comfort Level  Outcome: Ongoing (interventions implemented as appropriate)      Problem: Skin Injury Risk (Adult)  Goal: Skin Health and Integrity  Outcome: Ongoing (interventions implemented as appropriate)      Problem: Nutrition, Imbalanced: Inadequate Oral Intake (Adult)  Goal: Improved Oral Intake  Outcome: Ongoing (interventions implemented as appropriate)    Goal: Prevent Further Weight Loss  Outcome: Ongoing (interventions implemented as appropriate)

## 2019-09-09 NOTE — PLAN OF CARE
Problem: Patient Care Overview  Goal: Plan of Care Review  Outcome: Ongoing (interventions implemented as appropriate)   09/09/19 7549   Coping/Psychosocial   Plan of Care Reviewed With patient;daughter;other (see comments)  (GUSTAVO Sommer)   Plan of Care Review   Progress improving   OTHER   Outcome Summary Swallow tx completed. Patient alert and cooperative with daughter at bedside. He did not eat much lunch as he said he was not very hungry. He completed several trials of thin liquids with no overt s/s of aspiration. Voice remained unchanged. Small sips from straw. Educated on importance of small sips and bites. Patient verbalized understanding. Patient Ok to upgrade to thin liquids with close monitoring by RN for increased congestion or s/s of aspiration. RN aware to dowgrade patient back to nectar thick liquids if he begins showing s/s of aspiration with thin liquids. RN was made are to continue administering meds in apple sauce for safest option at this time. SLP will follow up to ensure diet tolerance.

## 2019-09-09 NOTE — PLAN OF CARE
Problem: Patient Care Overview  Goal: Plan of Care Review  Outcome: Ongoing (interventions implemented as appropriate)   09/09/19 5072   Coping/Psychosocial   Plan of Care Reviewed With patient   Plan of Care Review   Progress no change   OTHER   Outcome Summary PT tx completed. Pt supine in bed. BP's WNL during tx. Became alittle low towards end of tx. MaxA x 2 bed mobility. Sitting balance max assist initially, but improved the longer he sat. At end of tx Evan to sit edge of bed. Right side weakness and R rib pn. Nursing made aware of pn. Recommend SNF for con'td PT.

## 2019-09-09 NOTE — THERAPY TREATMENT NOTE
Acute Care - Physical Therapy Treatment Note  [facility]     Patient Name: Phan aEstman  : 1955  MRN: [id number]  Today's Date: 2019  Onset of Illness/Injury or Date of Surgery: 19     Referring Physician: Dr. Wyatt     Admit Date: 2019    Visit Dx:    ICD-10-CM ICD-9-CM   1. Oropharyngeal dysphagia R13.12 787.22   2. Impaired mobility and ADLs Z74.09 799.89   3. Impaired mobility Z74.09 799.89   4. Left renal mass N28.89 593.9     Patient Active Problem List   Diagnosis   • ICH (intracerebral hemorrhage) (CMS/HCC)   • Diabetes mellitus (CMS/HCC)   • Hypertensive urgency   • Hyperlipidemia   • Cerebrovascular accident (CVA) due to embolism of left middle cerebral artery (CMS/HCC)   • Dysarthria   • CKD (chronic kidney disease) stage 3, GFR 30-59 ml/min (CMS/HCC)   • Left renal mass   • Obesity (BMI 30-39.9)   • Diastolic CHF, chronic (CMS/HCC)   • Bradycardia       Therapy Treatment    Rehabilitation Treatment Summary     Row Name 19 1147             Treatment Time/Intention    Discipline  physical therapy assistant  -KJ      Document Type  therapy note (daily note)  -KJ2      Subjective Information  complains of;pain  -KJ2      Mode of Treatment  physical therapy  -KJ2      Patient/Family Observations  no family present  -KJ2      Patient Effort  fair  -KJ2      Comment  keep systolic <140  -KJ2      Existing Precautions/Restrictions  fall  -KJ2      Treatment Considerations/Comments  R side weakness  -KJ2      Recorded by [KJ] Debby Garcia, PTA 19 1148  [KJ2] Debby Garcia, [facility] 19 1210      Row Name 19 1147             Motor Skills Assessment/Interventions    Additional Documentation  Therapeutic Exercise (Group)  -KJ      Recorded by [KJ] Debby Garcia, [facility] 19 1210      Row Name 19 1147             Therapeutic Exercise    Exercise Type (Therapeutic Exercise)  AAROM (active assistive range of motion);PROM (passive range of motion)  -KJ       Position (Therapeutic Exercise)  supine  -KJ      Sets/Reps (Therapeutic Exercise)  2 sets of 10  -KJ      Recorded by [KJ] Debby Garcia, [facility] 09/09/19 1210      Row Name 09/09/19 1147             Positioning and Restraints    Pre-Treatment Position  in bed  -KJ      Post Treatment Position  bed  -KJ      Recorded by [KJ] Debby Garcia, [facility] 09/09/19 1210      Row Name 09/09/19 1147             Pain Scale: Numbers Pre/Post-Treatment    Pain Scale: Numbers, Pretreatment  4/10  -KJ      Pain Scale: Numbers, Post-Treatment  4/10  -KJ      Pain Location  — generalized  -KJ      Recorded by [KJ] Debby Garcia, [facility] 09/09/19 1210        User Key  (r) = Recorded By, (t) = Taken By, (c) = Cosigned By    Initials Name Effective Dates Discipline    Debby Thompson, [facility] 08/02/16 -  PT                   Physical Therapy Education     Title: PT OT SLP Therapies (In Progress)     Topic: Physical Therapy (In Progress)     Point: Mobility training (Done)     Learning Progress Summary           Patient Acceptance, E, VU,NR by TITUS at 9/4/2019  8:11 AM    Comment:  Rolling in bed. Bed mobility.    Acceptance, E, VU,NR by JAKE at 9/2/2019  7:20 AM    Comment:  Educated pt on progression of PT POC and benefits of activity                               User Key     Initials Effective Dates Name Provider Type Discipline    JAKE 08/02/16 -  Gilmer Medrano, PT DPT Physical Therapist PT    TITUS 08/02/16 -  Kathrine Ramsay PTA Physical Therapy Assistant PT                PT Recommendation and Plan           Time Calculation:   PT Charges     Row Name 09/09/19 1211             Time Calculation    Start Time  1147  -KJ      Stop Time  1211  -KJ      Time Calculation (min)  24 min  -KJ      PT Received On  09/09/19  -KJ      PT Goal Re-Cert Due Date  09/12/19  -KJ         Time Calculation- PT    Total Timed Code Minutes- PT  24 minute(s)  -KJ        User Key  (r) = Recorded By, (t) = Taken By, (c) = Cosigned By    Initials Name Provider  Type    KJ Debby Garcia, RAJAN Physical Therapy Assistant            PT G-Codes  Outcome Measure Options: AM-PAC 6 Clicks Daily Activity (OT)  AM-PAC 6 Clicks Score (PT): 8  AM-PAC 6 Clicks Score (OT): 10    Debby Garcia PTA  9/9/2019

## 2019-09-09 NOTE — PROGRESS NOTES
Neurosurgery Daily Progress Note    Assessment:   Phan Eastman is a 64 y.o. male with a significant comorbidity diabetes with diabetic neuropathy, intracerebral ischemic stroke x3.  He presents with a new problem of sudden onset of right-sided weakness and slurred speech. Physical exam findings of right-sided hemiparesis and right facial droop.  Their imaging shows 2.7 x 2.1 acute thalamic intracerebral hemorrhage.    DDX:     ICH (intracerebral hemorrhage) (CMS/HCC)    Diabetes mellitus (CMS/HCC)    Hypertensive urgency    Hyperlipidemia    Cerebrovascular accident (CVA) due to embolism of left middle cerebral artery (CMS/HCC)    Dysarthria    CKD (chronic kidney disease) stage 3, GFR 30-59 ml/min (CMS/HCC)    Left renal mass    Obesity (BMI 30-39.9)    Diastolic CHF, chronic (CMS/HCC)    Bradycardia    Patient Active Problem List   Diagnosis   • ICH (intracerebral hemorrhage) (CMS/HCC)   • Diabetes mellitus (CMS/HCC)   • Hypertensive urgency   • Hyperlipidemia   • Cerebrovascular accident (CVA) due to embolism of left middle cerebral artery (CMS/HCC)   • Dysarthria   • CKD (chronic kidney disease) stage 3, GFR 30-59 ml/min (CMS/HCC)   • Left renal mass   • Obesity (BMI 30-39.9)   • Diastolic CHF, chronic (CMS/HCC)   • Bradycardia     Plan:   Neuro: Stable.  Confusion with minor aphasia and right-sided hemiparesis   CT stable   MRI with small left ischemic stroke.     Hx of ischemic stroke; appreciate neurology    CV: Labetolol and hydralazine with marginal blood pressures.     SBP    Oral antihypertensives adjusted per hospitalist:   Appreciate hospitalist   Echo WNL.   Carotid US; right: 50 - 69%; left: <50%  Skin: Rash on legs.  Monitor for now.   Pulm: CPAP at home.  CXR: Moderate cardiomegaly no evidence pulmonary vascular congestion.  : Renal ultrasound: 2.8 cm solid mass suspected in the upper pole of the level left   OP renal MRI with and without contrast to fully evaluate lesion with OP urology  "follow-up.   Appreciate Urology  FEN: Tolerating puréed diet; one-to-one supervision with meals   Appreciate speech/ nutrition  Endocrine: blood glucose well controlled  GI: KUB for abdominal pain.    ID: PAUL  Heme:  DVT prophylaxis held for brain bleed; SCDs  Pain: Well controlled  Dispo: PT/OT   IP rehab pending placement    Chief complaint:   Right sided weakness and speech decline    Subjective  Can not communicate    Temp:  [97.5 °F (36.4 °C)-99.4 °F (37.4 °C)] 98.3 °F (36.8 °C)  Heart Rate:  [49-74] 58  Resp:  [15-22] 18  BP: ()/() 125/60    Objective:  Vital signs: (most recent): Blood pressure 125/60, pulse 58, temperature 98.3 °F (36.8 °C), temperature source Oral, resp. rate 18, height 188 cm (74\"), weight 122 kg (269 lb), SpO2 97 %.        Neurologic Exam     Mental Status   Oriented to person.   Oriented to place.   Oriented to year.   Speech: slurred   Level of consciousness: alert ,  arousable by verbal stimuli  Able to name object. Unable to read. Unable to repeat. Unable to write. Abnormal comprehension.   Speech remains slurred but clearing.  Follows commands     Cranial Nerves     CN III, IV, VI   Pupils are equal, round, and reactive to light.  Extraocular motions are normal.     CN V   Facial sensation intact.     CN VII   Facial expression full, symmetric.   Left facial weakness: central    Motor Exam   Right arm pronator drift: absent  Left arm pronator drift: absent    Strength   Right deltoid: 4/5  Left deltoid: 5/5  Right biceps: 4/5  Left biceps: 5/5  Right triceps: 4/5  Left triceps: 5/5  Right interossei: 3/5  Right iliopsoas: 2/5  Left iliopsoas: 5/5  Right quadriceps: 2/5  Left quadriceps: 5/5  Right peroneal: 3/5  Right gastroc: 3/5  Left gastroc: 5/5  Strength/movement on right improving     Sensory Exam   Light touch normal.     Gait, Coordination, and Reflexes     Reflexes   Right : 4+  Left : 4+    Drains:  NA    Imaging Results (last 24 hours)     ** No results " found for the last 24 hours. **        Lab Results (last 24 hours)     Procedure Component Value Units Date/Time    POC Glucose Once [[id number]]  (Abnormal) Collected:  09/08/19 1928    Specimen:  Blood Updated:  09/08/19 1940     Glucose 138 mg/dL      Comment: : [id number] Melvin Davis LMeter ID: [id number]       POC Glucose Once [[id number]]  (Normal) Collected:  09/08/19 1642    Specimen:  Blood Updated:  09/08/19 1653     Glucose 92 mg/dL      Comment: : [id number] Chante NicoleMeter ID: [id number]       POC Glucose Once [[id number]]  (Abnormal) Collected:  09/08/19 1150    Specimen:  Blood Updated:  09/08/19 1215     Glucose 68 mg/dL      Comment: : [id number] Jan RaineyunaMeter ID: [id number]       POC Glucose Once [[id number]]  (Normal) Collected:  09/08/19 0806    Specimen:  Blood Updated:  09/08/19 0835     Glucose 90 mg/dL      Comment: : [id number] Naseem GilbertenMeter ID: [id number]           [id number]  Heriberto Avalos, APRN

## 2019-09-09 NOTE — THERAPY TREATMENT NOTE
Acute Care - Speech Language Pathology   Swallow Treatment Note [facility]     Patient Name: Phan Eastman  : 1955  MRN: [id number]  Today's Date: 2019  Onset of Illness/Injury or Date of Surgery: 19     Referring Physician: Dr. Wyatt       Admit Date: 2019  Swallow tx completed. Patient alert and cooperative with daughter at bedside. He did not eat much lunch as he said he was not very hungry. He completed several trials of thin liquids with no overt s/s of aspiration. Voice remained unchanged. Small sips from straw. Educated on importance of small sips and bites. Patient verbalized understanding. Patient Ok to upgrade to thin liquids with close monitoring by RN for increased congestion or s/s of aspiration. RN aware to dowgrade patient back to nectar thick liquids if he begins showing s/s of aspiration with thin liquids. RN was made are to continue administering meds in apple sauce for safest option at this time. SLP will follow up to ensure diet tolerance.   Krystal Babcock MS CCC-SLP 2019 2:44 PM    Visit Dx:      ICD-10-CM ICD-9-CM   1. Oropharyngeal dysphagia R13.12 787.22   2. Impaired mobility and ADLs Z74.09 799.89   3. Impaired mobility Z74.09 799.89   4. Left renal mass N28.89 593.9     Patient Active Problem List   Diagnosis   • ICH (intracerebral hemorrhage) (CMS/HCC)   • Diabetes mellitus (CMS/HCC)   • Hypertensive urgency   • Hyperlipidemia   • Cerebrovascular accident (CVA) due to embolism of left middle cerebral artery (CMS/HCC)   • Dysarthria   • CKD (chronic kidney disease) stage 3, GFR 30-59 ml/min (CMS/HCC)   • Left renal mass   • Obesity (BMI 30-39.9)   • Diastolic CHF, chronic (CMS/HCC)   • Bradycardia       Therapy Treatment  Rehabilitation Treatment Summary     Row Name 19 1413 19 1345 19 1147       Treatment Time/Intention    Discipline  speech language pathologist  -MM  physical therapy assistant  -KJ  physical therapy assistant  -KJ     Document Type  therapy note (daily note)  -MM  therapy note (daily note)  -KJ  therapy note (daily note)  -KJ2    Subjective Information  no complaints  -MM  complains of;pain  -KJ  complains of;pain  -KJ2    Mode of Treatment  individual therapy;speech-language pathology  -MM  physical therapy  -KJ  physical therapy  -KJ2    Patient/Family Observations  Daughter present.  -MM  children present  -KJ  no family present  -KJ2    Care Plan Review  care plan/treatment goals reviewed  -MM  —  —    Therapy Frequency (Swallow)  at least;3 days per week  -MM  —  —    Patient Effort  good  -MM  good  -KJ  fair  -KJ2    Comment  —  keep systolic <140  (Significant)   -KJ  keep systolic <140  -KJ2    Existing Precautions/Restrictions  —  fall  -KJ  fall  -KJ2    Treatment Considerations/Comments  —  R side weakness  -KJ  R side weakness  -KJ2    Recorded by [MM] Krystal Babcock, MS CCC-SLP 09/09/19 1438 [KJ] Debby Garcia, PTA 09/09/19 1428 [KJ] Debby Garcia, [facility] 09/09/19 1148  [KJ2] Debby Garcia, [facility] 09/09/19 1210    Row Name 09/09/19 1345             Vital Signs    Pre Systolic BP Rehab  —  (Significant)  BP's WNL  -KJ      Recorded by [KJ] Debby Garcia, [facility] 09/09/19 1428      Row Name 09/09/19 1345             Bed Mobility Assessment/Treatment    Rolling Left [place] (Bed Mobility)  moderate assist (50% patient effort);2 person assist;verbal cues  -KJ      Rolling Right [place] (Bed Mobility)  verbal cues;maximum assist (25% patient effort);2 person assist  -KJ      Scooting/Bridging [place] (Bed Mobility)  maximum assist (25% patient effort);2 person assist  -KJ      Supine-Sit [place] (Bed Mobility)  verbal cues;maximum assist (25% patient effort);2 person assist  -KJ      Sit-Supine [place] (Bed Mobility)  verbal cues;maximum assist (25% patient effort);2 person assist  -KJ      Bed Mobility, Safety Issues  decreased use of legs for bridging/pushing;impaired trunk control for  bed mobility;decreased use of arms for pushing/pulling  -KJ      Assistive Device (Bed Mobility)  head of bed elevated;bed rails  -KJ      Comment (Bed Mobility)  sit edge of bed x 15 minutes max assist initially, but sitting balance improved with time. Able to sit Evan at times  -KJ      Recorded by [KJ] Debby Gacria, [facility] 09/09/19 1428      Row Name 09/09/19 1345             Gait/Stairs Assessment/Training    Comment (Gait/Stairs)  not appropriate  -KJ      Recorded by [KJ] Debby Garcia, [facility] 09/09/19 1428      Row Name 09/09/19 1147             Motor Skills Assessment/Interventions    Additional Documentation  Therapeutic Exercise (Group)  -KJ      Recorded by [KJ] Debby Garcia, [facility] 09/09/19 1210      Row Name 09/09/19 1345 09/09/19 1147          Therapeutic Exercise    Exercise Type (Therapeutic Exercise)  —  AAROM (active assistive range of motion);PROM (passive range of motion)  -KJ     Position (Therapeutic Exercise)  —  supine  -KJ     Sets/Reps (Therapeutic Exercise)  —  2 sets of 10  -KJ     Comment (Therapeutic Exercise)  lateral shift to down on elbow on L to stretch R obiliques, forward weight shifting multiple times and cueing to correct sitting balance. Reaching all planes LUE. Weight bearing RUE. Rythmic stabs all planes  -KJ  —     Recorded by [KJ] Debby Garcia, [facility] 09/09/19 1428 [KJ] Debby Garcia, [facility] 09/09/19 1210     Row Name 09/09/19 1345 09/09/19 1147          Positioning and Restraints    Pre-Treatment Position  in bed  -KJ  in bed  -KJ     Post Treatment Position  bed  -KJ  bed  -KJ     Recorded by [KJ] Debby Garcia, [facility] 09/09/19 1428 [KJ] Debby Garcia, [facility] 09/09/19 1210     Row Name 09/09/19 1413             Pain Assessment    Additional Documentation  Pain Scale: Numbers Pre/Post-Treatment (Group)  -MM      Recorded by [MM] Krystal Babcock, MS CCC-SLP 09/09/19 1438      Row Name 09/09/19 1413 09/09/19 1345 09/09/19 1147       Pain Scale: Numbers Pre/Post-Treatment     Pain Scale: Numbers, Pretreatment  0/10 - no pain  -MM  8/10  -KJ  4/10  -KJ    Pain Scale: Numbers, Post-Treatment  0/10 - no pain  -MM  8/10  -KJ  4/10  -KJ    Pain Location - Side  —  Right  -KJ  —    Pain Location - Orientation  —  lower  -KJ  —    Pain Location  —  flank  -KJ  — generalized  -KJ    Recorded by [MM] Krystal Babcock, MS CCC-SLP 09/09/19 1438 [KJ] Debby Garcia, PTA 09/09/19 1428 [KJ] Debby Garcia, PTA 09/09/19 1210    Row Name 09/09/19 1413             Outcome Summary/Treatment Plan (SLP)    Daily Summary of Progress (SLP)  progress toward functional goals as expected  -MM      Barriers to Overall Progress (SLP)  n/a  -MM      Plan for Continued Treatment (SLP)  Continue to follow.  -MM      Anticipated Dischage Disposition  unknown  -MM      Recorded by [MM] Krystal Babcock MS CCC-SLP 09/09/19 1438        User Key  (r) = Recorded By, (t) = Taken By, (c) = Cosigned By    Initials Name Effective Dates Discipline    KJ Debby Garcia, PTA 08/02/16 -  PT    MM Krystal Babcock, MS CCC-SLP 05/24/19 -  SLP          Outcome Summary  Outcome Summary/Treatment Plan (SLP)  Daily Summary of Progress (SLP): progress toward functional goals as expected (09/09/19 1413 : Krystal Babcock, MS CCC-SLP)  Barriers to Overall Progress (SLP): n/a (09/09/19 1413 : Krystal Babcock, MS CCC-SLP)  Plan for Continued Treatment (SLP): Continue to follow. (09/09/19 1413 : Krystal Babcock, MS CCC-SLP)  Anticipated Dischage Disposition: unknown (09/09/19 1413 : Krystal Babcock, MS [facility])      SLP GOALS     Row Name 09/09/19 1413             Oral Nutrition/Hydration Goal 1 (SLP)    Oral Nutrition/Hydration Goal 1, SLP  LTG: Patient will tolerate LRD with no overt s/s of aspiration.  -MM      Time Frame (Oral Nutrition/Hydration Goal 1, SLP)  by discharge  -MM      Barriers (Oral Nutrition/Hydration Goal 1, SLP)  n/a  -MM      Progress/Outcomes (Oral Nutrition/Hydration Goal 1, SLP)   continuing progress toward goal  -MM         Labial Strengthening Goal 1 (SLP)    Activity (Labial Strengthening Goal 1, SLP)  increase labial tone  -MM      Increase Labial Tone  labial resistance exercises  -MM      [place]/Accuracy (Labial Strengthening Goal 1, SLP)  independently (over 90% accuracy)  -MM      Time Frame (Labial Strengthening Goal 1, SLP)  short term goal (STG);by discharge  -MM      Barriers (Labial Strengthening Goal 1, SLP)  n/a  -MM      Progress/Outcomes (Labial Strengthening Goal 1, SLP)  continuing progress toward goal  -MM         Lingual Strengthening Goal 1 (SLP)    Activity (Lingual Strengthening Goal 1, SLP)  increase lingual tone/sensation/control/coordination/movement  -MM      Increase Lingual Tone/Sensation/Control/Coordination/Movement  lingual movement exercises  -MM      [place]/Accuracy (Lingual Strengthening Goal 1, SLP)  independently (over 90% accuracy)  -MM      Time Frame (Lingual Strengthening Goal 1, SLP)  by discharge  -MM      Barriers (Lingual Strengthening Goal 1, SLP)  n/a  -MM      Progress/Outcomes (Lingual Strengthening Goal 1, SLP)  continuing progress toward goal  -MM         Pharyngeal Strengthening Exercise Goal 1 (SLP)    Activity (Pharyngeal Strengthening Goal 1, SLP)  increase epiglottic inversion and retroflexion;increase tongue base retraction;increase pharyngeal sensation  -MM      Increase Pharyngeal Sensation  gustatory stimulation (sour/cold)  -MM      Increase Epiglottic Inversion and Retroflexion  Mendelsohn;falsetto  -MM      Increase Tongue Base Retraction  hard effortful swallow;kaleb  -MM      [place]/Accuracy (Pharyngeal Strengthening Goal 1, SLP)  independently (over 90% accuracy)  -MM      Time Frame (Pharyngeal Strengthening Goal 1, SLP)  short term goal (STG);by discharge  -MM      Barriers (Pharyngeal Strengthening Goal 1, SLP)  n/a  -MM      Progress/Outcomes (Pharyngeal Strengthening Goal 1, SLP)  goal ongoing  -MM         User Key  (r) = Recorded By, (t) = Taken By, (c) = Cosigned By    Initials Name Provider Type    Krystal Monroy MS CCC-SLP Speech and Language Pathologist          EDUCATION  The patient has been educated in the following areas:   Dysphagia (Swallowing Impairment) Oral Care/Hydration Modified Diet Instruction.    SLP Recommendation and Plan  Daily Summary of Progress (SLP): progress toward functional goals as expected  Barriers to Overall Progress (SLP): n/a  Plan for Continued Treatment (SLP): Continue to follow.  Anticipated Dischage Disposition: unknown                    Time Calculation:   Time Calculation- SLP     Row Name 09/09/19 1443             Time Calculation- SLP    SLP Start Time  1413  -MM      SLP Stop Time  1425  -MM      SLP Time Calculation (min)  12 min  -MM      SLP Received On  09/09/19  -MM        User Key  (r) = Recorded By, (t) = Taken By, (c) = Cosigned By    Initials Name Provider Type    Krystal Monroy MS CCC-SLP Speech and Language Pathologist          Therapy Charges for Today     Code Description Service Date Service Provider Modifiers Qty    [id number] HC ST TREATMENT SWALLOW 1 9/9/2019 Krystal Babcock MS CCC-SLP GN 1                 Krystal Babcock MS CCC-RAVEN  9/9/2019

## 2019-09-09 NOTE — PLAN OF CARE
Problem: Patient Care Overview  Goal: Plan of Care Review  Outcome: Ongoing (interventions implemented as appropriate)   09/09/19 1441   Plan of Care Review   Progress no change   OTHER   Outcome Summary RD nutrition follow-up completed. Pt's po intake is 50% of one meal at this time. Will continue to follow for d/c needs, however no needs are noted.

## 2019-09-09 NOTE — THERAPY TREATMENT NOTE
Acute Care - Occupational Therapy Treatment Note  [facility]     Patient Name: Phan Eastman  : 1955  MRN: [id number]  Today's Date: 2019  Onset of Illness/Injury or Date of Surgery: 19  Date of Referral to OT: 19  Referring Physician: Dr. Wyatt     Admit Date: 2019       ICD-10-CM ICD-9-CM   1. Oropharyngeal dysphagia R13.12 787.22   2. Impaired mobility and ADLs Z74.09 799.89   3. Impaired mobility Z74.09 799.89   4. Left renal mass N28.89 593.9     Patient Active Problem List   Diagnosis   • ICH (intracerebral hemorrhage) (CMS/HCC)   • Diabetes mellitus (CMS/HCC)   • Hypertensive urgency   • Hyperlipidemia   • Cerebrovascular accident (CVA) due to embolism of left middle cerebral artery (CMS/HCC)   • Dysarthria   • CKD (chronic kidney disease) stage 3, GFR 30-59 ml/min (CMS/HCC)   • Left renal mass   • Obesity (BMI 30-39.9)   • Diastolic CHF, chronic (CMS/HCC)   • Bradycardia     Past Medical History:   Diagnosis Date   • Diabetes mellitus (CMS/HCC)    • GERD (gastroesophageal reflux disease)    • Hyperlipidemia    • Hypertension    • Neuropathy    • Osteoarthritis    • TIA (transient ischemic attack)      Past Surgical History:   Procedure Laterality Date   • CARPAL TUNNEL RELEASE Bilateral    • EYE SURGERY     • KNEE SURGERY Bilateral    • NECK SURGERY     • RETINAL DETACHMENT SURGERY     • ROTATOR CUFF REPAIR         Therapy Treatment    Rehabilitation Treatment Summary     Row Name 19 1610 19 1413 19 1345       Treatment Time/Intention    Discipline  occupational therapist  -MW  speech language pathologist  -MM  physical therapy assistant  -KJ    Document Type  therapy note (daily note)  -MW  therapy note (daily note)  -MM  therapy note (daily note)  -KJ    Subjective Information  no complaints  -MW  no complaints  -MM  complains of;pain  -KJ    Mode of Treatment  —  individual therapy;speech-language pathology  -MM  physical therapy  -KJ    Patient/Family  Observations  family present  -MW2  Daughter present.  -MM  children present  -KJ    Care Plan Review  —  care plan/treatment goals reviewed  -MM  —    Therapy Frequency (Swallow)  —  at least;3 days per week  -MM  —    Patient Effort  —  good  -MM  good  -KJ    Comment  —  —  keep systolic <140  (Significant)   -KJ    Existing Precautions/Restrictions  fall  -MW2  —  fall  -KJ    Treatment Considerations/Comments  —  —  R side weakness  -KJ    Recorded by [MW] Elisa Chung OTR/L 09/09/19 1612  [MW2] Elisa Chung, OTR/L 09/09/19 1618 [MM] Krystal Babcock, MS CCC-SLP 09/09/19 1438 [KJ] Debby Garcia, PTA 09/09/19 1428    Row Name 09/09/19 1147             Treatment Time/Intention    Discipline  physical therapy assistant  -KJ      Document Type  therapy note (daily note)  -KJ2      Subjective Information  complains of;pain  -KJ2      Mode of Treatment  physical therapy  -KJ2      Patient/Family Observations  no family present  -KJ2      Patient Effort  fair  -KJ2      Comment  keep systolic <140  -KJ2      Existing Precautions/Restrictions  fall  -KJ2      Treatment Considerations/Comments  R side weakness  -KJ2      Recorded by [KJ] Debby Garcia, PTA 09/09/19 1148  [KJ2] Debby Garcia, PTA 09/09/19 1210      Row Name 09/09/19 1345             Vital Signs    Pre Systolic BP Rehab  —  (Significant)  BP's WNL  -KJ      Recorded by [KJ] Debby Garcia, PTA 09/09/19 1428      Row Name 09/09/19 1610             Cognitive Assessment/Intervention- PT/OT    Personal Safety Interventions  fall prevention program maintained;muscle strengthening facilitated;nonskid shoes/slippers when out of bed;supervised activity  -MW      Recorded by [MW] Elisa Chung OTR/L 09/09/19 1618      Row Name 09/09/19 1610 09/09/19 1345          Bed Mobility Assessment/Treatment    Rolling Left Novi (Bed Mobility)  —  moderate assist (50% patient effort);2 person assist;verbal cues  -KJ     Rolling Right  Knapp (Bed Mobility)  —  verbal cues;maximum assist (25% patient effort);2 person assist  -KJ     Scooting/Bridging Knapp (Bed Mobility)  —  maximum assist (25% patient effort);2 person assist  -KJ     Supine-Sit Knapp (Bed Mobility)  —  verbal cues;maximum assist (25% patient effort);2 person assist  -KJ     Sit-Supine Knapp (Bed Mobility)  —  verbal cues;maximum assist (25% patient effort);2 person assist  -KJ     Bed Mobility, Safety Issues  —  decreased use of legs for bridging/pushing;impaired trunk control for bed mobility;decreased use of arms for pushing/pulling  -KJ     Assistive Device (Bed Mobility)  —  head of bed elevated;bed rails  -KJ     Comment (Bed Mobility)  up in chair  -MW  sit edge of bed x 15 minutes max assist initially, but sitting balance improved with time. Able to sit Sugar at times  -KJ     Recorded by [MW] Elisa Chung OTR/L 09/09/19 1618 [KJ] Debby Garcia, PTA 09/09/19 1428     Row Name 09/09/19 1345             Gait/Stairs Assessment/Training    Comment (Gait/Stairs)  not appropriate  -KJ      Recorded by [KJ] Debby Garcia, PTA 09/09/19 1428      Row Name 09/09/19 1610             ADL Assessment/Intervention    35494 - OT Self Care/Mgmt Minutes  35  -MW      BADL Assessment/Intervention  grooming;bathing  -MW      Recorded by [MW] Elisa Chung OTR/L 09/09/19 1702      Row Name 09/09/19 1610             Bathing Assessment/Intervention    Bathing Knapp Level  minimum assist (75% patient effort);set up  -MW      Bathing Position  supported sitting  -MW      Comment (Bathing)  washed hair using shower cap, Sugar for thoroughness  -MW      Recorded by [MW] Elisa Chung OTR/L 09/09/19 1702      Row Name 09/09/19 1610             Grooming Assessment/Training    Knapp Level (Grooming)  hair care, combing/brushing;wash face, hands;shave face;moderate assist (50% patient effort);verbal cues;nonverbal cues (demo/gesture)  -MW       Grooming Position  supported sitting  -MW      Comment (Grooming)  Pt positioned upright in chair supported on R side by pillows. Performed shaving with overall modA for task completion, modA to stabilize R hand to dispense shaving cream and completed half of task with L UE. Assist required for thoroughness d/t increased fatigue.  -MW      Recorded by [MW] Elisa Chung, OTR/L 09/09/19 1702      Row Name 09/09/19 1610             General ROM    RT Upper Ext  —  -MW      Recorded by [MW] Elisa Chung OTR/L 09/09/19 1702      Row Name 09/09/19 1147             Motor Skills Assessment/Interventions    Additional Documentation  Therapeutic Exercise (Group)  -KJ      Recorded by [KJ] Debby Garcia, RAJAN 09/09/19 1210      Row Name 09/09/19 1610             Therapeutic Exercise    38421 - OT Therapeutic Exercise Minutes  15  -MW      Recorded by [MW] Elisa Chung, OTR/L 09/09/19 1702      Row Name 09/09/19 1610 09/09/19 1345 09/09/19 1147       Therapeutic Exercise    Upper Extremity Range of Motion (Therapeutic Exercise)  shoulder flexion/extension, right;shoulder abduction/adduction, right;shoulder horizontal abduction/adduction, right;elbow flexion/extension, right  -MW  —  —    Exercise Type (Therapeutic Exercise)  AAROM (active assistive range of motion);PROM (passive range of motion)  -MW  —  AAROM (active assistive range of motion);PROM (passive range of motion)  -KJ    Position (Therapeutic Exercise)  seated  -MW  —  supine  -KJ    Sets/Reps (Therapeutic Exercise)  2 sets of 10  -MW  —  2 sets of 10  -KJ    Expected Outcome (Therapeutic Exercise)  improve functional tolerance, self-care activity;improve neuromuscular control;improve object manipulation, self-care activity;improve performance, BADLs  -MW  —  —    Comment (Therapeutic Exercise)  —  lateral shift to down on elbow on L to stretch R obiliques, forward weight shifting multiple times and cueing to correct sitting balance. Reaching all planes  LUE. Weight bearing RUE. Rythmic stabs all planes  -KJ  —    Recorded by [MW] Elisa Chung OTR/L 09/09/19 1702 [KJ] Debby Garcia, PTA 09/09/19 1428 [KJ] Debby Garcia, PTA 09/09/19 1210    Row Name 09/09/19 1610 09/09/19 1345 09/09/19 1147       Positioning and Restraints    Pre-Treatment Position  sitting in chair/recliner  -MW  in bed  -KJ  in bed  -KJ    Post Treatment Position  chair  -MW  bed  -KJ  bed  -KJ    In Chair  sitting;call light within reach;encouraged to call for assist;with family/caregiver;legs elevated on lift pad, pillow supporting trunk on R  -MW2  —  —    Recorded by [MW] Elisa Chung OTR/L 09/09/19 1702  [MW2] Elisa Chung OTR/L 09/09/19 1704 [KJ] Debby Garcia, PTA 09/09/19 1428 [KJ] Debby Garcia, PTA 09/09/19 1210    Row Name 09/09/19 1610 09/09/19 1413          Pain Assessment    Additional Documentation  Pain Scale: Numbers Pre/Post-Treatment (Group)  -MW  Pain Scale: Numbers Pre/Post-Treatment (Group)  -MM     Recorded by [MW] Elisa Chung OTR/L 09/09/19 1704 [MM] Krystal Babcock, MS CCC-SLP 09/09/19 1438     Row Name 09/09/19 1610 09/09/19 1413 09/09/19 1345       Pain Scale: Numbers Pre/Post-Treatment    Pain Scale: Numbers, Pretreatment  0/10 - no pain  -MW  0/10 - no pain  -MM  8/10  -KJ    Pain Scale: Numbers, Post-Treatment  0/10 - no pain  -MW  0/10 - no pain  -MM  8/10  -KJ    Pain Location - Side  —  —  Right  -KJ    Pain Location - Orientation  —  —  lower  -KJ    Pain Location  —  —  flank  -KJ    Recorded by [MW] Elisa Chung OTR/L 09/09/19 1704 [MM] Krystal Babcock, MS CCC-SLP 09/09/19 1438 [KJ] Debby Garcia, PTA 09/09/19 1428    Row Name 09/09/19 1147             Pain Scale: Numbers Pre/Post-Treatment    Pain Scale: Numbers, Pretreatment  4/10  -KJ      Pain Scale: Numbers, Post-Treatment  4/10  -KJ      Pain Location  — generalized  -KJ      Recorded by [KJ] Debby Garcia, PTA 09/09/19 1210      Row Name 09/09/19 1613              Plan of Care Review    Plan of Care Reviewed With  patient;family  -MW      Recorded by [MW] Elisa Chung, OTR/L 09/09/19 1704      Row Name 09/09/19 1610             Outcome Summary/Treatment Plan (OT)    Daily Summary of Progress (OT)  progress towards functional goals is fair  -MW      Anticipated Discharge Disposition (OT)  skilled nursing facility;inpatient rehabilitation facility  -MW      Recorded by [MW] Elisa Chung, OTR/L 09/09/19 1704      Row Name 09/09/19 1413             Outcome Summary/Treatment Plan (SLP)    Daily Summary of Progress (SLP)  progress toward functional goals as expected  -MM      Barriers to Overall Progress (SLP)  n/a  -MM      Plan for Continued Treatment (SLP)  Continue to follow.  -MM      Anticipated Dischage Disposition  unknown  -MM      Recorded by [MM] Krystal Babcock, MS CCC-SLP 09/09/19 1438        User Key  (r) = Recorded By, (t) = Taken By, (c) = Cosigned By    Initials Name Effective Dates Discipline    KJ Debby Garcia, PTA 08/02/16 -  PT    Elisa Nolan, OTR/L 08/28/18 -  OT    MM Krystal Babcock, MS CCC-SLP 05/24/19 -  SLP           Rehab Goal Summary     Row Name 09/09/19 1413             Swallow Goals (SLP)    Oral Nutrition/Hydration Goal Selection (SLP)  oral nutrition/hydration, SLP goal 1  -MM      Labial Strengthening Goal Selection (SLP)  labial strengthening, SLP goal 1  -MM      Lingual Strengthening Goal Selection (SLP)  lingual strengthening, SLP goal 1  -MM      Pharyngeal Strengthening Exercise Goal Selection (SLP)  pharyngeal strengthening exercise, SLP goal 1  -MM      Additional Documentation  lingual strengthening goal selection (SLP)  -MM         Oral Nutrition/Hydration Goal 1 (SLP)    Oral Nutrition/Hydration Goal 1, SLP  LTG: Patient will tolerate LRD with no overt s/s of aspiration.  -MM      Time Frame (Oral Nutrition/Hydration Goal 1, SLP)  by discharge  -MM      Barriers (Oral Nutrition/Hydration Goal 1, SLP)  n/a   -MM      Progress/Outcomes (Oral Nutrition/Hydration Goal 1, SLP)  continuing progress toward goal  -MM         Labial Strengthening Goal 1 (SLP)    Activity (Labial Strengthening Goal 1, SLP)  increase labial tone  -MM      Increase Labial Tone  labial resistance exercises  -MM      Guadalupe/Accuracy (Labial Strengthening Goal 1, SLP)  independently (over 90% accuracy)  -MM      Time Frame (Labial Strengthening Goal 1, SLP)  short term goal (STG);by discharge  -MM      Barriers (Labial Strengthening Goal 1, SLP)  n/a  -MM      Progress/Outcomes (Labial Strengthening Goal 1, SLP)  continuing progress toward goal  -MM         Lingual Strengthening Goal 1 (SLP)    Activity (Lingual Strengthening Goal 1, SLP)  increase lingual tone/sensation/control/coordination/movement  -MM      Increase Lingual Tone/Sensation/Control/Coordination/Movement  lingual movement exercises  -MM      [place]upe/Accuracy (Lingual Strengthening Goal 1, SLP)  independently (over 90% accuracy)  -MM      Time Frame (Lingual Strengthening Goal 1, SLP)  by discharge  -MM      Barriers (Lingual Strengthening Goal 1, SLP)  n/a  -MM      Progress/Outcomes (Lingual Strengthening Goal 1, SLP)  continuing progress toward goal  -MM         Pharyngeal Strengthening Exercise Goal 1 (SLP)    Activity (Pharyngeal Strengthening Goal 1, SLP)  increase epiglottic inversion and retroflexion;increase tongue base retraction;increase pharyngeal sensation  -MM      Increase Pharyngeal Sensation  gustatory stimulation (sour/cold)  -MM      Increase Epiglottic Inversion and Retroflexion  Mendelsohn;falsetto  -MM      Increase Tongue Base Retraction  hard effortful swallow;kaleb  -MM      Guadalupe/Accuracy (Pharyngeal Strengthening Goal 1, SLP)  independently (over 90% accuracy)  -MM      Time Frame (Pharyngeal Strengthening Goal 1, SLP)  short term goal (STG);by discharge  -MM      Barriers (Pharyngeal Strengthening Goal 1, SLP)  n/a  -MM       Progress/Outcomes (Pharyngeal Strengthening Goal 1, SLP)  goal ongoing  -MM        User Key  (r) = Recorded By, (t) = Taken By, (c) = Cosigned By    Initials Name Provider Type Discipline    Krystal Monroy MS CCC-SLP Speech and Language Pathologist SLP        Occupational Therapy Education     Title: PT OT SLP Therapies (In Progress)     Topic: Occupational Therapy (Done)     Point: ADL training (Done)     Description: Instruct learner(s) on proper safety adaptation and remediation techniques during self care or transfers.   Instruct in proper use of assistive devices.    Learning Progress Summary           Patient Acceptance, E, VU by FORTINO at 9/9/2019  5:09 PM    Comment:  mind muscle connection for neuroplasticity, ADL, OT POC    Acceptance, E, VU by RYAN at 9/6/2019 10:30 AM    Comment:  OT POC, adls, t/fs, bed mobility, importance of positioning, neuromuscular reeducation techniques.    Acceptance, E,TB, VU,DU,NR by YUDI at 9/5/2019  2:50 PM    Comment:  Pt. takes washcloth presented to him by this orellana/l and with min. assist using Rue washes his face and hands, no issues with Lue rom with washcloth! Ue exs performed with Lue Ind'ly, min. assist with Rue!    Acceptance, E, NR by RYAN at 9/2/2019  8:35 AM    Comment:  OT POC, benefits and roles of OT, adls, t/fs, bed mobility, positioning for prevention of skin breakdown of joint protection.                   Point: Home exercise program (Done)     Description: Instruct learner(s) on appropriate technique for monitoring, assisting and/or progressing therapeutic exercises/activities.    Learning Progress Summary           Patient Acceptance, E, VU by FORTINO at 9/9/2019  5:09 PM    Comment:  mind muscle connection for neuroplasticity, ADL, OT POC    Acceptance, E,TB, VU,DU,NR by YUDI at 9/5/2019  2:50 PM    Comment:  Pt. takes washcloth presented to him by this orellnaa/l and with min. assist using Rue washes his face and hands, no issues with Lue rom with washcloth! Ue exs  performed with Lue Ind'ly, min. assist with Rue!                   Point: Precautions (Done)     Description: Instruct learner(s) on prescribed precautions during self-care and functional transfers.    Learning Progress Summary           Patient Acceptance, E, VU by FORTINO at 9/9/2019  5:09 PM    Comment:  mind muscle connection for neuroplasticity, ADL, OT POC    Acceptance, E, VU by RYAN at 9/6/2019 10:30 AM    Comment:  OT POC, adls, t/fs, bed mobility, importance of positioning, neuromuscular reeducation techniques.    Acceptance, E,TB, VU,DU,NR by YUDI at 9/5/2019  2:50 PM    Comment:  Pt. takes washcloth presented to him by this orellana/l and with min. assist using Rue washes his face and hands, no issues with Lue rom with washcloth! Ue exs performed with Lue Ind'ly, min. assist with Rue!    Acceptance, E, NR by RYAN at 9/2/2019  8:35 AM    Comment:  OT POC, benefits and roles of OT, adls, t/fs, bed mobility, positioning for prevention of skin breakdown of joint protection.                   Point: Body mechanics (Done)     Description: Instruct learner(s) on proper positioning and spine alignment during self-care, functional mobility activities and/or exercises.    Learning Progress Summary           Patient Acceptance, E, VU by FORTINO at 9/9/2019  5:09 PM    Comment:  mind muscle connection for neuroplasticity, ADL, OT POC    Acceptance, E,TB, VU,DU,NR by YUDI at 9/5/2019  2:50 PM    Comment:  Pt. takes washcloth presented to him by this orellana/l and with min. assist using Rue washes his face and hands, no issues with Lue rom with washcloth! Ue exs performed with Lue Ind'ly, min. assist with Rue!                               User Key     Initials Effective Dates Name Provider Type Discipline    YUDI 08/02/16 -  Emmanuel Osborne ORELLANA/L Occupational Therapy Assistant OT    FORTINO 08/28/18 -  Elisa Chung OTR/L Occupational Therapist OT    RYAN 10/12/18 -  Aster Suggs OTR/L Occupational Therapist OT                OT  Recommendation and Plan  Outcome Summary/Treatment Plan (OT)  Daily Summary of Progress (OT): progress towards functional goals is fair  Anticipated Discharge Disposition (OT): skilled nursing facility, inpatient rehabilitation facility  Daily Summary of Progress (OT): progress towards functional goals is fair  Plan of Care Review  Plan of Care Reviewed With: patient  Plan of Care Reviewed With: patient  Outcome Summary: OT txt completed. Pt up in chair. Repositioned to straighten up, propped with pillow. AAROM/PROM 2x10 reps performed RUE. Pt becomes emotional with exercises feeling defeated with progress. Education provided regarding benefit of functional use/exercise of RUE to regain function, verbalized understanding and motivated to perform ADL. W Sugar functionally stabilizes and dispenses shaving cream, applies and shaves with LUE. ModA provided for task completion and thoroughness. Washes hair with shower cap with Sugar. Pt demos fatigue following txt but states feeling much better. Cont OT POC. REcommend d/c to SNF vs inpatient rehab.  Outcome Measures     Row Name 09/09/19 1700             How much help from another is currently needed...    Putting on and taking off regular lower body clothing?  1  -MW      Bathing (including washing, rinsing, and drying)  2  -MW      Toileting (which includes using toilet bed pan or urinal)  1  -MW      Putting on and taking off regular upper body clothing  2  -MW      Taking care of personal grooming (such as brushing teeth)  2  -MW      Eating meals  2  -MW      AM-PAC 6 Clicks Score (OT)  10  -MW         Functional Assessment    Outcome Measure Options  AM-PAC 6 Clicks Daily Activity (OT)  -MW        User Key  (r) = Recorded By, (t) = Taken By, (c) = Cosigned By    Initials Name Provider Type    Elisa Nolan, OTR/L Occupational Therapist           Time Calculation:   Time Calculation- OT     Row Name 09/09/19 1710 09/09/19 1610          Time Calculation- OT    OT  Start Time  1602  -MW  —     OT Stop Time  1650  -MW  —     OT Time Calculation (min)  48 min  -MW  —     Total Timed Code Minutes- OT  48 minute(s)  -MW  —     OT Received On  09/09/19  -MW  —        Timed Charges    21658 - OT Therapeutic Exercise Minutes  —  15  -MW     59684 - OT Self Care/Mgmt Minutes  —  35  -MW       User Key  (r) = Recorded By, (t) = Taken By, (c) = Cosigned By    Initials Name Provider Type     Elisa Chung OTR/L Occupational Therapist        Therapy Charges for Today     Code Description Service Date Service Provider Modifiers Qty    30907979068 HC OT THER PROC EA 15 MIN 9/9/2019 Elisa Chung OTR/L GO 1    30444736304 HC OT SELF CARE/MGMT/TRAIN EA 15 MIN 9/9/2019 Elisa Chung OTR/L GO 2               VILLA Bueno/JIMENA  9/9/2019

## 2019-09-09 NOTE — PLAN OF CARE
Problem: Patient Care Overview  Goal: Plan of Care Review  Outcome: Ongoing (interventions implemented as appropriate)   09/09/19 0358   Coping/Psychosocial   Plan of Care Reviewed With patient   Plan of Care Review   Progress improving   OTHER   Outcome Summary Patient c/om pain, prn pain meds given as ordered. No neuro changes, right side very weak, disoriented to time. VSS, Safety maintained.       Problem: Fall Risk (Adult)  Goal: Identify Related Risk Factors and Signs and Symptoms  Outcome: Outcome(s) achieved Date Met: 09/09/19    Goal: Absence of Fall  Outcome: Ongoing (interventions implemented as appropriate)      Problem: Pain, Acute (Adult)  Goal: Identify Related Risk Factors and Signs and Symptoms  Outcome: Outcome(s) achieved Date Met: 09/09/19    Goal: Acceptable Pain Control/Comfort Level  Outcome: Ongoing (interventions implemented as appropriate)      Problem: Skin Injury Risk (Adult)  Goal: Identify Related Risk Factors and Signs and Symptoms  Outcome: Outcome(s) achieved Date Met: 09/09/19    Goal: Skin Health and Integrity  Outcome: Ongoing (interventions implemented as appropriate)      Problem: Nutrition, Imbalanced: Inadequate Oral Intake (Adult)  Goal: Identify Related Risk Factors and Signs and Symptoms  Outcome: Outcome(s) achieved Date Met: 09/09/19    Goal: Improved Oral Intake  Outcome: Ongoing (interventions implemented as appropriate)    Goal: Prevent Further Weight Loss  Outcome: Ongoing (interventions implemented as appropriate)      Problem: Stroke (Hemorrhagic) (Adult)  Goal: Signs and Symptoms of Listed Potential Problems Will be Absent, Minimized or Managed (Stroke)  Outcome: Ongoing (interventions implemented as appropriate)

## 2019-09-09 NOTE — PLAN OF CARE
Problem: Patient Care Overview  Goal: Plan of Care Review  Outcome: Ongoing (interventions implemented as appropriate)   09/09/19 4492   Coping/Psychosocial   Plan of Care Reviewed With patient   Plan of Care Review   Progress improving   OTHER   Outcome Summary OT txt completed. Pt up in chair. Repositioned to straighten up, propped with pillow. AAROM/PROM 2x10 reps performed RUE. Pt becomes emotional with exercises feeling defeated with progress. Education provided regarding benefit of functional use/exercise of RUE to regain function, verbalized understanding and motivated to perform ADL. W Sugar functionally stabilizes and dispenses shaving cream w R hand with tenodesis technique, applies and shaves with LUE. ModA provided for task completion and thoroughness. Washes hair with shower cap with Sugar. Pt demos fatigue following txt but states feeling much better. Cont OT POC. REcommend d/c to SNF vs inpatient rehab.

## 2019-09-09 NOTE — PROGRESS NOTES
Continued Stay Note   [facility]     Patient Name: Phan Eastman  MRN: [id number]  Today's Date: 9/9/2019    Admit Date: 8/31/2019    Discharge Plan     Row Name 09/09/19 1424       Plan    Plan Comments  Patient's son/daughter, Gabe and Sarah, requesting information for SNF in [place], [place]. Patient agrees with this area for continued Skilled rehab needs. Daughter lives in [place] and son lives in [place], [place].  List of facilities given and family , pending decision as to which facility to use. Informed that precert would be needed from [facility] after bed offer was given from their choice of facility. Encouraged to make decision and inform SW as soon as possible.         Discharge Codes    No documentation.             Aurora Pearl RN

## 2019-09-10 PROBLEM — K59.00 CONSTIPATION: Status: ACTIVE | Noted: 2019-01-01

## 2019-09-10 NOTE — PLAN OF CARE
Problem: Patient Care Overview  Goal: Plan of Care Review  Outcome: Ongoing (interventions implemented as appropriate)   09/10/19 9849   Coping/Psychosocial   Plan of Care Reviewed With patient;family;other (see comments)  (GUSTAVO Pérez )   Plan of Care Review   Progress improving   OTHER   Outcome Summary Swallow tx completed. Patient is alert and cooperative with several family members present. He completed several trials of mechanical soft and thin liquids from lunch tray. No overt s/s of aspiration are observed with thin liquids. Functional rotary chew; however, slow rate is observed. RN reports no concern with toleration and no increased congestion in lung sounds. OK to continue a mechanical soft diet with thin liquids. Meds in apple sauce. SLP will continue to follow.

## 2019-09-10 NOTE — PROGRESS NOTES
Neurosurgery Daily Progress Note    Assessment:   Phan Eastman is a 64 y.o. male with a significant comorbidity diabetes with diabetic neuropathy, intracerebral ischemic stroke x3.  He presents with a new problem of sudden onset of right-sided weakness and slurred speech. Physical exam findings of right-sided hemiparesis and right facial droop.  Their imaging shows 2.7 x 2.1 acute thalamic intracerebral hemorrhage.    DDX:     ICH (intracerebral hemorrhage) (CMS/HCC)    Diabetes mellitus (CMS/HCC)    Hypertensive urgency    Hyperlipidemia    Cerebrovascular accident (CVA) due to embolism of left middle cerebral artery (CMS/HCC)    Dysarthria    CKD (chronic kidney disease) stage 3, GFR 30-59 ml/min (CMS/HCC)    Left renal mass    Obesity (BMI 30-39.9)    Diastolic CHF, chronic (CMS/HCC)    Bradycardia    Patient Active Problem List   Diagnosis   • ICH (intracerebral hemorrhage) (CMS/HCC)   • Diabetes mellitus (CMS/HCC)   • Hypertensive urgency   • Hyperlipidemia   • Cerebrovascular accident (CVA) due to embolism of left middle cerebral artery (CMS/HCC)   • Dysarthria   • CKD (chronic kidney disease) stage 3, GFR 30-59 ml/min (CMS/HCC)   • Left renal mass   • Obesity (BMI 30-39.9)   • Diastolic CHF, chronic (CMS/HCC)   • Bradycardia     Plan:   Neuro: Stable.  Neuro unchanged.  Confusion with minor aphasia and right-sided hemiparesis   CT stable   MRI with small left ischemic stroke.     Hx of ischemic stroke; appreciate neurology   Will require outpatient follow-up with neurology    CV: Labetolol and hydralazine with marginal blood pressures.     -153   Oral antihypertensives adjusted per hospitalist:   Appreciate hospitalist   Echo WNL.   Carotid US; right: 50 - 69%; left: <50%  Skin: Rash on legs.  Monitor for now.   Pulm: CPAP at home.  CXR: Moderate cardiomegaly no evidence pulmonary vascular congestion.  : Renal ultrasound: 2.8 cm solid mass suspected in the upper pole of the level left   OP renal  "MRI with and without contrast to fully evaluate lesion with OP urology follow-up.   Appreciate Urology  FEN: Tolerating puréed diet; one-to-one supervision with meals   Appreciate speech/ nutrition  Endocrine: blood glucose well controlled  GI: No BM TD; KUB: A few borderline gas-filled loops of small bowel overlying the pelvis, which could be seen with ileus or early/partial obstruction.  Large amount of colonic stool.   Consulted with Dr. Rodgers   Dulcolax suppositories; if no results HOG enema  ID: [id number]  Heme:  DVT prophylaxis held for brain bleed; SCDs  Pain: Well controlled  Dispo: PT/OT   IP rehab pending placement    Chief complaint:   Right sided weakness and speech decline    Subjective  Can not communicate    Temp:  [97.8 °F (36.6 °C)-98.7 °F (37.1 °C)] 98.7 °F (37.1 °C)  Heart Rate:  [53-64] 55  Resp:  [14-18] 14  BP: (116-170)/(58-79) 170/74    Objective:  Vital signs: (most recent): Blood pressure 170/74, pulse 55, temperature 98.7 °F (37.1 °C), temperature source Oral, resp. rate 14, height 188 cm (74\"), weight 122 kg (269 lb), SpO2 99 %.        Neurologic Exam     Mental Status   Oriented to person.   Oriented to place.   Oriented to year.   Speech: slurred   Level of consciousness: alert ,  arousable by verbal stimuli  Able to name object. Unable to read. Unable to repeat. Unable to write. Abnormal comprehension.   Speech remains slurred but clearing.  Follows commands     Cranial Nerves     CN III, IV, VI   Pupils are equal, round, and reactive to light.  Extraocular motions are normal.     CN V   Facial sensation intact.     CN VII   Facial expression full, symmetric.   Left facial weakness: central    Motor Exam   Right arm pronator drift: absent  Left arm pronator drift: absent    Strength   Right deltoid: 4/5  Left deltoid: 5/5  Right biceps: 4/5  Left biceps: 5/5  Right triceps: 4/5  Left triceps: 5/5  Right interossei: 3/5  Right iliopsoas: 2/5  Left iliopsoas: 5/5  Right quadriceps: 2/5  Left " quadriceps: 5/5  Right peroneal: 3/5  Right gastroc: 3/5  Left gastroc: 5/5  Strength/movement on right improving     Sensory Exam   Light touch normal.     Gait, Coordination, and Reflexes     Reflexes   Right : 4+  Left : 4+    Drains:  NA    Imaging Results (last 24 hours)     Procedure Component Value Units Date/Time    XR Abdomen KUB [[id number]] Collected:  09/09/19 1600     Updated:  09/09/19 1605    Narrative:       EXAM: XR ABDOMEN KUB- - 9/9/2019 3:38 PM CDT     HISTORY: Abdominal pain; R13.12-Dysphagia, oropharyngeal phase;  Z74.09-Other reduced mobility; Z74.09-Other reduced mobility;  N28.89-Other specified disorders of kidney and ureter       COMPARISON: None.      TECHNIQUE:  2 images.  Supine view of the abdomen.      FINDINGS:  See impression          Impression:       1. A few borderline gas-filled loops of small bowel overlying the  pelvis, which could be seen with ileus or early/partial obstruction.  2. Large amount of colonic stool.  3. No convincing dystrophic calcifications in the visualized abdomen,  but evaluation limited by portable technique.  This report was finalized on 09/09/2019 16:02 by Dr Delfina Gu MD.        Lab Results (last 24 hours)     Procedure Component Value Units Date/Time    Basic Metabolic Panel [[id number]]  (Abnormal) Collected:  09/10/19 0553    Specimen:  Blood Updated:  09/10/19 0646     Glucose 84 mg/dL      BUN 26 mg/dL      Creatinine 1.52 mg/dL      Sodium 138 mmol/L      Potassium 4.2 mmol/L      Chloride 107 mmol/L      CO2 24.0 mmol/L      Calcium 8.6 mg/dL      eGFR Non African Amer 47 mL/min/1.73      BUN/Creatinine Ratio 17.1     Anion Gap 7.0 mmol/L     Narrative:       GFR Normal >60  Chronic Kidney Disease <60  Kidney Failure <15    POC Glucose Once [[id number]]  (Normal) Collected:  09/09/19 2155    Specimen:  Blood Updated:  09/09/19 2209     Glucose 90 mg/dL      Comment: : [id number] Adriel ([place]) AmandaMeter ID: [id number]       POC  Glucose Once [[id number]]  (Normal) Collected:  09/09/19 1647    Specimen:  Blood Updated:  09/09/19 1703     Glucose 86 mg/dL      Comment: : [id number] Danny ZipRecruiterMeter ID: [id number]       POC Glucose Once [[id number]]  (Normal) Collected:  09/09/19 1114    Specimen:  Blood Updated:  09/09/19 1127     Glucose 110 mg/dL      Comment: : [id number] Danny Zip[employer]ruiterMeter ID: [id number]           [id number]  Heriberto Avalos, APRN

## 2019-09-10 NOTE — DISCHARGE PLACEMENT REQUEST
"Prietsh Alva (64 y.o. Male)     Date of Birth Social Security Number Address Home Phone MRN    [id number]  [id number]   [facility] [address] [phone] [id number]    Faith Marital Status          Samaritan        Admission Date Admission Type Admitting Provider Attending Provider Department, Room/Bed    8/31/19 Urgent Raf Wyatt MD [place], William Lee, MD [facility] 3A, 340/1    Discharge Date Discharge Disposition Discharge Destination                       Attending Provider:  Raf Wyatt MD    Allergies:  Iodine, Lisinopril, Red Dye    Isolation:  None   Infection:  None   Code Status:  CPR    Ht:  188 cm (74\")   Wt:  122 kg (269 lb)    Admission Cmt:  None   Principal Problem:  ICH (intracerebral hemorrhage) (CMS/HCC) [I61.9]                 Active Insurance as of 8/31/2019     Primary Coverage     Payor Plan Insurance Group Employer/Plan Group    HUMANA MEDICARE REPLACEMENT HUMANA MEDICARE REPL [id number]     Payor Plan Address Payor Plan Phone Number Payor Plan Fax Number Effective Dates    [address] [address] [phone]  1/1/2018 - None Entered    [facility]ly [facility] [address]       Subscriber Name Subscriber Birth Date Member ID       PRITESH ALVA 1955 [id number]                 Emergency Contacts      (Rel.) Home Phone Work Phone Mobile Phone    Yaritza Alva (Sister) [phone] -- --    Gabe Alva (Son) -- -- [phone]    LUDIN Stanley (Daughter) -- -- [phone]               History & Physical      Raf Wyatt MD at 8/31/2019  8:41 AM          NEUROSURGERY INITIAL HOSPITAL ENCOUNTER    Assessment/Plan:   Pritesh Alva is a 64 y.o. male with a significant comorbidity diabetes with diabetic neuropathy, intracerebral ischemic stroke x3.  He presents with a new problem of sudden onset of right-sided weakness and slurred speech. Physical exam findings of right-sided hemiparesis and right facial droop.  Their " imaging shows 2.7 x 2.1 acute thalamic intracerebral hemorrhage.    Differential Diagnosis:   Intracerebral Hemorrhage: HTN, anticoagulation, trauma, neoplasm, vascular malformation, amyloid angiopathy, idiopathic.   ICH Score    Volume: 0 Points (ICH volume < 30 cm3)  Age: 0 Points (Age < 80 years)  GCS: 0 Points (GCS 13 to 15)  Location: 0 Points (Infratentorial Origin - No )  Intraventricular Extension: 0 Points (Intraventricular Extension Absent)  Total = 0    ICH Score Mortality Rate   0 0%   1 13%   2 26%   3 72%   4 94%   5 100%   6* 100%     References:  Stroke. 2001 Apr;32(4):891-7.  Neurocrit Care. 2004;1(1):53-60.    Recommendations:  Admit to ICU for every hour neuro checks  SBP < 140.  Cardene gtt  Repeat head CT shows no expansion.  No indication antiepileptic drugs  NPO with Speech evaluation, then diabetic diet    I discussed the patients findings and my recommendations with patient and nursing staff    Thank you very much for this interesting consult.     Level of Risk: High due to:  abrupt change in neurological status  MDM: High Complexity  Mod = 72882, High=99223  ___________________________________________________________________    Reason for consult right-sided weakness and intracerebral hemorrhage    Chief Complaint: Right-sided weakness    HPI: Phan Eastman is a 64 y.o. male with a significant comorbidity diabetes, hyperlipidemia, hypertension, diabetic neuropathy of the lower extremities, intracerebral ischemic stroke x3, ACDF.  The patient normally ambulates with a walker due to severe diabetic neuropathy.  Last night he was in his normal state of health until dinnertime.  He was eating on the island in his kitchen when he fell to the ground.  He had difficulty standing up and went to his walker.  He turned this over to get his cell phone out.  He also noted slurred speech.  He had no associated sensory deficits other than his pre-existing bilateral lower extremity numbness.  Due to his  right-sided weakness he called his family members and was taken to an outside emergency room.  A noncontrast head CT was performed which showed a 2 x 2.7 cm left thalamic and posterior limb of the internal capsule stroke.  Therefore he was transferred to [facility] for further evaluation.    On admission he is currently complaining of right-sided weakness.  He feels that this is been stable since last night.  He also has slurred speech.  He has no headache.  He does have a past medical history of hypertension.  Blood pressure on admission is 215.  He has no bowel or bladder incontinence.  He is resting comfortably in his pain is a 0 out of 10.    Review of Systems   HENT: Negative.    Eyes: Negative.    Respiratory: Negative.    Cardiovascular: Negative.    Gastrointestinal: Negative.    Endocrine: Negative.    Genitourinary: Negative.    Musculoskeletal: Positive for gait problem.   Skin: Negative.    Allergic/Immunologic: Negative.    Neurological: Positive for weakness and numbness.   Hematological: Negative.    Psychiatric/Behavioral: Negative.         Past Medical History:  has a past medical history of Diabetes mellitus (CMS/[facility]), GERD (gastroesophageal reflux disease), Hyperlipidemia, Hypertension, Neuropathy, Osteoarthritis, and TIA (transient ischemic attack).    Past Surgical History:  has a past surgical history that includes Rotator cuff repair; Knee surgery (Bilateral); Carpal tunnel release (Bilateral); Neck surgery; Retinal detachment surgery; and Eye surgery.    Family History: family history is not on file.    Social History:  reports that he has never smoked. He has never used smokeless tobacco. He reports that he does not drink alcohol or use drugs.    Allergies: Iodine; Lisinopril; and Red dye    Home Medications:   Current Facility-Administered Medications:   •  acetaminophen (TYLENOL) tablet 650 mg, 650 mg, Oral, Q4H PRN **OR** acetaminophen (TYLENOL) 160 MG/5ML solution 650 mg, 650 mg,  Oral, Q4H PRN **OR** acetaminophen (TYLENOL) suppository 650 mg, 650 mg, Rectal, Q4H PRN, Raf Wyatt MD  •  dextrose (D50W) 25 g/ 50mL Intravenous Solution 25 g, 25 g, Intravenous, Q15 Min PRN, Raf Wyatt MD  •  dextrose (GLUTOSE) oral gel 15 g, 15 g, Oral, Q15 Min PRN, Raf Wyatt MD  •  glipiZIDE (GLUCOTROL) tablet 5 mg, 5 mg, Oral, QAM AC, Raf Wyatt MD  •  glucagon (human recombinant) (GLUCAGEN DIAGNOSTIC) injection 1 mg, 1 mg, Subcutaneous, PRN, Raf Wyatt MD  •  insulin lispro (humaLOG) injection 0-9 Units, 0-9 Units, Subcutaneous, 4x Daily With Meals & Nightly, Raf Wyatt MD  •  metFORMIN (GLUCOPHAGE) tablet 850 mg, 850 mg, Oral, BID With Meals, Raf Wyatt MD  •  ondansetron (ZOFRAN) tablet 4 mg, 4 mg, Oral, Q6H PRN **OR** ondansetron (ZOFRAN) injection 4 mg, 4 mg, Intravenous, Q6H PRN, Raf Wyatt MD  •  oxyCODONE-acetaminophen (PERCOCET) 5-325 MG per tablet 1 tablet, 1 tablet, Oral, Q4H PRN, Raf Wyatt MD  •  oxyCODONE-acetaminophen (PERCOCET) 7.5-325 MG per tablet 2 tablet, 2 tablet, Oral, Q4H PRN, Raf Wyatt MD  •  sodium chloride 0.9 % flush 10 mL, 10 mL, Intravenous, Q12H, Raf Wyatt MD  •  sodium chloride 0.9 % flush 10 mL, 10 mL, Intravenous, PRN, Raf Wyatt MD  •  sodium chloride 0.9 % with KCl 20 mEq/L infusion, 100 mL/hr, Intravenous, Continuous, Raf Wyatt MD  •  terazosin (HYTRIN) capsule 2 mg, 2 mg, Oral, Nightly, Raf Wyatt MD  •  traZODone (DESYREL) tablet 50 mg, 50 mg, Oral, Nightly PRN, Raf Wyatt MD    Medications: Scheduled Meds:    glipiZIDE 5 mg Oral QAM AC   insulin lispro 0-9 Units Subcutaneous 4x Daily With Meals & Nightly   metFORMIN 850 mg Oral BID With Meals   sodium chloride 10 mL Intravenous Q12H   terazosin 2 mg Oral Nightly     Continuous Infusions:    sodium chloride 0.9 % with KCl 20  mEq 100 mL/hr     PRN Meds:.•  acetaminophen **OR** acetaminophen **OR** acetaminophen  •  dextrose  •  dextrose  •  glucagon (human recombinant)  •  ondansetron **OR** ondansetron  •  oxyCODONE-acetaminophen  •  oxyCODONE-acetaminophen  •  sodium chloride  •  traZODone    Vital Signs  Temp:  [97.6 °F (36.4 °C)-97.7 °F (36.5 °C)] 97.6 °F (36.4 °C)  Heart Rate:  [64-75] 75  Resp:  [16-18] 16  BP: (188-215)/(88-92) 215/88    Physical Exam  Physical Exam   Constitutional: He is oriented to person, place, and time. He appears well-developed and well-nourished.   HENT:   Head: Normocephalic and atraumatic.   Eyes: Conjunctivae and EOM are normal. Pupils are equal, round, and reactive to light.   Fundoscopic exam:       The right eye shows no exudate, no hemorrhage and no papilledema.        The left eye shows no exudate, no hemorrhage and no papilledema.   Neck: Normal range of motion. Neck supple.   Cardiovascular:   Pulses:       Dorsalis pedis pulses are 2+ on the right side, and 2+ on the left side.        Posterior tibial pulses are 2+ on the right side, and 2+ on the left side.   Pulmonary/Chest: Effort normal. No respiratory distress.     Vascular Status -  His right foot exhibits no edema. His left foot exhibits no edema.  Neurological: He is oriented to person, place, and time. He has a normal Finger-Nose-Finger Test, a normal Heel to [place] Test and a normal Tandem Gait Test. Gait normal.   Skin: Skin is warm. No rash noted. No erythema.   Psychiatric: His speech is slurred.       Neurologic Exam     Mental Status   Oriented to person, place, and time.   Registration: recalls 3 of 3 objects. Recall at 5 minutes: recalls 3 of 3 objects.   Attention: normal. Concentration: normal.   Speech: slurred   Level of consciousness: alert  Knowledge: consistent with education.   Able to name object. Able to read. Able to repeat. Able to write. Normal comprehension.     Cranial Nerves     CN II   Visual acuity: normal    CN  III, IV, VI   Pupils are equal, round, and reactive to light.  Extraocular motions are normal.   Diplopia: none    CN V   Facial sensation intact.   Right corneal reflex: normal  Left corneal reflex: normal    CN VII   Right facial weakness: central  Left facial weakness: none    CN VIII   Hearing: intact    CN IX, X   Palate: symmetric  Right gag reflex: normal  Left gag reflex: normal    CN XI   Right trapezius strength: normal  Left trapezius strength: normal    CN XII   Tongue deviation: none    Motor Exam   Right arm tone: normal  Left arm tone: normal  Right arm pronator drift: present  Left arm pronator drift: absent  Right leg tone: normal  Left leg tone: normal    Strength   Strength 5/5 except as noted.   Right deltoid: 4/5  Right biceps: 4/5  Right triceps: 4/5  Right interossei: 4/5  Right iliopsoas: 4/5  Right quadriceps: 4/5  Right anterior tibial: 4/5  Right gastroc: 4/5    Sensory Exam   Right arm light touch: normal  Left arm light touch: normal  Right leg light touch: decreased from knee  Left leg light touch: decreased from knee  Proprioception normal.   Stocking glove distribution numbness on the bilateral lower extremities.  Stable and long-standing.     Gait, Coordination, and Reflexes     Gait  Gait: normal    Coordination   Finger to nose coordination: normal  Heel to shin coordination: normal  Tandem walking coordination: normal    Reflexes   Reflexes 2+ except as noted.   Right plantar: normal  Left plantar: normal  Right Monreal: absent  Left Monreal: absent      Results Review:   Independent review and interpretation of imaging  Imaging Results (last 24 hours)     Procedure Component Value Units Date/Time    CT Head Without Contrast [[id number]] Collected:  08/31/19 0811     Updated:  08/31/19 0827    Narrative:       EXAMINATION: CT HEAD WO CONTRAST- 8/31/2019 8:11 AM CDT     HISTORY: Intracerebral hemorrhage.     DOSE: 813 mGycm (Automatic exposure control technique was implemented in  an  effort to keep the radiation dose as low as possible without  compromising image quality)     REPORT: Spiral CT of head was performed without contrast, reconstructed  coronal and sagittal images were also reviewed. No comparison studies.     There is an acute intraparenchymal hematoma centered within the left  basal ganglia, with involvement of the putamen, external capsule and  caudate nucleus. This measures approximately 2.1 x 2.7 cm, no  intraventricular extension is identified. No extra-axial hemorrhage is  identified. There is moderate diffuse cortical atrophy. There are small  chronic appearing lacunar infarcts in the basal ganglia. Mild  ventricular megaly is present. There is decreased attenuation within the  periventricular and subcortical white matter tracts compatible with  advanced chronic small vessel white matter ischemic disease. The  posterior fossa, there is a chronic appearing infarct in the medial  aspect of the posterior superior cerebellum. Review of bone windows is  unremarkable. There is normal aeration of the visualized paranasal  sinuses and mastoid air cells.       Impression:       1. Acute intraparenchymal hematoma centered within the left basal  ganglia, measuring approximately 2.1 x 2.7 cm, probably hypertensive  type bleed.  2. Moderate diffuse cortical atrophy. Chronic lacunar infarction noted  in the basal ganglia. This also small chronic appearing infarct in the  medial aspect of the posterior superior cerebellum.  3. Mild ventriculomegaly, no more than expected for the degree of  atrophy identified. There is no midline shift.     This report was finalized on 08/31/2019 08:24 by Dr. Figueroa Golden MD.        MRI brain:  MRI spine:   CT Head:              CT c-spine:  CT t-spine:  CT l-spine:  X-ray:    I reviewed the patient's new clinical results.  Lab Results (last 24 hours)     ** No results found for the last 24 hours. **          Raf Wyatt,  MD          Electronically signed by Raf Wyatt MD at 8/31/2019  9:07 AM          Physician Progress Notes (most recent note)      Tanner Rodgers MD at 9/10/2019 12:36 PM              [facility] Medicine Services  INPATIENT PROGRESS NOTE    Patient Name: Phan Eastman  Date of Admission: 8/31/2019  Today's Date: 09/10/19  Length of Stay: 10  Primary Care Physician: Emmanuel Mclain MD    Subjective   Chief Complaint: abdominal pain   HPI     Patient was seen and examined at bedside.  Patient is much more awake and alert today, patient is not tearful as he was yesterday.  Family is at bedside.  Patient indicates she is more motivated today and he set up in a chair for greater than 1 hour and did very well.  Patient would like to sit up in the chair again this afternoon.  Patient is awaiting placement in the [facility] as that is where his son and daughter live.  KUB performed yesterday shows constipation, we will give the patient suppository and hog enema, will also start p.o. stool softeners.        Review of Systems   Constitutional: Positive for fatigue. Negative for activity change, appetite change, chills and fever.   Respiratory: Negative for cough and shortness of breath.    Cardiovascular: Negative for chest pain and palpitations.   Gastrointestinal: Positive for constipation. Negative for abdominal distention, abdominal pain, diarrhea, nausea and vomiting.   Genitourinary: Negative for difficulty urinating and dysuria.   Neurological: Positive for weakness.   Psychiatric/Behavioral: Negative for behavioral problems and decreased concentration. The patient is not nervous/anxious.         All pertinent negatives and positives are as above. All other systems have been reviewed and are negative unless otherwise stated.     Objective    Temp:  [97.8 °F (36.6 °C)-98.7 °F (37.1 °C)] 98.7 °F (37.1 °C)  Heart Rate:  [53-64] 59  Resp:  [14-22]  22  BP: (109-170)/(56-79) 109/56  Physical Exam  Constitutional: No distress.   HENT:   Head: Normocephalic and atraumatic.   Eyes: Conjunctivae are normal. No scleral icterus.   Neck: Neck supple. No JVD present.   Cardiovascular: Intact distal pulses. Exam reveals no gallop and no friction rub.   Murmur heard. Normal rate  Pulmonary/Chest: Effort normal and breath sounds normal. No stridor. No respiratory distress. He has no wheezes.   Abdominal: Soft. Bowel sounds are normal. He exhibits no distension and no mass. There mild tenderness. There is no guarding.   Musculoskeletal: He exhibits no edema.   Neurological: He is alert.   Oriented to name and place; not time; right sided weakness    Skin: Skin is warm and dry. He is not diaphoretic. No erythema. Ecchymosis on the right side   Psychiatric: He has a normal mood and affect. His behavior is normal.   Nursing note and vitals reviewed.         Results Review:  I have reviewed the labs, radiology results, and diagnostic studies.    Laboratory Data:          Results from last 7 days   Lab Units 09/10/19  0553 09/09/19  0658 09/08/19  0250   SODIUM mmol/L 138 140 141   POTASSIUM mmol/L 4.2 4.4 4.4   CHLORIDE mmol/L 107 109 112*   CO2 mmol/L 24.0 24.0 23.0*   BUN mg/dL 26* 28* 28*   CREATININE mg/dL 1.52* 1.61* 1.61*   CALCIUM mg/dL 8.6 8.8 8.9   GLUCOSE mg/dL 84 95 81       Culture Data:        Radiology Data:   Imaging Results (last 24 hours)     Procedure Component Value Units Date/Time    XR Abdomen KUB [[id number]] Collected:  09/09/19 1600     Updated:  09/09/19 1605    Narrative:       EXAM: XR ABDOMEN KUB- - 9/9/2019 3:38 PM CDT     HISTORY: Abdominal pain; R13.12-Dysphagia, oropharyngeal phase;  Z74.09-Other reduced mobility; Z74.09-Other reduced mobility;  N28.89-Other specified disorders of kidney and ureter       COMPARISON: None.      TECHNIQUE:  2 images.  Supine view of the abdomen.      FINDINGS:  See impression          Impression:       1. A few  borderline gas-filled loops of small bowel overlying the  pelvis, which could be seen with ileus or early/partial obstruction.  2. Large amount of colonic stool.  3. No convincing dystrophic calcifications in the visualized abdomen,  but evaluation limited by portable technique.  This report was finalized on 09/09/2019 16:02 by Dr Delfina Gu MD.          I have reviewed the patient's current medications.     Assessment/Plan     Active Hospital Problems    Diagnosis   • **ICH (intracerebral hemorrhage) (CMS/[facility])   • Constipation   • Obesity (BMI 30-39.9)   • Diastolic CHF, chronic (CMS/HCC)   • Bradycardia   • CKD (chronic kidney disease) stage 3, GFR 30-59 ml/min (CMS/[facility])   • Left renal mass   • Cerebrovascular accident (CVA) due to embolism of left middle cerebral artery (CMS/[facility])   • Dysarthria   • Diabetes mellitus (CMS/[facility])   • Hypertensive urgency   • Hyperlipidemia       Plan:  1.  Continue losartan at 100 mg, clonidine at 0.3 mg, amlodipine 10 mg, hydralazine 100 mg, HCTZ 12.5 mg and terazosin 10 mg  2.  PRN antihypertensives  3.  Overnight pulse oximetry showed 388 desaturations, would recommend 2L NC at night   4.  Outpatient MRI with and without contrast for left renal mass  5.  Nasal saline q4h   6.  Creatinine stable   7.  HCTZ 12.5 mg   8.  Discussed case with Heriberto KRAUS with neurosurgery, will give bisacodyl suppository and if no BM will give a HOG enema.   9.  Added miralax and docusate senna daily as well   10.  From my standpoint can give the patient an AM lab holiday   11.  Up to chair with meals               Discharge Planning: I expect the patient to be discharged to SNF in ? days.    Tanner Rodgers MD   09/10/19   12:36 PM    Electronically signed by Tanner Rodgers MD at 9/10/2019 12:42 PM          Consult Notes (most recent note)      Anjel Cook MD at 9/6/2019  3:26 PM          Consults         Referring Provider: Rick  Reason for Consultation: Renal  Mass    Chief complaint Renal Mass    Subjective .     History of present illness:  Renal Mass  Patient here for evaluation of renal mass.  The renal mass was found incidentally.  The location of the mass is the left kidney.  The mass has been present for1 day.  The mass is described as solid.  The size of the mass is 2.8cm.  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  The mass was found on evaluation of hypertension.  Associated symptoms include denies flank pain/hematuria.      Review of Systems  The following systems were reviewed and negative;  eyes, ENT, respiratory, integument, breast, hematologic / lymphatic, behavioral/psych, endocrine and allergies / immunologic     History  Past Medical History:   Diagnosis Date   • Diabetes mellitus (CMS/HCC)    • GERD (gastroesophageal reflux disease)    • Hyperlipidemia    • Hypertension    • Neuropathy    • Osteoarthritis    • TIA (transient ischemic attack)        Past Surgical History:   Procedure Laterality Date   • CARPAL TUNNEL RELEASE Bilateral    • EYE SURGERY     • KNEE SURGERY Bilateral    • NECK SURGERY     • RETINAL DETACHMENT SURGERY     • ROTATOR CUFF REPAIR         History reviewed. No pertinent family history.    Social History     Socioeconomic History   • Marital status:      Spouse name: Not on file   • Number of children: Not on file   • Years of education: Not on file   • Highest education level: Not on file   Tobacco Use   • Smoking status: Never Smoker   • Smokeless tobacco: Never Used   Substance and Sexual Activity   • Alcohol use: No     Frequency: Never   • Drug use: No   • Sexual activity: Defer       Current Facility-Administered Medications   Medication Dose Route Frequency Provider Last Rate Last Dose   • acetaminophen (TYLENOL) tablet 650 mg  650 mg Oral Q4H PRN Raf Wyatt MD   650 mg at 09/02/19 1606    Or   • acetaminophen (TYLENOL) 160 MG/5ML solution 650 mg  650 mg Oral Q4H PRN Raf Wyatt MD        Or   •  acetaminophen (TYLENOL) suppository 650 mg  650 mg Rectal Q4H PRN Raf Wyatt MD       • amLODIPine (NORVASC) tablet 10 mg  10 mg Oral Q24H Gaston Cabrera DO   10 mg at 09/06/19 0902   • CloNIDine (CATAPRES) tablet 0.3 mg  0.3 mg Oral Q12H Gaston Cabrera DO   0.3 mg at 09/06/19 0902   • dextrose (D50W) 25 g/ 50mL Intravenous Solution 25 g  25 g Intravenous Q15 Min PRN Raf Wyatt MD       • dextrose (GLUTOSE) oral gel 15 g  15 g Oral Q15 Min PRN Raf Wyatt MD       • glipiZIDE (GLUCOTROL) tablet 5 mg  5 mg Oral QAM AC Raf Wyatt MD   5 mg at 09/06/19 0902   • glucagon (human recombinant) (GLUCAGEN DIAGNOSTIC) injection 1 mg  1 mg Subcutaneous PRN Raf Wyatt MD       • hydrALAZINE (APRESOLINE) injection 10 mg  10 mg Intravenous Q15 Min PRN Raf Wyatt MD   10 mg at 09/05/19 0513   • hydrALAZINE (APRESOLINE) tablet 100 mg  100 mg Oral Q8H Gaston Cabrera DO   100 mg at 09/06/19 1342   • insulin lispro (humaLOG) injection 0-9 Units  0-9 Units Subcutaneous 4x Daily With Meals & Nightly Raf Wyatt MD       • labetalol (NORMODYNE,TRANDATE) injection 10 mg  10 mg Intravenous Q15 Min PRN Raf Wyatt MD   10 mg at 09/05/19 2110   • losartan (COZAAR) tablet 50 mg  50 mg Oral Q24H Gaston Cabrera DO   50 mg at 09/06/19 1156   • metoprolol succinate XL (TOPROL-XL) 24 hr tablet 100 mg  100 mg Oral Q24H Gaston Cabrera DO   100 mg at 09/06/19 1156   • niCARdipine (CARDENE) 25 mg/250 mL (0.1 mg/mL) 0.9% NS infusion  5-15 mg/hr Intravenous Titrated Raf Wyatt MD   Stopped at 09/06/19 1135   • ondansetron (ZOFRAN) tablet 4 mg  4 mg Oral Q6H PRN Raf Wyatt MD        Or   • ondansetron (ZOFRAN) injection 4 mg  4 mg Intravenous Q6H PRN Raf Wyatt MD       • oxyCODONE-acetaminophen (PERCOCET) 5-325 MG per tablet 1 tablet  1 tablet Oral Q4H PRN Raf Wyatt MD   1  tablet at 09/06/19 0508   • oxyCODONE-acetaminophen (PERCOCET) 7.5-325 MG per tablet 2 tablet  2 tablet Oral Q4H PRN Raf Wyatt MD   2 tablet at 09/05/19 2332   • sodium chloride 0.9 % flush 10 mL  10 mL Intravenous Q12H Raf Wyatt MD   10 mL at 09/06/19 1054   • sodium chloride 0.9 % flush 10 mL  10 mL Intravenous PRN Raf Wyatt MD       • terazosin (HYTRIN) capsule 10 mg  10 mg Oral Nightly Gaston Cabrera DO   10 mg at 09/05/19 2108   • traZODone (DESYREL) tablet 100 mg  100 mg Oral Nightly Gaston Cabrera DO            Allergies   Allergen Reactions   • Iodine Shortness Of Breath   • Lisinopril Rash   • Red Dye Rash       Objective     Vital Signs   Temp:  [98.1 °F (36.7 °C)-98.4 °F (36.9 °C)] 98.4 °F (36.9 °C)  Heart Rate:  [47-78] 56  Resp:  [20] 20  BP: (109-199)/(56-93) 123/58    Intake/Output Summary (Last 24 hours) at 9/6/2019 1526  Last data filed at 9/6/2019 1300  Gross per 24 hour   Intake 2086.7 ml   Output 2675 ml   Net -588.3 ml       Physical Exam:     General Appearance:    Alert, cooperative, in no acute distress   Head:    Normocephalic, without obvious abnormality, atraumatic   Eyes:            Lids and lashes normal, conjunctivae and sclerae normal, no   icterus, no pallor, corneas clear, PERRLA   Ears:    Ears appear intact with no abnormalities noted   Throat:   No oral lesions, no thrush, oral mucosa moist   Neck:   No adenopathy, supple, trachea midline, no thyromegaly, no   carotid bruit, no JVD   Back:     No kyphosis present, no scoliosis present, no skin lesions,      erythema or scars, no tenderness to percussion or                   palpation,   range of motion normal   Lungs:   Respirations unlabored    Heart:   Regular rate   Chest Wall:    No abnormalities observed   Abdomen:     No masses, no organomegaly, soft        non-tender, non-distended, no guarding, no rebound                Tenderness     Genitorurinary:   Deferred    Extremities:   Moves all extremities well, no edema, no cyanosis, no             redness   Pulses:   Pulses palpable and equal bilaterally   Skin:   No bleeding, bruising or rash   Lymph nodes:   No palpable adenopathy   Neurologic:   Right sided weakness       Results Review:   I reviewed the patient's new clinical results.      No results found for: WBC, RBC, HGB, HCT, MCV, MCH, MCHC, RDW, RDWSD, MPV, PLT, NEUTRORELPCT, LYMPHORELPCT, MONORELPCT, EOSRELPCT, BASORELPCT, AUTOIGPER, NEUTROABS, LYMPHSABS, MONOSABS, EOSABS, BASOSABS, AUTOIGNUM, NRBC    Basic Metabolic Panel    Sodium Sodium   Date Value Ref Range Status   09/06/2019 144 135 - 145 mmol/L Final   09/05/2019 146 (H) 135 - 145 mmol/L Final   09/04/2019 144 135 - 145 mmol/L Final      Potassium Potassium   Date Value Ref Range Status   09/06/2019 4.7 3.5 - 5.3 mmol/L Final   09/05/2019 4.7 3.5 - 5.3 mmol/L Final   09/04/2019 4.5 3.5 - 5.3 mmol/L Final      Chloride Chloride   Date Value Ref Range Status   09/06/2019 117 (H) 98 - 110 mmol/L Final   09/05/2019 118 (H) 98 - 110 mmol/L Final   09/04/2019 117 (H) 98 - 110 mmol/L Final      Bicarbonate No results found for: PLASMABICARB   BUN BUN   Date Value Ref Range Status   09/06/2019 27 (H) 5 - 21 mg/dL Final   09/05/2019 29 (H) 5 - 21 mg/dL Final   09/04/2019 27 (H) 5 - 21 mg/dL Final      Creatinine Creatinine   Date Value Ref Range Status   09/06/2019 1.60 (H) 0.50 - 1.40 mg/dL Final   09/05/2019 1.55 (H) 0.50 - 1.40 mg/dL Final   09/04/2019 1.59 (H) 0.50 - 1.40 mg/dL Final      Calcium Calcium   Date Value Ref Range Status   09/06/2019 8.5 8.4 - 10.4 mg/dL Final   09/05/2019 8.7 8.4 - 10.4 mg/dL Final   09/04/2019 8.6 8.4 - 10.4 mg/dL Final      Glucose      No components found for: GLUCOSE.*       Imaging Results (last 24 hours)     ** No results found for the last 24 hours. **        Renal ultrasound independent review    The renal ultrasound is available for me to review.  Treatment recommendations  require an independent review.  This film has been reviewed by the radiologist to determine any non urologic abnormalities that are presents.  However, I very closely inspected the kidneys for size, symmetry, contour, parenchymal thickness, perinephric reaction, presence of calcifications, and intrarenal dilation of the collecting system.       The right kidney appears normal on this ultrasound.  The renal parenchymal is normal in thickness.  There are no solid masses or cysts.  There is no hydronephrosis.  There are no stones.      The left kidney appears 2.8cm solid mass upper pole    The bladder appears normal on thisultrsaound.  The bladder appears normal in thickness.  There no masses or stones seen on this exam.         Assessment/Plan       ICH (intracerebral hemorrhage) (CMS/HCC)    Diabetes mellitus (CMS/HCC)    Hypertensive urgency    Hyperlipidemia    Cerebrovascular accident (CVA) due to embolism of left middle cerebral artery (CMS/HCC)    Dysarthria    CKD (chronic kidney disease) stage 3, GFR 30-59 ml/min (CMS/HCC)    Left renal mass      Left Renal Mass    Patient with CKD and a left renal mass.  This is 2.8 cm on ultrasound.  He has a creatinine 1.6 and a GFR 44.  I discussed with him he needs further imaging to fully characterize this.  Typically I would prefer a CT with and without contrast.  However this cannot be obtained due to his renal function.  I have recommended an MRI with and without contrast to fully evaluate this lesion.  The patient stated he is claustrophobic.  I discussed with him that this could be a cancerous lesion.  However renal cell carcinomas do not metastasize with less than 3 cm.  In addition on average these masses grow 0.3 cm/year.  With all the other issues he has ongoing, I think is reasonable to set up an MRI with and without contrast on an outpatient basis and follow-up with me on an outpatient basis.      I discussed the patients findings and my recommendations with  patient and nursing staff    Anjel Cook MD  19  3:26 PM            Electronically signed by Anjel Cook MD at 2019  3:32 PM          Physical Therapy Notes (most recent note)      Debby Garcia, RAJAN at 9/10/2019  2:37 PM  Version 1 of 1         Acute Care - Physical Therapy Treatment Note   [facility]     Patient Name: Phan Eastman  : 1955  MRN: [id number]  Today's Date: 9/10/2019  Onset of Illness/Injury or Date of Surgery: 19     Referring Physician: Dr. Wyatt     Admit Date: 2019    Visit Dx:    ICD-10-CM ICD-9-CM   1. Oropharyngeal dysphagia R13.12 787.22   2. Impaired mobility and ADLs Z74.09 799.89   3. Impaired mobility Z74.09 799.89   4. Left renal mass N28.89 593.9     Patient Active Problem List   Diagnosis   • ICH (intracerebral hemorrhage) (CMS/HCC)   • Diabetes mellitus (CMS/HCC)   • Hypertensive urgency   • Hyperlipidemia   • Cerebrovascular accident (CVA) due to embolism of left middle cerebral artery (CMS/HCC)   • Dysarthria   • CKD (chronic kidney disease) stage 3, GFR 30-59 ml/min (CMS/HCC)   • Left renal mass   • Obesity (BMI 30-39.9)   • Diastolic CHF, chronic (CMS/HCC)   • Bradycardia   • Constipation       Therapy Treatment    Rehabilitation Treatment Summary     Row Name 09/10/19 1350 09/10/19 1229 09/10/19 0940       Treatment Time/Intention    Discipline  physical therapy assistant  -KJ  speech language pathologist  -MM  physical therapy assistant  -KJ    Document Type  therapy note (daily note)  -KJ  therapy note (daily note)  -MM  therapy note (daily note)  -KJ2    Subjective Information  complains of;weakness  -KJ  no complaints  -MM  complains of;weakness  -KJ2    Mode of Treatment  physical therapy  -KJ  individual therapy;speech-language pathology  -MM  physical therapy  -KJ2    Patient/Family Observations  children present  -KJ  Family present.   -MM  children present  -KJ2    Care Plan Review  —  care plan/treatment goals  reviewed  -MM  —    Therapy Frequency (Swallow)  —  at least;3 days per week  -MM  —    Patient Effort  adequate  -KJ  good  -MM  adequate  -KJ2    Comment  keep systolic pressure<140; Righ side weakness  -KJ  —  keep systolic pressure<140  -KJ2    Existing Precautions/Restrictions  fall  -KJ  —  fall  -KJ2    Treatment Considerations/Comments  —  —  Right side weakness  -KJ2    Recorded by [KJ] Debby Garcia, PTA 09/10/19 1423 [MM] Krystal Babcock, MS CCC-SLP 09/10/19 1249 [KJ] Debby Garcia, PTA 09/10/19 0940  [KJ2] Debby Garcia, PTA 09/10/19 1026    Row Name 09/10/19 0824             Treatment Time/Intention    Discipline  occupational therapist  -MW      Document Type  therapy note (daily note)  -MW      Subjective Information  complains of;weakness;fatigue  -MW2      Mode of Treatment  individual therapy  -MW2      Patient/Family Observations  son and daughter present  -MW2      Existing Precautions/Restrictions  fall  -MW2      Recorded by [MW] Elisa Chung OTR/L 09/10/19 0825  [MW2] Elisa Chung, OTR/L 09/10/19 1010      Row Name 09/10/19 0824             Vital Signs    Pre Systolic BP Rehab  170 nrsg notified  -MW      Pre Treatment Diastolic BP  74  -MW      Pre Patient Position  Supine  -MW      Recorded by [MW] Elisa Chung OTR/L 09/10/19 1019      Row Name 09/10/19 0824             Cognitive Assessment/Intervention    Additional Documentation  Cognitive Assessment/Intervention (Group)  -MW      Recorded by [MW] Elisa Chung OTR/L 09/10/19 1019      Row Name 09/10/19 0824             Cognitive Assessment/Intervention- PT/OT    Personal Safety Interventions  fall prevention program maintained;muscle strengthening facilitated;nonskid shoes/slippers when out of bed;supervised activity  -MW      Recorded by [MW] Elisa Chung OTR/L 09/10/19 1019      Row Name 09/10/19 1350 09/10/19 0940 09/10/19 0824       Bed Mobility Assessment/Treatment    Bed Mobility Assessment/Treatment   —  —  rolling left;rolling right;scooting/bridging;supine-sit;sit-supine  -MW    Rolling Left Cecilia (Bed Mobility)  —  —  maximum assist (25% patient effort);2 person assist  -MW    Rolling Right Cecilia (Bed Mobility)  —  —  maximum assist (25% patient effort);2 person assist  -MW    Scooting/Bridging Cecilia (Bed Mobility)  —  —  dependent (less than 25% patient effort);2 person assist  -MW    Supine-Sit Cecilia (Bed Mobility)  maximum assist (25% patient effort);2 person assist  -KJ  —  maximum assist (25% patient effort);dependent (less than 25% patient effort);2 person assist  -MW    Sit-Supine Cecilia (Bed Mobility)  maximum assist (25% patient effort);2 person assist  -KJ  —  maximum assist (25% patient effort);dependent (less than 25% patient effort);2 person assist  -MW    Bed Mobility, Safety Issues  decreased use of arms for pushing/pulling;decreased use of legs for bridging/pushing;impaired trunk control for bed mobility  -KJ  —  decreased use of arms for pushing/pulling;decreased use of legs for bridging/pushing;impaired trunk control for bed mobility  -MW    Assistive Device (Bed Mobility)  bed rails;draw sheet  -KJ  —  bed rails;draw sheet;head of bed elevated  -MW    Comment (Bed Mobility)  sit edge of bed x 15 minutes MaxA; unable to maintain sitting balance today  -KJ  transferred to neuro chair   -KJ  unable to maintain sitting balance EOB >30 seconds without mod-maxA, returned to supine  -MW    Recorded by [KJ] Debby Garcia, PTA 09/10/19 1423 [KJ] Debby Garcia, PTA 09/10/19 1026 [MW] Elisa Chung, OTR/L 09/10/19 1019    Row Name 09/10/19 1350             Gait/Stairs Assessment/Training    Comment (Gait/Stairs)  not appropriate to attempt standing  -KJ      Recorded by [KJ] Debby Garcia, PTA 09/10/19 1423      Row Name 09/10/19 1350 09/10/19 0940          Therapeutic Exercise    Exercise Type (Therapeutic Exercise)  AAROM (active assistive range of motion)  LLE  -KJ  AAROM (active assistive range of motion)  -KJ     Position (Therapeutic Exercise)  seated  -KJ  seated  -KJ     Sets/Reps (Therapeutic Exercise)  15  -KJ  2 sets of 10  -KJ     Comment (Therapeutic Exercise)  AA/PROM RLE/UE; lateral L push up, postural correction activities statically. Unable to maintain sitting  wiithout Max external support  -KJ  —     Recorded by [KJ] Debby Garcia, PTA 09/10/19 1423 [KJ] Debby Garcia, PTA 09/10/19 1026     Row Name 09/10/19 0824             Balance    Balance  static sitting balance  -MW      Recorded by [MW] Elisa Chung OTR/L 09/10/19 1019      Row Name 09/10/19 1350 09/10/19 0824          Static Sitting Balance    Level of [place] (Unsupported Sitting, Static Balance)  maximal assist, 25 to 49% patient effort  -KJ  maximal assist, 25 to 49% patient effort  -MW     Sitting Position (Unsupported Sitting, Static Balance)  sitting on edge of bed  -KJ  sitting on edge of bed  -MW     Time Able to Maintain Position (Unsupported Sitting, Static Balance)  more than 5 minutes  -KJ  —     Comment (Unsupported Sitting, Static Balance)  —  able to hold self up for 30 seconds CGA with vc for posture, maxA required d/t R and posterior lean   -MW     Comment (Supported Sitting, Static Balance)  max verbal cueing for activities; wanted to lay down due to pain and fatigue  -KJ  —     Recorded by [KJ] Debby Garcia, PTA 09/10/19 1423 [MW] Elisa Chung OTR/L 09/10/19 1019     Row Name 09/10/19 1350 09/10/19 0940 09/10/19 0824       Positioning and Restraints    Pre-Treatment Position  in bed  -KJ  in bed  -KJ  in bed  -MW    Post Treatment Position  bed  -KJ  chair  -KJ  bed  -MW    In Bed  —  —  fowlers;call light within reach;encouraged to call for assist;with family/caregiver;side rails up x2;SCD pump applied;heels elevated;RUE elevated  -MW    Recorded by [KJ] Debby Garcia, PTA 09/10/19 1423 [KJ] Debby Garcia, PTA 09/10/19 1026 [MW] Elisa Chung,  OTR/L 09/10/19 1019    Row Name 09/10/19 1229 09/10/19 0824          Pain Assessment    Additional Documentation  Pain Scale: FACES Pre/Post-Treatment (Group)  -MM  Pain Scale: Numbers Pre/Post-Treatment (Group)  -MW     Recorded by [MM] Krystal Babcock MS CCC-SLP 09/10/19 1249 [MW] Elisa Chung, OTR/L 09/10/19 1019     Row Name 09/10/19 0940 09/10/19 0824          Pain Scale: Numbers Pre/Post-Treatment    Pain Scale: Numbers, Pretreatment  5/10  -KJ  0/10 - no pain  -MW     Pain Scale: Numbers, Post-Treatment  6/10  -KJ  0/10 - no pain  -MW     Pain Location - Side  Right  -KJ  —     Pain Location  flank  -KJ  —     Recorded by [KJ] Debby Garcia, RAJAN 09/10/19 1026 [MW] Elisa Chung, OTR/L 09/10/19 1019     Row Name 09/10/19 1229             Pain Scale: FACES Pre/Post-Treatment    Pain: FACES Scale, Pretreatment  0-->no hurt  -MM      Pain: FACES Scale, Post-Treatment  0-->no hurt  -MM      Recorded by [MM] Krystal Babcock MS CCC-SLP 09/10/19 1249      Row Name 09/10/19 0824             Plan of Care Review    Plan of Care Reviewed With  patient;son  -MW      Recorded by [MW] Elisa Chung OTR/L 09/10/19 1035      Row Name 09/10/19 0824             Outcome Summary/Treatment Plan (OT)    Daily Summary of Progress (OT)  progress toward functional goals is gradual  -MW      Anticipated Discharge Disposition (OT)  skilled nursing facility  -MW      Recorded by [MW] Elisa Chung OTR/L 09/10/19 1035      Row Name 09/10/19 1229             Outcome Summary/Treatment Plan (SLP)    Daily Summary of Progress (SLP)  progress toward functional goals as expected  -MM      Barriers to Overall Progress (SLP)  n/a  -MM      Plan for Continued Treatment (SLP)  Continue to follow.  -MM      Anticipated Dischage Disposition  unknown  -MM      Recorded by [MM] Krystal Babcock MS CCC-SLP 09/10/19 1249        User Key  (r) = Recorded By, (t) = Taken By, (c) = Cosigned By    Initials Name Effective Dates  Discipline    KJ Debby Garcia, PTA 08/02/16 -  PT    MW Elisa Chung, OTR/L 08/28/18 -  OT    MM Krystal Babcock, MS CCC-SLP 05/24/19 -  SLP               Rehab Goal Summary     Row Name 09/10/19 1229             Swallow Goals (SLP)    Oral Nutrition/Hydration Goal Selection (SLP)  oral nutrition/hydration, SLP goal 1  -MM      Labial Strengthening Goal Selection (SLP)  labial strengthening, SLP goal 1  -MM      Lingual Strengthening Goal Selection (SLP)  lingual strengthening, SLP goal 1  -MM      Pharyngeal Strengthening Exercise Goal Selection (SLP)  pharyngeal strengthening exercise, SLP goal 1  -MM      Additional Documentation  lingual strengthening goal selection (SLP)  -MM         Oral Nutrition/Hydration Goal 1 (SLP)    Oral Nutrition/Hydration Goal 1, SLP  LTG: Patient will tolerate LRD with no overt s/s of aspiration.  -MM      Time Frame (Oral Nutrition/Hydration Goal 1, SLP)  by discharge  -MM      Barriers (Oral Nutrition/Hydration Goal 1, SLP)  n/a  -MM      Progress/Outcomes (Oral Nutrition/Hydration Goal 1, SLP)  continuing progress toward goal  -MM         Labial Strengthening Goal 1 (SLP)    Activity (Labial Strengthening Goal 1, SLP)  increase labial tone  -MM      Increase Labial Tone  labial resistance exercises  -MM      [place]/Accuracy (Labial Strengthening Goal 1, SLP)  independently (over 90% accuracy)  -MM      Time Frame (Labial Strengthening Goal 1, SLP)  short term goal (STG);by discharge  -MM      Barriers (Labial Strengthening Goal 1, SLP)  n/a  -MM      Progress/Outcomes (Labial Strengthening Goal 1, SLP)  continuing progress toward goal  -MM         Lingual Strengthening Goal 1 (SLP)    Activity (Lingual Strengthening Goal 1, SLP)  increase lingual tone/sensation/control/coordination/movement  -MM      Increase Lingual Tone/Sensation/Control/Coordination/Movement  lingual movement exercises  -MM      [place]/Accuracy (Lingual Strengthening Goal 1, SLP)   independently (over 90% accuracy)  -MM      Time Frame (Lingual Strengthening Goal 1, SLP)  by discharge  -MM      Barriers (Lingual Strengthening Goal 1, SLP)  n/a  -MM      Progress/Outcomes (Lingual Strengthening Goal 1, SLP)  continuing progress toward goal  -MM         Pharyngeal Strengthening Exercise Goal 1 (SLP)    Activity (Pharyngeal Strengthening Goal 1, SLP)  increase epiglottic inversion and retroflexion;increase tongue base retraction;increase pharyngeal sensation  -MM      Increase Pharyngeal Sensation  gustatory stimulation (sour/cold)  -MM      Increase Epiglottic Inversion and Retroflexion  Mendelsohn;falsetto  -MM      Increase Tongue Base Retraction  hard effortful swallow;kaleb  -MM      [place]/Accuracy (Pharyngeal Strengthening Goal 1, SLP)  independently (over 90% accuracy)  -MM      Time Frame (Pharyngeal Strengthening Goal 1, SLP)  short term goal (STG);by discharge  -MM      Barriers (Pharyngeal Strengthening Goal 1, SLP)  n/a  -MM      Progress/Outcomes (Pharyngeal Strengthening Goal 1, SLP)  goal ongoing  -MM        User Key  (r) = Recorded By, (t) = Taken By, (c) = Cosigned By    Initials Name Provider Type Discipline    Krystal Monroy, MS CCC-SLP Speech and Language Pathologist SLP          Physical Therapy Education     Title: PT OT SLP Therapies (In Progress)     Topic: Physical Therapy (In Progress)     Point: Mobility training (Done)     Learning Progress Summary           Patient Acceptance, E, VU,NR by TITUS at 9/4/2019  8:11 AM    Comment:  Rolling in bed. Bed mobility.    Acceptance, E, VU,NR by JAKE at 9/2/2019  7:20 AM    Comment:  Educated pt on progression of PT POC and benefits of activity                               User Key     Initials Effective Dates Name Provider Type Discipline    JAKE 08/02/16 -  Gilmer Medrano PT DPT Physical Therapist PT    TITUS 08/02/16 -  Kathrine Ramsay PTA Physical Therapy Assistant PT                PT Recommendation and Plan      Plan of Care Reviewed With: patient  Progress: improving  Outcome Summary: PT tx completed. Pt supine in bed. C/O increased weakness this pm. MaxA x 2 bed mobility. Unable to maintain static sitting this pm without max external support. Sit edge of bed x 15 minutes max assist. Performed sitting balance activities working on trunk control. Recommend SNf for cont'd Pt services.  Outcome Measures     Row Name 09/10/19 1000 09/09/19 1700          How much help from another is currently needed...    Putting on and taking off regular lower body clothing?  1  -MW  1  -MW     Bathing (including washing, rinsing, and drying)  2  -MW  2  -MW     Toileting (which includes using toilet bed pan or urinal)  1  -MW  1  -MW     Putting on and taking off regular upper body clothing  2  -MW  2  -MW     Taking care of personal grooming (such as brushing teeth)  2  -MW  2  -MW     Eating meals  2  -MW  2  -MW     AM-PAC 6 Clicks Score (OT)  10  -MW  10  -MW        Functional Assessment    Outcome Measure Options  AM-PAC 6 Clicks Daily Activity (OT)  -MW  AM-PAC 6 Clicks Daily Activity (OT)  -MW       User Key  (r) = Recorded By, (t) = Taken By, (c) = Cosigned By    Initials Name Provider Type    MW Elisa Chung, OTR/L Occupational Therapist         Time Calculation:   PT Charges     Row Name 09/10/19 1425 09/10/19 1026          Time Calculation    Start Time  1350  -KJ  0940  -KJ     Stop Time  1420  -KJ  1026  -KJ     Time Calculation (min)  30 min  -KJ  46 min  -KJ     PT Received On  09/10/19  -KJ  09/10/19  -KJ     PT Goal Re-Cert Due Date  09/12/19  -KJ  09/12/19  -KJ        Time Calculation- PT    Total Timed Code Minutes- PT  30 minute(s)  -KJ  46 minute(s)  -KJ       User Key  (r) = Recorded By, (t) = Taken By, (c) = Cosigned By    Initials Name Provider Type    Debby Thompson, PTA Physical Therapy Assistant        Therapy Charges for Today     Code Description Service Date Service Provider Modifiers Qty     57787303060 HC PT THER PROC EA 15 MIN 2019 Jose Debby HESS, PTA GP 2    34780109963 HC PT THERAPEUTIC ACT EA 15 MIN 2019 Jose Debby HESS, PTA GP 2    54635688647 HC PT THERAPEUTIC ACT EA 15 MIN 9/10/2019 Jose Debby HESS, PTA GP 3    19056649051 HC PT THERAPEUTIC ACT EA 15 MIN 9/10/2019 Debby Garcia JIMENA, PTA GP 2          PT G-Codes  Outcome Measure Options: AM-PAC 6 Clicks Daily Activity (OT)  AM-PAC 6 Clicks Score (PT): 8  AM-PAC 6 Clicks Score (OT): 10    Debby Garcia PTA  9/10/2019         Electronically signed by Debby Garcia PTA at 9/10/2019  2:37 PM          Occupational Therapy Notes (most recent note)      Elisa Chung, OTR/L at 9/10/2019 10:36 AM          Acute Care - Occupational Therapy Treatment Note   [facility]     Patient Name: Phan Eastman  : 1955  MRN: [id number]  Today's Date: 9/10/2019  Onset of Illness/Injury or Date of Surgery: 19  Date of Referral to OT: 19  Referring Physician: Dr. Wyatt     Admit Date: 2019       ICD-10-CM ICD-9-CM   1. Oropharyngeal dysphagia R13.12 787.22   2. Impaired mobility and ADLs Z74.09 799.89   3. Impaired mobility Z74.09 799.89   4. Left renal mass N28.89 593.9     Patient Active Problem List   Diagnosis   • ICH (intracerebral hemorrhage) (CMS/[facility])   • Diabetes mellitus (CMS/[facility])   • Hypertensive urgency   • Hyperlipidemia   • Cerebrovascular accident (CVA) due to embolism of left middle cerebral artery (CMS/[facility])   • Dysarthria   • CKD (chronic kidney disease) stage 3, GFR 30-59 ml/min (CMS/HCC)   • Left renal mass   • Obesity (BMI 30-39.9)   • Diastolic CHF, chronic (CMS/HCC)   • Bradycardia     Past Medical History:   Diagnosis Date   • Diabetes mellitus (CMS/HCC)    • GERD (gastroesophageal reflux disease)    • Hyperlipidemia    • Hypertension    • Neuropathy    • Osteoarthritis    • TIA (transient ischemic attack)      Past Surgical History:   Procedure Laterality Date   • CARPAL TUNNEL RELEASE Bilateral    •  EYE SURGERY     • KNEE SURGERY Bilateral    • NECK SURGERY     • RETINAL DETACHMENT SURGERY     • ROTATOR CUFF REPAIR         Therapy Treatment    Rehabilitation Treatment Summary     Row Name 09/10/19 0940 09/10/19 0824          Treatment Time/Intention    Discipline  physical therapy assistant  -KJ  occupational therapist  -MW     Document Type  therapy note (daily note)  -KJ2  therapy note (daily note)  -MW     Subjective Information  complains of;weakness  -KJ2  complains of;weakness;fatigue  -MW2     Mode of Treatment  physical therapy  -KJ2  individual therapy  -MW2     Patient/Family Observations  children present  -KJ2  son and daughter present  -MW2     Patient Effort  adequate  -KJ2  —     Comment  keep systolic pressure<140  -KJ2  —     Existing Precautions/Restrictions  fall  -KJ2  fall  -MW2     Treatment Considerations/Comments  Right side weakness  -KJ2  —     Recorded by [KJ] Debby Garcia, PTA 09/10/19 0940  [KJ2] Debby Garcia, PTA 09/10/19 1026 [MW] Elisa Chung, OTR/L 09/10/19 0825  [MW2] Elisa Cuhng, OTR/L 09/10/19 1010     Row Name 09/10/19 0824             Vital Signs    Pre Systolic BP Rehab  170 nrsg notified  -MW      Pre Treatment Diastolic BP  74  -MW      Pre Patient Position  Supine  -MW      Recorded by [MW] Elisa Chung, OTR/L 09/10/19 1019      Row Name 09/10/19 0824             Cognitive Assessment/Intervention    Additional Documentation  Cognitive Assessment/Intervention (Group)  -MW      Recorded by [MW] Elisa Chung OTR/L 09/10/19 1019      Row Name 09/10/19 0824             Cognitive Assessment/Intervention- PT/OT    Personal Safety Interventions  fall prevention program maintained;muscle strengthening facilitated;nonskid shoes/slippers when out of bed;supervised activity  -MW      Recorded by [MW] Elisa Chung OTR/L 09/10/19 1019      Row Name 09/10/19 0940 09/10/19 0824          Bed Mobility Assessment/Treatment    Bed Mobility Assessment/Treatment   —  rolling left;rolling right;scooting/bridging;supine-sit;sit-supine  -MW     Rolling Left [place] (Bed Mobility)  —  maximum assist (25% patient effort);2 person assist  -MW     Rolling Right [place] (Bed Mobility)  —  maximum assist (25% patient effort);2 person assist  -MW     Scooting/Bridging [place] (Bed Mobility)  —  dependent (less than 25% patient effort);2 person assist  -MW     Supine-Sit [place] (Bed Mobility)  —  maximum assist (25% patient effort);dependent (less than 25% patient effort);2 person assist  -MW     Sit-Supine [place] (Bed Mobility)  —  maximum assist (25% patient effort);dependent (less than 25% patient effort);2 person assist  -MW     Bed Mobility, Safety Issues  —  decreased use of arms for pushing/pulling;decreased use of legs for bridging/pushing;impaired trunk control for bed mobility  -MW     Assistive Device (Bed Mobility)  —  bed rails;draw sheet;head of bed elevated  -MW     Comment (Bed Mobility)  transferred to neuro chair   -KJ  unable to maintain sitting balance EOB >30 seconds without mod-maxA, returned to supine  -MW     Recorded by [KJ] Debby Garcia, PTA 09/10/19 1026 [MW] Elisa Chung, OTR/L 09/10/19 1019     Row Name 09/10/19 0940             Therapeutic Exercise    Exercise Type (Therapeutic Exercise)  AAROM (active assistive range of motion)  -KJ      Position (Therapeutic Exercise)  seated  -KJ      Sets/Reps (Therapeutic Exercise)  2 sets of 10  -KJ      Recorded by [KJ] Debby Garcia, PTA 09/10/19 1026      Row Name 09/10/19 0824             Balance    Balance  static sitting balance  -MW      Recorded by [MW] Elisa Chung, OTR/L 09/10/19 1019      Row Name 09/10/19 0824             Static Sitting Balance    Level of [place] (Unsupported Sitting, Static Balance)  maximal assist, 25 to 49% patient effort  -MW      Sitting Position (Unsupported Sitting, Static Balance)  sitting on edge of bed  -MW      Comment (Unsupported  Sitting, Static Balance)  able to hold self up for 30 seconds CGA with vc for posture, maxA required d/t R and posterior lean   -MW      Recorded by [MW] Elisa Chung OTR/L 09/10/19 1019      Row Name 09/10/19 0940 09/10/19 0824          Positioning and Restraints    Pre-Treatment Position  in bed  -KJ  in bed  -MW     Post Treatment Position  chair  -KJ  bed  -MW     In Bed  —  fowlers;call light within reach;encouraged to call for assist;with family/caregiver;side rails up x2;SCD pump applied;heels elevated;RUE elevated  -MW     Recorded by [KJ] Debby Garcia, PTA 09/10/19 1026 [MW] Elisa Chung OTR/L 09/10/19 1019     Row Name 09/10/19 0824             Pain Assessment    Additional Documentation  Pain Scale: Numbers Pre/Post-Treatment (Group)  -MW      Recorded by [MW] Elisa Chung OTR/L 09/10/19 1019      Row Name 09/10/19 0940 09/10/19 0824          Pain Scale: Numbers Pre/Post-Treatment    Pain Scale: Numbers, Pretreatment  5/10  -KJ  0/10 - no pain  -MW     Pain Scale: Numbers, Post-Treatment  6/10  -KJ  0/10 - no pain  -MW     Pain Location - Side  Right  -KJ  —     Pain Location  flank  -KJ  —     Recorded by [KJ] Debby Garcia, PTA 09/10/19 1026 [MW] Elisa Chung OTR/L 09/10/19 1019     Row Name 09/10/19 0824             Plan of Care Review    Plan of Care Reviewed With  patient;son  -MW      Recorded by [MW] Elisa Chung OTR/L 09/10/19 1035      Row Name 09/10/19 0824             Outcome Summary/Treatment Plan (OT)    Daily Summary of Progress (OT)  progress toward functional goals is gradual  -MW      Anticipated Discharge Disposition (OT)  skilled nursing facility  -MW      Recorded by [MW] Elisa Chung OTR/L 09/10/19 1035        User Key  (r) = Recorded By, (t) = Taken By, (c) = Cosigned By    Initials Name Effective Dates Discipline    KJ Debby Garcia, PTA 08/02/16 -  PT    Elisa Nolan OTR/L 08/28/18 -  OT             Occupational Therapy Education      Title: PT OT SLP Therapies (In Progress)     Topic: Occupational Therapy (Done)     Point: ADL training (Done)     Description: Instruct learner(s) on proper safety adaptation and remediation techniques during self care or transfers.   Instruct in proper use of assistive devices.    Learning Progress Summary           Patient Acceptance, E, VU,NR by FORTINO at 9/10/2019 10:23 AM    Comment:  limb protection with mobility/adl/supine, bed mobility, ADL    Acceptance, E, VU by FORTINO at 9/9/2019  5:09 PM    Comment:  mind muscle connection for neuroplasticity, ADL, OT POC    Acceptance, E, VU by RYAN at 9/6/2019 10:30 AM    Comment:  OT POC, adls, t/fs, bed mobility, importance of positioning, neuromuscular reeducation techniques.    Acceptance, E,TB, VU,DU,NR by YUDI at 9/5/2019  2:50 PM    Comment:  Pt. takes washcloth presented to him by this orellana/l and with min. assist using Rue washes his face and hands, no issues with Lue rom with washcloth! Ue exs performed with Lue Ind'ly, min. assist with Rue!    Acceptance, E, NR by RYAN at 9/2/2019  8:35 AM    Comment:  OT POC, benefits and roles of OT, adls, t/fs, bed mobility, positioning for prevention of skin breakdown of joint protection.                   Point: Home exercise program (Done)     Description: Instruct learner(s) on appropriate technique for monitoring, assisting and/or progressing therapeutic exercises/activities.    Learning Progress Summary           Patient Acceptance, E, VU,NR by FORTINO at 9/10/2019 10:23 AM    Comment:  limb protection with mobility/adl/supine, bed mobility, ADL    Acceptance, E, VU by FORTINO at 9/9/2019  5:09 PM    Comment:  mind muscle connection for neuroplasticity, ADL, OT POC    Acceptance, E,TB, VU,DU,NR by YUDI at 9/5/2019  2:50 PM    Comment:  Pt. takes washcloth presented to him by this orellana/l and with min. assist using Rue washes his face and hands, no issues with Lue rom with washcloth! Ue exs performed with Lue Ind'ly, min. assist with Rue!                    Point: Precautions (Done)     Description: Instruct learner(s) on prescribed precautions during self-care and functional transfers.    Learning Progress Summary           Patient Acceptance, E, VU,NR by FORTINO at 9/10/2019 10:23 AM    Comment:  limb protection with mobility/adl/supine, bed mobility, ADL    Acceptance, E, VU by FORTINO at 9/9/2019  5:09 PM    Comment:  mind muscle connection for neuroplasticity, ADL, OT POC    Acceptance, E, VU by RYAN at 9/6/2019 10:30 AM    Comment:  OT POC, adls, t/fs, bed mobility, importance of positioning, neuromuscular reeducation techniques.    Acceptance, E,TB, VU,DU,NR by YUDI at 9/5/2019  2:50 PM    Comment:  Pt. takes washcloth presented to him by this orellana/l and with min. assist using Rue washes his face and hands, no issues with Lue rom with washcloth! Ue exs performed with Lue Ind'ly, min. assist with Rue!    Acceptance, E, NR by RYAN at 9/2/2019  8:35 AM    Comment:  OT POC, benefits and roles of OT, adls, t/fs, bed mobility, positioning for prevention of skin breakdown of joint protection.                   Point: Body mechanics (Done)     Description: Instruct learner(s) on proper positioning and spine alignment during self-care, functional mobility activities and/or exercises.    Learning Progress Summary           Patient Acceptance, E, VU,NR by FORTINO at 9/10/2019 10:23 AM    Comment:  limb protection with mobility/adl/supine, bed mobility, ADL    Acceptance, E, VU by FORTINO at 9/9/2019  5:09 PM    Comment:  mind muscle connection for neuroplasticity, ADL, OT POC    Acceptance, E,TB, VU,DU,NR by YUDI at 9/5/2019  2:50 PM    Comment:  Pt. takes washcloth presented to him by this orellana/l and with min. assist using Rue washes his face and hands, no issues with Lue rom with washcloth! Ue exs performed with Lue Ind'ly, min. assist with Rue!                               User Key     Initials Effective Dates Name Provider Type Discipline     08/02/16 -  Emmanuel Osborne  DOS SANTOS/L Occupational Therapy Assistant OT     08/28/18 -  Elisa Chung, OTR/L Occupational Therapist OT    RYAN 10/12/18 -  Aster Suggs OTR/L Occupational Therapist OT                OT Recommendation and Plan  Outcome Summary/Treatment Plan (OT)  Daily Summary of Progress (OT): progress toward functional goals is gradual  Anticipated Discharge Disposition (OT): skilled nursing facility  Daily Summary of Progress (OT): progress toward functional goals is gradual  Plan of Care Review  Plan of Care Reviewed With: patient  Plan of Care Reviewed With: patient  Outcome Summary: OT txt completed. Pt reports weakness and fatigue greater than yesterday but wanting to sit up. Max-depx2 to come to EOB. Pt able to maintain sitting balance about 30 sec with max vc/tc for posture, otherwise requiring maxA for R and posterior lean. Pt rolled multiple times for linen and brief change following incontinence, maxAx2 for rolling. Education provided for limb protection with mobility, ADL, and laying in bed. Pt and son verbalize understanding and need for RUE support. Cont OT pOC. Recommend d/c to SNF.  Outcome Measures     Row Name 09/10/19 1000 09/09/19 1700          How much help from another is currently needed...    Putting on and taking off regular lower body clothing?  1  -MW  1  -MW     Bathing (including washing, rinsing, and drying)  2  -MW  2  -MW     Toileting (which includes using toilet bed pan or urinal)  1  -MW  1  -MW     Putting on and taking off regular upper body clothing  2  -MW  2  -MW     Taking care of personal grooming (such as brushing teeth)  2  -MW  2  -MW     Eating meals  2  -MW  2  -MW     AM-PAC 6 Clicks Score (OT)  10  -MW  10  -MW        Functional Assessment    Outcome Measure Options  AM-PAC 6 Clicks Daily Activity (OT)  -MW  AM-PAC 6 Clicks Daily Activity (OT)  -MW       User Key  (r) = Recorded By, (t) = Taken By, (c) = Cosigned By    Initials Name Provider Type    Elisa Nolan,  OTR/L Occupational Therapist           Time Calculation:   Time Calculation- OT     Row Name 09/10/19 1023             Time Calculation- OT    OT Start Time  0808  -MW      OT Stop Time  0907  -MW      OT Time Calculation (min)  59 min  -MW      Total Timed Code Minutes- OT  59 minute(s)  -MW      OT Received On  09/10/19  -        User Key  (r) = Recorded By, (t) = Taken By, (c) = Cosigned By    Initials Name Provider Type     Elisa Chung OTR/L Occupational Therapist        Therapy Charges for Today     Code Description Service Date Service Provider Modifiers Qty    26914016273 HC OT THER PROC EA 15 MIN 9/9/2019 Elisa Chung OTR/L GO 1    09519724510 HC OT SELF CARE/MGMT/TRAIN EA 15 MIN 9/9/2019 Elisa Chung OTR/L GO 2    46587316850 HC OT THERAPEUTIC ACT EA 15 MIN 9/10/2019 Elisa Chung OTR/L GO 3    [id number] HC OT SELF CARE/MGMT/TRAIN EA 15 MIN 9/10/2019 Elisa Chung OTR/L GO 1               VILLA Bueno/L  9/10/2019    Electronically signed by Elisa Chung OTR/L at 9/10/2019 10:36 AM

## 2019-09-10 NOTE — THERAPY TREATMENT NOTE
Acute Care - Physical Therapy Treatment Note  [facility]     Patient Name: Phan Eastman  : 1955  MRN: [id number]  Today's Date: 9/10/2019  Onset of Illness/Injury or Date of Surgery: 19     Referring Physician: Dr. Wyatt     Admit Date: 2019    Visit Dx:    ICD-10-CM ICD-9-CM   1. Oropharyngeal dysphagia R13.12 787.22   2. Impaired mobility and ADLs Z74.09 799.89   3. Impaired mobility Z74.09 799.89   4. Left renal mass N28.89 593.9     Patient Active Problem List   Diagnosis   • ICH (intracerebral hemorrhage) (CMS/HCC)   • Diabetes mellitus (CMS/HCC)   • Hypertensive urgency   • Hyperlipidemia   • Cerebrovascular accident (CVA) due to embolism of left middle cerebral artery (CMS/HCC)   • Dysarthria   • CKD (chronic kidney disease) stage 3, GFR 30-59 ml/min (CMS/HCC)   • Left renal mass   • Obesity (BMI 30-39.9)   • Diastolic CHF, chronic (CMS/HCC)   • Bradycardia   • Constipation       Therapy Treatment    Rehabilitation Treatment Summary     Row Name 09/10/19 1350 09/10/19 1229 09/10/19 0940       Treatment Time/Intention    Discipline  physical therapy assistant  -KJ  speech language pathologist  -MM  physical therapy assistant  -KJ    Document Type  therapy note (daily note)  -KJ  therapy note (daily note)  -MM  therapy note (daily note)  -KJ2    Subjective Information  complains of;weakness  -KJ  no complaints  -MM  complains of;weakness  -KJ2    Mode of Treatment  physical therapy  -KJ  individual therapy;speech-language pathology  -MM  physical therapy  -KJ2    Patient/Family Observations  children present  -KJ  Family present.   -MM  children present  -KJ2    Care Plan Review  —  care plan/treatment goals reviewed  -MM  —    Therapy Frequency (Swallow)  —  at least;3 days per week  -MM  —    Patient Effort  adequate  -KJ  good  -MM  adequate  -KJ2    Comment  keep systolic pressure<140; Righ side weakness  -KJ  —  keep systolic pressure<140  -KJ2    Existing Precautions/Restrictions   fall  -KJ  —  fall  -KJ2    Treatment Considerations/Comments  —  —  Right side weakness  -KJ2    Recorded by [KJ] Debby Garcia, PTA 09/10/19 1423 [MM] Krystal Babcock, MS CCC-SLP 09/10/19 1249 [KJ] Debby Garcia, PTA 09/10/19 0940  [KJ2] Debby Garcia, PTA 09/10/19 1026    Row Name 09/10/19 0824             Treatment Time/Intention    Discipline  occupational therapist  -MW      Document Type  therapy note (daily note)  -MW      Subjective Information  complains of;weakness;fatigue  -MW2      Mode of Treatment  individual therapy  -MW2      Patient/Family Observations  son and daughter present  -MW2      Existing Precautions/Restrictions  fall  -MW2      Recorded by [MW] Elisa Chung OTR/L 09/10/19 0825  [MW2] Elisa Chung OTR/L 09/10/19 1010      Row Name 09/10/19 0824             Vital Signs    Pre Systolic BP Rehab  170 nrsg notified  -MW      Pre Treatment Diastolic BP  74  -MW      Pre Patient Position  Supine  -MW      Recorded by [MW] Elisa Chung OTR/L 09/10/19 1019      Row Name 09/10/19 0824             Cognitive Assessment/Intervention    Additional Documentation  Cognitive Assessment/Intervention (Group)  -MW      Recorded by [MW] Elisa Chung OTR/L 09/10/19 1019      Row Name 09/10/19 0824             Cognitive Assessment/Intervention- PT/OT    Personal Safety Interventions  fall prevention program maintained;muscle strengthening facilitated;nonskid shoes/slippers when out of bed;supervised activity  -MW      Recorded by [MW] Elisa Chung OTR/L 09/10/19 1019      Row Name 09/10/19 1350 09/10/19 0940 09/10/19 0824       Bed Mobility Assessment/Treatment    Bed Mobility Assessment/Treatment  —  —  rolling left;rolling right;scooting/bridging;supine-sit;sit-supine  -MW    Rolling Left [place] (Bed Mobility)  —  —  maximum assist (25% patient effort);2 person assist  -MW    Rolling Right [place] (Bed Mobility)  —  —  maximum assist (25% patient effort);2  person assist  -MW    Scooting/Bridging [place] (Bed Mobility)  —  —  dependent (less than 25% patient effort);2 person assist  -MW    Supine-Sit [place] (Bed Mobility)  maximum assist (25% patient effort);2 person assist  -KJ  —  maximum assist (25% patient effort);dependent (less than 25% patient effort);2 person assist  -MW    Sit-Supine [place] (Bed Mobility)  maximum assist (25% patient effort);2 person assist  -KJ  —  maximum assist (25% patient effort);dependent (less than 25% patient effort);2 person assist  -MW    Bed Mobility, Safety Issues  decreased use of arms for pushing/pulling;decreased use of legs for bridging/pushing;impaired trunk control for bed mobility  -KJ  —  decreased use of arms for pushing/pulling;decreased use of legs for bridging/pushing;impaired trunk control for bed mobility  -MW    Assistive Device (Bed Mobility)  bed rails;draw sheet  -KJ  —  bed rails;draw sheet;head of bed elevated  -MW    Comment (Bed Mobility)  sit edge of bed x 15 minutes MaxA; unable to maintain sitting balance today  -KJ  transferred to neuro chair   -KJ  unable to maintain sitting balance EOB >30 seconds without mod-maxA, returned to supine  -MW    Recorded by [KJ] Debby Garcia, PTA 09/10/19 1423 [KJ] Debby Garcia, PTA 09/10/19 1026 [MW] Elisa Chung, OTR/L 09/10/19 1019    Row Name 09/10/19 1350             Gait/Stairs Assessment/Training    Comment (Gait/Stairs)  not appropriate to attempt standing  -KJ      Recorded by [KJ] Debby Garcia, PTA 09/10/19 1423      Row Name 09/10/19 1350 09/10/19 0940          Therapeutic Exercise    Exercise Type (Therapeutic Exercise)  AAROM (active assistive range of motion) LLE  -KJ  AAROM (active assistive range of motion)  -KJ     Position (Therapeutic Exercise)  seated  -KJ  seated  -KJ     Sets/Reps (Therapeutic Exercise)  15  -KJ  2 sets of 10  -KJ     Comment (Therapeutic Exercise)  AA/PROM RLE/UE; lateral L push up, postural correction  activities statically. Unable to maintain sitting  wiithout Max external support  -KJ  —     Recorded by [KJ] Debby Garcia, PTA 09/10/19 1423 [KJ] Debby Garcia, PTA 09/10/19 1026     Row Name 09/10/19 0824             Balance    Balance  static sitting balance  -MW      Recorded by [MW] Elisa Chung OTR/L 09/10/19 1019      Row Name 09/10/19 1350 09/10/19 0824          Static Sitting Balance    Level of [place] (Unsupported Sitting, Static Balance)  maximal assist, 25 to 49% patient effort  -KJ  maximal assist, 25 to 49% patient effort  -MW     Sitting Position (Unsupported Sitting, Static Balance)  sitting on edge of bed  -KJ  sitting on edge of bed  -MW     Time Able to Maintain Position (Unsupported Sitting, Static Balance)  more than 5 minutes  -KJ  —     Comment (Unsupported Sitting, Static Balance)  —  able to hold self up for 30 seconds CGA with vc for posture, maxA required d/t R and posterior lean   -MW     Comment (Supported Sitting, Static Balance)  max verbal cueing for activities; wanted to lay down due to pain and fatigue  -KJ  —     Recorded by [KJ] Debby Garcia, PTA 09/10/19 1423 [MW] Elisa Chung OTR/L 09/10/19 1019     Row Name 09/10/19 1350 09/10/19 0940 09/10/19 0824       Positioning and Restraints    Pre-Treatment Position  in bed  -KJ  in bed  -KJ  in bed  -MW    Post Treatment Position  bed  -KJ  chair  -KJ  bed  -MW    In Bed  —  —  fowlers;call light within reach;encouraged to call for assist;with family/caregiver;side rails up x2;SCD pump applied;heels elevated;RUE elevated  -MW    Recorded by [KJ] Debby Garcia, PTA 09/10/19 1423 [KJ] Debby Garcia, PTA 09/10/19 1026 [MW] Elisa Chung OTR/L 09/10/19 1019    Row Name 09/10/19 1229 09/10/19 0824          Pain Assessment    Additional Documentation  Pain Scale: FACES Pre/Post-Treatment (Group)  -MM  Pain Scale: Numbers Pre/Post-Treatment (Group)  -MW     Recorded by [MM] Krystal Babcock, MS CCC-SLP  09/10/19 1249 [MW] Elisa Chung, OTR/L 09/10/19 1019     Row Name 09/10/19 0940 09/10/19 0824          Pain Scale: Numbers Pre/Post-Treatment    Pain Scale: Numbers, Pretreatment  5/10  -KJ  0/10 - no pain  -MW     Pain Scale: Numbers, Post-Treatment  6/10  -KJ  0/10 - no pain  -MW     Pain Location - Side  Right  -KJ  —     Pain Location  flank  -KJ  —     Recorded by [KJ] Debby Garcia, PTA 09/10/19 1026 [MW] Elisa Chung, OTR/L 09/10/19 1019     Row Name 09/10/19 1229             Pain Scale: FACES Pre/Post-Treatment    Pain: FACES Scale, Pretreatment  0-->no hurt  -MM      Pain: FACES Scale, Post-Treatment  0-->no hurt  -MM      Recorded by [MM] Krystal Babcock MS CCC-SLP 09/10/19 1249      Row Name 09/10/19 0824             Plan of Care Review    Plan of Care Reviewed With  patient;son  -MW      Recorded by [MW] Elisa Chung OTR/L 09/10/19 1035      Row Name 09/10/19 0824             Outcome Summary/Treatment Plan (OT)    Daily Summary of Progress (OT)  progress toward functional goals is gradual  -MW      Anticipated Discharge Disposition (OT)  skilled nursing facility  -MW      Recorded by [MW] Elisa Chung OTR/L 09/10/19 1035      Row Name 09/10/19 1229             Outcome Summary/Treatment Plan (SLP)    Daily Summary of Progress (SLP)  progress toward functional goals as expected  -MM      Barriers to Overall Progress (SLP)  n/a  -MM      Plan for Continued Treatment (SLP)  Continue to follow.  -MM      Anticipated Dischage Disposition  unknown  -MM      Recorded by [MM] Krystal Babcock MS CCC-SLP 09/10/19 1249        User Key  (r) = Recorded By, (t) = Taken By, (c) = Cosigned By    Initials Name Effective Dates Discipline    KJ Debby Garcia, PTA 08/02/16 -  PT    MW Elisa Chung OTR/L 08/28/18 -  OT    MM Krystal Babcock MS CCC-SLP 05/24/19 -  SLP               Rehab Goal Summary     Row Name 09/10/19 1229             Swallow Goals (SLP)    Oral Nutrition/Hydration  Goal Selection (SLP)  oral nutrition/hydration, SLP goal 1  -MM      Labial Strengthening Goal Selection (SLP)  labial strengthening, SLP goal 1  -MM      Lingual Strengthening Goal Selection (SLP)  lingual strengthening, SLP goal 1  -MM      Pharyngeal Strengthening Exercise Goal Selection (SLP)  pharyngeal strengthening exercise, SLP goal 1  -MM      Additional Documentation  lingual strengthening goal selection (SLP)  -MM         Oral Nutrition/Hydration Goal 1 (SLP)    Oral Nutrition/Hydration Goal 1, SLP  LTG: Patient will tolerate LRD with no overt s/s of aspiration.  -MM      Time Frame (Oral Nutrition/Hydration Goal 1, SLP)  by discharge  -MM      Barriers (Oral Nutrition/Hydration Goal 1, SLP)  n/a  -MM      Progress/Outcomes (Oral Nutrition/Hydration Goal 1, SLP)  continuing progress toward goal  -MM         Labial Strengthening Goal 1 (SLP)    Activity (Labial Strengthening Goal 1, SLP)  increase labial tone  -MM      Increase Labial Tone  labial resistance exercises  -MM      [place]/Accuracy (Labial Strengthening Goal 1, SLP)  independently (over 90% accuracy)  -MM      Time Frame (Labial Strengthening Goal 1, SLP)  short term goal (STG);by discharge  -MM      Barriers (Labial Strengthening Goal 1, SLP)  n/a  -MM      Progress/Outcomes (Labial Strengthening Goal 1, SLP)  continuing progress toward goal  -MM         Lingual Strengthening Goal 1 (SLP)    Activity (Lingual Strengthening Goal 1, SLP)  increase lingual tone/sensation/control/coordination/movement  -MM      Increase Lingual Tone/Sensation/Control/Coordination/Movement  lingual movement exercises  -MM      [place]/Accuracy (Lingual Strengthening Goal 1, SLP)  independently (over 90% accuracy)  -MM      Time Frame (Lingual Strengthening Goal 1, SLP)  by discharge  -MM      Barriers (Lingual Strengthening Goal 1, SLP)  n/a  -MM      Progress/Outcomes (Lingual Strengthening Goal 1, SLP)  continuing progress toward goal  -MM          Pharyngeal Strengthening Exercise Goal 1 (SLP)    Activity (Pharyngeal Strengthening Goal 1, SLP)  increase epiglottic inversion and retroflexion;increase tongue base retraction;increase pharyngeal sensation  -MM      Increase Pharyngeal Sensation  gustatory stimulation (sour/cold)  -MM      Increase Epiglottic Inversion and Retroflexion  [place]elsohn;falsetto  -MM      Increase Tongue Base Retraction  hard effortful swallow;kaleb  -MM      [place]/Accuracy (Pharyngeal Strengthening Goal 1, SLP)  independently (over 90% accuracy)  -MM      Time Frame (Pharyngeal Strengthening Goal 1, SLP)  short term goal (STG);by discharge  -MM      Barriers (Pharyngeal Strengthening Goal 1, SLP)  n/a  -MM      Progress/Outcomes (Pharyngeal Strengthening Goal 1, SLP)  goal ongoing  -MM        User Key  (r) = Recorded By, (t) = Taken By, (c) = Cosigned By    Initials Name Provider Type Discipline    Krystal Monroy, MS CCC-SLP Speech and Language Pathologist SLP          Physical Therapy Education     Title: PT OT SLP Therapies (In Progress)     Topic: Physical Therapy (In Progress)     Point: Mobility training (Done)     Learning Progress Summary           Patient Acceptance, E, VU,NR by TITUS at 9/4/2019  8:11 AM    Comment:  Rolling in bed. Bed mobility.    Acceptance, E, VU,NR by JAKE at 9/2/2019  7:20 AM    Comment:  Educated pt on progression of PT POC and benefits of activity                               User Key     Initials Effective Dates Name Provider Type Discipline    JAKE 08/02/16 -  Gilmer Medrano, PT DPT Physical Therapist PT    TITUS 08/02/16 -  Kathrine Ramsay, PTA Physical Therapy Assistant PT                PT Recommendation and Plan     Plan of Care Reviewed With: patient  Progress: improving  Outcome Summary: PT tx completed. Pt supine in bed. C/O increased weakness this pm. MaxA x 2 bed mobility. Unable to maintain static sitting this pm without max external support. Sit edge of bed x 15 minutes max  assist. Performed sitting balance activities working on trunk control. Recommend SNf for cont'd Pt services.  Outcome Measures     Row Name 09/10/19 1000 09/09/19 1700          How much help from another is currently needed...    Putting on and taking off regular lower body clothing?  1  -MW  1  -MW     Bathing (including washing, rinsing, and drying)  2  -MW  2  -MW     Toileting (which includes using toilet bed pan or urinal)  1  -MW  1  -MW     Putting on and taking off regular upper body clothing  2  -MW  2  -MW     Taking care of personal grooming (such as brushing teeth)  2  -MW  2  -MW     Eating meals  2  -MW  2  -MW     AM-PAC 6 Clicks Score (OT)  10  -MW  10  -MW        Functional Assessment    Outcome Measure Options  AM-PAC 6 Clicks Daily Activity (OT)  -MW  AM-PAC 6 Clicks Daily Activity (OT)  -MW       User Key  (r) = Recorded By, (t) = Taken By, (c) = Cosigned By    Initials Name Provider Type    Elisa Nolan, OTR/L Occupational Therapist         Time Calculation:   PT Charges     Row Name 09/10/19 1425 09/10/19 1026          Time Calculation    Start Time  1350  -KJ  0940  -KJ     Stop Time  1420  -KJ  1026  -KJ     Time Calculation (min)  30 min  -KJ  46 min  -KJ     PT Received On  09/10/19  -KJ  09/10/19  -KJ     PT Goal Re-Cert Due Date  09/12/19  -KJ  09/12/19  -KJ        Time Calculation- PT    Total Timed Code Minutes- PT  30 minute(s)  -KJ  46 minute(s)  -KJ       User Key  (r) = Recorded By, (t) = Taken By, (c) = Cosigned By    Initials Name Provider Type    Debby Thompson, RAJAN Physical Therapy Assistant        Therapy Charges for Today     Code Description Service Date Service Provider Modifiers Qty    67493209875 HC PT THER PROC EA 15 MIN 9/9/2019 Debby Garcia PTA GP 2    60074163655 HC PT THERAPEUTIC ACT EA 15 MIN 9/9/2019 Debby Garcia, RAJAN GP 2    52561209715 HC PT THERAPEUTIC ACT EA 15 MIN 9/10/2019 Debby Garcia PTA GP 3    70618318025 HC PT THERAPEUTIC ACT EA 15  MIN 9/10/2019 Debby Garcia, PTA GP 2          PT G-Codes  Outcome Measure Options: AM-PAC 6 Clicks Daily Activity (OT)  AM-PAC 6 Clicks Score (PT): 8  AM-PAC 6 Clicks Score (OT): 10    Debby Garcia, PTA  9/10/2019

## 2019-09-10 NOTE — PLAN OF CARE
Problem: Patient Care Overview  Goal: Plan of Care Review  Outcome: Ongoing (interventions implemented as appropriate)   09/10/19 1019   Coping/Psychosocial   Plan of Care Reviewed With patient   Plan of Care Review   Progress no change   OTHER   Outcome Summary OT txt completed. Pt reports weakness and fatigue greater than yesterday but wanting to sit up. Max-depx2 to come to EOB. Pt able to maintain sitting balance about 30 sec with max vc/tc for posture, otherwise requiring maxA for R and posterior lean. Pt rolled multiple times for linen and brief change following incontinence, maxAx2 for rolling. Education provided for limb protection with mobility, ADL, and laying in bed. Pt and son verbalize understanding and need for RUE support. Cont OT pOC. Recommend d/c to SNF.

## 2019-09-10 NOTE — PLAN OF CARE
Problem: Patient Care Overview  Goal: Plan of Care Review  Outcome: Ongoing (interventions implemented as appropriate)   09/10/19 3024   Coping/Psychosocial   Plan of Care Reviewed With patient;family   Plan of Care Review   Progress no change   OTHER   Outcome Summary Pt alert but disoriented to time. HTN relieved by scheduled BP meds. No c/o pain. NIH= 10. Suppository, miralax, and sennokot administered. 2 BM today. Meds whole in applesauce. Up with PT and lift team only. Safety maintained. Will continue to monitor.      Goal: Individualization and Mutuality  Outcome: Ongoing (interventions implemented as appropriate)    Goal: Interprofessional Rounds/Family Conf  Outcome: Ongoing (interventions implemented as appropriate)      Problem: Fall Risk (Adult)  Goal: Absence of Fall  Outcome: Ongoing (interventions implemented as appropriate)      Problem: Pain, Acute (Adult)  Goal: Acceptable Pain Control/Comfort Level  Outcome: Ongoing (interventions implemented as appropriate)      Problem: Skin Injury Risk (Adult)  Goal: Skin Health and Integrity  Outcome: Ongoing (interventions implemented as appropriate)      Problem: Nutrition, Imbalanced: Inadequate Oral Intake (Adult)  Goal: Improved Oral Intake  Outcome: Ongoing (interventions implemented as appropriate)    Goal: Prevent Further Weight Loss  Outcome: Ongoing (interventions implemented as appropriate)      Problem: Stroke (Hemorrhagic) (Adult)  Goal: Signs and Symptoms of Listed Potential Problems Will be Absent, Minimized or Managed (Stroke)  Outcome: Ongoing (interventions implemented as appropriate)

## 2019-09-10 NOTE — PROGRESS NOTES
Continued Stay Note   [facility]     Patient Name: Phan Eastman  MRN: [id number]  Today's Date: 9/10/2019    Admit Date: 8/31/2019    Discharge Plan     Row Name 09/10/19 1154       Plan    Plan Comments  FAXED REFERRAL TO [facility] PER PT SON/DTR REQUEST ([phone] FAX). AWAIT ANSWER.        Discharge Codes    No documentation.             ANETTE Richards

## 2019-09-10 NOTE — THERAPY TREATMENT NOTE
Acute Care - Physical Therapy Treatment Note  Psychiatric     Patient Name: Phan Eastman  : 1955  MRN: [id number]  Today's Date: 9/10/2019  Onset of Illness/Injury or Date of Surgery: 19     Referring Physician: Dr. Wyatt     Admit Date: 2019    Visit Dx:    ICD-10-CM ICD-9-CM   1. Oropharyngeal dysphagia R13.12 787.22   2. Impaired mobility and ADLs Z74.09 799.89   3. Impaired mobility Z74.09 799.89   4. Left renal mass N28.89 593.9     Patient Active Problem List   Diagnosis   • ICH (intracerebral hemorrhage) (CMS/HCC)   • Diabetes mellitus (CMS/HCC)   • Hypertensive urgency   • Hyperlipidemia   • Cerebrovascular accident (CVA) due to embolism of left middle cerebral artery (CMS/HCC)   • Dysarthria   • CKD (chronic kidney disease) stage 3, GFR 30-59 ml/min (CMS/[facility])   • Left renal mass   • Obesity (BMI 30-39.9)   • Diastolic CHF, chronic (CMS/HCC)   • Bradycardia       Therapy Treatment    Rehabilitation Treatment Summary     Row Name 09/10/19 0940 09/10/19 0824          Treatment Time/Intention    Discipline  physical therapy assistant  -KJ  occupational therapist  -MW     Document Type  therapy note (daily note)  -KJ2  therapy note (daily note)  -MW     Subjective Information  complains of;weakness  -KJ2  complains of;weakness;fatigue  -MW2     Mode of Treatment  physical therapy  -KJ2  individual therapy  -MW2     Patient/Family Observations  children present  -KJ2  son and daughter present  -MW2     Patient Effort  adequate  -KJ2  —     Comment  keep systolic pressure<140  -KJ2  —     Existing Precautions/Restrictions  fall  -KJ2  fall  -MW2     Treatment Considerations/Comments  Right side weakness  -KJ2  —     Recorded by [KJ] Debby Garcia, PTA 09/10/19 0940  [KJ2] Debby Garcia, PTA 09/10/19 1026 [MW] Elisa Chung, OTR/L 09/10/19 0825  [MW2] Elisa Chung, OTR/L 09/10/19 1010     Row Name 09/10/19 0824             Vital Signs    Pre Systolic BP Rehab  170 nrsg notified   -MW      Pre Treatment Diastolic BP  74  -MW      Pre Patient Position  Supine  -MW      Recorded by [MW] Elisa Chung, OTR/L 09/10/19 1019      Row Name 09/10/19 0824             Cognitive Assessment/Intervention    Additional Documentation  Cognitive Assessment/Intervention (Group)  -MW      Recorded by [MW] Elisa Chung OTR/L 09/10/19 1019      Row Name 09/10/19 0824             Cognitive Assessment/Intervention- PT/OT    Personal Safety Interventions  fall prevention program maintained;muscle strengthening facilitated;nonskid shoes/slippers when out of bed;supervised activity  -MW      Recorded by [MW] Elisa Chung, OTR/L 09/10/19 1019      Row Name 09/10/19 0940 09/10/19 0824          Bed Mobility Assessment/Treatment    Bed Mobility Assessment/Treatment  —  rolling left;rolling right;scooting/bridging;supine-sit;sit-supine  -MW     Rolling Left Cecilia (Bed Mobility)  —  maximum assist (25% patient effort);2 person assist  -MW     Rolling Right Cec[place] (Bed Mobility)  —  maximum assist (25% patient effort);2 person assist  -MW     Scooting/Bridging Cecilia (Bed Mobility)  —  dependent (less than 25% patient effort);2 person assist  -MW     Supine-Sit Cecilia (Bed Mobility)  —  maximum assist (25% patient effort);dependent (less than 25% patient effort);2 person assist  -MW     Sit-Supine Cecilia (Bed Mobility)  —  maximum assist (25% patient effort);dependent (less than 25% patient effort);2 person assist  -MW     Bed Mobility, Safety Issues  —  decreased use of arms for pushing/pulling;decreased use of legs for bridging/pushing;impaired trunk control for bed mobility  -MW     Assistive Device (Bed Mobility)  —  bed rails;draw sheet;head of bed elevated  -MW     Comment (Bed Mobility)  transferred to neuro chair   -KJ  unable to maintain sitting balance EOB >30 seconds without mod-maxA, returned to supine  -MW     Recorded by [KJ] Debby Garcia, PTA 09/10/19 1026 [MW]  Elisa Chung OTR/L 09/10/19 1019     Row Name 09/10/19 0940             Therapeutic Exercise    Exercise Type (Therapeutic Exercise)  AAROM (active assistive range of motion)  -KJ      Position (Therapeutic Exercise)  seated  -KJ      Sets/Reps (Therapeutic Exercise)  2 sets of 10  -KJ      Recorded by [KJ] Debby Garcia, PTA 09/10/19 1026      Row Name 09/10/19 0824             Balance    Balance  static sitting balance  -MW      Recorded by [MW] Elisa Chung OTR/L 09/10/19 1019      Row Name 09/10/19 0824             Static Sitting Balance    Level of [place] (Unsupported Sitting, Static Balance)  maximal assist, 25 to 49% patient effort  -MW      Sitting Position (Unsupported Sitting, Static Balance)  sitting on edge of bed  -MW      Comment (Unsupported Sitting, Static Balance)  able to hold self up for 30 seconds CGA with vc for posture, maxA required d/t R and posterior lean   -MW      Recorded by [MW] Elisa Chung OTR/L 09/10/19 1019      Row Name 09/10/19 0940 09/10/19 0824          Positioning and Restraints    Pre-Treatment Position  in bed  -KJ  in bed  -MW     Post Treatment Position  chair  -KJ  bed  -MW     In Bed  —  fowlers;call light within reach;encouraged to call for assist;with family/caregiver;side rails up x2;SCD pump applied;heels elevated;RUE elevated  -MW     Recorded by [KJ] Debby Garcia, PTA 09/10/19 1026 [MW] Elisa Cuhng OTR/L 09/10/19 1019     Row Name 09/10/19 0824             Pain Assessment    Additional Documentation  Pain Scale: Numbers Pre/Post-Treatment (Group)  -MW      Recorded by [MW] Elisa Chung OTR/L 09/10/19 1019      Row Name 09/10/19 0940 09/10/19 0824          Pain Scale: Numbers Pre/Post-Treatment    Pain Scale: Numbers, Pretreatment  5/10  -KJ  0/10 - no pain  -MW     Pain Scale: Numbers, Post-Treatment  6/10  -KJ  0/10 - no pain  -MW     Pain Location - Side  Right  -KJ  —     Pain Location  flank  -KJ  —     Recorded by [KJ]  JoseDebby, PTA 09/10/19 1026 [MW] Juliet Elisa LOYA, OTR/L 09/10/19 1019       User Key  (r) = Recorded By, (t) = Taken By, (c) = Cosigned By    Initials Name Effective Dates Discipline    COLETTE Garcia Debby HESS, PTA 08/02/16 -  PT    MW ChungElisa, OTR/L 08/28/18 -  OT                   Physical Therapy Education     Title: PT OT SLP Therapies (In Progress)     Topic: Physical Therapy (In Progress)     Point: Mobility training (Done)     Learning Progress Summary           Patient Acceptance, E, VU,NR by TITUS at 9/4/2019  8:11 AM    Comment:  Rolling in bed. Bed mobility.    Acceptance, E, VU,NR by JAKE at 9/2/2019  7:20 AM    Comment:  Educated pt on progression of PT POC and benefits of activity                               User Key     Initials Effective Dates Name Provider Type Discipline    JAKE 08/02/16 -  Gilmer Medrano, PT DPT Physical Therapist PT    TITUS 08/02/16 -  Kathrine Ramsay PTA Physical Therapy Assistant PT                PT Recommendation and Plan     Plan of Care Reviewed With: patient  Progress: no change  Outcome Summary: PT tx completed. Pt supine in bed. BP's WNL during tx. Became alittle low towards end of tx. MaxA x 2 bed mobility. Sitting balance max assist initially, but improved the longer he sat. At end of tx Evan to sit edge of bed. Right side weakness and R rib pn. Nursing made aware of pn. Recommend SNF for con'td PT.  Outcome Measures     Row Name 09/09/19 1700             How much help from another is currently needed...    Putting on and taking off regular lower body clothing?  1  -MW      Bathing (including washing, rinsing, and drying)  2  -MW      Toileting (which includes using toilet bed pan or urinal)  1  -MW      Putting on and taking off regular upper body clothing  2  -MW      Taking care of personal grooming (such as brushing teeth)  2  -MW      Eating meals  2  -MW      AM-PAC 6 Clicks Score (OT)  10  -MW         Functional Assessment    Outcome Measure Options   AM-PAC 6 Clicks Daily Activity (OT)  -        User Key  (r) = Recorded By, (t) = Taken By, (c) = Cosigned By    Initials Name Provider Type    Elisa Nolan, OTR/L Occupational Therapist         Time Calculation:   PT Charges     Row Name 09/10/19 1026             Time Calculation    Start Time  0940  -KJ      Stop Time  1026  -KJ      Time Calculation (min)  46 min  -KJ      PT Received On  09/10/19  -KJ      PT Goal Re-Cert Due Date  09/12/19  -KJ         Time Calculation- PT    Total Timed Code Minutes- PT  46 minute(s)  -KJ        User Key  (r) = Recorded By, (t) = Taken By, (c) = Cosigned By    Initials Name Provider Type    Debby Thompson, PTA Physical Therapy Assistant        Therapy Charges for Today     Code Description Service Date Service Provider Modifiers Qty    58025281591 HC PT THER PROC EA 15 MIN 9/9/2019 Debby Garcia, PTA GP 2    48435132173 HC PT THERAPEUTIC ACT EA 15 MIN 9/9/2019 Debby Garcia, RAJAN GP 2    33022669145 HC PT THERAPEUTIC ACT EA 15 MIN 9/10/2019 Debby Garcia, RAJAN GP 3          PT G-Codes  Outcome Measure Options: AM-PAC 6 Clicks Daily Activity (OT)  AM-PAC 6 Clicks Score (PT): 8  AM-PAC 6 Clicks Score (OT): 10    Debby Garcia PTA  9/10/2019

## 2019-09-10 NOTE — PROGRESS NOTES
[facility] Medicine Services  INPATIENT PROGRESS NOTE    Patient Name: Phan Eastman  Date of Admission: 8/31/2019  Today's Date: 09/10/19  Length of Stay: 10  Primary Care Physician: Emmanuel Mclain MD    Subjective   Chief Complaint: abdominal pain   HPI     Patient was seen and examined at bedside.  Patient is much more awake and alert today, patient is not tearful as he was yesterday.  Family is at bedside.  Patient indicates she is more motivated today and he set up in a chair for greater than 1 hour and did very well.  Patient would like to sit up in the chair again this afternoon.  Patient is awaiting placement in the [facility] as that is where his son and daughter live.  KUB performed yesterday shows constipation, we will give the patient suppository and hog enema, will also start p.o. stool softeners.        Review of Systems   Constitutional: Positive for fatigue. Negative for activity change, appetite change, chills and fever.   Respiratory: Negative for cough and shortness of breath.    Cardiovascular: Negative for chest pain and palpitations.   Gastrointestinal: Positive for constipation. Negative for abdominal distention, abdominal pain, diarrhea, nausea and vomiting.   Genitourinary: Negative for difficulty urinating and dysuria.   Neurological: Positive for weakness.   Psychiatric/Behavioral: Negative for behavioral problems and decreased concentration. The patient is not nervous/anxious.         All pertinent negatives and positives are as above. All other systems have been reviewed and are negative unless otherwise stated.     Objective    Temp:  [97.8 °F (36.6 °C)-98.7 °F (37.1 °C)] 98.7 °F (37.1 °C)  Heart Rate:  [53-64] 59  Resp:  [14-22] 22  BP: (109-170)/(56-79) 109/56  Physical Exam  Constitutional: No distress.   HENT:   Head: Normocephalic and atraumatic.   Eyes: Conjunctivae are normal. No scleral icterus.   Neck: Neck supple. No  JVD present.   Cardiovascular: Intact distal pulses. Exam reveals no gallop and no friction rub.   Murmur heard. Normal rate  Pulmonary/Chest: Effort normal and breath sounds normal. No stridor. No respiratory distress. He has no wheezes.   Abdominal: Soft. Bowel sounds are normal. He exhibits no distension and no mass. There mild tenderness. There is no guarding.   Musculoskeletal: He exhibits no edema.   Neurological: He is alert.   Oriented to name and place; not time; right sided weakness    Skin: Skin is warm and dry. He is not diaphoretic. No erythema. Ecchymosis on the right side   Psychiatric: He has a normal mood and affect. His behavior is normal.   Nursing note and vitals reviewed.         Results Review:  I have reviewed the labs, radiology results, and diagnostic studies.    Laboratory Data:          Results from last 7 days   Lab Units 09/10/19  0553 09/09/19  0658 09/08/19  0250   SODIUM mmol/L 138 140 141   POTASSIUM mmol/L 4.2 4.4 4.4   CHLORIDE mmol/L 107 109 112*   CO2 mmol/L 24.0 24.0 23.0*   BUN mg/dL 26* 28* 28*   CREATININE mg/dL 1.52* 1.61* 1.61*   CALCIUM mg/dL 8.6 8.8 8.9   GLUCOSE mg/dL 84 95 81       Culture Data:        Radiology Data:   Imaging Results (last 24 hours)     Procedure Component Value Units Date/Time    XR Abdomen KUB [[id number]] Collected:  09/09/19 1600     Updated:  09/09/19 1605    Narrative:       EXAM: XR ABDOMEN KUB- - 9/9/2019 3:38 PM CDT     HISTORY: Abdominal pain; R13.12-Dysphagia, oropharyngeal phase;  Z74.09-Other reduced mobility; Z74.09-Other reduced mobility;  N28.89-Other specified disorders of kidney and ureter       COMPARISON: None.      TECHNIQUE:  2 images.  Supine view of the abdomen.      FINDINGS:  See impression          Impression:       1. A few borderline gas-filled loops of small bowel overlying the  pelvis, which could be seen with ileus or early/partial obstruction.  2. Large amount of colonic stool.  3. No convincing dystrophic  calcifications in the visualized abdomen,  but evaluation limited by portable technique.  This report was finalized on 09/09/2019 16:02 by Dr Delfina Gu MD.          I have reviewed the patient's current medications.     Assessment/Plan     Active Hospital Problems    Diagnosis   • **ICH (intracerebral hemorrhage) (CMS/HCC)   • Constipation   • Obesity (BMI 30-39.9)   • Diastolic CHF, chronic (CMS/HCC)   • Bradycardia   • CKD (chronic kidney disease) stage 3, GFR 30-59 ml/min (CMS/HCC)   • Left renal mass   • Cerebrovascular accident (CVA) due to embolism of left middle cerebral artery (CMS/HCC)   • Dysarthria   • Diabetes mellitus (CMS/HCC)   • Hypertensive urgency   • Hyperlipidemia       Plan:  1.  Continue losartan at 100 mg, clonidine at 0.3 mg, amlodipine 10 mg, hydralazine 100 mg, HCTZ 12.5 mg and terazosin 10 mg  2.  PRN antihypertensives  3.  Overnight pulse oximetry showed 388 desaturations, would recommend 2L NC at night   4.  Outpatient MRI with and without contrast for left renal mass  5.  Nasal saline q4h   6.  Creatinine stable   7.  HCTZ 12.5 mg   8.  Discussed case with Heriberto KRAUS with neurosurgery, will give bisacodyl suppository and if no BM will give a HOG enema.   9.  Added miralax and docusate senna daily as well   10.  From my standpoint can give the patient an AM lab holiday   11.  Up to chair with meals               Discharge Planning: I expect the patient to be discharged to SNF in ? days.    Tanner Rodgers MD   09/10/19   12:36 PM

## 2019-09-10 NOTE — PLAN OF CARE
Problem: Patient Care Overview  Goal: Plan of Care Review  Outcome: Ongoing (interventions implemented as appropriate)   09/10/19 7927   Coping/Psychosocial   Plan of Care Reviewed With patient   Plan of Care Review   Progress improving   OTHER   Outcome Summary PT tx completed. Pt supine in bed. C/O increased weakness this pm. MaxA x 2 bed mobility. Unable to maintain static sitting this pm without max external support. Sit edge of bed x 15 minutes max assist. Performed sitting balance activities working on trunk control. Recommend SNf for cont'd Pt services.

## 2019-09-10 NOTE — PLAN OF CARE
"Problem: Patient Care Overview  Goal: Plan of Care Review  Outcome: Ongoing (interventions implemented as appropriate)   09/10/19 0753   Coping/Psychosocial   Plan of Care Reviewed With patient   Plan of Care Review   Progress no change   OTHER   Outcome Summary Pt called out every 5-10mins while awake and/or yelled \"help!\". Changed and turned frequently. SCDs on. cont pulse ox on. no neuro changes. Pt did have some wet vocal quality after drinking thin water, though no overt s/s aspiration. heels elevated.      Goal: Individualization and Mutuality  Outcome: Ongoing (interventions implemented as appropriate)      Problem: Fall Risk (Adult)  Goal: Absence of Fall  Outcome: Ongoing (interventions implemented as appropriate)      Problem: Pain, Acute (Adult)  Goal: Acceptable Pain Control/Comfort Level  Outcome: Ongoing (interventions implemented as appropriate)      Problem: Nutrition, Imbalanced: Inadequate Oral Intake (Adult)  Goal: Improved Oral Intake  Outcome: Ongoing (interventions implemented as appropriate)    Goal: Prevent Further Weight Loss  Outcome: Ongoing (interventions implemented as appropriate)      Problem: Stroke (Hemorrhagic) (Adult)  Goal: Signs and Symptoms of Listed Potential Problems Will be Absent, Minimized or Managed (Stroke)  Outcome: Ongoing (interventions implemented as appropriate)        "

## 2019-09-10 NOTE — DISCHARGE PLACEMENT REQUEST
"Please call Porsha at [phone] after referral reviewed  Thanks!      Pritesh Alva (64 y.o. Male)     Date of Birth Social Security Number Address Home Phone MRN    [id number]  [id number]   [facility] [address] [phone] [id number]    [employer] Marital Status          Yazidism        Admission Date Admission Type Admitting Provider Attending Provider Department, Room/Bed    8/31/19 Urgent Raf Wyatt MD [place], William Lee, MD [facility] 3A, 340/1    Discharge Date Discharge Disposition Discharge Destination                       Attending Provider:  Raf Wyatt MD    Allergies:  Iodine, Lisinopril, Red Dye    Isolation:  None   Infection:  None   Code Status:  CPR    Ht:  188 cm (74\")   Wt:  122 kg (269 lb)    Admission Cmt:  None   Principal Problem:  ICH (intracerebral hemorrhage) (CMS/HCC) [I61.9]                 Active Insurance as of 8/31/2019     Primary Coverage     Payor Plan Insurance Group Employer/Plan Group    HUMANA MEDICARE REPLACEMENT HUMANA MEDICARE REPL [id number]     Payor Plan Address Payor Plan Phone Number Payor Plan Fax Number Effective Dates    [address] [address] [phone]  1/1/2018 - None Entered    [facility] [address]       Subscriber Name Subscriber Birth Date Member ID       PRITESH ALVA 1955 [id number]                 Emergency Contacts      (Rel.) Home Phone Work Phone Mobile Phone    Yaritza Alva (Sister) [phone] -- --    RhodaGabe barrett (Son) -- -- [phone]    LUDIN Stanley (Daughter) -- -- [phone]            Insurance Information                HUMANA MEDICARE REPLACEMENT/HUMANA MEDICARE REPL Phone: [phone]    Subscriber: Pritesh Alva Subscriber#: [id number]    Group#: [id number] Precert#:              History & Physical      Raf Wyatt MD at 8/31/2019  8:41 AM          NEUROSURGERY INITIAL HOSPITAL ENCOUNTER    Assessment/Plan:   Pritesh Alva is a 64 y.o. male " with a significant comorbidity diabetes with diabetic neuropathy, intracerebral ischemic stroke x3.  He presents with a new problem of sudden onset of right-sided weakness and slurred speech. Physical exam findings of right-sided hemiparesis and right facial droop.  Their imaging shows 2.7 x 2.1 acute thalamic intracerebral hemorrhage.    Differential Diagnosis:   Intracerebral Hemorrhage: HTN, anticoagulation, trauma, neoplasm, vascular malformation, amyloid angiopathy, idiopathic.   ICH Score    Volume: 0 Points (ICH volume < 30 cm3)  Age: 0 Points (Age < 80 years)  GCS: 0 Points (GCS 13 to 15)  Location: 0 Points (Infratentorial Origin - No )  Intraventricular Extension: 0 Points (Intraventricular Extension Absent)  Total = 0    ICH Score Mortality Rate   0 0%   1 13%   2 26%   3 72%   4 94%   5 100%   6* 100%     References:  Stroke. 2001 Apr;32(4):891-7.  Neurocrit Care. 2004;1(1):53-60.    Recommendations:  Admit to ICU for every hour neuro checks  SBP < 140.  Cardene gtt  Repeat head CT shows no expansion.  No indication antiepileptic drugs  NPO with Speech evaluation, then diabetic diet    I discussed the patients findings and my recommendations with patient and nursing staff    Thank you very much for this interesting consult.     Level of Risk: High due to:  abrupt change in neurological status  MDM: High Complexity  Mod = 06147, High=99223  ___________________________________________________________________    Reason for consult right-sided weakness and intracerebral hemorrhage    Chief Complaint: Right-sided weakness    HPI: Phan Eastman is a 64 y.o. male with a significant comorbidity diabetes, hyperlipidemia, hypertension, diabetic neuropathy of the lower extremities, intracerebral ischemic stroke x3, ACDF.  The patient normally ambulates with a walker due to severe diabetic neuropathy.  Last night he was in his normal state of health until dinnertime.  He was eating on the island in his kitchen  when he fell to the ground.  He had difficulty standing up and went to his walker.  He turned this over to get his cell phone out.  He also noted slurred speech.  He had no associated sensory deficits other than his pre-existing bilateral lower extremity numbness.  Due to his right-sided weakness he called his family members and was taken to an outside emergency room.  A noncontrast head CT was performed which showed a 2 x 2.7 cm left thalamic and posterior limb of the internal capsule stroke.  Therefore he was transferred to [facility] for further evaluation.    On admission he is currently complaining of right-sided weakness.  He feels that this is been stable since last night.  He also has slurred speech.  He has no headache.  He does have a past medical history of hypertension.  Blood pressure on admission is 215.  He has no bowel or bladder incontinence.  He is resting comfortably in his pain is a 0 out of 10.    Review of Systems   HENT: Negative.    Eyes: Negative.    Respiratory: Negative.    Cardiovascular: Negative.    Gastrointestinal: Negative.    Endocrine: Negative.    Genitourinary: Negative.    Musculoskeletal: Positive for gait problem.   Skin: Negative.    Allergic/Immunologic: Negative.    Neurological: Positive for weakness and numbness.   Hematological: Negative.    Psychiatric/Behavioral: Negative.         Past Medical History:  has a past medical history of Diabetes mellitus (CMS/[facility]), GERD (gastroesophageal reflux disease), Hyperlipidemia, Hypertension, Neuropathy, Osteoarthritis, and TIA (transient ischemic attack).    Past Surgical History:  has a past surgical history that includes Rotator cuff repair; Knee surgery (Bilateral); Carpal tunnel release (Bilateral); Neck surgery; Retinal detachment surgery; and Eye surgery.    Family History: family history is not on file.    Social History:  reports that he has never smoked. He has never used smokeless tobacco. He reports that he does not  drink alcohol or use drugs.    Allergies: Iodine; Lisinopril; and Red dye    Home Medications:   Current Facility-Administered Medications:   •  acetaminophen (TYLENOL) tablet 650 mg, 650 mg, Oral, Q4H PRN **OR** acetaminophen (TYLENOL) 160 MG/5ML solution 650 mg, 650 mg, Oral, Q4H PRN **OR** acetaminophen (TYLENOL) suppository 650 mg, 650 mg, Rectal, Q4H PRN, Raf Wyatt MD  •  dextrose (D50W) 25 g/ 50mL Intravenous Solution 25 g, 25 g, Intravenous, Q15 Min PRN, Raf Wyatt MD  •  dextrose (GLUTOSE) oral gel 15 g, 15 g, Oral, Q15 Min PRN, Raf Wyatt MD  •  glipiZIDE (GLUCOTROL) tablet 5 mg, 5 mg, Oral, QAM AC, Raf Wyatt MD  •  glucagon (human recombinant) (GLUCAGEN DIAGNOSTIC) injection 1 mg, 1 mg, Subcutaneous, PRN, Raf Wyatt MD  •  insulin lispro (humaLOG) injection 0-9 Units, 0-9 Units, Subcutaneous, 4x Daily With Meals & Nightly, Raf Wyatt MD  •  metFORMIN (GLUCOPHAGE) tablet 850 mg, 850 mg, Oral, BID With Meals, Raf Wyatt MD  •  ondansetron (ZOFRAN) tablet 4 mg, 4 mg, Oral, Q6H PRN **OR** ondansetron (ZOFRAN) injection 4 mg, 4 mg, Intravenous, Q6H PRN, Raf Wyatt MD  •  oxyCODONE-acetaminophen (PERCOCET) 5-325 MG per tablet 1 tablet, 1 tablet, Oral, Q4H PRN, Raf Wyatt MD  •  oxyCODONE-acetaminophen (PERCOCET) 7.5-325 MG per tablet 2 tablet, 2 tablet, Oral, Q4H PRN, Raf Wyatt MD  •  sodium chloride 0.9 % flush 10 mL, 10 mL, Intravenous, Q12H, Raf Wyatt MD  •  sodium chloride 0.9 % flush 10 mL, 10 mL, Intravenous, PRN, Raf Wyatt MD  •  sodium chloride 0.9 % with KCl 20 mEq/L infusion, 100 mL/hr, Intravenous, Continuous, Raf Wyatt MD  •  terazosin (HYTRIN) capsule 2 mg, 2 mg, Oral, Nightly, Raf Wyatt MD  •  traZODone (DESYREL) tablet 50 mg, 50 mg, Oral, Nightly PRN, Raf Wyatt MD    Medications: Scheduled  Meds:    glipiZIDE 5 mg Oral QAM AC   insulin lispro 0-9 Units Subcutaneous 4x Daily With Meals & Nightly   metFORMIN 850 mg Oral BID With Meals   sodium chloride 10 mL Intravenous Q12H   terazosin 2 mg Oral Nightly     Continuous Infusions:    sodium chloride 0.9 % with KCl 20 mEq 100 mL/hr     PRN Meds:.•  acetaminophen **OR** acetaminophen **OR** acetaminophen  •  dextrose  •  dextrose  •  glucagon (human recombinant)  •  ondansetron **OR** ondansetron  •  oxyCODONE-acetaminophen  •  oxyCODONE-acetaminophen  •  sodium chloride  •  traZODone    Vital Signs  Temp:  [97.6 °F (36.4 °C)-97.7 °F (36.5 °C)] 97.6 °F (36.4 °C)  Heart Rate:  [64-75] 75  Resp:  [16-18] 16  BP: (188-215)/(88-92) 215/88    Physical Exam  Physical Exam   Constitutional: He is oriented to person, place, and time. He appears well-developed and well-nourished.   HENT:   Head: Normocephalic and atraumatic.   Eyes: Conjunctivae and EOM are normal. Pupils are equal, round, and reactive to light.   Fundoscopic exam:       The right eye shows no exudate, no hemorrhage and no papilledema.        The left eye shows no exudate, no hemorrhage and no papilledema.   Neck: Normal range of motion. Neck supple.   Cardiovascular:   Pulses:       Dorsalis pedis pulses are 2+ on the right side, and 2+ on the left side.        Posterior tibial pulses are 2+ on the right side, and 2+ on the left side.   Pulmonary/Chest: Effort normal. No respiratory distress.     Vascular Status -  His right foot exhibits no edema. His left foot exhibits no edema.  Neurological: He is oriented to person, place, and time. He has a normal Finger-Nose-Finger Test, a normal Heel to [place] Test and a normal Tandem Gait Test. Gait normal.   Skin: Skin is warm. No rash noted. No erythema.   Psychiatric: His speech is slurred.       Neurologic Exam     Mental Status   Oriented to person, place, and time.   Registration: recalls 3 of 3 objects. Recall at 5 minutes: recalls 3 of 3 objects.    Attention: normal. Concentration: normal.   Speech: slurred   Level of consciousness: alert  Knowledge: consistent with education.   Able to name object. Able to read. Able to repeat. Able to write. Normal comprehension.     Cranial Nerves     CN II   Visual acuity: normal    CN III, IV, VI   Pupils are equal, round, and reactive to light.  Extraocular motions are normal.   Diplopia: none    CN V   Facial sensation intact.   Right corneal reflex: normal  Left corneal reflex: normal    CN VII   Right facial weakness: central  Left facial weakness: none    CN VIII   Hearing: intact    CN IX, X   Palate: symmetric  Right gag reflex: normal  Left gag reflex: normal    CN XI   Right trapezius strength: normal  Left trapezius strength: normal    CN XII   Tongue deviation: none    Motor Exam   Right arm tone: normal  Left arm tone: normal  Right arm pronator drift: present  Left arm pronator drift: absent  Right leg tone: normal  Left leg tone: normal    Strength   Strength 5/5 except as noted.   Right deltoid: 4/5  Right biceps: 4/5  Right triceps: 4/5  Right interossei: 4/5  Right iliopsoas: 4/5  Right quadriceps: 4/5  Right anterior tibial: 4/5  Right gastroc: 4/5    Sensory Exam   Right arm light touch: normal  Left arm light touch: normal  Right leg light touch: decreased from knee  Left leg light touch: decreased from knee  Proprioception normal.   Stocking glove distribution numbness on the bilateral lower extremities.  Stable and long-standing.     Gait, Coordination, and Reflexes     Gait  Gait: normal    Coordination   Finger to nose coordination: normal  Heel to shin coordination: normal  Tandem walking coordination: normal    Reflexes   Reflexes 2+ except as noted.   Right plantar: normal  Left plantar: normal  Right Monreal: absent  Left Monreal: absent      Results Review:   Independent review and interpretation of imaging  Imaging Results (last 24 hours)     Procedure Component Value Units Date/Time    CT  Head Without Contrast [[id number]] Collected:  08/31/19 0811     Updated:  08/31/19 0827    Narrative:       EXAMINATION: CT HEAD WO CONTRAST- 8/31/2019 8:11 AM CDT     HISTORY: Intracerebral hemorrhage.     DOSE: 813 mGycm (Automatic exposure control technique was implemented in  an effort to keep the radiation dose as low as possible without  compromising image quality)     REPORT: Spiral CT of head was performed without contrast, reconstructed  coronal and sagittal images were also reviewed. No comparison studies.     There is an acute intraparenchymal hematoma centered within the left  basal ganglia, with involvement of the putamen, external capsule and  caudate nucleus. This measures approximately 2.1 x 2.7 cm, no  intraventricular extension is identified. No extra-axial hemorrhage is  identified. There is moderate diffuse cortical atrophy. There are small  chronic appearing lacunar infarcts in the basal ganglia. Mild  ventricular megaly is present. There is decreased attenuation within the  periventricular and subcortical white matter tracts compatible with  advanced chronic small vessel white matter ischemic disease. The  posterior fossa, there is a chronic appearing infarct in the medial  aspect of the posterior superior cerebellum. Review of bone windows is  unremarkable. There is normal aeration of the visualized paranasal  sinuses and mastoid air cells.       Impression:       1. Acute intraparenchymal hematoma centered within the left basal  ganglia, measuring approximately 2.1 x 2.7 cm, probably hypertensive  type bleed.  2. Moderate diffuse cortical atrophy. Chronic lacunar infarction noted  in the basal ganglia. This also small chronic appearing infarct in the  medial aspect of the posterior superior cerebellum.  3. Mild ventriculomegaly, no more than expected for the degree of  atrophy identified. There is no midline shift.     This report was finalized on 08/31/2019 08:24 by Dr. Figueroa Golden MD.         MRI brain:  MRI spine:   CT Head:              CT c-spine:  CT t-spine:  CT l-spine:  X-ray:    I reviewed the patient's new clinical results.  Lab Results (last 24 hours)     ** No results found for the last 24 hours. **          Raf Wyatt MD          Electronically signed by Raf Wyatt MD at 8/31/2019  9:07 AM       Operative/Procedure Notes (last 7 days) (Notes from 9/3/2019 11:19 AM through 9/10/2019 11:19 AM)     No notes of this type exist for this encounter.           Physician Progress Notes (last 24 hours) (Notes from 9/9/2019 11:19 AM through 9/10/2019 11:19 AM)      Heriberto Avalos APRN at 9/10/2019  8:35 AM          Neurosurgery Daily Progress Note    Assessment:   Phan Eastman is a 64 y.o. male with a significant comorbidity diabetes with diabetic neuropathy, intracerebral ischemic stroke x3.  He presents with a new problem of sudden onset of right-sided weakness and slurred speech. Physical exam findings of right-sided hemiparesis and right facial droop.  Their imaging shows 2.7 x 2.1 acute thalamic intracerebral hemorrhage.    DDX:     ICH (intracerebral hemorrhage) (CMS/HCC)    Diabetes mellitus (CMS/HCC)    Hypertensive urgency    Hyperlipidemia    Cerebrovascular accident (CVA) due to embolism of left middle cerebral artery (CMS/HCC)    Dysarthria    CKD (chronic kidney disease) stage 3, GFR 30-59 ml/min (CMS/HCC)    Left renal mass    Obesity (BMI 30-39.9)    Diastolic CHF, chronic (CMS/HCC)    Bradycardia    Patient Active Problem List   Diagnosis   • ICH (intracerebral hemorrhage) (CMS/HCC)   • Diabetes mellitus (CMS/HCC)   • Hypertensive urgency   • Hyperlipidemia   • Cerebrovascular accident (CVA) due to embolism of left middle cerebral artery (CMS/HCC)   • Dysarthria   • CKD (chronic kidney disease) stage 3, GFR 30-59 ml/min (CMS/HCC)   • Left renal mass   • Obesity (BMI 30-39.9)   • Diastolic CHF, chronic (CMS/HCC)   • Bradycardia     Plan:   Neuro: Stable.   "Neuro unchanged.  Confusion with minor aphasia and right-sided hemiparesis   CT stable   MRI with small left ischemic stroke.     Hx of ischemic stroke; appreciate neurology   Will require outpatient follow-up with neurology    CV: Labetolol and hydralazine with marginal blood pressures.     -153   Oral antihypertensives adjusted per hospitalist:   Appreciate hospitalist   Echo WNL.   Carotid US; right: 50 - 69%; left: <50%  Skin: Rash on legs.  Monitor for now.   Pulm: CPAP at home.  CXR: Moderate cardiomegaly no evidence pulmonary vascular congestion.  : Renal ultrasound: 2.8 cm solid mass suspected in the upper pole of the level left   OP renal MRI with and without contrast to fully evaluate lesion with OP urology follow-up.   Appreciate Urology  FEN: Tolerating puréed diet; one-to-one supervision with meals   Appreciate speech/ nutrition  Endocrine: blood glucose well controlled  GI: No BM TD; KUB: A few borderline gas-filled loops of small bowel overlying the pelvis, which could be seen with ileus or early/partial obstruction.  Large amount of colonic stool.   Consulted with Dr. Rodgers   Dulcolax suppositories; if no results HOG enema  ID: PAUL  Heme:  DVT prophylaxis held for brain bleed; SCDs  Pain: Well controlled  Dispo: PT/OT   IP rehab pending placement    Chief complaint:   Right sided weakness and speech decline    Subjective  Can not communicate    Temp:  [97.8 °F (36.6 °C)-98.7 °F (37.1 °C)] 98.7 °F (37.1 °C)  Heart Rate:  [53-64] 55  Resp:  [14-18] 14  BP: (116-170)/(58-79) 170/74    Objective:  Vital signs: (most recent): Blood pressure 170/74, pulse 55, temperature 98.7 °F (37.1 °C), temperature source Oral, resp. rate 14, height 188 cm (74\"), weight 122 kg (269 lb), SpO2 99 %.        Neurologic Exam     Mental Status   Oriented to person.   Oriented to place.   Oriented to year.   Speech: slurred   Level of consciousness: alert ,  arousable by verbal stimuli  Able to name object. Unable " to read. Unable to repeat. Unable to write. Abnormal comprehension.   Speech remains slurred but clearing.  Follows commands     Cranial Nerves     CN III, IV, VI   Pupils are equal, round, and reactive to light.  Extraocular motions are normal.     CN V   Facial sensation intact.     CN VII   Facial expression full, symmetric.   Left facial weakness: central    Motor Exam   Right arm pronator drift: absent  Left arm pronator drift: absent    Strength   Right deltoid: 4/5  Left deltoid: 5/5  Right biceps: 4/5  Left biceps: 5/5  Right triceps: 4/5  Left triceps: 5/5  Right interossei: 3/5  Right iliopsoas: 2/5  Left iliopsoas: 5/5  Right quadriceps: 2/5  Left quadriceps: 5/5  Right peroneal: 3/5  Right gastroc: 3/5  Left gastroc: 5/5  Strength/movement on right improving     Sensory Exam   Light touch normal.     Gait, Coordination, and Reflexes     Reflexes   Right : 4+  Left : 4+    Drains:  NA    Imaging Results (last 24 hours)     Procedure Component Value Units Date/Time    XR Abdomen KUB [[id number]] Collected:  09/09/19 1600     Updated:  09/09/19 1605    Narrative:       EXAM: XR ABDOMEN KUB- - 9/9/2019 3:38 PM CDT     HISTORY: Abdominal pain; R13.12-Dysphagia, oropharyngeal phase;  Z74.09-Other reduced mobility; Z74.09-Other reduced mobility;  N28.89-Other specified disorders of kidney and ureter       COMPARISON: None.      TECHNIQUE:  2 images.  Supine view of the abdomen.      FINDINGS:  See impression          Impression:       1. A few borderline gas-filled loops of small bowel overlying the  pelvis, which could be seen with ileus or early/partial obstruction.  2. Large amount of colonic stool.  3. No convincing dystrophic calcifications in the visualized abdomen,  but evaluation limited by portable technique.  This report was finalized on 09/09/2019 16:02 by Dr Delfina Gu MD.        Lab Results (last 24 hours)     Procedure Component Value Units Date/Time    Basic Metabolic Panel  [[id number]]  (Abnormal) Collected:  09/10/19 0553    Specimen:  Blood Updated:  09/10/19 0646     Glucose 84 mg/dL      BUN 26 mg/dL      Creatinine 1.52 mg/dL      Sodium 138 mmol/L      Potassium 4.2 mmol/L      Chloride 107 mmol/L      CO2 24.0 mmol/L      Calcium 8.6 mg/dL      eGFR Non African Amer 47 mL/min/1.73      BUN/Creatinine Ratio 17.1     Anion Gap 7.0 mmol/L     Narrative:       GFR Normal >60  Chronic Kidney Disease <60  Kidney Failure <15    POC Glucose Once [[id number]]  (Normal) Collected:  09/09/19 2155    Specimen:  Blood Updated:  09/09/19 2209     Glucose 90 mg/dL      Comment: : [id number] Adriel ([place]) AmandaMeter ID: [id number]       POC Glucose Once [[id number]]  (Normal) Collected:  09/09/19 1647    Specimen:  Blood Updated:  09/09/19 1703     Glucose 86 mg/dL      Comment: : [id number] Danny ShannonMeter ID: [id number]       POC Glucose Once [[id number]]  (Normal) Collected:  09/09/19 1114    Specimen:  Blood Updated:  09/09/19 1127     Glucose 110 mg/dL      Comment: : [id number] Danny ShannonMeter ID: [id number]           [id number]  EFRA Rodriguez      Electronically signed by Heriberto Avalos APRN at 9/10/2019  8:40 AM     Tanner Rodgers MD at 9/9/2019  3:57 PM              [facility] Medicine Services  INPATIENT PROGRESS NOTE    Patient Name: Phan Eastman  Date of Admission: 8/31/2019  Today's Date: 09/09/19  Length of Stay: 9  Primary Care Physician: Emmanuel Mclain MD    Subjective   Chief Complaint: crying  HPI     Patient was seen and examined at bedside.  Patient had his son and daughter at bedside.  Patient was tearful.  Patient has some ecchymosis on his right side, that he is complaining of some mild pain.  Patient has been more emotional today.  BP has been better controlled, denies headache or visual changes.        Review of Systems   Constitutional: Negative for chills and fever.   Respiratory: Negative for  cough and shortness of breath.    Cardiovascular: Negative for chest pain and palpitations.   Gastrointestinal: Negative for abdominal distention, abdominal pain, constipation, diarrhea, nausea and vomiting.        All pertinent negatives and positives are as above. All other systems have been reviewed and are negative unless otherwise stated.     Objective    Temp:  [98.3 °F (36.8 °C)-99.4 °F (37.4 °C)] 98.4 °F (36.9 °C)  Heart Rate:  [56-74] 56  Resp:  [16-18] 18  BP: (117-162)/(45-98) 128/58  Physical Exam  Constitutional: No distress.   HENT:   Head: Normocephalic and atraumatic.   Eyes: Conjunctivae are normal. No scleral icterus.   Neck: Neck supple. No JVD present.   Cardiovascular: Intact distal pulses. Exam reveals no gallop and no friction rub.   Murmur heard. Normal rate  Pulmonary/Chest: Effort normal and breath sounds normal. No stridor. No respiratory distress. He has no wheezes.   Abdominal: Soft. Bowel sounds are normal. He exhibits no distension and no mass. There is no tenderness. There is no guarding.   Musculoskeletal: He exhibits no edema.   Neurological: He is alert.   Oriented to name and place; not time; right sided weakness    Skin: Skin is warm and dry. He is not diaphoretic. No erythema.   Psychiatric: He has a normal mood and affect. His behavior is normal.   Nursing note and vitals reviewed.      Results Review:  I have reviewed the labs, radiology results, and diagnostic studies.    Laboratory Data:          Results from last 7 days   Lab Units 09/09/19  0658 09/08/19  0250 09/07/19  0331   SODIUM mmol/L 140 141 143   POTASSIUM mmol/L 4.4 4.4 4.9   CHLORIDE mmol/L 109 112* 113*   CO2 mmol/L 24.0 23.0* 25.0   BUN mg/dL 28* 28* 28*   CREATININE mg/dL 1.61* 1.61* 1.60*   CALCIUM mg/dL 8.8 8.9 9.0   GLUCOSE mg/dL 95 81 85       Culture Data:        Radiology Data:   Imaging Results (last 24 hours)     Procedure Component Value Units Date/Time    XR Abdomen KUB [[id number]] Updated:  09/09/19  1545          I have reviewed the patient's current medications.     Assessment/Plan     Active Hospital Problems    Diagnosis   • **ICH (intracerebral hemorrhage) (CMS/[facility])   • Obesity (BMI 30-39.9)   • Diastolic CHF, chronic (CMS/[facility])   • Bradycardia   • CKD (chronic kidney disease) stage 3, GFR 30-59 ml/min (CMS/[facility])   • Left renal mass   • Cerebrovascular accident (CVA) due to embolism of left middle cerebral artery (CMS/[facility])   • Dysarthria   • Diabetes mellitus (CMS/[facility])   • Hypertensive urgency   • Hyperlipidemia       Plan:  1.  Continue losartan at 100 mg, clonidine at 0.3 mg, amlodipine 10 mg, hydralazine 100 mg, and terazosin 10 mg  2.  PRN antihypertensives  3.  Overnight pulse oximetry showed 388 desaturations, would recommend 2L NC at night   4.  Outpatient MRI with and without contrast for left renal mass  5.  Nasal saline q4h   6.  Creatinine stable   7.   Continue HCTZ 12.5 mg     If BP stays stable, will likely sign off tomorrow.  Patient awaiting rehab placement.              Discharge Planning: I expect the patient to be discharged to rehab in ? days.    Tanner Rodgers MD   19   3:57 PM    Electronically signed by Tanner Rodgers MD at 2019  4:03 PM       Consult Notes (last 24 hours) (Notes from 2019 11:19 AM through 9/10/2019 11:19 AM)     No notes of this type exist for this encounter.           Physical Therapy Notes (last 24 hours) (Notes from 2019 11:19 AM through 9/10/2019 11:19 AM)      Debby Garcia PTA at 2019 12:11 PM  Version 1 of 1         Acute Care - Physical Therapy Treatment Note  [facility]     Patient Name: Phan Eastman  : 1955  MRN: [id number]  Today's Date: 2019  Onset of Illness/Injury or Date of Surgery: 19     Referring Physician: Dr. Wyatt     Admit Date: 2019    Visit Dx:    ICD-10-CM ICD-9-CM   1. Oropharyngeal dysphagia R13.12 787.22   2. Impaired mobility and ADLs Z74.09 799.89   3. Impaired mobility Z74.09  799.89   4. Left renal mass N28.89 593.9     Patient Active Problem List   Diagnosis   • ICH (intracerebral hemorrhage) (CMS/HCC)   • Diabetes mellitus (CMS/[facility])   • Hypertensive urgency   • Hyperlipidemia   • Cerebrovascular accident (CVA) due to embolism of left middle cerebral artery (CMS/HCC)   • Dysarthria   • CKD (chronic kidney disease) stage 3, GFR 30-59 ml/min (CMS/HCC)   • Left renal mass   • Obesity (BMI 30-39.9)   • Diastolic CHF, chronic (CMS/[facility])   • Bradycardia       Therapy Treatment    Rehabilitation Treatment Summary     Row Name 09/09/19 1147             Treatment Time/Intention    Discipline  physical therapy assistant  -KJ      Document Type  therapy note (daily note)  -KJ2      Subjective Information  complains of;pain  -KJ2      Mode of Treatment  physical therapy  -KJ2      Patient/Family Observations  no family present  -KJ2      Patient Effort  fair  -KJ2      Comment  keep systolic <140  -KJ2      Existing Precautions/Restrictions  fall  -KJ2      Treatment Considerations/Comments  R side weakness  -KJ2      Recorded by [KJ] Debby Garcia, [facility] 09/09/19 1148  [KJ2] Debby Garcia, [facility] 09/09/19 1210      Row Name 09/09/19 1147             Motor Skills Assessment/Interventions    Additional Documentation  Therapeutic Exercise (Group)  -KJ      Recorded by [KJ] Debby Garcia, [facility] 09/09/19 1210      Row Name 09/09/19 1147             Therapeutic Exercise    Exercise Type (Therapeutic Exercise)  AAROM (active assistive range of motion);PROM (passive range of motion)  -KJ      Position (Therapeutic Exercise)  supine  -KJ      Sets/Reps (Therapeutic Exercise)  2 sets of 10  -KJ      Recorded by [KJ] Debby Garcia, [facility] 09/09/19 1210      Row Name 09/09/19 1147             Positioning and Restraints    Pre-Treatment Position  in bed  -KJ      Post Treatment Position  bed  -KJ      Recorded by [KJ] Debby Garcia, [facility] 09/09/19 1210      Row Name 09/09/19 1147             Pain Scale: Numbers  Pre/Post-Treatment    Pain Scale: Numbers, Pretreatment  4/10  -KJ      Pain Scale: Numbers, Post-Treatment  4/10  -KJ      Pain Location  — generalized  -KJ      Recorded by [KJ] Debby Garcia PTA 09/09/19 1210        User Key  (r) = Recorded By, (t) = Taken By, (c) = Cosigned By    Initials Name Effective Dates Discipline    Debby Thompson PTA 08/02/16 -  PT                   Physical Therapy Education     Title: PT OT SLP Therapies (In Progress)     Topic: Physical Therapy (In Progress)     Point: Mobility training (Done)     Learning Progress Summary           Patient Acceptance, E, VU,NR by TITUS at 9/4/2019  8:11 AM    Comment:  Rolling in bed. Bed mobility.    Acceptance, E, VU,NR by JAKE at 9/2/2019  7:20 AM    Comment:  Educated pt on progression of PT POC and benefits of activity                               User Key     Initials Effective Dates Name Provider Type Discipline    JAKE 08/02/16 -  Gilmer Medrano, PT DPT Physical Therapist PT    TITUS 08/02/16 -  Kathrine Ramsay PTA Physical Therapy Assistant PT                PT Recommendation and Plan           Time Calculation:   PT Charges     Row Name 09/09/19 1211             Time Calculation    Start Time  1147  -KJ      Stop Time  1211  -KJ      Time Calculation (min)  24 min  -KJ      PT Received On  09/09/19  -KJ      PT Goal Re-Cert Due Date  09/12/19  -KJ         Time Calculation- PT    Total Timed Code Minutes- PT  24 minute(s)  -KJ        User Key  (r) = Recorded By, (t) = Taken By, (c) = Cosigned By    Initials Name Provider Type    Debby Thompson PTA Physical Therapy Assistant            PT G-Codes  Outcome Measure Options: AM-PAC 6 Clicks Daily Activity (OT)  AM-PAC 6 Clicks Score (PT): 8  AM-PAC 6 Clicks Score (OT): 10    Debby Garcia PTA  9/9/2019         Electronically signed by Debby Garcia PTA at 9/9/2019 12:11 PM     Debby Garcia PTA at 9/9/2019  2:32 PM  Version 1 of 1         Problem: Patient Care Overview  Goal:  Plan of Care Review  Outcome: Ongoing (interventions implemented as appropriate)   19 1429   Coping/Psychosocial   Plan of Care Reviewed With patient   Plan of Care Review   Progress no change   OTHER   Outcome Summary PT tx completed. Pt supine in bed. BP's WNL during tx. Became alittle low towards end of tx. MaxA x 2 bed mobility. Sitting balance max assist initially, but improved the longer he sat. At end of tx Evan to sit edge of bed. Right side weakness and R rib pn. Nursing made aware of pn. Recommend SNF for con'td PT.           Electronically signed by Debby Garcia PTA at 2019  2:32 PM     Debby Garcia PTA at 2019  2:33 PM  Version 1 of 1         Acute Care - Physical Therapy Treatment Note   [facility]     Patient Name: Phan Eastman  : 1955  MRN: [id number]  Today's Date: 2019  Onset of Illness/Injury or Date of Surgery: 19     Referring Physician: Dr. Wyatt     Admit Date: 2019    Visit Dx:    ICD-10-CM ICD-9-CM   1. Oropharyngeal dysphagia R13.12 787.22   2. Impaired mobility and ADLs Z74.09 799.89   3. Impaired mobility Z74.09 799.89   4. Left renal mass N28.89 593.9     Patient Active Problem List   Diagnosis   • ICH (intracerebral hemorrhage) (CMS/[facility])   • Diabetes mellitus (CMS/[facility])   • Hypertensive urgency   • Hyperlipidemia   • Cerebrovascular accident (CVA) due to embolism of left middle cerebral artery (CMS/[facility])   • Dysarthria   • CKD (chronic kidney disease) stage 3, GFR 30-59 ml/min (CMS/HCC)   • Left renal mass   • Obesity (BMI 30-39.9)   • Diastolic CHF, chronic (CMS/HCC)   • Bradycardia       Therapy Treatment    Rehabilitation Treatment Summary     Row Name 19 1345 19 1147          Treatment Time/Intention    Discipline  physical therapy assistant  -KJ  physical therapy assistant  -KJ     Document Type  therapy note (daily note)  -KJ  therapy note (daily note)  -KJ2     Subjective Information  complains of;pain  -KJ  complains  of;pain  -KJ2     Mode of Treatment  physical therapy  -KJ  physical therapy  -KJ2     Patient/Family Observations  children present  -KJ  no family present  -KJ2     Patient Effort  good  -KJ  fair  -KJ2     Comment  keep systolic <140  (Significant)   -KJ  keep systolic <140  -KJ2     Existing Precautions/Restrictions  fall  -KJ  fall  -KJ2     Treatment Considerations/Comments  R side weakness  -KJ  R side weakness  -KJ2     Recorded by [KJ] Debby Garcia, PTA 09/09/19 1428 [KJ] Debby Garcia, PTA 09/09/19 1148  [KJ2] Debby Garcia, PTA 09/09/19 1210     Row Name 09/09/19 1345             Vital Signs    Pre Systolic BP Rehab  —  (Significant)  BP's WNL  -KJ      Recorded by [KJ] Debby Garcia, PTA 09/09/19 1428      Row Name 09/09/19 1349             Bed Mobility Assessment/Treatment    Rolling Left [place] (Bed Mobility)  moderate assist (50% patient effort);2 person assist;verbal cues  -KJ      Rolling Right [place] (Bed Mobility)  verbal cues;maximum assist (25% patient effort);2 person assist  -KJ      Scooting/Bridging [place] (Bed Mobility)  maximum assist (25% patient effort);2 person assist  -KJ      Supine-Sit [place] (Bed Mobility)  verbal cues;maximum assist (25% patient effort);2 person assist  -KJ      Sit-Supine [place] (Bed Mobility)  verbal cues;maximum assist (25% patient effort);2 person assist  -KJ      Bed Mobility, Safety Issues  decreased use of legs for bridging/pushing;impaired trunk control for bed mobility;decreased use of arms for pushing/pulling  -KJ      Assistive Device (Bed Mobility)  head of bed elevated;bed rails  -KJ      Comment (Bed Mobility)  sit edge of bed x 15 minutes max assist initially, but sitting balance improved with time. Able to sit [place] at times  -KJ      Recorded by [KJ] Debby Garcia, PTA 09/09/19 1428      Row Name 09/09/19 1342             Gait/Stairs Assessment/Training    Comment (Gait/Stairs)  not appropriate  -KJ       Recorded by [KJ] Debby Garcia, [facility] 09/09/19 1428      Row Name 09/09/19 1147             Motor Skills Assessment/Interventions    Additional Documentation  Therapeutic Exercise (Group)  -KJ      Recorded by [KJ] Debby Garcia, [facility] 09/09/19 1210      Row Name 09/09/19 1345 09/09/19 1147          Therapeutic Exercise    Exercise Type (Therapeutic Exercise)  —  AAROM (active assistive range of motion);PROM (passive range of motion)  -KJ     Position (Therapeutic Exercise)  —  supine  -KJ     Sets/Reps (Therapeutic Exercise)  —  2 sets of 10  -KJ     Comment (Therapeutic Exercise)  lateral shift to down on elbow on L to stretch R obiliques, forward weight shifting multiple times and cueing to correct sitting balance. Reaching all planes LUE. Weight bearing RUE. Rythmic stabs all planes  -KJ  —     Recorded by [KJ] Debby Garcia, [facility] 09/09/19 1428 [KJ] Debby Garcia, [facility] 09/09/19 1210     Row Name 09/09/19 [facility] 09/09/19 1147          Positioning and Restraints    Pre-Treatment Position  in bed  -KJ  in bed  -KJ     Post Treatment Position  bed  -KJ  bed  -KJ     Recorded by [KJ] Debby Garcia, [facility] 09/09/19 1428 [KJ] Debby Garcia, [facility] 09/09/19 1210     Row Name 09/09/19 [facility] 09/09/19 1147          Pain Scale: Numbers Pre/Post-Treatment    Pain Scale: Numbers, Pretreatment  8/10  -KJ  4/10  -KJ     Pain Scale: Numbers, Post-Treatment  8/10  -KJ  4/10  -KJ     Pain Location - Side  Right  -KJ  —     Pain Location - Orientation  lower  -KJ  —     Pain Location  flank  -KJ  — generalized  -KJ     Recorded by [KJ] Debby Garcia, [facility] 09/09/19 1428 [KJ] Debby Garcia, [facility] 09/09/19 1210       User Key  (r) = Recorded By, (t) = Taken By, (c) = Cosigned By    Initials Name Effective Dates Discipline    Debby Thompson, [facility] 08/02/16 -  PT                   Physical Therapy Education     Title: PT OT SLP Therapies (In Progress)     Topic: Physical Therapy (In Progress)     Point: Mobility training (Done)      Learning Progress Summary           Patient Acceptance, E, VU,NR by TITUS at 9/4/2019  8:11 AM    Comment:  Rolling in bed. Bed mobility.    Acceptance, E, VU,NR by JAKE at 9/2/2019  7:20 AM    Comment:  Educated pt on progression of PT POC and benefits of activity                               User Key     Initials Effective Dates Name Provider Type Discipline    JAKE 08/02/16 -  Gilmer Medrano, PT DPT Physical Therapist PT    TITUS 08/02/16 -  Kathrine Ramsay PTA Physical Therapy Assistant PT                PT Recommendation and Plan     Plan of Care Reviewed With: patient  Progress: no change  Outcome Summary: PT tx completed. Pt supine in bed. BP's WNL during tx. Became alittle low towards end of tx. MaxA x 2 bed mobility. Sitting balance max assist initially, but improved the longer he sat. At end of tx Evan to sit edge of bed. Right side weakness and R rib pn. Nursing made aware of pn. Recommend SNF for con'td PT.     Time Calculation:   PT Charges     Row Name 09/09/19 1428 09/09/19 1211          Time Calculation    Start Time  1345  -KJ  1147  -KJ     Stop Time  1418  -KJ  1211  -KJ     Time Calculation (min)  33 min  -KJ  24 min  -KJ     PT Received On  09/09/19  -KJ  09/09/19  -KJ     PT Goal Re-Cert Due Date  09/12/19  -KJ  09/12/19  -KJ        Time Calculation- PT    Total Timed Code Minutes- PT  33 minute(s)  -KJ  24 minute(s)  -KJ       User Key  (r) = Recorded By, (t) = Taken By, (c) = Cosigned By    Initials Name Provider Type    Debby Thompson PTA Physical Therapy Assistant        Therapy Charges for Today     Code Description Service Date Service Provider Modifiers Qty    50571469225 HC PT THER PROC EA 15 MIN 9/9/2019 Debby Garcia PTA GP 2    55246716871 HC PT THERAPEUTIC ACT EA 15 MIN 9/9/2019 Debby Garcia PTA GP 2          PT G-Codes  Outcome Measure Options: AM-PAC 6 Clicks Daily Activity (OT)  AM-PAC 6 Clicks Score (PT): 8  AM-PAC 6 Clicks Score (OT): 10    Debby Garcia  PTA  2019         Electronically signed by Debby Garcia, PTA at 2019  2:33 PM     Debby Garcia, PTA at 9/10/2019 10:27 AM  Version 1 of 1         Acute Care - Physical Therapy Treatment Note   [place]     Patient Name: Phan Eastman  : 1955  MRN: [id number]  Today's Date: 9/10/2019  Onset of Illness/Injury or Date of Surgery: 19     Referring Physician: Dr. Wyatt     Admit Date: 2019    Visit Dx:    ICD-10-CM ICD-9-CM   1. Oropharyngeal dysphagia R13.12 787.22   2. Impaired mobility and ADLs Z74.09 799.89   3. Impaired mobility Z74.09 799.89   4. Left renal mass N28.89 593.9     Patient Active Problem List   Diagnosis   • ICH (intracerebral hemorrhage) (CMS/HCC)   • Diabetes mellitus (CMS/HCC)   • Hypertensive urgency   • Hyperlipidemia   • Cerebrovascular accident (CVA) due to embolism of left middle cerebral artery (CMS/HCC)   • Dysarthria   • CKD (chronic kidney disease) stage 3, GFR 30-59 ml/min (CMS/HCC)   • Left renal mass   • Obesity (BMI 30-39.9)   • Diastolic CHF, chronic (CMS/HCC)   • Bradycardia       Therapy Treatment    Rehabilitation Treatment Summary     Row Name 09/10/19 0940 09/10/19 0824          Treatment Time/Intention    Discipline  physical therapy assistant  -KJ  occupational therapist  -MW     Document Type  therapy note (daily note)  -KJ2  therapy note (daily note)  -MW     Subjective Information  complains of;weakness  -KJ2  complains of;weakness;fatigue  -MW2     Mode of Treatment  physical therapy  -KJ2  individual therapy  -MW2     Patient/Family Observations  children present  -KJ2  son and daughter present  -MW2     Patient Effort  adequate  -KJ2  —     Comment  keep systolic pressure<140  -KJ2  —     Existing Precautions/Restrictions  fall  -KJ2  fall  -MW2     Treatment Considerations/Comments  Right side weakness  -KJ2  —     Recorded by [KJ] Debby Garcia, PTA 09/10/19 0940  [KJ2] Debby Garcia, PTA 09/10/19 1026 [MW] Elisa Chung,  OTR/L 09/10/19 0825  [MW2] Elisa Chung, OTR/L 09/10/19 1010     Row Name 09/10/19 0824             Vital Signs    Pre Systolic BP Rehab  170 nrsg notified  -MW      Pre Treatment Diastolic BP  74  -MW      Pre Patient Position  Supine  -MW      Recorded by [MW] Elisa Chung OTR/L 09/10/19 1019      Row Name 09/10/19 0824             Cognitive Assessment/Intervention    Additional Documentation  Cognitive Assessment/Intervention (Group)  -MW      Recorded by [MW] Elisa Chung OTR/L 09/10/19 1019      Row Name 09/10/19 0824             Cognitive Assessment/Intervention- PT/OT    Personal Safety Interventions  fall prevention program maintained;muscle strengthening facilitated;nonskid shoes/slippers when out of bed;supervised activity  -MW      Recorded by [MW] Elisa Chung OTR/L 09/10/19 1019      Row Name 09/10/19 0940 09/10/19 0824          Bed Mobility Assessment/Treatment    Bed Mobility Assessment/Treatment  —  rolling left;rolling right;scooting/bridging;supine-sit;sit-supine  -MW     Rolling Left [place] (Bed Mobility)  —  maximum assist (25% patient effort);2 person assist  -MW     Rolling Right [place] (Bed Mobility)  —  maximum assist (25% patient effort);2 person assist  -MW     Scooting/Bridging [place] (Bed Mobility)  —  dependent (less than 25% patient effort);2 person assist  -MW     Supine-Sit [place] (Bed Mobility)  —  maximum assist (25% patient effort);dependent (less than 25% patient effort);2 person assist  -MW     Sit-Supine [place] (Bed Mobility)  —  maximum assist (25% patient effort);dependent (less than 25% patient effort);2 person assist  -MW     Bed Mobility, Safety Issues  —  decreased use of arms for pushing/pulling;decreased use of legs for bridging/pushing;impaired trunk control for bed mobility  -MW     Assistive Device (Bed Mobility)  —  bed rails;draw sheet;head of bed elevated  -MW     Comment (Bed Mobility)  transferred to neuro chair    -KJ  unable to maintain sitting balance EOB >30 seconds without mod-maxA, returned to supine  -MW     Recorded by [KJ] Debby Garcia, PTA 09/10/19 1026 [MW] Elisa Chung OTR/L 09/10/19 1019     Row Name 09/10/19 0940             Therapeutic Exercise    Exercise Type (Therapeutic Exercise)  AAROM (active assistive range of motion)  -KJ      Position (Therapeutic Exercise)  seated  -KJ      Sets/Reps (Therapeutic Exercise)  2 sets of 10  -KJ      Recorded by [KJ] Debby Garcia, PTA 09/10/19 1026      Row Name 09/10/19 0824             Balance    Balance  static sitting balance  -MW      Recorded by [MW] Elisa Chung OTR/L 09/10/19 1019      Row Name 09/10/19 0824             Static Sitting Balance    Level of [place] (Unsupported Sitting, Static Balance)  maximal assist, 25 to 49% patient effort  -MW      Sitting Position (Unsupported Sitting, Static Balance)  sitting on edge of bed  -MW      Comment (Unsupported Sitting, Static Balance)  able to hold self up for 30 seconds CGA with vc for posture, maxA required d/t R and posterior lean   -MW      Recorded by [MW] Elisa Chung OTR/L 09/10/19 1019      Row Name 09/10/19 0940 09/10/19 0824          Positioning and Restraints    Pre-Treatment Position  in bed  -KJ  in bed  -MW     Post Treatment Position  chair  -KJ  bed  -MW     In Bed  —  fowlers;call light within reach;encouraged to call for assist;with family/caregiver;side rails up x2;SCD pump applied;heels elevated;RUE elevated  -MW     Recorded by [KJ] Debby Garcia, PTA 09/10/19 1026 [MW] Elisa Chung OTR/L 09/10/19 1019     Row Name 09/10/19 0824             Pain Assessment    Additional Documentation  Pain Scale: Numbers Pre/Post-Treatment (Group)  -MW      Recorded by [MW] Elisa Chung OTR/L 09/10/19 1019      Row Name 09/10/19 0940 09/10/19 0824          Pain Scale: Numbers Pre/Post-Treatment    Pain Scale: Numbers, Pretreatment  5/10  -KJ  0/10 - no pain  -MW     Pain  Scale: Numbers, Post-Treatment  6/10  -KJ  0/10 - no pain  -MW     Pain Location - Side  Right  -KJ  —     Pain Location  flank  -KJ  —     Recorded by [KJ] Debby Garcia, RAJAN 09/10/19 1026 [MW] Elisa Chnug, OTR/L 09/10/19 1019       User Key  (r) = Recorded By, (t) = Taken By, (c) = Cosigned By    Initials Name Effective Dates Discipline    Debby Thompson, RAJAN 08/02/16 -  PT    MW Elisa Chung, OTR/L 08/28/18 -  OT                   Physical Therapy Education     Title: PT OT SLP Therapies (In Progress)     Topic: Physical Therapy (In Progress)     Point: Mobility training (Done)     Learning Progress Summary           Patient Acceptance, E, VU,NR by TITUS at 9/4/2019  8:11 AM    Comment:  Rolling in bed. Bed mobility.    Acceptance, E, VU,NR by JAKE at 9/2/2019  7:20 AM    Comment:  Educated pt on progression of PT POC and benefits of activity                               User Key     Initials Effective Dates Name Provider Type Discipline    JAKE 08/02/16 -  Gilmer Medrano, PT DPT Physical Therapist PT    TITUS 08/02/16 -  Kathrine Ramsay PTA Physical Therapy Assistant PT                PT Recommendation and Plan     Plan of Care Reviewed With: patient  Progress: no change  Outcome Summary: PT tx completed. Pt supine in bed. BP's WNL during tx. Became alittle low towards end of tx. MaxA x 2 bed mobility. Sitting balance max assist initially, but improved the longer he sat. At end of tx Evan to sit edge of bed. Right side weakness and R rib pn. Nursing made aware of pn. Recommend SNF for con'td PT.  Outcome Measures     Row Name 09/09/19 1704             How much help from another is currently needed...    Putting on and taking off regular lower body clothing?  1  -MW      Bathing (including washing, rinsing, and drying)  2  -MW      Toileting (which includes using toilet bed pan or urinal)  1  -MW      Putting on and taking off regular upper body clothing  2  -MW      Taking care of personal grooming  (such as brushing teeth)  2  -MW      Eating meals  2  -MW      AM-PAC 6 Clicks Score (OT)  10  -MW         Functional Assessment    Outcome Measure Options  AM-PAC 6 Clicks Daily Activity (OT)  -MW        User Key  (r) = Recorded By, (t) = Taken By, (c) = Cosigned By    Initials Name Provider Type    Elisa Nolan OTR/L Occupational Therapist         Time Calculation:   PT Charges     Row Name 09/10/19 1026             Time Calculation    Start Time  0940  -KJ      Stop Time  1026  -KJ      Time Calculation (min)  46 min  -KJ      PT Received On  09/10/19  -KJ      PT Goal Re-Cert Due Date  19  -KJ         Time Calculation- PT    Total Timed Code Minutes- PT  46 minute(s)  -KJ        User Key  (r) = Recorded By, (t) = Taken By, (c) = Cosigned By    Initials Name Provider Type    Debby Thompson PTA Physical Therapy Assistant        Therapy Charges for Today     Code Description Service Date Service Provider Modifiers Qty    22659680332 HC PT THER PROC EA 15 MIN 2019 Debby Garcia PTA GP 2    97302133703 HC PT THERAPEUTIC ACT EA 15 MIN 2019 Debby Garcia PTA GP 2    68701419290 HC PT THERAPEUTIC ACT EA 15 MIN 9/10/2019 Debby Garcia PTA GP 3          PT G-Codes  Outcome Measure Options: AM-PAC 6 Clicks Daily Activity (OT)  AM-PAC 6 Clicks Score (PT): 8  AM-PAC 6 Clicks Score (OT): 10    Debby Garcia PTA  9/10/2019         Electronically signed by Debby Garcia PTA at 9/10/2019 10:27 AM          Occupational Therapy Notes (last 24 hours) (Notes from 2019 11:19 AM through 9/10/2019 11:19 AM)      Elisa Chung OTR/L at 9/10/2019 10:36 AM          Acute Care - Occupational Therapy Treatment Note  [facility]     Patient Name: Phan Eastman  : 1955  MRN: [id number]  Today's Date: 9/10/2019  Onset of Illness/Injury or Date of Surgery: 19  Date of Referral to OT: 19  Referring Physician: Dr. Wyatt     Admit Date: 2019       ICD-10-CM ICD-9-CM    1. Oropharyngeal dysphagia R13.12 787.22   2. Impaired mobility and ADLs Z74.09 799.89   3. Impaired mobility Z74.09 799.89   4. Left renal mass N28.89 593.9     Patient Active Problem List   Diagnosis   • ICH (intracerebral hemorrhage) (CMS/HCC)   • Diabetes mellitus (CMS/[facility])   • Hypertensive urgency   • Hyperlipidemia   • Cerebrovascular accident (CVA) due to embolism of left middle cerebral artery (CMS/[facility])   • Dysarthria   • CKD (chronic kidney disease) stage 3, GFR 30-59 ml/min (CMS/[facility])   • Left renal mass   • Obesity (BMI 30-39.9)   • Diastolic CHF, chronic (CMS/[facility])   • Bradycardia     Past Medical History:   Diagnosis Date   • Diabetes mellitus (CMS/[facility])    • GERD (gastroesophageal reflux disease)    • Hyperlipidemia    • Hypertension    • Neuropathy    • Osteoarthritis    • TIA (transient ischemic attack)      Past Surgical History:   Procedure Laterality Date   • CARPAL TUNNEL RELEASE Bilateral    • EYE SURGERY     • KNEE SURGERY Bilateral    • NECK SURGERY     • RETINAL DETACHMENT SURGERY     • ROTATOR CUFF REPAIR         Therapy Treatment    Rehabilitation Treatment Summary     Row Name 09/10/19 0940 09/10/19 0824          Treatment Time/Intention    Discipline  physical therapy assistant  -KJ  occupational therapist  -MW     Document Type  therapy note (daily note)  -KJ2  therapy note (daily note)  -MW     Subjective Information  complains of;weakness  -KJ2  complains of;weakness;fatigue  -MW2     Mode of Treatment  physical therapy  -KJ2  individual therapy  -MW2     Patient/Family Observations  children present  -KJ2  son and daughter present  -MW2     Patient Effort  adequate  -KJ2  —     Comment  keep systolic pressure<140  -KJ2  —     Existing Precautions/Restrictions  fall  -KJ2  fall  -MW2     Treatment Considerations/Comments  Right side weakness  -KJ2  —     Recorded by [KJ] Debby Garcia, PTA 09/10/19 0940  [KJ2] Debby Garcia, PTA 09/10/19 1026 [MW] Elisa Chung, OTR/L 09/10/19  0825  [MW2] Elisa Chung OTR/L 09/10/19 1010     Row Name 09/10/19 0824             Vital Signs    Pre Systolic BP Rehab  170 nrsg notified  -MW      Pre Treatment Diastolic BP  74  -MW      Pre Patient Position  Supine  -MW      Recorded by [MW] Elisa Chung OTR/L 09/10/19 1019      Row Name 09/10/19 0824             Cognitive Assessment/Intervention    Additional Documentation  Cognitive Assessment/Intervention (Group)  -MW      Recorded by [MW] Elisa Chung OTR/L 09/10/19 1019      Row Name 09/10/19 0824             Cognitive Assessment/Intervention- PT/OT    Personal Safety Interventions  fall prevention program maintained;muscle strengthening facilitated;nonskid shoes/slippers when out of bed;supervised activity  -MW      Recorded by [MW] Elisa Chung OTR/L 09/10/19 1019      Row Name 09/10/19 0940 09/10/19 0824          Bed Mobility Assessment/Treatment    Bed Mobility Assessment/Treatment  —  rolling left;rolling right;scooting/bridging;supine-sit;sit-supine  -MW     Rolling Left [place] (Bed Mobility)  —  maximum assist (25% patient effort);2 person assist  -MW     Rolling Right [place] (Bed Mobility)  —  maximum assist (25% patient effort);2 person assist  -MW     Scooting/Bridging [place] (Bed Mobility)  —  dependent (less than 25% patient effort);2 person assist  -MW     Supine-Sit [place] (Bed Mobility)  —  maximum assist (25% patient effort);dependent (less than 25% patient effort);2 person assist  -MW     Sit-Supine [place] (Bed Mobility)  —  maximum assist (25% patient effort);dependent (less than 25% patient effort);2 person assist  -MW     Bed Mobility, Safety Issues  —  decreased use of arms for pushing/pulling;decreased use of legs for bridging/pushing;impaired trunk control for bed mobility  -MW     Assistive Device (Bed Mobility)  —  bed rails;draw sheet;head of bed elevated  -MW     Comment (Bed Mobility)  transferred to neuro chair   -KJ  unable to  maintain sitting balance EOB >30 seconds without mod-maxA, returned to supine  -MW     Recorded by [KJ] Debby Garcia, PTA 09/10/19 1026 [MW] Elisa Chung OTR/L 09/10/19 1019     Row Name 09/10/19 0940             Therapeutic Exercise    Exercise Type (Therapeutic Exercise)  AAROM (active assistive range of motion)  -KJ      Position (Therapeutic Exercise)  seated  -KJ      Sets/Reps (Therapeutic Exercise)  2 sets of 10  -KJ      Recorded by [KJ] Debby Garcia, PTA 09/10/19 1026      Row Name 09/10/19 0824             Balance    Balance  static sitting balance  -MW      Recorded by [MW] Elisa Chung OTR/L 09/10/19 1019      Row Name 09/10/19 0824             Static Sitting Balance    Level of [place] (Unsupported Sitting, Static Balance)  maximal assist, 25 to 49% patient effort  -MW      Sitting Position (Unsupported Sitting, Static Balance)  sitting on edge of bed  -MW      Comment (Unsupported Sitting, Static Balance)  able to hold self up for 30 seconds CGA with vc for posture, maxA required d/t R and posterior lean   -MW      Recorded by [MW] Elisa Chung OTR/L 09/10/19 1019      Row Name 09/10/19 0940 09/10/19 0824          Positioning and Restraints    Pre-Treatment Position  in bed  -KJ  in bed  -MW     Post Treatment Position  chair  -KJ  bed  -MW     In Bed  —  fowlers;call light within reach;encouraged to call for assist;with family/caregiver;side rails up x2;SCD pump applied;heels elevated;RUE elevated  -MW     Recorded by [KJ] Debby Garcia, PTA 09/10/19 1026 [MW] Elisa Chung OTR/L 09/10/19 1019     Row Name 09/10/19 0824             Pain Assessment    Additional Documentation  Pain Scale: Numbers Pre/Post-Treatment (Group)  -MW      Recorded by [MW] Elisa Chung OTR/L 09/10/19 1019      Row Name 09/10/19 0940 09/10/19 0824          Pain Scale: Numbers Pre/Post-Treatment    Pain Scale: Numbers, Pretreatment  5/10  -KJ  0/10 - no pain  -MW     Pain Scale: Numbers,  Post-Treatment  6/10  -KJ  0/10 - no pain  -MW     Pain Location - Side  Right  -KJ  —     Pain Location  flank  -KJ  —     Recorded by [KJ] Debby Garcia, PTA 09/10/19 1026 [MW] Elisa Chung OTR/L 09/10/19 1019     Row Name 09/10/19 0824             Plan of Care Review    Plan of Care Reviewed With  patient;son  -MW      Recorded by [MW] Elisa Chung OTR/L 09/10/19 1035      Row Name 09/10/19 0824             Outcome Summary/Treatment Plan (OT)    Daily Summary of Progress (OT)  progress toward functional goals is gradual  -MW      Anticipated Discharge Disposition (OT)  skilled nursing facility  -MW      Recorded by [MW] Elisa Chung OTR/L 09/10/19 1035        User Key  (r) = Recorded By, (t) = Taken By, (c) = Cosigned By    Initials Name Effective Dates Discipline    KJ Debby Garcia, PTA 08/02/16 -  PT    Elisa Nolan OTR/L 08/28/18 -  OT             Occupational Therapy Education     Title: PT OT SLP Therapies (In Progress)     Topic: Occupational Therapy (Done)     Point: ADL training (Done)     Description: Instruct learner(s) on proper safety adaptation and remediation techniques during self care or transfers.   Instruct in proper use of assistive devices.    Learning Progress Summary           Patient Acceptance, E, VU,NR by FORTINO at 9/10/2019 10:23 AM    Comment:  limb protection with mobility/adl/supine, bed mobility, ADL    Acceptance, E, VU by FORTINO at 9/9/2019  5:09 PM    Comment:  mind muscle connection for neuroplasticity, ADL, OT POC    Acceptance, E, VU by RYAN at 9/6/2019 10:30 AM    Comment:  OT POC, adls, t/fs, bed mobility, importance of positioning, neuromuscular reeducation techniques.    Acceptance, E,TB, VU,DU,NR by YUDI at 9/5/2019  2:50 PM    Comment:  Pt. takes washcloth presented to him by this orellana/l and with min. assist using Rue washes his face and hands, no issues with Lue rom with washcloth! Ue exs performed with Lue Ind'ly, min. assist with Rue!    Acceptance, E,  NR by RYAN at 9/2/2019  8:35 AM    Comment:  OT POC, benefits and roles of OT, adls, t/fs, bed mobility, positioning for prevention of skin breakdown of joint protection.                   Point: Home exercise program (Done)     Description: Instruct learner(s) on appropriate technique for monitoring, assisting and/or progressing therapeutic exercises/activities.    Learning Progress Summary           Patient Acceptance, E, VU,NR by FORTINO at 9/10/2019 10:23 AM    Comment:  limb protection with mobility/adl/supine, bed mobility, ADL    Acceptance, E, VU by FORTINO at 9/9/2019  5:09 PM    Comment:  mind muscle connection for neuroplasticity, ADL, OT POC    Acceptance, E,TB, VU,DU,NR by YUDI at 9/5/2019  2:50 PM    Comment:  Pt. takes washcloth presented to him by this orellana/l and with min. assist using Rue washes his face and hands, no issues with Lue rom with washcloth! Ue exs performed with Lue Ind'ly, min. assist with Rue!                   Point: Precautions (Done)     Description: Instruct learner(s) on prescribed precautions during self-care and functional transfers.    Learning Progress Summary           Patient Acceptance, E, VU,NR by FORTINO at 9/10/2019 10:23 AM    Comment:  limb protection with mobility/adl/supine, bed mobility, ADL    Acceptance, E, VU by FORTINO at 9/9/2019  5:09 PM    Comment:  mind muscle connection for neuroplasticity, ADL, OT POC    Acceptance, E, VU by RYAN at 9/6/2019 10:30 AM    Comment:  OT POC, adls, t/fs, bed mobility, importance of positioning, neuromuscular reeducation techniques.    Acceptance, E,TB, VU,DU,NR by YUDI at 9/5/2019  2:50 PM    Comment:  Pt. takes washcloth presented to him by this orellana/l and with min. assist using Rue washes his face and hands, no issues with Lue rom with washcloth! Ue exs performed with Lue Ind'ly, min. assist with Rue!    Acceptance, E, NR by RYAN at 9/2/2019  8:35 AM    Comment:  OT POC, benefits and roles of OT, adls, t/fs, bed mobility, positioning for prevention of  skin breakdown of joint protection.                   Point: Body mechanics (Done)     Description: Instruct learner(s) on proper positioning and spine alignment during self-care, functional mobility activities and/or exercises.    Learning Progress Summary           Patient Acceptance, E, VU,NR by FORTINO at 9/10/2019 10:23 AM    Comment:  limb protection with mobility/adl/supine, bed mobility, ADL    Acceptance, E, VU by FORTINO at 9/9/2019  5:09 PM    Comment:  mind muscle connection for neuroplasticity, ADL, OT POC    Acceptance, E,TB, VU,DU,NR by YUDI at 9/5/2019  2:50 PM    Comment:  Pt. takes washcloth presented to him by this orellana/l and with min. assist using Rue washes his face and hands, no issues with Lue rom with washcloth! Ue exs performed with Lue Ind'ly, min. assist with Rue!                               User Key     Initials Effective Dates Name Provider Type Discipline     08/02/16 -  Emmanuel Osborne ORELLANA/L Occupational Therapy Assistant OT     08/28/18 -  Elisa Chung, OTR/L Occupational Therapist OT    RYAN 10/12/18 -  Aster Suggs OTR/L Occupational Therapist OT                OT Recommendation and Plan  Outcome Summary/Treatment Plan (OT)  Daily Summary of Progress (OT): progress toward functional goals is gradual  Anticipated Discharge Disposition (OT): skilled nursing facility  Daily Summary of Progress (OT): progress toward functional goals is gradual  Plan of Care Review  Plan of Care Reviewed With: patient  Plan of Care Reviewed With: patient  Outcome Summary: OT txt completed. Pt reports weakness and fatigue greater than yesterday but wanting to sit up. Max-depx2 to come to EOB. Pt able to maintain sitting balance about 30 sec with max vc/tc for posture, otherwise requiring maxA for R and posterior lean. Pt rolled multiple times for linen and brief change following incontinence, maxAx2 for rolling. Education provided for limb protection with mobility, ADL, and laying in bed. Pt and  son verbalize understanding and need for RUE support. Cont OT pOC. Recommend d/c to SNF.  Outcome Measures     Row Name 09/10/19 1000 09/09/19 1700          How much help from another is currently needed...    Putting on and taking off regular lower body clothing?  1  -MW  1  -MW     Bathing (including washing, rinsing, and drying)  2  -MW  2  -MW     Toileting (which includes using toilet bed pan or urinal)  1  -MW  1  -MW     Putting on and taking off regular upper body clothing  2  -MW  2  -MW     Taking care of personal grooming (such as brushing teeth)  2  -MW  2  -MW     Eating meals  2  -MW  2  -MW     AM-PAC 6 Clicks Score (OT)  10  -MW  10  -MW        Functional Assessment    Outcome Measure Options  AM-PAC 6 Clicks Daily Activity (OT)  -MW  AM-PAC 6 Clicks Daily Activity (OT)  -MW       User Key  (r) = Recorded By, (t) = Taken By, (c) = Cosigned By    Initials Name Provider Type    Elisa Nolan OTR/L Occupational Therapist           Time Calculation:   Time Calculation- OT     Row Name 09/10/19 1023             Time Calculation- OT    OT Start Time  0808  -MW      OT Stop Time  0907  -MW      OT Time Calculation (min)  59 min  -MW      Total Timed Code Minutes- OT  59 minute(s)  -MW      OT Received On  09/10/19  -MW        User Key  (r) = Recorded By, (t) = Taken By, (c) = Cosigned By    Initials Name Provider Type    Elisa Nolan OTR/L Occupational Therapist        Therapy Charges for Today     Code Description Service Date Service Provider Modifiers Qty    02075944907 HC OT THER PROC EA 15 MIN 9/9/2019 Elisa Chung OTR/L GO 1    99926501345 HC OT SELF CARE/MGMT/TRAIN EA 15 MIN 9/9/2019 Elisa Chung OTR/L GO 2    62118020152 HC OT THERAPEUTIC ACT EA 15 MIN 9/10/2019 Elisa Chung OTR/L GO 3    87690617363 HC OT SELF CARE/MGMT/TRAIN EA 15 MIN 9/10/2019 Elisa Chung OTR/L GO 1               VILLA Bueno/L  9/10/2019    Electronically signed by Elisa Chung  OTR/L at 9/10/2019 10:36 AM     Elisa Chung OTR/L at 9/10/2019 10:22 AM          Problem: Patient Care Overview  Goal: Plan of Care Review  Outcome: Ongoing (interventions implemented as appropriate)   09/10/19 1019   Coping/Psychosocial   Plan of Care Reviewed With patient   Plan of Care Review   Progress no change   OTHER   Outcome Summary OT txt completed. Pt reports weakness and fatigue greater than yesterday but wanting to sit up. Max-depx2 to come to EOB. Pt able to maintain sitting balance about 30 sec with max vc/tc for posture, otherwise requiring maxA for R and posterior lean. Pt rolled multiple times for linen and brief change following incontinence, maxAx2 for rolling. Education provided for limb protection with mobility, ADL, and laying in bed. Pt and son verbalize understanding and need for RUE support. Cont OT pOC. Recommend d/c to SNF.           Electronically signed by Elisa Chung OTKATHY/L at 9/10/2019 10:22 AM     Elisa Chung OTR/L at 2019  5:11 PM          Acute Care - Occupational Therapy Treatment Note  [facility]     Patient Name: Phan Eastman  : 1955  MRN: [id number]  Today's Date: 2019  Onset of Illness/Injury or Date of Surgery: 19  Date of Referral to OT: 19  Referring Physician: Dr. Wyatt     Admit Date: 2019       ICD-10-CM ICD-9-CM   1. Oropharyngeal dysphagia R13.12 787.22   2. Impaired mobility and ADLs Z74.09 799.89   3. Impaired mobility Z74.09 799.89   4. Left renal mass N28.89 593.9     Patient Active Problem List   Diagnosis   • ICH (intracerebral hemorrhage) (CMS/HCC)   • Diabetes mellitus (CMS/HCC)   • Hypertensive urgency   • Hyperlipidemia   • Cerebrovascular accident (CVA) due to embolism of left middle cerebral artery (CMS/HCC)   • Dysarthria   • CKD (chronic kidney disease) stage 3, GFR 30-59 ml/min (CMS/HCC)   • Left renal mass   • Obesity (BMI 30-39.9)   • Diastolic CHF, chronic (CMS/HCC)   • Bradycardia     Past  Medical History:   Diagnosis Date   • Diabetes mellitus (CMS/HCC)    • GERD (gastroesophageal reflux disease)    • Hyperlipidemia    • Hypertension    • Neuropathy    • Osteoarthritis    • TIA (transient ischemic attack)      Past Surgical History:   Procedure Laterality Date   • CARPAL TUNNEL RELEASE Bilateral    • EYE SURGERY     • KNEE SURGERY Bilateral    • NECK SURGERY     • RETINAL DETACHMENT SURGERY     • ROTATOR CUFF REPAIR         Therapy Treatment    Rehabilitation Treatment Summary     Row Name 09/09/19 1610 09/09/19 1413 09/09/19 1345       Treatment Time/Intention    Discipline  occupational therapist  -MW  speech language pathologist  -MM  physical therapy assistant  -KJ    Document Type  therapy note (daily note)  -MW  therapy note (daily note)  -MM  therapy note (daily note)  -KJ    Subjective Information  no complaints  -MW  no complaints  -MM  complains of;pain  -KJ    Mode of Treatment  —  individual therapy;speech-language pathology  -MM  physical therapy  -KJ    Patient/Family Observations  family present  -MW2  Daughter present.  -MM  children present  -KJ    Care Plan Review  —  care plan/treatment goals reviewed  -MM  —    Therapy Frequency (Swallow)  —  at least;3 days per week  -MM  —    Patient Effort  —  good  -MM  good  -KJ    Comment  —  —  keep systolic <140  (Significant)   -KJ    Existing Precautions/Restrictions  fall  -MW2  —  fall  -KJ    Treatment Considerations/Comments  —  —  R side weakness  -KJ    Recorded by [MW] Elisa Chung, OTR/L 09/09/19 1612  [MW2] Elisa Chung, OTR/L 09/09/19 1618 [MM] Krystal Babcock, MS CCC-SLP 09/09/19 1438 [KJ] Debby Garcia, PTA 09/09/19 1428    Row Name 09/09/19 1147             Treatment Time/Intention    Discipline  physical therapy assistant  -KJ      Document Type  therapy note (daily note)  -KJ2      Subjective Information  complains of;pain  -KJ2      Mode of Treatment  physical therapy  -KJ2      Patient/Family Observations   no family present  -KJ2      Patient Effort  fair  -KJ2      Comment  keep systolic <140  -KJ2      Existing Precautions/Restrictions  fall  -KJ2      Treatment Considerations/Comments  R side weakness  -KJ2      Recorded by [KJ] Debby Garcia, PTA 09/09/19 1148  [KJ2] Debby Garcia, PTA 09/09/19 1210      Row Name 09/09/19 1345             Vital Signs    Pre Systolic BP Rehab  —  (Significant)  BP's WNL  -KJ      Recorded by [KJ] Debby Garcia, PTA 09/09/19 1428      Row Name 09/09/19 1610             Cognitive Assessment/Intervention- PT/OT    Personal Safety Interventions  fall prevention program maintained;muscle strengthening facilitated;nonskid shoes/slippers when out of bed;supervised activity  -MW      Recorded by [MW] Elisa Chung OTR/L 09/09/19 1618      Row Name 09/09/19 1610 09/09/19 1345          Bed Mobility Assessment/Treatment    Rolling Left [place] (Bed Mobility)  —  moderate assist (50% patient effort);2 person assist;verbal cues  -KJ     Rolling Right [place] (Bed Mobility)  —  verbal cues;maximum assist (25% patient effort);2 person assist  -KJ     Scooting/Bridging [place] (Bed Mobility)  —  maximum assist (25% patient effort);2 person assist  -KJ     Supine-Sit [place] (Bed Mobility)  —  verbal cues;maximum assist (25% patient effort);2 person assist  -KJ     Sit-Supine [place] (Bed Mobility)  —  verbal cues;maximum assist (25% patient effort);2 person assist  -KJ     Bed Mobility, Safety Issues  —  decreased use of legs for bridging/pushing;impaired trunk control for bed mobility;decreased use of arms for pushing/pulling  -KJ     Assistive Device (Bed Mobility)  —  head of bed elevated;bed rails  -KJ     Comment (Bed Mobility)  up in chair  -MW  sit edge of bed x 15 minutes max assist initially, but sitting balance improved with time. Able to sit [place] at times  -KJ     Recorded by [MW] Elisa Chung OTR/JIMENA 09/09/19 1618 [KJ] Debby Garcia, PTA 09/09/19  1428     Row Name 09/09/19 1345             Gait/Stairs Assessment/Training    Comment (Gait/Stairs)  not appropriate  -KJ      Recorded by [KJ] Debby Garcia, PTA 09/09/19 1428      Row Name 09/09/19 1610             ADL Assessment/Intervention    83024 - OT Self Care/Mgmt Minutes  35  -MW      BADL Assessment/Intervention  grooming;bathing  -MW      Recorded by [MW] Elisa Chung OTR/L 09/09/19 1702      Row Name 09/09/19 1610             Bathing Assessment/Intervention    Bathing [place] Level  minimum assist (75% patient effort);set up  -MW      Bathing Position  supported sitting  -MW      Comment (Bathing)  washed hair using shower cap, Sugar for thoroughness  -MW      Recorded by [MW] Elisa Chung OTR/L 09/09/19 1702      Row Name 09/09/19 1610             Grooming Assessment/Training    [place] Level (Grooming)  hair care, combing/brushing;wash face, hands;shave face;moderate assist (50% patient effort);verbal cues;nonverbal cues (demo/gesture)  -MW      Grooming Position  supported sitting  -MW      Comment (Grooming)  Pt positioned upright in chair supported on R side by pillows. Performed shaving with overall modA for task completion, modA to stabilize R hand to dispense shaving cream and completed half of task with L UE. Assist required for thoroughness d/t increased fatigue.  -MW      Recorded by [MW] Elisa Chung OTR/L 09/09/19 1702      Row Name 09/09/19 1610             General ROM    RT Upper Ext  —  -MW      Recorded by [MW] Elisa Chung OTR/L 09/09/19 1702      Row Name 09/09/19 1147             Motor Skills Assessment/Interventions    Additional Documentation  Therapeutic Exercise (Group)  -KJ      Recorded by [KJ] Debby Garcia, PTA 09/09/19 1210      Row Name 09/09/19 1610             Therapeutic Exercise    93092 - OT Therapeutic Exercise Minutes  15  -MW      Recorded by [MW] Elisa hCung OTR/L 09/09/19 1702      Row Name 09/09/19 1610 09/09/19 1345  09/09/19 1147       Therapeutic Exercise    Upper Extremity Range of Motion (Therapeutic Exercise)  shoulder flexion/extension, right;shoulder abduction/adduction, right;shoulder horizontal abduction/adduction, right;elbow flexion/extension, right  -MW  —  —    Exercise Type (Therapeutic Exercise)  AAROM (active assistive range of motion);PROM (passive range of motion)  -MW  —  AAROM (active assistive range of motion);PROM (passive range of motion)  -KJ    Position (Therapeutic Exercise)  seated  -MW  —  supine  -KJ    Sets/Reps (Therapeutic Exercise)  2 sets of 10  -MW  —  2 sets of 10  -KJ    Expected Outcome (Therapeutic Exercise)  improve functional tolerance, self-care activity;improve neuromuscular control;improve object manipulation, self-care activity;improve performance, BADLs  -MW  —  —    Comment (Therapeutic Exercise)  —  lateral shift to down on elbow on L to stretch R obiliques, forward weight shifting multiple times and cueing to correct sitting balance. Reaching all planes LUE. Weight bearing RUE. Rythmic stabs all planes  -KJ  —    Recorded by [MW] Elisa Chung OTR/L 09/09/19 1702 [KJ] Debby Garcia, PTA 09/09/19 1428 [KJ] Debby Garcia, PTA 09/09/19 1210    Row Name 09/09/19 1610 09/09/19 1345 09/09/19 1147       Positioning and Restraints    Pre-Treatment Position  sitting in chair/recliner  -MW  in bed  -KJ  in bed  -KJ    Post Treatment Position  chair  -MW  bed  -KJ  bed  -KJ    In Chair  sitting;call light within reach;encouraged to call for assist;with family/caregiver;legs elevated on lift pad, pillow supporting trunk on R  -MW2  —  —    Recorded by [MW] Elisa Chung OTR/L 09/09/19 1702  [MW2] Elisa Chung OTR/L 09/09/19 1704 [KJ] Debby Garcia, PTA 09/09/19 1428 [KJ] Debby Garcia, PTA 09/09/19 1210    Row Name 09/09/19 1610 09/09/19 1413          Pain Assessment    Additional Documentation  Pain Scale: Numbers Pre/Post-Treatment (Group)  -MW  Pain Scale: Numbers  Pre/Post-Treatment (Group)  -MM     Recorded by [MW] Elisa Chung OTR/L 09/09/19 1704 [MM] Krystal Babcock, MS CCC-SLP 09/09/19 1438     Row Name 09/09/19 1610 09/09/19 1413 09/09/19 1345       Pain Scale: Numbers Pre/Post-Treatment    Pain Scale: Numbers, Pretreatment  0/10 - no pain  -MW  0/10 - no pain  -MM  8/10  -KJ    Pain Scale: Numbers, Post-Treatment  0/10 - no pain  -MW  0/10 - no pain  -MM  8/10  -KJ    Pain Location - Side  —  —  Right  -KJ    Pain Location - Orientation  —  —  lower  -KJ    Pain Location  —  —  flank  -KJ    Recorded by [MW] Elisa Chung OTR/L 09/09/19 1704 [MM] Krystal Babcock, MS CCC-SLP 09/09/19 1438 [KJ] Debby Garcia, PTA 09/09/19 1428    Row Name 09/09/19 1147             Pain Scale: Numbers Pre/Post-Treatment    Pain Scale: Numbers, Pretreatment  4/10  -KJ      Pain Scale: Numbers, Post-Treatment  4/10  -KJ      Pain Location  — generalized  -KJ      Recorded by [KJ] Debby Garcia, PTA 09/09/19 1210      Row Name 09/09/19 1610             Plan of Care Review    Plan of Care Reviewed With  patient;family  -MW      Recorded by [MW] Elisa Chung OTR/L 09/09/19 1704      Row Name 09/09/19 1610             Outcome Summary/Treatment Plan (OT)    Daily Summary of Progress (OT)  progress towards functional goals is fair  -MW      Anticipated Discharge Disposition (OT)  skilled nursing facility;inpatient rehabilitation facility  -MW      Recorded by [MW] Elisa Chung OTR/L 09/09/19 1704      Row Name 09/09/19 1413             Outcome Summary/Treatment Plan (SLP)    Daily Summary of Progress (SLP)  progress toward functional goals as expected  -MM      Barriers to Overall Progress (SLP)  n/a  -MM      Plan for Continued Treatment (SLP)  Continue to follow.  -MM      Anticipated Dischage Disposition  unknown  -MM      Recorded by [MM] Krystal Babcock, MS CCC-SLP 09/09/19 1438        User Key  (r) = Recorded By, (t) = Taken By, (c) = Cosigned By     Initials Name Effective Dates Discipline    COLETTE Garcia Debby HESS, PTA 08/02/16 -  PT    Elisa Nolan, OTR/L 08/28/18 -  OT    MM Krystal Babcock, MS CCC-SLP 05/24/19 -  SLP           Rehab Goal Summary     Row Name 09/09/19 1413             Swallow Goals (SLP)    Oral Nutrition/Hydration Goal Selection (SLP)  oral nutrition/hydration, SLP goal 1  -MM      Labial Strengthening Goal Selection (SLP)  labial strengthening, SLP goal 1  -MM      Lingual Strengthening Goal Selection (SLP)  lingual strengthening, SLP goal 1  -MM      Pharyngeal Strengthening Exercise Goal Selection (SLP)  pharyngeal strengthening exercise, SLP goal 1  -MM      Additional Documentation  lingual strengthening goal selection (SLP)  -MM         Oral Nutrition/Hydration Goal 1 (SLP)    Oral Nutrition/Hydration Goal 1, SLP  LTG: Patient will tolerate LRD with no overt s/s of aspiration.  -MM      Time Frame (Oral Nutrition/Hydration Goal 1, SLP)  by discharge  -MM      Barriers (Oral Nutrition/Hydration Goal 1, SLP)  n/a  -MM      Progress/Outcomes (Oral Nutrition/Hydration Goal 1, SLP)  continuing progress toward goal  -MM         Labial Strengthening Goal 1 (SLP)    Activity (Labial Strengthening Goal 1, SLP)  increase labial tone  -MM      Increase Labial Tone  labial resistance exercises  -MM      Frio/Accuracy (Labial Strengthening Goal 1, SLP)  independently (over 90% accuracy)  -MM      Time Frame (Labial Strengthening Goal 1, SLP)  short term goal (STG);by discharge  -MM      Barriers (Labial Strengthening Goal 1, SLP)  n/a  -MM      Progress/Outcomes (Labial Strengthening Goal 1, SLP)  continuing progress toward goal  -MM         Lingual Strengthening Goal 1 (SLP)    Activity (Lingual Strengthening Goal 1, SLP)  increase lingual tone/sensation/control/coordination/movement  -MM      Increase Lingual Tone/Sensation/Control/Coordination/Movement  lingual movement exercises  -MM      Frio/Accuracy (Lingual  Strengthening Goal 1, SLP)  independently (over 90% accuracy)  -MM      Time Frame (Lingual Strengthening Goal 1, SLP)  by discharge  -MM      Barriers (Lingual Strengthening Goal 1, SLP)  n/a  -MM      Progress/Outcomes (Lingual Strengthening Goal 1, SLP)  continuing progress toward goal  -MM         Pharyngeal Strengthening Exercise Goal 1 (SLP)    Activity (Pharyngeal Strengthening Goal 1, SLP)  increase epiglottic inversion and retroflexion;increase tongue base retraction;increase pharyngeal sensation  -MM      Increase Pharyngeal Sensation  gustatory stimulation (sour/cold)  -MM      Increase Epiglottic Inversion and Retroflexion  Mendelsohn;falsetto  -MM      Increase Tongue Base Retraction  hard effortful swallow;kaleb  -MM      Cross/Accuracy (Pharyngeal Strengthening Goal 1, SLP)  independently (over 90% accuracy)  -MM      Time Frame (Pharyngeal Strengthening Goal 1, SLP)  short term goal (STG);by discharge  -MM      Barriers (Pharyngeal Strengthening Goal 1, SLP)  n/a  -MM      Progress/Outcomes (Pharyngeal Strengthening Goal 1, SLP)  goal ongoing  -MM        User Key  (r) = Recorded By, (t) = Taken By, (c) = Cosigned By    Initials Name Provider Type Discipline    Krystal Monroy MS CCC-SLP Speech and Language Pathologist SLP        Occupational Therapy Education     Title: PT OT SLP Therapies (In Progress)     Topic: Occupational Therapy (Done)     Point: ADL training (Done)     Description: Instruct learner(s) on proper safety adaptation and remediation techniques during self care or transfers.   Instruct in proper use of assistive devices.    Learning Progress Summary           Patient Acceptance, E, VU by FORTINO at 9/9/2019  5:09 PM    Comment:  mind muscle connection for neuroplasticity, ADL, OT POC    Acceptance, E, VU by RYAN at 9/6/2019 10:30 AM    Comment:  OT POC, adls, t/fs, bed mobility, importance of positioning, neuromuscular reeducation techniques.    Acceptance, E,TB, VU,DU,NR  by YUDI at 9/5/2019  2:50 PM    Comment:  Pt. takes washcloth presented to him by this orellana/l and with min. assist using Rue washes his face and hands, no issues with Lue rom with washcloth! Ue exs performed with Lue Ind'ly, min. assist with Rue!    Acceptance, E, NR by RYAN at 9/2/2019  8:35 AM    Comment:  OT POC, benefits and roles of OT, adls, t/fs, bed mobility, positioning for prevention of skin breakdown of joint protection.                   Point: Home exercise program (Done)     Description: Instruct learner(s) on appropriate technique for monitoring, assisting and/or progressing therapeutic exercises/activities.    Learning Progress Summary           Patient Acceptance, E, VU by FORTINO at 9/9/2019  5:09 PM    Comment:  mind muscle connection for neuroplasticity, ADL, OT POC    Acceptance, E,TB, VU,DU,NR by YUDI at 9/5/2019  2:50 PM    Comment:  Pt. takes washcloth presented to him by this orellana/l and with min. assist using Rue washes his face and hands, no issues with Lue rom with washcloth! Ue exs performed with Lue Ind'ly, min. assist with Rue!                   Point: Precautions (Done)     Description: Instruct learner(s) on prescribed precautions during self-care and functional transfers.    Learning Progress Summary           Patient Acceptance, E, VU by FORTINO at 9/9/2019  5:09 PM    Comment:  mind muscle connection for neuroplasticity, ADL, OT POC    Acceptance, E, VU by RYAN at 9/6/2019 10:30 AM    Comment:  OT POC, adls, t/fs, bed mobility, importance of positioning, neuromuscular reeducation techniques.    Acceptance, E,TB, VU,DU,NR by YUDI at 9/5/2019  2:50 PM    Comment:  Pt. takes washcloth presented to him by this orellana/l and with min. assist using Rue washes his face and hands, no issues with Lue rom with washcloth! Ue exs performed with Lue Ind'ly, min. assist with Rue!    Acceptance, E, NR by RYAN at 9/2/2019  8:35 AM    Comment:  OT POC, benefits and roles of OT, adls, t/fs, bed mobility, positioning for  prevention of skin breakdown of joint protection.                   Point: Body mechanics (Done)     Description: Instruct learner(s) on proper positioning and spine alignment during self-care, functional mobility activities and/or exercises.    Learning Progress Summary           Patient Acceptance, E, VU by FORTINO at 9/9/2019  5:09 PM    Comment:  mind muscle connection for neuroplasticity, ADL, OT POC    Acceptance, E,TB, VU,DU,NR by YUDI at 9/5/2019  2:50 PM    Comment:  Pt. takes washcloth presented to him by this orellana/l and with min. assist using Rue washes his face and hands, no issues with Lue rom with washcloth! Ue exs performed with Lue Ind'ly, min. assist with Rue!                               User Key     Initials Effective Dates Name Provider Type Discipline     08/02/16 -  Emmanuel Osborne ORELLANA/L Occupational Therapy Assistant OT     08/28/18 -  Elisa Chung OTR/L Occupational Therapist OT    RYAN 10/12/18 -  Aster Suggs OTR/L Occupational Therapist OT                OT Recommendation and Plan  Outcome Summary/Treatment Plan (OT)  Daily Summary of Progress (OT): progress towards functional goals is fair  Anticipated Discharge Disposition (OT): skilled nursing facility, inpatient rehabilitation facility  Daily Summary of Progress (OT): progress towards functional goals is fair  Plan of Care Review  Plan of Care Reviewed With: patient  Plan of Care Reviewed With: patient  Outcome Summary: OT txt completed. Pt up in chair. Repositioned to straighten up, propped with pillow. AAROM/PROM 2x10 reps performed RUE. Pt becomes emotional with exercises feeling defeated with progress. Education provided regarding benefit of functional use/exercise of RUE to regain function, verbalized understanding and motivated to perform ADL. W Sugar functionally stabilizes and dispenses shaving cream, applies and shaves with LUE. ModA provided for task completion and thoroughness. Washes hair with shower cap with  Sugar. Pt demos fatigue following txt but states feeling much better. Cont OT POC. REcommend d/c to SNF vs inpatient rehab.  Outcome Measures     Row Name 09/09/19 1700             How much help from another is currently needed...    Putting on and taking off regular lower body clothing?  1  -MW      Bathing (including washing, rinsing, and drying)  2  -MW      Toileting (which includes using toilet bed pan or urinal)  1  -MW      Putting on and taking off regular upper body clothing  2  -MW      Taking care of personal grooming (such as brushing teeth)  2  -MW      Eating meals  2  -MW      AM-PAC 6 Clicks Score (OT)  10  -MW         Functional Assessment    Outcome Measure Options  AM-PAC 6 Clicks Daily Activity (OT)  -MW        User Key  (r) = Recorded By, (t) = Taken By, (c) = Cosigned By    Initials Name Provider Type    Elisa Nolan OTR/L Occupational Therapist           Time Calculation:   Time Calculation- OT     Row Name 09/09/19 1710 09/09/19 1610          Time Calculation- OT    OT Start Time  1602  -MW  —     OT Stop Time  1650  -MW  —     OT Time Calculation (min)  48 min  -MW  —     Total Timed Code Minutes- OT  48 minute(s)  -MW  —     OT Received On  09/09/19  -MW  —        Timed Charges    66505 - OT Therapeutic Exercise Minutes  —  15  -MW     67098 - OT Self Care/Mgmt Minutes  —  35  -MW       User Key  (r) = Recorded By, (t) = Taken By, (c) = Cosigned By    Initials Name Provider Type    Elisa Nolan OTR/L Occupational Therapist        Therapy Charges for Today     Code Description Service Date Service Provider Modifiers Qty    36163890944  OT THER PROC EA 15 MIN 9/9/2019 Elisa Chung OTR/L GO 1    [id number] HC OT SELF CARE/MGMT/TRAIN EA 15 MIN 9/9/2019 Elisa Chung OTR/L GO 2               ISMAEL Bueno  9/9/2019    Electronically signed by Elisa Chung OTR/L at 9/9/2019  5:11 PM     Elisa Chung OTR/L at 9/9/2019  5:08 PM          Problem: Patient  Care Overview  Goal: Plan of Care Review  Outcome: Ongoing (interventions implemented as appropriate)   09/09/19 1704   Coping/Psychosocial   Plan of Care Reviewed With patient   Plan of Care Review   Progress improving   OTHER   Outcome Summary OT txt completed. Pt up in chair. Repositioned to straighten up, propped with pillow. AAROM/PROM 2x10 reps performed RUE. Pt becomes emotional with exercises feeling defeated with progress. Education provided regarding benefit of functional use/exercise of RUE to regain function, verbalized understanding and motivated to perform ADL. W Sugar functionally stabilizes and dispenses shaving cream w R hand with tenodesis technique, applies and shaves with LUE. ModA provided for task completion and thoroughness. Washes hair with shower cap with Sugar. Pt demos fatigue following txt but states feeling much better. Cont OT POC. REcommend d/c to SNF vs inpatient rehab.           Electronically signed by Elisa Chung OTR/L at 9/9/2019  5:09 PM       Respiratory Therapy Notes (last 24 hours) (Notes from 9/9/2019 11:19 AM through 9/10/2019 11:19 AM)     No notes of this type exist for this encounter.

## 2019-09-10 NOTE — THERAPY TREATMENT NOTE
Acute Care - Occupational Therapy Treatment Note  [facility]     Patient Name: Phan Eastman  : 1955  MRN: [id number]  Today's Date: 9/10/2019  Onset of Illness/Injury or Date of Surgery: 19  Date of Referral to OT: 19  Referring Physician: Dr. Wyatt     Admit Date: 2019       ICD-10-CM ICD-9-CM   1. Oropharyngeal dysphagia R13.12 787.22   2. Impaired mobility and ADLs Z74.09 799.89   3. Impaired mobility Z74.09 799.89   4. Left renal mass N28.89 593.9     Patient Active Problem List   Diagnosis   • ICH (intracerebral hemorrhage) (CMS/HCC)   • Diabetes mellitus (CMS/HCC)   • Hypertensive urgency   • Hyperlipidemia   • Cerebrovascular accident (CVA) due to embolism of left middle cerebral artery (CMS/HCC)   • Dysarthria   • CKD (chronic kidney disease) stage 3, GFR 30-59 ml/min (CMS/HCC)   • Left renal mass   • Obesity (BMI 30-39.9)   • Diastolic CHF, chronic (CMS/HCC)   • Bradycardia     Past Medical History:   Diagnosis Date   • Diabetes mellitus (CMS/HCC)    • GERD (gastroesophageal reflux disease)    • Hyperlipidemia    • Hypertension    • Neuropathy    • Osteoarthritis    • TIA (transient ischemic attack)      Past Surgical History:   Procedure Laterality Date   • CARPAL TUNNEL RELEASE Bilateral    • EYE SURGERY     • KNEE SURGERY Bilateral    • NECK SURGERY     • RETINAL DETACHMENT SURGERY     • ROTATOR CUFF REPAIR         Therapy Treatment    Rehabilitation Treatment Summary     Row Name 09/10/19 0940 09/10/19 0824          Treatment Time/Intention    Discipline  physical therapy assistant  -KJ  occupational therapist  -MW     Document Type  therapy note (daily note)  -KJ2  therapy note (daily note)  -MW     Subjective Information  complains of;weakness  -KJ2  complains of;weakness;fatigue  -MW2     Mode of Treatment  physical therapy  -KJ2  individual therapy  -MW2     Patient/Family Observations  children present  -KJ2  son and daughter present  -MW2     Patient Effort   adequate  -KJ2  —     Comment  keep systolic pressure<140  -KJ2  —     Existing Precautions/Restrictions  fall  -KJ2  fall  -MW2     Treatment Considerations/Comments  Right side weakness  -KJ2  —     Recorded by [KJ] Debby Garcia, PTA 09/10/19 0940  [KJ2] Debby Garcia, PTA 09/10/19 1026 [MW] Elisa Chung, OTR/L 09/10/19 0825  [MW2] Elisa Chung, OTR/L 09/10/19 1010     Row Name 09/10/19 0824             Vital Signs    Pre Systolic BP Rehab  170 nrsg notified  -MW      Pre Treatment Diastolic BP  74  -MW      Pre Patient Position  Supine  -MW      Recorded by [MW] Elisa Chung OTR/L 09/10/19 1019      Row Name 09/10/19 0824             Cognitive Assessment/Intervention    Additional Documentation  Cognitive Assessment/Intervention (Group)  -MW      Recorded by [MW] Elisa Chung OTR/L 09/10/19 1019      Row Name 09/10/19 0824             Cognitive Assessment/Intervention- PT/OT    Personal Safety Interventions  fall prevention program maintained;muscle strengthening facilitated;nonskid shoes/slippers when out of bed;supervised activity  -MW      Recorded by [MW] Elisa Chung OTR/L 09/10/19 1019      Row Name 09/10/19 0940 09/10/19 0824          Bed Mobility Assessment/Treatment    Bed Mobility Assessment/Treatment  —  rolling left;rolling right;scooting/bridging;supine-sit;sit-supine  -MW     Rolling Left [place] (Bed Mobility)  —  maximum assist (25% patient effort);2 person assist  -MW     Rolling Right [place] (Bed Mobility)  —  maximum assist (25% patient effort);2 person assist  -MW     Scooting/Bridging [place] (Bed Mobility)  —  dependent (less than 25% patient effort);2 person assist  -MW     Supine-Sit [place] (Bed Mobility)  —  maximum assist (25% patient effort);dependent (less than 25% patient effort);2 person assist  -MW     Sit-Supine [place] (Bed Mobility)  —  maximum assist (25% patient effort);dependent (less than 25% patient effort);2 person  assist  -MW     Bed Mobility, Safety Issues  —  decreased use of arms for pushing/pulling;decreased use of legs for bridging/pushing;impaired trunk control for bed mobility  -MW     Assistive Device (Bed Mobility)  —  bed rails;draw sheet;head of bed elevated  -MW     Comment (Bed Mobility)  transferred to neuro chair   -KJ  unable to maintain sitting balance EOB >30 seconds without mod-maxA, returned to supine  -MW     Recorded by [KJ] Debby Garcia, PTA 09/10/19 1026 [MW] Elisa Chung OTR/L 09/10/19 1019     Row Name 09/10/19 0940             Therapeutic Exercise    Exercise Type (Therapeutic Exercise)  AAROM (active assistive range of motion)  -KJ      Position (Therapeutic Exercise)  seated  -KJ      Sets/Reps (Therapeutic Exercise)  2 sets of 10  -KJ      Recorded by [KJ] Debby Garcia, PTA 09/10/19 1026      Row Name 09/10/19 0824             Balance    Balance  static sitting balance  -MW      Recorded by [MW] Elisa Chung OTR/L 09/10/19 1019      Row Name 09/10/19 0824             Static Sitting Balance    Level of [place] (Unsupported Sitting, Static Balance)  maximal assist, 25 to 49% patient effort  -MW      Sitting Position (Unsupported Sitting, Static Balance)  sitting on edge of bed  -MW      Comment (Unsupported Sitting, Static Balance)  able to hold self up for 30 seconds CGA with vc for posture, maxA required d/t R and posterior lean   -MW      Recorded by [MW] Elisa Chung OTR/L 09/10/19 1019      Row Name 09/10/19 0940 09/10/19 0824          Positioning and Restraints    Pre-Treatment Position  in bed  -KJ  in bed  -MW     Post Treatment Position  chair  -KJ  bed  -MW     In Bed  —  fowlers;call light within reach;encouraged to call for assist;with family/caregiver;side rails up x2;SCD pump applied;heels elevated;RUE elevated  -MW     Recorded by [KJ] Debby Garcia, PTA 09/10/19 1026 [MW] Elisa Chung OTR/L 09/10/19 1019     Row Name 09/10/19 0824             Pain  Assessment    Additional Documentation  Pain Scale: Numbers Pre/Post-Treatment (Group)  -MW      Recorded by [MW] Elisa Chung, OTR/L 09/10/19 1019      Row Name 09/10/19 0940 09/10/19 0824          Pain Scale: Numbers Pre/Post-Treatment    Pain Scale: Numbers, Pretreatment  5/10  -KJ  0/10 - no pain  -MW     Pain Scale: Numbers, Post-Treatment  6/10  -KJ  0/10 - no pain  -MW     Pain Location - Side  Right  -KJ  —     Pain Location  flank  -KJ  —     Recorded by [KJ] Debby Garcia, PTA 09/10/19 1026 [MW] Elisa Chung, OTR/L 09/10/19 1019     Row Name 09/10/19 0824             Plan of Care Review    Plan of Care Reviewed With  patient;son  -MW      Recorded by [MW] Elisa Chung, OTR/L 09/10/19 1035      Row Name 09/10/19 0824             Outcome Summary/Treatment Plan (OT)    Daily Summary of Progress (OT)  progress toward functional goals is gradual  -MW      Anticipated Discharge Disposition (OT)  skilled nursing facility  -MW      Recorded by [MW] Elisa Chung, OTR/L 09/10/19 1035        User Key  (r) = Recorded By, (t) = Taken By, (c) = Cosigned By    Initials Name Effective Dates Discipline    Debby Thompson, PTA 08/02/16 -  PT    Elisa Nolan, OTR/L 08/28/18 -  OT             Occupational Therapy Education     Title: PT OT SLP Therapies (In Progress)     Topic: Occupational Therapy (Done)     Point: ADL training (Done)     Description: Instruct learner(s) on proper safety adaptation and remediation techniques during self care or transfers.   Instruct in proper use of assistive devices.    Learning Progress Summary           Patient Acceptance, E, VU,NR by FORTINO at 9/10/2019 10:23 AM    Comment:  limb protection with mobility/adl/supine, bed mobility, ADL    Acceptance, E, VU by FORTINO at 9/9/2019  5:09 PM    Comment:  mind muscle connection for neuroplasticity, ADL, OT POC    Acceptance, E, VU by RYAN at 9/6/2019 10:30 AM    Comment:  OT POC, adls, t/fs, bed mobility, importance of  positioning, neuromuscular reeducation techniques.    Acceptance, E,TB, VU,DU,NR by YUDI at 9/5/2019  2:50 PM    Comment:  Pt. takes washcloth presented to him by this orellana/l and with min. assist using Rue washes his face and hands, no issues with Lue rom with washcloth! Ue exs performed with Lue Ind'ly, min. assist with Rue!    Acceptance, E, NR by RYAN at 9/2/2019  8:35 AM    Comment:  OT POC, benefits and roles of OT, adls, t/fs, bed mobility, positioning for prevention of skin breakdown of joint protection.                   Point: Home exercise program (Done)     Description: Instruct learner(s) on appropriate technique for monitoring, assisting and/or progressing therapeutic exercises/activities.    Learning Progress Summary           Patient Acceptance, E, VU,NR by FORTINO at 9/10/2019 10:23 AM    Comment:  limb protection with mobility/adl/supine, bed mobility, ADL    Acceptance, E, VU by FORTINO at 9/9/2019  5:09 PM    Comment:  mind muscle connection for neuroplasticity, ADL, OT POC    Acceptance, E,TB, VU,DU,NR by YUDI at 9/5/2019  2:50 PM    Comment:  Pt. takes washcloth presented to him by this orellana/l and with min. assist using Rue washes his face and hands, no issues with Lue rom with washcloth! Ue exs performed with Lue Ind'ly, min. assist with Rue!                   Point: Precautions (Done)     Description: Instruct learner(s) on prescribed precautions during self-care and functional transfers.    Learning Progress Summary           Patient Acceptance, E, VU,NR by FORTINO at 9/10/2019 10:23 AM    Comment:  limb protection with mobility/adl/supine, bed mobility, ADL    Acceptance, E, VU by FORTINO at 9/9/2019  5:09 PM    Comment:  mind muscle connection for neuroplasticity, ADL, OT POC    Acceptance, E, VU by RYAN at 9/6/2019 10:30 AM    Comment:  OT POC, adls, t/fs, bed mobility, importance of positioning, neuromuscular reeducation techniques.    Acceptance, E,TB, VU,DU,NR by YUDI at 9/5/2019  2:50 PM    Comment:  Pt. takes  washcloth presented to him by this orellana/l and with min. assist using Rue washes his face and hands, no issues with Lue rom with washcloth! Ue exs performed with Lue Ind'ly, min. assist with Rue!    Acceptance, E, NR by RYAN at 9/2/2019  8:35 AM    Comment:  OT POC, benefits and roles of OT, adls, t/fs, bed mobility, positioning for prevention of skin breakdown of joint protection.                   Point: Body mechanics (Done)     Description: Instruct learner(s) on proper positioning and spine alignment during self-care, functional mobility activities and/or exercises.    Learning Progress Summary           Patient Acceptance, E, VU,NR by FORTINO at 9/10/2019 10:23 AM    Comment:  limb protection with mobility/adl/supine, bed mobility, ADL    Acceptance, E, VU by FORTINO at 9/9/2019  5:09 PM    Comment:  mind muscle connection for neuroplasticity, ADL, OT POC    Acceptance, E,TB, VU,DU,NR by YUDI at 9/5/2019  2:50 PM    Comment:  Pt. takes washcloth presented to him by this orellana/l and with min. assist using Rue washes his face and hands, no issues with Lue rom with washcloth! Ue exs performed with Lue Ind'ly, min. assist with Rue!                               User Key     Initials Effective Dates Name Provider Type Discipline     08/02/16 -  Emmanuel Osborne ORELLANA/L Occupational Therapy Assistant OT    FORTINO 08/28/18 -  Elisa Chung OTR/L Occupational Therapist OT    RYAN 10/12/18 -  Aster Suggs OTR/L Occupational Therapist OT                OT Recommendation and Plan  Outcome Summary/Treatment Plan (OT)  Daily Summary of Progress (OT): progress toward functional goals is gradual  Anticipated Discharge Disposition (OT): skilled nursing facility  Daily Summary of Progress (OT): progress toward functional goals is gradual  Plan of Care Review  Plan of Care Reviewed With: patient  Plan of Care Reviewed With: patient  Outcome Summary: OT txt completed. Pt reports weakness and fatigue greater than yesterday but wanting  to sit up. Max-depx2 to come to EOB. Pt able to maintain sitting balance about 30 sec with max vc/tc for posture, otherwise requiring maxA for R and posterior lean. Pt rolled multiple times for linen and brief change following incontinence, maxAx2 for rolling. Education provided for limb protection with mobility, ADL, and laying in bed. Pt and son verbalize understanding and need for RUE support. Cont OT pOC. Recommend d/c to SNF.  Outcome Measures     Row Name 09/10/19 1000 09/09/19 1700          How much help from another is currently needed...    Putting on and taking off regular lower body clothing?  1  -MW  1  -MW     Bathing (including washing, rinsing, and drying)  2  -MW  2  -MW     Toileting (which includes using toilet bed pan or urinal)  1  -MW  1  -MW     Putting on and taking off regular upper body clothing  2  -MW  2  -MW     Taking care of personal grooming (such as brushing teeth)  2  -MW  2  -MW     Eating meals  2  -MW  2  -MW     AM-PAC 6 Clicks Score (OT)  10  -MW  10  -MW        Functional Assessment    Outcome Measure Options  AM-PAC 6 Clicks Daily Activity (OT)  -MW  AM-PAC 6 Clicks Daily Activity (OT)  -MW       User Key  (r) = Recorded By, (t) = Taken By, (c) = Cosigned By    Initials Name Provider Type    Elisa Nolan, OTR/L Occupational Therapist           Time Calculation:   Time Calculation- OT     Row Name 09/10/19 1023             Time Calculation- OT    OT Start Time  0808  -MW      OT Stop Time  0907  -MW      OT Time Calculation (min)  59 min  -MW      Total Timed Code Minutes- OT  59 minute(s)  -MW      OT Received On  09/10/19  -MW        User Key  (r) = Recorded By, (t) = Taken By, (c) = Cosigned By    Initials Name Provider Type    Elisa Nolan, OTR/L Occupational Therapist        Therapy Charges for Today     Code Description Service Date Service Provider Modifiers Qty    57081343480  OT THER PROC EA 15 MIN 9/9/2019 Elisa Chung OTR/L GO 1    [id number]   OT SELF CARE/MGMT/TRAIN EA 15 MIN 9/9/2019 Elisa Chung OTR/L GO 2    06963068434 HC OT THERAPEUTIC ACT EA 15 MIN 9/10/2019 Elisa Chung OTR/L GO 3    [id number] HC OT SELF CARE/MGMT/TRAIN EA 15 MIN 9/10/2019 Elisa Chung OTR/L GO 1               VILLA Bueno/JIMENA  9/10/2019

## 2019-09-10 NOTE — THERAPY TREATMENT NOTE
Acute Care - Speech Language Pathology   Swallow Treatment Note [facility]     Patient Name: Phan Eastman  : 1955  MRN: [id number]  Today's Date: 9/10/2019  Onset of Illness/Injury or Date of Surgery: 19     Referring Physician: Dr. Wyatt       Admit Date: 2019  Swallow tx completed. Patient is alert and cooperative with several family members present. He completed several trials of mechanical soft and thin liquids from lunch tray. No overt s/s of aspiration are observed with thin liquids. Functional rotary chew; however, slow rate is observed. RN reports no concern with toleration and no increased congestion in lung sounds. OK to continue a mechanical soft diet with thin liquids. Meds in apple sauce. SLP will continue to follow.   Krystal Babcock MS CCC-SLP 9/10/2019 12:56 PM    Visit Dx:      ICD-10-CM ICD-9-CM   1. Oropharyngeal dysphagia R13.12 787.22   2. Impaired mobility and ADLs Z74.09 799.89   3. Impaired mobility Z74.09 799.89   4. Left renal mass N28.89 593.9     Patient Active Problem List   Diagnosis   • ICH (intracerebral hemorrhage) (CMS/HCC)   • Diabetes mellitus (CMS/HCC)   • Hypertensive urgency   • Hyperlipidemia   • Cerebrovascular accident (CVA) due to embolism of left middle cerebral artery (CMS/HCC)   • Dysarthria   • CKD (chronic kidney disease) stage 3, GFR 30-59 ml/min (CMS/HCC)   • Left renal mass   • Obesity (BMI 30-39.9)   • Diastolic CHF, chronic (CMS/HCC)   • Bradycardia   • Constipation       Therapy Treatment  Rehabilitation Treatment Summary     Row Name 09/10/19 1229 09/10/19 0940 09/10/19 0824       Treatment Time/Intention    Discipline  speech language pathologist  -MM  physical therapy assistant  -KJ  occupational therapist  -MW    Document Type  therapy note (daily note)  -MM  therapy note (daily note)  -KJ2  therapy note (daily note)  -MW    Subjective Information  no complaints  -MM  complains of;weakness  -KJ2  complains of;weakness;fatigue   -MW2    Mode of Treatment  individual therapy;speech-language pathology  -MM  physical therapy  -KJ2  individual therapy  -MW2    Patient/Family Observations  Family present.   -MM  children present  -KJ2  son and daughter present  -MW2    Care Plan Review  care plan/treatment goals reviewed  -MM  —  —    Therapy Frequency (Swallow)  at least;3 days per week  -MM  —  —    Patient Effort  good  -MM  adequate  -KJ2  —    Comment  —  keep systolic pressure<140  -KJ2  —    Existing Precautions/Restrictions  —  fall  -KJ2  fall  -MW2    Treatment Considerations/Comments  —  Right side weakness  -KJ2  —    Recorded by [MM] Krystal Babcock, MS CCC-SLP 09/10/19 1249 [KJ] Debby Garcia, PTA 09/10/19 0940  [KJ2] Debby Garcia, PTA 09/10/19 1026 [MW] Elisa Chnug, OTR/L 09/10/19 0825  [MW2] Elisa Chung, OTR/L 09/10/19 1010    Row Name 09/10/19 0824             Vital Signs    Pre Systolic BP Rehab  170 nrsg notified  -MW      Pre Treatment Diastolic BP  74  -MW      Pre Patient Position  Supine  -MW      Recorded by [MW] Elisa Chung, OTR/L 09/10/19 1019      Row Name 09/10/19 0824             Cognitive Assessment/Intervention    Additional Documentation  Cognitive Assessment/Intervention (Group)  -MW      Recorded by [MW] Elisa Chung, OTR/L 09/10/19 1019      Row Name 09/10/19 0824             Cognitive Assessment/Intervention- PT/OT    Personal Safety Interventions  fall prevention program maintained;muscle strengthening facilitated;nonskid shoes/slippers when out of bed;supervised activity  -MW      Recorded by [MW] Elisa Chung, OTR/L 09/10/19 1019      Row Name 09/10/19 0940 09/10/19 0824          Bed Mobility Assessment/Treatment    Bed Mobility Assessment/Treatment  —  rolling left;rolling right;scooting/bridging;supine-sit;sit-supine  -MW     Rolling Left [place] (Bed Mobility)  —  maximum assist (25% patient effort);2 person assist  -MW     Rolling Right [place] (Bed Mobility)  —   maximum assist (25% patient effort);2 person assist  -MW     Scooting/Bridging [place] (Bed Mobility)  —  dependent (less than 25% patient effort);2 person assist  -MW     Supine-Sit [place] (Bed Mobility)  —  maximum assist (25% patient effort);dependent (less than 25% patient effort);2 person assist  -MW     Sit-Supine [place] (Bed Mobility)  —  maximum assist (25% patient effort);dependent (less than 25% patient effort);2 person assist  -MW     Bed Mobility, Safety Issues  —  decreased use of arms for pushing/pulling;decreased use of legs for bridging/pushing;impaired trunk control for bed mobility  -MW     Assistive Device (Bed Mobility)  —  bed rails;draw sheet;head of bed elevated  -MW     Comment (Bed Mobility)  transferred to neuro chair   -KJ  unable to maintain sitting balance EOB >30 seconds without mod-maxA, returned to supine  -MW     Recorded by [KJ] Debby Garcia, PTA 09/10/19 1026 [MW] Elisa Chung OTR/L 09/10/19 1019     Row Name 09/10/19 0940             Therapeutic Exercise    Exercise Type (Therapeutic Exercise)  AAROM (active assistive range of motion)  -KJ      Position (Therapeutic Exercise)  seated  -KJ      Sets/Reps (Therapeutic Exercise)  2 sets of 10  -KJ      Recorded by [KJ] Debby Garcia, PTA 09/10/19 1026      Row Name 09/10/19 0824             Balance    Balance  static sitting balance  -MW      Recorded by [MW] Elisa Chung OTR/L 09/10/19 1019      Row Name 09/10/19 0824             Static Sitting Balance    Level of [place] (Unsupported Sitting, Static Balance)  maximal assist, 25 to 49% patient effort  -MW      Sitting Position (Unsupported Sitting, Static Balance)  sitting on edge of bed  -MW      Comment (Unsupported Sitting, Static Balance)  able to hold self up for 30 seconds CGA with vc for posture, maxA required d/t R and posterior lean   -MW      Recorded by [MW] Elisa Chung OTR/L 09/10/19 1019      Row Name 09/10/19 0940 09/10/19  0824          Positioning and Restraints    Pre-Treatment Position  in bed  -KJ  in bed  -MW     Post Treatment Position  chair  -KJ  bed  -MW     In Bed  —  fowlers;call light within reach;encouraged to call for assist;with family/caregiver;side rails up x2;SCD pump applied;heels elevated;RUE elevated  -MW     Recorded by [KJ] Debby Garcia, PTA 09/10/19 1026 [MW] Elisa Chung OTR/L 09/10/19 1019     Row Name 09/10/19 1229 09/10/19 0824          Pain Assessment    Additional Documentation  Pain Scale: FACES Pre/Post-Treatment (Group)  -MM  Pain Scale: Numbers Pre/Post-Treatment (Group)  -MW     Recorded by [MM] Krystal Babcock MS CCC-SLP 09/10/19 1249 [MW] Elisa Chung OTR/L 09/10/19 1019     Row Name 09/10/19 0940 09/10/19 0824          Pain Scale: Numbers Pre/Post-Treatment    Pain Scale: Numbers, Pretreatment  5/10  -KJ  0/10 - no pain  -MW     Pain Scale: Numbers, Post-Treatment  6/10  -KJ  0/10 - no pain  -MW     Pain Location - Side  Right  -KJ  —     Pain Location  flank  -KJ  —     Recorded by [KJ] Debby Garcia, PTA 09/10/19 1026 [MW] Elisa Chung OTR/L 09/10/19 1019     Row Name 09/10/19 1229             Pain Scale: FACES Pre/Post-Treatment    Pain: FACES Scale, Pretreatment  0-->no hurt  -MM      Pain: FACES Scale, Post-Treatment  0-->no hurt  -MM      Recorded by [MM] Krystal Babcock MS CCC-SLP 09/10/19 1249      Row Name 09/10/19 0824             Plan of Care Review    Plan of Care Reviewed With  patient;son  -MW      Recorded by [MW] Elisa Chung OTR/L 09/10/19 1035      Row Name 09/10/19 0824             Outcome Summary/Treatment Plan (OT)    Daily Summary of Progress (OT)  progress toward functional goals is gradual  -MW      Anticipated Discharge Disposition (OT)  skilled nursing facility  -MW      Recorded by [MW] Elisa Chung OTR/L 09/10/19 1035      Row Name 09/10/19 1229             Outcome Summary/Treatment Plan (SLP)    Daily Summary of Progress (SLP)   progress toward functional goals as expected  -MM      Barriers to Overall Progress (SLP)  n/a  -MM      Plan for Continued Treatment (SLP)  Continue to follow.  -MM      Anticipated Dischage Disposition  unknown  -MM      Recorded by [MM] Krystal Babcock, MS CCC-SLP 09/10/19 1249        User Key  (r) = Recorded By, (t) = Taken By, (c) = Cosigned By    Initials Name Effective Dates Discipline    KJ Debby Garcia, PTA 08/02/16 -  PT    MW Elisa Chung, OTR/L 08/28/18 -  OT    MM Krystal Babcock, MS CCC-SLP 05/24/19 -  SLP          Outcome Summary  Outcome Summary/Treatment Plan (SLP)  Daily Summary of Progress (SLP): progress toward functional goals as expected (09/10/19 1229 : Krystal Babcock, MS CCC-SLP)  Barriers to Overall Progress (SLP): n/a (09/10/19 1229 : Krystal Babcock, MS CCC-SLP)  Plan for Continued Treatment (SLP): Continue to follow. (09/10/19 1229 : Krystal Babcock, MS CCC-SLP)  Anticipated Dischage Disposition: unknown (09/10/19 1229 : Krystal Babcock, [facility])      SLP GOALS     Row Name 09/10/19 1229 09/09/19 1413          Oral Nutrition/Hydration Goal 1 (SLP)    Oral Nutrition/Hydration Goal 1, SLP  LTG: Patient will tolerate LRD with no overt s/s of aspiration.  -MM  LTG: Patient will tolerate LRD with no overt s/s of aspiration.  -MM     Time Frame (Oral Nutrition/Hydration Goal 1, SLP)  by discharge  -MM  by discharge  -MM     Barriers (Oral Nutrition/Hydration Goal 1, SLP)  n/a  -MM  n/a  -MM     Progress/Outcomes (Oral Nutrition/Hydration Goal 1, SLP)  continuing progress toward goal  -MM  continuing progress toward goal  -MM        Labial Strengthening Goal 1 (SLP)    Activity (Labial Strengthening Goal 1, SLP)  increase labial tone  -MM  increase labial tone  -MM     Increase Labial Tone  labial resistance exercises  -MM  labial resistance exercises  -MM     [place]/Accuracy (Labial Strengthening Goal 1, SLP)  independently (over 90% accuracy)  -MM   independently (over 90% accuracy)  -MM     Time Frame (Labial Strengthening Goal 1, SLP)  short term goal (STG);by discharge  -MM  short term goal (STG);by discharge  -MM     Barriers (Labial Strengthening Goal 1, SLP)  n/a  -MM  n/a  -MM     Progress/Outcomes (Labial Strengthening Goal 1, SLP)  continuing progress toward goal  -MM  continuing progress toward goal  -MM        Lingual Strengthening Goal 1 (SLP)    Activity (Lingual Strengthening Goal 1, SLP)  increase lingual tone/sensation/control/coordination/movement  -MM  increase lingual tone/sensation/control/coordination/movement  -MM     Increase Lingual Tone/Sensation/Control/Coordination/Movement  lingual movement exercises  -MM  lingual movement exercises  -MM     [place]/Accuracy (Lingual Strengthening Goal 1, SLP)  independently (over 90% accuracy)  -MM  independently (over 90% accuracy)  -MM     Time Frame (Lingual Strengthening Goal 1, SLP)  by discharge  -MM  by discharge  -MM     Barriers (Lingual Strengthening Goal 1, SLP)  n/a  -MM  n/a  -MM     Progress/Outcomes (Lingual Strengthening Goal 1, SLP)  continuing progress toward goal  -MM  continuing progress toward goal  -MM        Pharyngeal Strengthening Exercise Goal 1 (SLP)    Activity (Pharyngeal Strengthening Goal 1, SLP)  increase epiglottic inversion and retroflexion;increase tongue base retraction;increase pharyngeal sensation  -MM  increase epiglottic inversion and retroflexion;increase tongue base retraction;increase pharyngeal sensation  -MM     Increase Pharyngeal Sensation  gustatory stimulation (sour/cold)  -MM  gustatory stimulation (sour/cold)  -MM     Increase Epiglottic Inversion and Retroflexion  Mendelsohn;falsetto  -MM  Mendelsohn;falsetto  -MM     Increase Tongue Base Retraction  hard effortful swallow;kaleb  -MM  hard effortful swallow;kaleb  -MM     [place]/Accuracy (Pharyngeal Strengthening Goal 1, SLP)  independently (over 90% accuracy)  -MM  independently (over  90% accuracy)  -MM     Time Frame (Pharyngeal Strengthening Goal 1, SLP)  short term goal (STG);by discharge  -MM  short term goal (STG);by discharge  -MM     Barriers (Pharyngeal Strengthening Goal 1, SLP)  n/a  -MM  n/a  -MM     Progress/Outcomes (Pharyngeal Strengthening Goal 1, SLP)  goal ongoing  -MM  goal ongoing  -MM       User Key  (r) = Recorded By, (t) = Taken By, (c) = Cosigned By    Initials Name Provider Type    Krystal Monroy MS CCC-RAVEN Speech and Language Pathologist          EDUCATION  The patient has been educated in the following areas:   Dysphagia (Swallowing Impairment) Oral Care/Hydration.    SLP Recommendation and Plan  Daily Summary of Progress (SLP): progress toward functional goals as expected  Barriers to Overall Progress (SLP): n/a  Plan for Continued Treatment (SLP): Continue to follow.  Anticipated Dischage Disposition: unknown                    Time Calculation:   Time Calculation- SLP     Row Name 09/10/19 1255             Time Calculation- SLP    SLP Start Time  1229  -MM      SLP Stop Time  1252  -MM      SLP Time Calculation (min)  23 min  -MM      SLP Received On  09/10/19  -MM        User Key  (r) = Recorded By, (t) = Taken By, (c) = Cosigned By    Initials Name Provider Type    Krystal Monroy MS CCC-SLP Speech and Language Pathologist          Therapy Charges for Today     Code Description Service Date Service Provider Modifiers Qty    38873589332 HC ST TREATMENT SWALLOW 1 9/9/2019 Krystal Babcock MS CCC-SLP GN 1    [id number] HC ST TREATMENT SWALLOW 2 9/10/2019 Krystal Babcock MS CCC-SLP GN 1                 MS NESTOR Vargas  9/10/2019

## 2019-09-11 PROBLEM — R31.9 HEMATURIA: Status: ACTIVE | Noted: 2019-01-01

## 2019-09-11 NOTE — PLAN OF CARE
Problem: Patient Care Overview  Goal: Plan of Care Review  Outcome: Ongoing (interventions implemented as appropriate)   09/11/19 0494   Coping/Psychosocial   Plan of Care Reviewed With patient   Plan of Care Review   Progress no change   OTHER   Outcome Summary Pt A&O X3. Turning. Frequently yells, instructed on how to use call light. PPP. [facility] 7. Will continue to monitor.

## 2019-09-11 NOTE — THERAPY TREATMENT NOTE
Acute Care - Occupational Therapy Treatment Note  [facility]     Patient Name: Phan Eastman  : 1955  MRN: [id number]  Today's Date: 2019  Onset of Illness/Injury or Date of Surgery: 19  Date of Referral to OT: 19  Referring Physician: Dr. Wyatt     Admit Date: 2019       ICD-10-CM ICD-9-CM   1. Oropharyngeal dysphagia R13.12 787.22   2. Impaired mobility and ADLs Z74.09 799.89   3. Impaired mobility Z74.09 799.89   4. Left renal mass N28.89 593.9     Patient Active Problem List   Diagnosis   • ICH (intracerebral hemorrhage) (CMS/HCC)   • Diabetes mellitus (CMS/HCC)   • Hypertensive urgency   • Hyperlipidemia   • Cerebrovascular accident (CVA) due to embolism of left middle cerebral artery (CMS/HCC)   • Dysarthria   • CKD (chronic kidney disease) stage 3, GFR 30-59 ml/min (CMS/HCC)   • Left renal mass   • Obesity (BMI 30-39.9)   • Diastolic CHF, chronic (CMS/HCC)   • Bradycardia   • Constipation   • Hematuria     Past Medical History:   Diagnosis Date   • Diabetes mellitus (CMS/HCC)    • GERD (gastroesophageal reflux disease)    • Hyperlipidemia    • Hypertension    • Neuropathy    • Osteoarthritis    • TIA (transient ischemic attack)      Past Surgical History:   Procedure Laterality Date   • CARPAL TUNNEL RELEASE Bilateral    • EYE SURGERY     • KNEE SURGERY Bilateral    • NECK SURGERY     • RETINAL DETACHMENT SURGERY     • ROTATOR CUFF REPAIR         Therapy Treatment    Rehabilitation Treatment Summary     Row Name 19 1613 19 1430 19 0910       Treatment Time/Intention    Discipline  occupational therapist  -AB FORTINO,KATELYN  speech language pathologist  -MM  physical therapy assistant  -KJ    Document Type  therapy note (daily note)  -AB FREITAS MW2  therapy note (daily note)  -MM  therapy note (daily note)  -KJ    Subjective Information  —  complains of;pain upon sitting upright   -MM  complains of;pain  -KJ    Mode of Treatment  occupational therapy;co-treatment   -FORTINO,AB,MW2  individual therapy;speech-language pathology  -MM  physical therapy  -KJ    Patient/Family Observations  —  no family present   -MM  —    Care Plan Review  evaluation/treatment results reviewed;care plan/treatment goals reviewed;risks/benefits reviewed;patient/other agree to care plan;current/potential barriers reviewed  -FORTINO,AB,MW2  care plan/treatment goals reviewed  -MM  —    Therapy Frequency (OT Eval)  5 times/wk  -FORTINO,AB,MW2  —  —    Therapy Frequency (Swallow)  —  at least;3 days per week  -MM  —    Patient Effort  good  -FORTINO,AB,MW2  good  -MM  adequate  -KJ    Existing Precautions/Restrictions  —  —  fall  -KJ    Treatment Considerations/Comments  —  —  Right side weakness, increased pain everywhere this am  -KJ    Recorded by [FORTINO,AB,MW2] Elisa Chung OTR/L (r) Janet Cardona, OT Student (t) Elisa Chung, OTR/L (c) 09/11/19 1659 [MM] Krystal Babcock MS CCC-SLP 09/11/19 1455 [KJ] Debby Garcia, RAJAN 09/11/19 1028    Row Name 09/11/19 1613             Cognitive Assessment/Intervention    Additional Documentation  Cognitive Assessment/Intervention (Group)  -FORTINO,AB,MW2      Recorded by [FORTINO,AB,MW2] Elisa Chung OTR/L (r) Janet Cardona, OT Student (t) Elisa Chung, OTR/L (c) 09/11/19 1659      Row Name 09/11/19 1613             Cognitive Assessment/Intervention- PT/OT    Orientation Status (Cognition)  oriented to;place;person;situation  -MW,AB,MW2      Personal Safety Interventions  fall prevention program maintained;gait belt;nonskid shoes/slippers when out of bed;muscle strengthening facilitated;supervised activity  -MW,AB,MW2      Recorded by [FORTINO,AB,MW2] Elisa Chung OTR/JIMENA (r) Janet Cardona, OT Student (t) Elisa Chung, OTR/L (c) 09/11/19 1659      Row Name 09/11/19 1613             Cognitive Assessment Intervention- SLP    Orientation Status (Cognition)  place;person;situation  -MW,AB,MW2      Recorded by [FORTINO,AB,MW2] Elisa Chung, OTR/JIMENA (r) Janet Cardona, OT  Student (t) Elisa Chung OTR/L (c) 09/11/19 1659      Row Name 09/11/19 1613             Safety Issues, Functional Mobility    Impairments Affecting Function (Mobility)  balance;endurance/activity tolerance;grasp;motor control;pain;strength;range of motion (ROM)  -MW,AB,MW2      Recorded by [MW,AB,MW2] Elisa Chung, OTR/L (r) Janet Cardona OT Student (t) Elisa Chung, OTR/L (c) 09/11/19 1659      Row Name 09/11/19 1613 09/11/19 0910          Bed Mobility Assessment/Treatment    Bed Mobility Assessment/Treatment  rolling right;rolling left;scooting/bridging;supine-sit;sit-supine  -MW,AB,MW2  —     Rolling Left [place] (Bed Mobility)  moderate assist (50% patient effort);maximum assist (25% patient effort);2 person assist  -MW,AB,MW2  —     Rolling Right [place] (Bed Mobility)  moderate assist (50% patient effort);2 person assist  -MW,AB,MW2  —     Scooting/Bridging [place] (Bed Mobility)  verbal cues;nonverbal cues (demo/gesture);contact guard;other (see comments) use of bed inverted and bed rails to pull  -MW,AB,MW2  —     Supine-Sit [place] (Bed Mobility)  moderate assist (50% patient effort);maximum assist (25% patient effort);2 person assist  -MW,AB,MW2  verbal cues;maximum assist (25% patient effort);2 person assist  -KJ     Sit-Supine [place] (Bed Mobility)  moderate assist (50% patient effort);maximum assist (25% patient effort);2 person assist  -MW,AB,MW2  maximum assist (25% patient effort);2 person assist;verbal cues  -KJ     Bed Mobility, Safety Issues  decreased use of arms for pushing/pulling;decreased use of legs for bridging/pushing;impaired trunk control for bed mobility  -MW,AB,MW2  decreased use of arms for pushing/pulling;decreased use of legs for bridging/pushing  -KJ     Assistive Device (Bed Mobility)  bed rails;head of bed elevated;draw sheet  -MW,AB,MW2  —     Comment (Bed Mobility)  Pt reported 6/10 pain hin L abdomen with movement. nrsg notified    -MW,AB,MW2  sit edge of bed x 15 minutes Mod/MaxA 1-2 for sitting balance  -KJ     Recorded by [MW,AB,MW2] Elisa Chung OTR/L (r) Janet Cardona, OT Student (t) Elisa Chung, OTR/L (c) 09/11/19 1659 [KJ] Debby Garcia, PTA 09/11/19 1028     Row Name 09/11/19 0910             Gait/Stairs Assessment/Training    Comment (Gait/Stairs)  not appropriate  -KJ      Recorded by [KJ] Debby Garcia, PTA 09/11/19 1028      Row Name 09/11/19 1613             ADL Assessment/Intervention    BADL Assessment/Intervention  grooming  -MW,AB,MW2      Recorded by [MW,AB,MW2] Elisa Chung OTR/L (r) Janet Cardona, OT Student (t) Elisa Chung, OTR/L (c) 09/11/19 1659      Row Name 09/11/19 1613             Grooming Assessment/Training    [place] Level (Grooming)  set up;supervision;verbal cues;nonverbal cues (demo/gesture);shave face  -MW,AB,MW2      Grooming Position  sitting up in bed  -MW,AB,MW2      Comment (Grooming)  Pt required [place] for holding tooth paste tube and taking off cap  -MW,AB,MW2      Recorded by [MW,AB,MW2] Elisa Chung, OTR/L (r) Janet Cardona, OT Student (t) Elisa Chung, OTR/L (c) 09/11/19 1659      Row Name 09/11/19 1613             BADL Safety/Performance    Impairments, BADL Safety/Performance  balance;endurance/activity tolerance;grasp/prehension;motor control;pain;range of motion;strength;trunk/postural control  -MW,AB,MW2      Recorded by [MW,AB,MW2] Elisa Chung OTR/L (r) Janet Cardona OT Student (t) Elias Chung, OTR/L (c) 09/11/19 1659      Row Name 09/11/19 1613             Motor Skills Assessment/Interventions    Additional Documentation  Balance (Group)  -MW,AB,MW2      Recorded by [MW,AB,MW2] Elisa Chung, OTR/L (r) Janet Cardona OT Student (t) Elisa Chung, VILLA/L (c) 09/11/19 1659      Row Name 09/11/19 0910             Therapeutic Exercise    Exercise Type (Therapeutic Exercise)  AROM (active range of motion) LAQ's actively x 15  -KJ       Position (Therapeutic Exercise)  seated  -KJ      Sets/Reps (Therapeutic Exercise)  15  -KJ      Comment (Therapeutic Exercise)  reaching all planes, static postural correction   -KJ      Recorded by [KJ] Debby Garcia, PTA 09/11/19 1028      Row Name 09/11/19 1613             Balance    Balance  static sitting balance  -MW,AB,MW2      Recorded by [MW,AB,MW2] Elisa Chung, OTR/L (r) Janet Cardona, OT Student (t) Elisa Chung, OTR/L (c) 09/11/19 1659      Row Name 09/11/19 1613 09/11/19 0910          Static Sitting Balance    Level of [place] (Unsupported Sitting, Static Balance)  —  maximal assist, 25 to 49% patient effort  -KJ     Sitting Position (Unsupported Sitting, Static Balance)  —  sitting on edge of bed  -KJ     Time Able to Maintain Position (Unsupported Sitting, Static Balance)  —  more than 5 minutes  -KJ     Comment (Unsupported Sitting, Static Balance)  —  sitting balance max assist, unable to sit without external support  -KJ     Level of [place] (Supported Sitting, Static Balance)  maximal assist, 25 to 49% patient effort;dependent, less than 25% patient effort;other (see comments);2 person assist level of assit decreased throughout session  -MW,AB,MW2  —     Sitting Position (Supported Sitting, Static Balance)  sitting on edge of bed  -MW,AB,MW2  —     Comment (Supported Sitting, Static Balance)  Pt initially required max/dependent A, however throughout session and with use of verbal and tactile cueing required level of assist decreased to CGA/Evan  -MW,AB,MW2  —     Recorded by [MW,AB,MW2] Elisa Chung, OTR/L (r) Janet Cardona, OT Student (t) Elisa Chung, OTR/L (c) 09/11/19 1659 [KJ] Debby Garcia, PTA 09/11/19 1028     Row Name 09/11/19 1613 09/11/19 0910          Positioning and Restraints    Pre-Treatment Position  in bed  -MW,AB,MW2  in bed  -KJ     Post Treatment Position  bed  -MW,AB,MW2  bed  -KJ     In Bed  notified nsg;side lying right;encouraged to call  for assist;call light within reach;side rails up x3;LUE elevated;heels elevated  -MW,AB,MW2  —     Recorded by [MW,AB,MW2] Elisa Chung OTR/JIMENA (r) Janet Cardona, PRATEEK Student (t) Elisa Chung OTR/L (c) 09/11/19 1659 [KJ] Debby Garcia, PTA 09/11/19 1028     Row Name 09/11/19 1613 09/11/19 1430          Pain Assessment    Additional Documentation  Pain Scale: Numbers Pre/Post-Treatment (Group)  -MW,AB,MW2  Pain Scale: FACES Pre/Post-Treatment (Group)  -MM     Recorded by [MW,AB,MW2] Elisa Chung OTR/JIMENA (r) Janet Cardona, PRATEEK Student (t) Elisa Chung OTR/L (c) 09/11/19 1659 [MM] Krystal Babcock MS CCC-SLP 09/11/19 1455     Row Name 09/11/19 1613 09/11/19 0910          Pain Scale: Numbers Pre/Post-Treatment    Pain Scale: Numbers, Pretreatment  0/10 - no pain  -MW,AB,MW2  8/10  -KJ     Pain Scale: Numbers, Post-Treatment  7/10  -MW,AB,MW2  8/10  -KJ     Pain Location - Side  Left  -MW,AB,MW2  —     Pain Location  abdomen  -MW,AB,MW2  back  -KJ     Pre/Post Treatment Pain Comment  Pt verbalized pain in L abdomen with movement in bed and to EOB. Nrsg notified of pain.  -MW,AB,MW2  —     Pain Intervention(s)  Medication (See MAR);Repositioned;Ambulation/increased activity  -MW,AB,MW2  —     Recorded by [MW,AB,MW2] Elisa Chung OTR/JIMENA (r) Janet Cardona, PRATEEK Student (t) Elisa Chung OTR/L (c) 09/11/19 1659 [KJ] Debby Garcia, PTA 09/11/19 1028     Row Name 09/11/19 1430             Pain Scale: FACES Pre/Post-Treatment    Pain: FACES Scale, Pretreatment  0-->no hurt  -MM      Pain: FACES Scale, Post-Treatment  0-->no hurt  -MM      Pre/Post Treatment Pain Comment  ribs  -MM      Recorded by [MM] Krystal Babcock, MS CCC-SLP 09/11/19 7311      Row Name 09/11/19 4779             Plan of Care Review    Plan of Care Reviewed With  patient;other (see comments) GUSTAVO Dawn   -MW,AB,MW2      Recorded by [MW,AB,MW2] Elisa Chung, OTR/L (r) Janet Cardona OT Student (t) Elisa Chung,  OTR/L (c) 09/11/19 1659      Row Name 09/11/19 1613             Outcome Summary/Treatment Plan (OT)    Daily Summary of Progress (OT)  progress toward functional goals is good  -MW,AB,MW2      Plan for Continued Treatment (OT)  follow OT POC  -MW,AB,MW2      Anticipated Discharge Disposition (OT)  skilled nursing facility;inpatient rehabilitation facility  -MW,AB,MW2      Recorded by [FORTINO,AB,MW2] Elisa Chung, OTR/L (r) Jnaet Cardona OT Student (t) Elisa Chugn, OTR/L (c) 09/11/19 1659      Row Name 09/11/19 1430             Outcome Summary/Treatment Plan (SLP)    Daily Summary of Progress (SLP)  progress toward functional goals as expected  -MM      Barriers to Overall Progress (SLP)  n/a  -MM      Plan for Continued Treatment (SLP)  Continue to follow   -MM      Anticipated Dischage Disposition  unknown  -MM      Recorded by [MM] Krystal Babcock MS CCC-SLP 09/11/19 9528        User Key  (r) = Recorded By, (t) = Taken By, (c) = Cosigned By    Initials Name Effective Dates Discipline    KJ Debby Garcia, PTA 08/02/16 -  PT    Elisa Nolan, OTR/L 08/28/18 -  OT    MM Krystal Babcock, MS CCC-SLP 05/24/19 -  SLP    AB Janet Cardona OT Student 07/25/19 -  OT           Rehab Goal Summary     Row Name 09/11/19 1430             Swallow Goals (SLP)    Oral Nutrition/Hydration Goal Selection (SLP)  oral nutrition/hydration, SLP goal 1  -MM      Labial Strengthening Goal Selection (SLP)  labial strengthening, SLP goal 1  -MM      Lingual Strengthening Goal Selection (SLP)  lingual strengthening, SLP goal 1  -MM      Pharyngeal Strengthening Exercise Goal Selection (SLP)  pharyngeal strengthening exercise, SLP goal 1  -MM      Additional Documentation  lingual strengthening goal selection (SLP)  -MM         Oral Nutrition/Hydration Goal 1 (SLP)    Oral Nutrition/Hydration Goal 1, SLP  LTG: Patient will tolerate LRD with no overt s/s of aspiration.  -MM      Time Frame (Oral Nutrition/Hydration  Goal 1, SLP)  by discharge  -MM      Barriers (Oral Nutrition/Hydration Goal 1, SLP)  n/a  -MM      Progress/Outcomes (Oral Nutrition/Hydration Goal 1, SLP)  continuing progress toward goal  -MM         Labial Strengthening Goal 1 (SLP)    Activity (Labial Strengthening Goal 1, SLP)  increase labial tone  -MM      Increase Labial Tone  labial resistance exercises  -MM      [place]/Accuracy (Labial Strengthening Goal 1, SLP)  independently (over 90% accuracy)  -MM      Time Frame (Labial Strengthening Goal 1, SLP)  short term goal (STG);by discharge  -MM      Barriers (Labial Strengthening Goal 1, SLP)  n/a  -MM      Progress/Outcomes (Labial Strengthening Goal 1, SLP)  continuing progress toward goal  -MM         Lingual Strengthening Goal 1 (SLP)    Activity (Lingual Strengthening Goal 1, SLP)  increase lingual tone/sensation/control/coordination/movement  -MM      Increase Lingual Tone/Sensation/Control/Coordination/Movement  lingual movement exercises  -MM      [place]/Accuracy (Lingual Strengthening Goal 1, SLP)  independently (over 90% accuracy)  -MM      Time Frame (Lingual Strengthening Goal 1, SLP)  by discharge  -MM      Barriers (Lingual Strengthening Goal 1, SLP)  n/a  -MM      Progress/Outcomes (Lingual Strengthening Goal 1, SLP)  continuing progress toward goal  -MM         Pharyngeal Strengthening Exercise Goal 1 (SLP)    Activity (Pharyngeal Strengthening Goal 1, SLP)  increase epiglottic inversion and retroflexion;increase tongue base retraction;increase pharyngeal sensation  -MM      Increase Pharyngeal Sensation  gustatory stimulation (sour/cold)  -MM      Increase Epiglottic Inversion and Retroflexion  Mendelsohn;falsetto  -MM      Increase Tongue Base Retraction  hard effortful swallow;kaleb  -MM      [place]/Accuracy (Pharyngeal Strengthening Goal 1, SLP)  independently (over 90% accuracy)  -MM      Time Frame (Pharyngeal Strengthening Goal 1, SLP)  short term goal (STG);by  discharge  -MM      Barriers (Pharyngeal Strengthening Goal 1, SLP)  n/a  -MM      Progress/Outcomes (Pharyngeal Strengthening Goal 1, SLP)  continuing progress toward goal  -MM        User Key  (r) = Recorded By, (t) = Taken By, (c) = Cosigned By    Initials Name Provider Type Discipline    Krystal Monroy MS CCC-SLP Speech and Language Pathologist SLP        Occupational Therapy Education     Title: PT OT SLP Therapies (In Progress)     Topic: Occupational Therapy (Done)     Point: ADL training (Done)     Description: Instruct learner(s) on proper safety adaptation and remediation techniques during self care or transfers.   Instruct in proper use of assistive devices.    Learning Progress Summary           Patient Acceptance, E, VU by AB at 9/11/2019  4:38 PM    Comment:  OT POC, benefit of activity, ADL, safety of R limb, bed mobility, pain relief techniques, not to hold breath    Acceptance, E, VU,NR by FORTINO at 9/10/2019 10:23 AM    Comment:  limb protection with mobility/adl/supine, bed mobility, ADL    Acceptance, E, VU by FORTINO at 9/9/2019  5:09 PM    Comment:  mind muscle connection for neuroplasticity, ADL, OT POC    Acceptance, E, VU by RYAN at 9/6/2019 10:30 AM    Comment:  OT POC, adls, t/fs, bed mobility, importance of positioning, neuromuscular reeducation techniques.    Acceptance, E,TB, VU,DU,NR by YUDI at 9/5/2019  2:50 PM    Comment:  Pt. takes washcloth presented to him by this orellana/l and with min. assist using Rue washes his face and hands, no issues with Lue rom with washcloth! Ue exs performed with Lue Ind'ly, min. assist with Rue!    Acceptance, E, NR by RYAN at 9/2/2019  8:35 AM    Comment:  OT POC, benefits and roles of OT, adls, t/fs, bed mobility, positioning for prevention of skin breakdown of joint protection.                   Point: Home exercise program (Done)     Description: Instruct learner(s) on appropriate technique for monitoring, assisting and/or progressing therapeutic  exercises/activities.    Learning Progress Summary           Patient Acceptance, E, VU by AB at 9/11/2019  4:38 PM    Comment:  OT POC, benefit of activity, ADL, safety of R limb, bed mobility, pain relief techniques, not to hold breath    Acceptance, E, VU,NR by FORTINO at 9/10/2019 10:23 AM    Comment:  limb protection with mobility/adl/supine, bed mobility, ADL    Acceptance, E, VU by FORTINO at 9/9/2019  5:09 PM    Comment:  mind muscle connection for neuroplasticity, ADL, OT POC    Acceptance, E,TB, VU,DU,NR by YUDI at 9/5/2019  2:50 PM    Comment:  Pt. takes washcloth presented to him by this orellana/l and with min. assist using Rue washes his face and hands, no issues with Lue rom with washcloth! Ue exs performed with Lue Ind'ly, min. assist with Rue!                   Point: Precautions (Done)     Description: Instruct learner(s) on prescribed precautions during self-care and functional transfers.    Learning Progress Summary           Patient Acceptance, E, VU by AB at 9/11/2019  4:38 PM    Comment:  OT POC, benefit of activity, ADL, safety of R limb, bed mobility, pain relief techniques, not to hold breath    Acceptance, E, VU,NR by FORTINO at 9/10/2019 10:23 AM    Comment:  limb protection with mobility/adl/supine, bed mobility, ADL    Acceptance, E, VU by FORTINO at 9/9/2019  5:09 PM    Comment:  mind muscle connection for neuroplasticity, ADL, OT POC    Acceptance, E, VU by RYAN at 9/6/2019 10:30 AM    Comment:  OT POC, adls, t/fs, bed mobility, importance of positioning, neuromuscular reeducation techniques.    Acceptance, E,TB, VU,DU,NR by YUDI at 9/5/2019  2:50 PM    Comment:  Pt. takes washcloth presented to him by this orellana/l and with min. assist using Rue washes his face and hands, no issues with Lue rom with washcloth! Ue exs performed with Lue Ind'ly, min. assist with Rue!    Acceptance, E, NR by RYAN at 9/2/2019  8:35 AM    Comment:  OT POC, benefits and roles of OT, adls, t/fs, bed mobility, positioning for prevention of  skin breakdown of joint protection.                   Point: Body mechanics (Done)     Description: Instruct learner(s) on proper positioning and spine alignment during self-care, functional mobility activities and/or exercises.    Learning Progress Summary           Patient Acceptance, E, VU by AB at 9/11/2019  4:38 PM    Comment:  OT POC, benefit of activity, ADL, safety of R limb, bed mobility, pain relief techniques, not to hold breath    Acceptance, E, VU,NR by MW at 9/10/2019 10:23 AM    Comment:  limb protection with mobility/adl/supine, bed mobility, ADL    Acceptance, E, VU by MW at 9/9/2019  5:09 PM    Comment:  mind muscle connection for neuroplasticity, ADL, OT POC    Acceptance, E,TB, VU,DU,NR by YUDI at 9/5/2019  2:50 PM    Comment:  Pt. takes washcloth presented to him by this orellana/l and with min. assist using Rue washes his face and hands, no issues with Lue rom with washcloth! Ue exs performed with Lue Ind'ly, min. assist with Rue!                               User Key     Initials Effective Dates Name Provider Type Discipline     08/02/16 -  Emmanuel Osborne ORELLANA/L Occupational Therapy Assistant OT     08/28/18 -  Elisa Chung, OTR/L Occupational Therapist OT    JCARLY 10/12/18 -  Aster Suggs OTR/L Occupational Therapist OT    AB 07/25/19 -  Janet Cardona OT Student OT Student OT                OT Recommendation and Plan  Outcome Summary/Treatment Plan (OT)  Daily Summary of Progress (OT): progress toward functional goals is good  Plan for Continued Treatment (OT): follow OT POC  Anticipated Discharge Disposition (OT): skilled nursing facility, inpatient rehabilitation facility  Therapy Frequency (OT Eval): 5 times/wk  Daily Summary of Progress (OT): progress toward functional goals is good  Plan of Care Review  Plan of Care Reviewed With: patient  Plan of Care Reviewed With: patient  Outcome Summary: OT treatment completed. Pt completed oral hygiene fowlers in bed with set  up/supervision and verbal/nonverbal cues. Pt verbalized no pain at rest, but had complaints of 7/10 pain wiht movement. Pt completed bed mobility to come to EOB with Mod/MaxA x2 and Max/Dependent A to remain sitting initially. Throughout session the amount of assistance required decreased as pt was given verbal and nonverbal cues. Pt demo'd increased ability to use LUE (compared to last tx) to stabilize when sitting at EOB and pull to reposition in bed. Pt demo'd lateral leaning to L and R, however was able to correct lean with verbal and tactile cues. Pt returned to sidelying right with Mod/MaxAx2, call light wihtin reach, encouraged to call for assistance, and nrsg notified of pain.  Outcome Measures     Row Name 09/11/19 1600 09/10/19 1000 09/09/19 1700       How much help from another is currently needed...    Putting on and taking off regular lower body clothing?  1  -MW (r) AB (t) MW (c)  1  -MW  1  -MW    Bathing (including washing, rinsing, and drying)  2  -MW (r) AB (t) MW (c)  2  -MW  2  -MW    Toileting (which includes using toilet bed pan or urinal)  1  -MW (r) AB (t) MW (c)  1  -MW  1  -MW    Putting on and taking off regular upper body clothing  2  -MW (r) AB (t) MW (c)  2  -MW  2  -MW    Taking care of personal grooming (such as brushing teeth)  2  -MW (r) AB (t) MW (c)  2  -MW  2  -MW    Eating meals  2  -MW (r) AB (t) MW (c)  2  -MW  2  -MW    AM-PAC 6 Clicks Score (OT)  10  -MW (r) AB (t)  10  -MW  10  -MW       Functional Assessment    Outcome Measure Options  AM-PAC 6 Clicks Daily Activity (OT)  -MW (r) AB (t) MW (c)  AM-PAC 6 Clicks Daily Activity (OT)  -MW  AM-PAC 6 Clicks Daily Activity (OT)  -MW      User Key  (r) = Recorded By, (t) = Taken By, (c) = Cosigned By    Initials Name Provider Type    Elisa Nolan, OTR/L Occupational Therapist    AB Janet Cardona, OT Student OT Student           Time Calculation:   Time Calculation- OT     Row Name 09/11/19 5392             Time  Calculation- OT    OT Start Time  1310  -MW (r) AB (t) MW (c)      OT Stop Time  1408  -MW (r) AB (t) MW (c)      OT Time Calculation (min)  58 min  -MW (r) AB (t)      Total Timed Code Minutes- OT  30 minute(s)  -MW (r) AB (t) MW (c)      OT Non-Billable Time (min)  28 min  -MW (r) AB (t) MW (c)      OT Received On  09/11/19  -MW (r) AB (t) MW (c)         Timed Charges    45925 - OT Therapeutic Exercise Minutes  15  -MW (r) AB (t) MW (c)      94947 - OT Self Care/Mgmt Minutes  15  -MW (r) AB (t) MW (c)        User Key  (r) = Recorded By, (t) = Taken By, (c) = Cosigned By    Initials Name Provider Type    Elisa Nolan, OTR/L Occupational Therapist    AB Janet Cardona OT Student OT Student        Therapy Charges for Today     Code Description Service Date Service Provider Modifiers Qty    [id number]  OT THERAPEUTIC ACT EA 15 MIN 9/11/2019 Janet Cardona OT Student GO 1    [id number]  OT SELF CARE/MGMT/TRAIN EA 15 MIN 9/11/2019 Janet Cardona OT Student GO 1               PRATEEK Nicolas  9/11/2019

## 2019-09-11 NOTE — PLAN OF CARE
Problem: Patient Care Overview  Goal: Plan of Care Review  Outcome: Ongoing (interventions implemented as appropriate)   09/11/19 1029   Coping/Psychosocial   Plan of Care Reviewed With patient   Plan of Care Review   Progress no change   OTHER   Outcome Summary PT tx completed. Pt supine in bed moaning/groaning in pain. States he hurts everywhere. Rates back pn 9/10. MaxA x 2 bed mobility, MaxA for sitting balance. Unable to sit unsupported at bedside. Worked on sitting balance x 15 minutes. BLE ROM, increased assistance on RUE/LE due to weakness. Recommend SNF for rehab.

## 2019-09-11 NOTE — PLAN OF CARE
Problem: Patient Care Overview  Goal: Plan of Care Review  Outcome: Ongoing (interventions implemented as appropriate)   09/11/19 0803   Coping/Psychosocial   Plan of Care Reviewed With patient   Plan of Care Review   Progress no change   OTHER   Outcome Summary Pt is alert but disoriented to time and situation. Pt is noncooperative with assessments and positioning, q2hr turning maintained. Meds whole in apple sauce. PRN dose of antihypertensive given at 0500, BP returned to normal after dose. C/o generalized pain, unable to discribe, PO pain med given twice with no observed effect. diet maintained, pt wore 2l of O2 throughpout night to maintain sats in range. Will continue to monitor.     Goal: Individualization and Mutuality  Outcome: Ongoing (interventions implemented as appropriate)    Goal: Discharge Needs Assessment  Outcome: Ongoing (interventions implemented as appropriate)    Goal: Interprofessional Rounds/Family Conf  Outcome: Ongoing (interventions implemented as appropriate)

## 2019-09-11 NOTE — PLAN OF CARE
Problem: Patient Care Overview  Goal: Plan of Care Review  Outcome: Ongoing (interventions implemented as appropriate)   09/11/19 2597   Coping/Psychosocial   Plan of Care Reviewed With patient;other (see comments)  (GUSTAVO Dawn )   Plan of Care Review   Progress no change   OTHER   Outcome Summary Swallow tx completed. Patient is alert and cooperative. NPO for possible procedure; however, GUSTAVO Dawn states he is cleared to return to PO diet. He completed trials of mechanical soft foods with functional rotary chew. Thin liquids completed with 1x throat clearing behavior. Vocal quality remains unchanged. He is observed to hold some trials prior to swallowing. RN charting lungs as clear and diminished. Patient OK to resume mechanical soft diet with thin liquids. Continue meds whole in apple sauce. Oral care, Supervision with meals. SLP to follow up.

## 2019-09-11 NOTE — CONSULTS
Urology    Mr. Eastman is 64 y.o. male    REASON FOR CONSULT/CHIEF COMPLAINT: Bleeding from urethra    HPI  64-year-old male with multiple comorbidities including diabetes hypertension currently admitted for intracerebral hemorrhage noted to have today to have urinary incontinence with some reported hematuria by nursing.  Patient is reporting difficulty voiding.  Dr. Cook was consulted last week regarding a small incidentally noted left renal lesion on a renal ultrasound at which time he apparently had a Alejo catheter in place.  It is unclear when his Alejo catheter was removed.  The patient is passing urine but he is complaining of difficulty with obstructive symptoms and having blood dripping from the urethra.  He is also having some abdominal pain.  No fevers.  No nausea vomiting.  Previous urological recommendations including getting a CT with contrast at some point but he has not yet had this done.  Given the patient's difficulty voiding, recommended nursing replace the Alejo catheter.  Urinary output remained clear without any hematuria after Alejo catheter placement.    The following portions of the patient's history were reviewed and updated as appropriate: allergies, current medications, past family history, past medical history, past social history, past surgical history and problem list.    Review of Systems   Genitourinary: Positive for difficulty urinating, hematuria and urgency.   Neurological: Positive for speech difficulty.   All other systems reviewed and are negative.         Medications Prior to Admission   Medication Sig Dispense Refill Last Dose   • aspirin 81 MG EC tablet Take 81 mg by mouth Daily.   8/30/2019 at Unknown time   • CloNIDine (CATAPRES) 0.1 MG tablet Take 0.1 mg by mouth 2 (Two) Times a Day.   8/30/2019 at Unknown time   • doxazosin (CARDURA) 2 MG tablet Take 2 mg by mouth Every Night.   8/30/2019   • glimepiride (AMARYL) 2 MG tablet Take 2 mg by mouth Every Morning Before  Breakfast.   8/30/2019 at Unknown time   • losartan (COZAAR) 100 MG tablet Take 100 mg by mouth Daily.   8/30/2019 at Unknown time   • metFORMIN (GLUCOPHAGE) 850 MG tablet Take 850 mg by mouth 2 (Two) Times a Day With Meals.   8/30/2019 at Unknown time   • omeprazole (priLOSEC) 20 MG capsule Take 20 mg by mouth Daily.   8/30/2019 at Unknown time   • simvastatin (ZOCOR) 40 MG tablet Take 40 mg by mouth Every Night.   8/30/2019 at Unknown time   • traZODone (DESYREL) 50 MG tablet Take 50 mg by mouth At Night As Needed for Sleep.            Current Facility-Administered Medications:   •  acetaminophen (TYLENOL) tablet 650 mg, 650 mg, Oral, Q4H PRN, 650 mg at 09/08/19 2017 **OR** acetaminophen (TYLENOL) 160 MG/5ML solution 650 mg, 650 mg, Oral, Q4H PRN **OR** acetaminophen (TYLENOL) suppository 650 mg, 650 mg, Rectal, Q4H PRN, Raf Wyatt MD  •  amLODIPine (NORVASC) tablet 10 mg, 10 mg, Oral, Q24H, Gaston Cabrera DO, 10 mg at 09/11/19 0930  •  atorvastatin (LIPITOR) tablet 20 mg, 20 mg, Oral, Nightly, Raf Wyatt MD, 20 mg at 09/10/19 2208  •  CloNIDine (CATAPRES) tablet 0.3 mg, 0.3 mg, Oral, Q12H, Gaston Cabrera DO, 0.3 mg at 09/11/19 0930  •  dextrose (D50W) 25 g/ 50mL Intravenous Solution 25 g, 25 g, Intravenous, Q15 Min PRN, Raf Wyatt MD  •  dextrose (GLUTOSE) oral gel 15 g, 15 g, Oral, Q15 Min PRN, Raf Wyatt MD  •  glipiZIDE (GLUCOTROL) tablet 5 mg, 5 mg, Oral, QAM AC, Raf Wyatt MD, 5 mg at 09/11/19 0930  •  glucagon (human recombinant) (GLUCAGEN DIAGNOSTIC) injection 1 mg, 1 mg, Subcutaneous, PRN, Raf Wyatt MD  •  HOG enema 360 mL, 360 mL, Rectal, Once, Tanner Rodgers MD  •  hydrALAZINE (APRESOLINE) injection 10 mg, 10 mg, Intravenous, Q15 Min PRN, Raf Wyatt MD, 10 mg at 09/11/19 0512  •  hydrALAZINE (APRESOLINE) tablet 100 mg, 100 mg, Oral, Q8H, Gaston Cabrera DO, 100 mg at 09/11/19 1418  •  [START  ON 9/12/2019] hydrochlorothiazide (HYDRODIURIL) tablet 25 mg, 25 mg, Oral, Daily, Tanner Rodgers MD  •  insulin lispro (humaLOG) injection 0-9 Units, 0-9 Units, Subcutaneous, 4x Daily With Meals & Nightly, Raf Wyatt MD  •  labetalol (NORMODYNE,TRANDATE) injection 10 mg, 10 mg, Intravenous, Q15 Min PRN, Tanner Rodgers MD, 10 mg at 09/07/19 1811  •  lidocaine (LIDODERM) 5 % 1 patch, 1 patch, Transdermal, Q24H, Tanner Rodgers MD, 1 patch at 09/11/19 1418  •  losartan (COZAAR) tablet 100 mg, 100 mg, Oral, Q24H, Tanner Rodgers MD, 100 mg at 09/11/19 0929  •  ondansetron (ZOFRAN) tablet 4 mg, 4 mg, Oral, Q6H PRN **OR** ondansetron (ZOFRAN) injection 4 mg, 4 mg, Intravenous, Q6H PRN, Raf Wyatt MD  •  opium-belladonna (B&O SUPPRETTES) suppository 60 mg, 60 mg, Rectal, Q8H PRN, Tanner Rodgers MD  •  oxyCODONE-acetaminophen (PERCOCET) 5-325 MG per tablet 1 tablet, 1 tablet, Oral, Q4H PRN, Raf Wyatt MD, 1 tablet at 09/11/19 1511  •  pantoprazole (PROTONIX) EC tablet 40 mg, 40 mg, Oral, Q AM, Raf Wyatt MD, 40 mg at 09/11/19 0523  •  polyethylene glycol 3350 powder (packet), 17 g, Oral, Daily, Tanner Rodgers MD, 17 g at 09/11/19 0929  •  sennosides-docusate sodium (SENOKOT-S) 8.6-50 MG tablet 1 tablet, 1 tablet, Oral, BID, Tanner Rodgers MD, 1 tablet at 09/11/19 0930  •  sodium chloride 0.9 % flush 10 mL, 10 mL, Intravenous, Q12H, Raf Wyatt MD, 10 mL at 09/11/19 0929  •  sodium chloride 0.9 % flush 10 mL, 10 mL, Intravenous, PRN, Raf Wyatt MD  •  sodium chloride nasal spray 2 spray, 2 spray, Each Nare, 4x Daily PRN, Tanner Rodgers MD  •  terazosin (HYTRIN) capsule 10 mg, 10 mg, Oral, Nightly, Gaston Cabrera DO, 10 mg at 09/10/19 2209  •  traZODone (DESYREL) tablet 100 mg, 100 mg, Oral, Nightly, Gaston Cabrera DO, 100 mg at 09/10/19 2208    Past Medical History:   Diagnosis Date   • Diabetes  "mellitus (CMS/HCC)    • GERD (gastroesophageal reflux disease)    • Hyperlipidemia    • Hypertension    • Neuropathy    • Osteoarthritis    • TIA (transient ischemic attack)        Past Surgical History:   Procedure Laterality Date   • CARPAL TUNNEL RELEASE Bilateral    • EYE SURGERY     • KNEE SURGERY Bilateral    • NECK SURGERY     • RETINAL DETACHMENT SURGERY     • ROTATOR CUFF REPAIR         Social History     Socioeconomic History   • Marital status:      Spouse name: Not on file   • Number of children: Not on file   • Years of education: Not on file   • Highest education level: Not on file   Tobacco Use   • Smoking status: Never Smoker   • Smokeless tobacco: Never Used   Substance and Sexual Activity   • Alcohol use: No     Frequency: Never   • Drug use: No   • Sexual activity: Defer       History reviewed. No pertinent family history.    /52 (BP Location: Right arm, Patient Position: Lying)   Pulse 58   Temp 97.7 °F (36.5 °C) (Oral)   Resp 19   Ht 188 cm (74\")   Wt 122 kg (269 lb)   SpO2 93%   BMI 34.54 kg/m²     Physical Exam   Constitutional: Well nourished, Well developed; No apparent distress.  His vital signs are reviewed  Psychiatric: Appropriate affect; Alert and oriented  Eyes: Unremarkable  Musculoskeletal: Normal gait and station  GI: Abdomen is soft, non-tender, obese.   Respiratory: No distress; Unlabored movement; No accessory musculature needed with symmetric movements  Skin: No pallor or diaphoresis  ; Penis and testicles are normal;    Lab Results   Component Value Date    GLUCOSE 108 (H) 09/11/2019    BUN 24 (H) 09/11/2019    CREATININE 1.55 (H) 09/11/2019    EGFRIFNONA 46 (L) 09/11/2019    BCR 15.5 09/11/2019    CO2 27.0 09/11/2019    CALCIUM 8.5 (L) 09/11/2019    ALBUMIN 3.70 09/01/2019    AST 43 09/01/2019    ALT 31 09/01/2019     Lab Results   Component Value Date    GLUCOSE 108 (H) 09/11/2019    CALCIUM 8.5 (L) 09/11/2019     09/11/2019    K 4.0 09/11/2019    " CO2 27.0 09/11/2019     09/11/2019    BUN 24 (H) 09/11/2019    CREATININE 1.55 (H) 09/11/2019    EGFRIFNONA 46 (L) 09/11/2019    BCR 15.5 09/11/2019    ANIONGAP 9.0 09/11/2019     Lab Results   Component Value Date    WBC 11.95 (H) 09/01/2019    HGB 12.7 (L) 09/01/2019    HCT 39.4 09/01/2019    MCV 84.4 09/01/2019     09/01/2019     No results found for: PSA  No results found for: URINECX  Brief Urine Lab Results     None          Imaging Results (last 7 days)     Procedure Component Value Units Date/Time    XR Abdomen KUB [[id number]] Collected:  09/09/19 1600     Updated:  09/09/19 1605    Narrative:       EXAM: XR ABDOMEN KUB- - 9/9/2019 3:38 PM CDT     HISTORY: Abdominal pain; R13.12-Dysphagia, oropharyngeal phase;  Z74.09-Other reduced mobility; Z74.09-Other reduced mobility;  N28.89-Other specified disorders of kidney and ureter       COMPARISON: None.      TECHNIQUE:  2 images.  Supine view of the abdomen.      FINDINGS:  See impression          Impression:       1. A few borderline gas-filled loops of small bowel overlying the  pelvis, which could be seen with ileus or early/partial obstruction.  2. Large amount of colonic stool.  3. No convincing dystrophic calcifications in the visualized abdomen,  but evaluation limited by portable technique.  This report was finalized on 09/09/2019 16:02 by Dr Delfina Gu MD.    US Renal Bilateral [[id number]] Collected:  09/05/19 1433     Updated:  09/05/19 1439    Narrative:       EXAMINATION:  US RENAL BILATERAL-  9/5/2019 12:55 PM CDT     HISTORY: refractory HTN; R13.12-Dysphagia, oropharyngeal phase;  Z74.09-Other reduced mobility; Z74.09-Other reduced mobility      COMPARISON: No comparison study.     TECHNIQUE: Bilateral renal ultrasound was performed.     The examination is limited due to the patient's inability to hold her  breath and be still for the examination.     The renal arteries were unable to perform at this time. The renal  arteries and  aorta were obscured by bowel gas.     FINDINGS:      The left kidney measures 12.2 cm in zbzw-mi-tetk length.     The right kidney measures 12.3 cm in cavh-ua-qlvy length.     In the upper pole of the left kidney there is a 2.8 cm solid  pedunculated-appearing mass. Renal cell malignancy considered. CT  imaging with renal mass protocol suggested.     A 1 cm cyst noted in the upper pole the right kidney.     Normal echogenicity and renal cortex observed.     There is no pelvocaliectasis or obstructive uropathy changes. There are  no perinephric abnormalities.     Urinary bladder is decompressed with a Alejo catheter.     A small amount of perihepatic ascites.       Impression:       1. 2.8 cm solid mass suspected in the upper pole of the level left  kidney, further imaging characterization with CT, renal mass protocol,  recommended.  2. Right nephrogenic cyst.  This report was finalized on 09/05/2019 14:36 by Dr. Misha Osborne MD.            Assessment and Plan  1.  Gross hematuria now resolved with Alejo catheter replacement likely due to previous Alejo trauma.  Recommend leaving Alejo catheter in place for about a week to allow for urethral healing, continue his terazosin he is already taking, and will observe for now.  2.  Known left small renal lesion for which continue to recommend a three-phase dedicated renal CT at some point to further characterize.    We will continue to follow.  Thank for the consultation.  Please call with any questions      (Please note that portions of this note were completed with a voice recognition program.)  Munir Swan MD  09/11/19  6:23 PM

## 2019-09-11 NOTE — THERAPY TREATMENT NOTE
Acute Care - Physical Therapy Treatment Note  [facility]     Patient Name: Phan Eastman  : 1955  MRN: [id number]  Today's Date: 2019  Onset of Illness/Injury or Date of Surgery: 19     Referring Physician: Dr. Wyatt     Admit Date: 2019    Visit Dx:    ICD-10-CM ICD-9-CM   1. Oropharyngeal dysphagia R13.12 787.22   2. Impaired mobility and ADLs Z74.09 799.89   3. Impaired mobility Z74.09 799.89   4. Left renal mass N28.89 593.9     Patient Active Problem List   Diagnosis   • ICH (intracerebral hemorrhage) (CMS/HCC)   • Diabetes mellitus (CMS/HCC)   • Hypertensive urgency   • Hyperlipidemia   • Cerebrovascular accident (CVA) due to embolism of left middle cerebral artery (CMS/HCC)   • Dysarthria   • CKD (chronic kidney disease) stage 3, GFR 30-59 ml/min (CMS/HCC)   • Left renal mass   • Obesity (BMI 30-39.9)   • Diastolic CHF, chronic (CMS/HCC)   • Bradycardia   • Constipation       Therapy Treatment    Rehabilitation Treatment Summary     Row Name 19             Treatment Time/Intention    Discipline  physical therapy assistant  -KJ      Document Type  therapy note (daily note)  -KJ      Subjective Information  complains of;pain  -KJ      Mode of Treatment  physical therapy  -KJ      Patient Effort  adequate  -KJ      Existing Precautions/Restrictions  fall  -KJ      Treatment Considerations/Comments  Right side weakness, increased pain everywhere this am  -KJ      Recorded by [KJ] Debby Garcia, RAJAN 19 1028      Row Name 19 0910             Bed Mobility Assessment/Treatment    Supine-Sit [place] (Bed Mobility)  verbal cues;maximum assist (25% patient effort);2 person assist  -KJ      Sit-Supine [place] (Bed Mobility)  maximum assist (25% patient effort);2 person assist;verbal cues  -KJ      Bed Mobility, Safety Issues  decreased use of arms for pushing/pulling;decreased use of legs for bridging/pushing  -KJ      Comment (Bed Mobility)  sit edge of  bed x 15 minutes Mod/MaxA 1-2 for sitting balance  -KJ      Recorded by [KJ] Debby Garcia, [facility] 09/11/19 1028      Row Name 09/11/19 0910             Gait/Stairs Assessment/Training    Comment (Gait/Stairs)  not appropriate  -KJ      Recorded by [KJ] Debby Garcia, [facility] 09/11/19 1028      Row Name 09/11/19 0910             Therapeutic Exercise    Exercise Type (Therapeutic Exercise)  AROM (active range of motion) LAQ's actively x 15  -KJ      Position (Therapeutic Exercise)  seated  -KJ      Sets/Reps (Therapeutic Exercise)  15  -KJ      Comment (Therapeutic Exercise)  reaching all planes, static postural correction   -KJ      Recorded by [KJ] Debby Garcia, [facility] 09/11/19 1028      Row Name 09/11/19 0910             Static Sitting Balance    Level of [place] (Unsupported Sitting, Static Balance)  maximal assist, 25 to 49% patient effort  -KJ      Sitting Position (Unsupported Sitting, Static Balance)  sitting on edge of bed  -KJ      Time Able to Maintain Position (Unsupported Sitting, Static Balance)  more than 5 minutes  -KJ      Comment (Unsupported Sitting, Static Balance)  sitting balance max assist, unable to sit without external support  -KJ      Recorded by [KJ] Debby Garcia, [facility] 09/11/19 1028      Row Name 09/11/19 0910             Positioning and Restraints    Pre-Treatment Position  in bed  -KJ      Post Treatment Position  bed  -KJ      Recorded by [KJ] Debby Garcia, [facility] 09/11/19 1028      Row Name 09/11/19 0910             Pain Scale: Numbers Pre/Post-Treatment    Pain Scale: Numbers, Pretreatment  8/10  -KJ      Pain Scale: Numbers, Post-Treatment  8/10  -KJ      Pain Location  back  -KJ      Recorded by [KJ] Debby Garcia, [facility] 09/11/19 1028        User Key  (r) = Recorded By, (t) = Taken By, (c) = Cosigned By    Initials Name Effective Dates Discipline    KJ Debby Garcia, [facility] 08/02/16 -  PT                   Physical Therapy Education     Title: PT OT SLP Therapies (In Progress)      Topic: Physical Therapy (In Progress)     Point: Mobility training (Done)     Learning Progress Summary           Patient Acceptance, E, CHARLY,NR by TITUS at 9/4/2019  8:11 AM    Comment:  Rolling in bed. Bed mobility.    Acceptance, E, CHARLY,NR by JAKE at 9/2/2019  7:20 AM    Comment:  Educated pt on progression of PT POC and benefits of activity                               User Key     Initials Effective Dates Name Provider Type Discipline    JAKE 08/02/16 -  Gilmer Medrano, PT DPT Physical Therapist PT    TITUS 08/02/16 -  Kathrine Ramsay, PTA Physical Therapy Assistant PT                PT Recommendation and Plan     Plan of Care Reviewed With: patient  Progress: no change  Outcome Summary: PT tx completed. Pt supine in bed moaning/groaning in pain. States he hurts everywhere. Rates back pn 9/10. MaxA x 2 bed mobility, MaxA for sitting balance. Unable to sit unsupported at bedside. Worked on sitting balance x 15 minutes. BLE ROM, increased assistance on RUE/LE due to weakness. Recommend SNF for rehab.  Outcome Measures     Row Name 09/10/19 1000 09/09/19 1700          How much help from another is currently needed...    Putting on and taking off regular lower body clothing?  1  -MW  1  -MW     Bathing (including washing, rinsing, and drying)  2  -MW  2  -MW     Toileting (which includes using toilet bed pan or urinal)  1  -MW  1  -MW     Putting on and taking off regular upper body clothing  2  -MW  2  -MW     Taking care of personal grooming (such as brushing teeth)  2  -MW  2  -MW     Eating meals  2  -MW  2  -MW     AM-PAC 6 Clicks Score (OT)  10  -MW  10  -MW        Functional Assessment    Outcome Measure Options  AM-PAC 6 Clicks Daily Activity (OT)  -MW  AM-PAC 6 Clicks Daily Activity (OT)  -MW       User Key  (r) = Recorded By, (t) = Taken By, (c) = Cosigned By    Initials Name Provider Type    Elisa Nolan, OTR/L Occupational Therapist         Time Calculation:   PT Charges     Row Name 09/11/19 9228              Time Calculation    Start Time  0910  -KJ      Stop Time  0935  -KJ      Time Calculation (min)  25 min  -KJ      PT Received On  09/11/19  -KJ      PT Goal Re-Cert Due Date  09/12/19  -KJ         Time Calculation- PT    Total Timed Code Minutes- PT  25 minute(s)  -KJ        User Key  (r) = Recorded By, (t) = Taken By, (c) = Cosigned By    Initials Name Provider Type    Debby Thompson, RAJAN Physical Therapy Assistant        Therapy Charges for Today     Code Description Service Date Service Provider Modifiers Qty    83048745525 HC PT THERAPEUTIC ACT EA 15 MIN 9/10/2019 Debby Garcia, RAJAN GP 3    27892651841 HC PT THERAPEUTIC ACT EA 15 MIN 9/10/2019 Debby Garcia, RAJAN GP 2    70476656911 HC PT THERAPEUTIC ACT EA 15 MIN 9/11/2019 Debby Garica, RAJAN GP 2          PT G-Codes  Outcome Measure Options: AM-PAC 6 Clicks Daily Activity (OT)  AM-PAC 6 Clicks Score (PT): 8  AM-PAC 6 Clicks Score (OT): 10    Debby Garcia PTA  9/11/2019

## 2019-09-11 NOTE — PLAN OF CARE
Problem: Patient Care Overview  Goal: Plan of Care Review   09/11/19 1640   Coping/Psychosocial   Plan of Care Reviewed With patient   Plan of Care Review   Progress improving   OTHER   Outcome Summary OT treatment completed. Pt completed oral hygiene fowlers in bed with set up/supervision and verbal/nonverbal cues. Pt verbalized no pain at rest, but had complaints of 7/10 pain wiht movement. Pt completed bed mobility to come to EOB with Mod/MaxA x2 and Max/Dependent A to remain sitting initially. Throughout session the amount of assistance required decreased as pt was given verbal and nonverbal cues. Pt demo'd increased ability to use LUE (compared to last tx) to stabilize when sitting at EOB and pull to reposition in bed. Pt demo'd lateral leaning to L and R, however was able to correct lean with verbal and tactile cues. Pt returned to sidelying right with Mod/MaxAx2, call light wihtin reach, encouraged to call for assistance, and nrsg notified of pain.

## 2019-09-11 NOTE — PLAN OF CARE
Problem: Patient Care Overview  Goal: Plan of Care Review  Outcome: Ongoing (interventions implemented as appropriate)                        09/11/19 0332   Coping/Psychosocial   Plan of Care Reviewed With patient   Plan of Care Review   Progress improving   OTHER   Outcome Summary PT treatment completed this afternoon. Pt performed rolling to L and supine-sit-supine with assist of 2. Pt sat EOB x2, pt assistance varied from max A at times CGA sometimes using therapist's hand to pull himself upright and lean forward. Pt remarked pain in L abdomen throughout treatment and it hurt more with leaning to the L in sitting. Nursed notified of pain.

## 2019-09-11 NOTE — THERAPY TREATMENT NOTE
Acute Care - Speech Language Pathology   Swallow Treatment Note [facility]     Patient Name: Phan Eastman  : 1955  MRN: [id number]  Today's Date: 2019  Onset of Illness/Injury or Date of Surgery: 19     Referring Physician: Dr. Wyatt       Admit Date: 2019  Swallow tx completed. Patient is alert and cooperative. NPO for possible procedure; however, RN Nereyda states he is cleared to return to PO diet. He completed trials of mechanical soft foods with functional rotary chew. Thin liquids completed with 1x throat clearing behavior. Vocal quality remains unchanged. He is observed to hold some trials prior to swallowing. RN charting lungs as clear and diminished.     Patient OK to resume mechanical soft diet with thin liquids. Continue meds whole in apple sauce. Oral care, Supervision with meals. SLP to follow up.   Krystal Babcock, MS CCC-SLP 2019 3:01 PM    Visit Dx:      ICD-10-CM ICD-9-CM   1. Oropharyngeal dysphagia R13.12 787.22   2. Impaired mobility and ADLs Z74.09 799.89   3. Impaired mobility Z74.09 799.89   4. Left renal mass N28.89 593.9     Patient Active Problem List   Diagnosis   • ICH (intracerebral hemorrhage) (CMS/HCC)   • Diabetes mellitus (CMS/HCC)   • Hypertensive urgency   • Hyperlipidemia   • Cerebrovascular accident (CVA) due to embolism of left middle cerebral artery (CMS/HCC)   • Dysarthria   • CKD (chronic kidney disease) stage 3, GFR 30-59 ml/min (CMS/HCC)   • Left renal mass   • Obesity (BMI 30-39.9)   • Diastolic CHF, chronic (CMS/HCC)   • Bradycardia   • Constipation   • Hematuria       Therapy Treatment  Rehabilitation Treatment Summary     Row Name 19 1430 19 0910          Treatment Time/Intention    Discipline  speech language pathologist  -MM  physical therapy assistant  -KJ     Document Type  therapy note (daily note)  -MM  therapy note (daily note)  -KJ     Subjective Information  complains of;pain upon sitting upright   -MM  complains  of;pain  -KJ     Mode of Treatment  individual therapy;speech-language pathology  -MM  physical therapy  -KJ     Patient/Family Observations  no family present   -MM  —     Care Plan Review  care plan/treatment goals reviewed  -MM  —     Therapy Frequency (Swallow)  at least;3 days per week  -MM  —     Patient Effort  good  -MM  adequate  -KJ     Existing Precautions/Restrictions  —  fall  -KJ     Treatment Considerations/Comments  —  Right side weakness, increased pain everywhere this am  -KJ     Recorded by [MM] Krystal Babcock, MS CCC-SLP 09/11/19 3324 [KJ] Debby Garcia, [facility] 09/11/19 1028     Row Name 09/11/19 0910             Bed Mobility Assessment/Treatment    Supine-Sit [place] (Bed Mobility)  verbal cues;maximum assist (25% patient effort);2 person assist  -KJ      Sit-Supine [place] (Bed Mobility)  maximum assist (25% patient effort);2 person assist;verbal cues  -KJ      Bed Mobility, Safety Issues  decreased use of arms for pushing/pulling;decreased use of legs for bridging/pushing  -KJ      Comment (Bed Mobility)  sit edge of bed x 15 minutes Mod/MaxA 1-2 for sitting balance  -KJ      Recorded by [KJ] Debby Garcia, [facility] 09/11/19 1028      Row Name 09/11/19 0910             Gait/Stairs Assessment/Training    Comment (Gait/Stairs)  not appropriate  -KJ      Recorded by [KJ] Debby Garcia, [facility] 09/11/19 1028      Row Name 09/11/19 0910             Therapeutic Exercise    Exercise Type (Therapeutic Exercise)  AROM (active range of motion) LAQ's actively x 15  -KJ      Position (Therapeutic Exercise)  seated  -KJ      Sets/Reps (Therapeutic Exercise)  15  -KJ      Comment (Therapeutic Exercise)  reaching all planes, static postural correction   -KJ      Recorded by [KJ] Debby Garcia, [facility] 09/11/19 1028      Row Name 09/11/19 0910             Static Sitting Balance    Level of [place] (Unsupported Sitting, Static Balance)  maximal assist, 25 to 49% patient effort  -KJ      Sitting  Position (Unsupported Sitting, Static Balance)  sitting on edge of bed  -KJ      Time Able to Maintain Position (Unsupported Sitting, Static Balance)  more than 5 minutes  -KJ      Comment (Unsupported Sitting, Static Balance)  sitting balance max assist, unable to sit without external support  -KJ      Recorded by [KJ] Debby Garcia, [facility] 09/11/19 1028      Row Name 09/11/19 0910             Positioning and Restraints    Pre-Treatment Position  in bed  -KJ      Post Treatment Position  bed  -KJ      Recorded by [KJ] Debby Garcia, PTA 09/11/19 1028      Row Name 09/11/19 1430             Pain Assessment    Additional Documentation  Pain Scale: FACES Pre/Post-Treatment (Group)  -MM      Recorded by [MM] Krystal Babcock MS CCC-SLP 09/11/19 1455      Row Name 09/11/19 0910             Pain Scale: Numbers Pre/Post-Treatment    Pain Scale: Numbers, Pretreatment  8/10  -KJ      Pain Scale: Numbers, Post-Treatment  8/10  -KJ      Pain Location  back  -KJ      Recorded by [KJ] Debby Garcia, [facility] 09/11/19 1028      Row Name 09/11/19 1430             Pain Scale: FACES Pre/Post-Treatment    Pain: FACES Scale, Pretreatment  0-->no hurt  -MM      Pain: FACES Scale, Post-Treatment  0-->no hurt  -MM      Pre/Post Treatment Pain Comment  ribs  -MM      Recorded by [MM] Krystal Babcock MS CCC-SLP 09/11/19 1455      Row Name 09/11/19 1430             Outcome Summary/Treatment Plan (SLP)    Daily Summary of Progress (SLP)  progress toward functional goals as expected  -MM      Barriers to Overall Progress (SLP)  n/a  -MM      Plan for Continued Treatment (SLP)  Continue to follow   -MM      Anticipated Dischage Disposition  unknown  -MM      Recorded by [MM] Krystal Babcock MS CCC-SLP 09/11/19 1455        User Key  (r) = Recorded By, (t) = Taken By, (c) = Cosigned By    Initials Name Effective Dates Discipline    KJ Debby Garcia, PTA 08/02/16 -  PT    MM Krystal Babcock, MS CCC-SLP 05/24/19 -  SLP           Outcome Summary  Outcome Summary/Treatment Plan (SLP)  Daily Summary of Progress (SLP): progress toward functional goals as expected (09/11/19 1430 : Krystal Babcock, MS CCC-SLP)  Barriers to Overall Progress (SLP): n/a (09/11/19 1430 : Krystal Babcock, MS CCC-SLP)  Plan for Continued Treatment (SLP): Continue to follow  (09/11/19 1430 : Krystal Babcock, MS CCC-SLP)  Anticipated Dischage Disposition: unknown (09/11/19 1430 : Krystal Babcock, [facility])      SLP GOALS     Row Name 09/11/19 1430 09/10/19 1229 09/09/19 1413       Oral Nutrition/Hydration Goal 1 (SLP)    Oral Nutrition/Hydration Goal 1, SLP  LTG: Patient will tolerate LRD with no overt s/s of aspiration.  -MM  LTG: Patient will tolerate LRD with no overt s/s of aspiration.  -MM  LTG: Patient will tolerate LRD with no overt s/s of aspiration.  -MM    Time Frame (Oral Nutrition/Hydration Goal 1, SLP)  by discharge  -MM  by discharge  -MM  by discharge  -MM    Barriers (Oral Nutrition/Hydration Goal 1, SLP)  n/a  -MM  n/a  -MM  n/a  -MM    Progress/Outcomes (Oral Nutrition/Hydration Goal 1, SLP)  continuing progress toward goal  -MM  continuing progress toward goal  -MM  continuing progress toward goal  -MM       Labial Strengthening Goal 1 (SLP)    Activity (Labial Strengthening Goal 1, SLP)  increase labial tone  -MM  increase labial tone  -MM  increase labial tone  -MM    Increase Labial Tone  labial resistance exercises  -MM  labial resistance exercises  -MM  labial resistance exercises  -MM    [place]/Accuracy (Labial Strengthening Goal 1, SLP)  independently (over 90% accuracy)  -MM  independently (over 90% accuracy)  -MM  independently (over 90% accuracy)  -MM    Time Frame (Labial Strengthening Goal 1, SLP)  short term goal (STG);by discharge  -MM  short term goal (STG);by discharge  -MM  short term goal (STG);by discharge  -MM    Barriers (Labial Strengthening Goal 1, SLP)  n/a  -MM  n/a  -MM  n/a  -MM     Progress/Outcomes (Labial Strengthening Goal 1, SLP)  continuing progress toward goal  -MM  continuing progress toward goal  -MM  continuing progress toward goal  -MM       Lingual Strengthening Goal 1 (SLP)    Activity (Lingual Strengthening Goal 1, SLP)  increase lingual tone/sensation/control/coordination/movement  -MM  increase lingual tone/sensation/control/coordination/movement  -MM  increase lingual tone/sensation/control/coordination/movement  -MM    Increase Lingual Tone/Sensation/Control/Coordination/Movement  lingual movement exercises  -MM  lingual movement exercises  -MM  lingual movement exercises  -MM    [place]/Accuracy (Lingual Strengthening Goal 1, SLP)  independently (over 90% accuracy)  -MM  independently (over 90% accuracy)  -MM  independently (over 90% accuracy)  -MM    Time Frame (Lingual Strengthening Goal 1, SLP)  by discharge  -MM  by discharge  -MM  by discharge  -MM    Barriers (Lingual Strengthening Goal 1, SLP)  n/a  -MM  n/a  -MM  n/a  -MM    Progress/Outcomes (Lingual Strengthening Goal 1, SLP)  continuing progress toward goal  -MM  continuing progress toward goal  -MM  continuing progress toward goal  -MM       Pharyngeal Strengthening Exercise Goal 1 (SLP)    Activity (Pharyngeal Strengthening Goal 1, SLP)  increase epiglottic inversion and retroflexion;increase tongue base retraction;increase pharyngeal sensation  -MM  increase epiglottic inversion and retroflexion;increase tongue base retraction;increase pharyngeal sensation  -MM  increase epiglottic inversion and retroflexion;increase tongue base retraction;increase pharyngeal sensation  -MM    Increase Pharyngeal Sensation  gustatory stimulation (sour/cold)  -MM  gustatory stimulation (sour/cold)  -MM  gustatory stimulation (sour/cold)  -MM    Increase Epiglottic Inversion and Retroflexion  [place]hn;falsetto  -MM  Mendel[place]hn;falsetto  -MM  M[place]el[place]hn;falsetto  -MM    Increase Tongue Base Retraction  hard effortful  swallow;kaleb  -MM  hard effortful swallow;kaleb  -MM  hard effortful swallow;kaleb  -MM    [place]/Accuracy (Pharyngeal Strengthening Goal 1, SLP)  independently (over 90% accuracy)  -MM  independently (over 90% accuracy)  -MM  independently (over 90% accuracy)  -MM    Time Frame (Pharyngeal Strengthening Goal 1, SLP)  short term goal (STG);by discharge  -MM  short term goal (STG);by discharge  -MM  short term goal (STG);by discharge  -MM    Barriers (Pharyngeal Strengthening Goal 1, SLP)  n/a  -MM  n/a  -MM  n/a  -MM    Progress/Outcomes (Pharyngeal Strengthening Goal 1, SLP)  continuing progress toward goal  -MM  goal ongoing  -MM  goal ongoing  -MM      User Key  (r) = Recorded By, (t) = Taken By, (c) = Cosigned By    Initials Name Provider Type    Krystal Monroy MS CCC-RAVEN Speech and Language Pathologist          EDUCATION  The patient has been educated in the following areas:   Dysphagia (Swallowing Impairment) Oral Care/Hydration Modified Diet Instruction.    SLP Recommendation and Plan  Daily Summary of Progress (SLP): progress toward functional goals as expected  Barriers to Overall Progress (SLP): n/a  Plan for Continued Treatment (SLP): Continue to follow   Anticipated Dischage Disposition: unknown                    Time Calculation:   Time Calculation- SLP     Row Name 09/11/19 1501             Time Calculation- SLP    SLP Start Time  1430  -MM      SLP Stop Time  1453  -MM      SLP Time Calculation (min)  23 min  -MM      SLP Received On  09/11/19  -MM        User Key  (r) = Recorded By, (t) = Taken By, (c) = Cosigned By    Initials Name Provider Type    Krystal Monroy MS CCC-RAVEN Speech and Language Pathologist          Therapy Charges for Today     Code Description Service Date Service Provider Modifiers Qty    06173213391 HC ST TREATMENT SWALLOW 2 9/10/2019 Krystal Babcock MS CCC-RAVEN GN 1    [id number] HC ST TREATMENT SWALLOW 2 9/11/2019 Krystal Babcock MS CCC-RAVEN  GN 1                 Krystal Babcock, MS CCC-SLP  9/11/2019

## 2019-09-11 NOTE — PROGRESS NOTES
Neurosurgery Daily Progress Note    Assessment:   Phan Eastman is a 64 y.o. male with a significant comorbidity diabetes with diabetic neuropathy, intracerebral ischemic stroke x3.  He presents with a new problem of sudden onset of right-sided weakness and slurred speech. Physical exam findings of right-sided hemiparesis and right facial droop.  Their imaging shows 2.7 x 2.1 acute thalamic intracerebral hemorrhage.    DDX:     ICH (intracerebral hemorrhage) (CMS/HCC)    Diabetes mellitus (CMS/HCC)    Hypertensive urgency    Hyperlipidemia    Cerebrovascular accident (CVA) due to embolism of left middle cerebral artery (CMS/HCC)    Dysarthria    CKD (chronic kidney disease) stage 3, GFR 30-59 ml/min (CMS/HCC)    Left renal mass    Obesity (BMI 30-39.9)    Diastolic CHF, chronic (CMS/HCC)    Bradycardia    Constipation    Patient Active Problem List   Diagnosis   • ICH (intracerebral hemorrhage) (CMS/HCC)   • Diabetes mellitus (CMS/HCC)   • Hypertensive urgency   • Hyperlipidemia   • Cerebrovascular accident (CVA) due to embolism of left middle cerebral artery (CMS/HCC)   • Dysarthria   • CKD (chronic kidney disease) stage 3, GFR 30-59 ml/min (CMS/HCC)   • Left renal mass   • Obesity (BMI 30-39.9)   • Diastolic CHF, chronic (CMS/HCC)   • Bradycardia   • Constipation     Plan:   Neuro: Stable.  Neuro unchanged.  Confusion with minor aphasia and right-sided hemiparesis   CT stable   MRI with small left ischemic stroke.     Hx of ischemic stroke; appreciate neurology   Will require outpatient follow-up with neurology    CV: Labetolol and hydralazine with marginal blood pressures.     -170   Oral antihypertensives adjusted per hospitalist:   Appreciate assistance   Echo WNL.   Carotid US; right: 50 - 69%; left: <50%  Skin: Rash on legs.  Monitor for now.   Pulm: CPAP at home.  CXR: Moderate cardiomegaly no evidence pulmonary vascular congestion.  : Renal ultrasound: 2.8 cm solid mass suspected in the upper  "pole of the level left   OP renal MRI with and without contrast to fully evaluate lesion with OP urology follow-up.   Appreciate Urology  FEN: Tolerating puréed diet; one-to-one supervision with meals   Appreciate speech/ nutrition  Endocrine: blood glucose well controlled  GI: + BM, Dulcolax suppositories, added MiraLAX and docusate   Appreciate hospitalist  ID: PAUL  Heme:  DVT prophylaxis held for brain bleed; SCDs  Pain: Well controlled  Dispo: PT/OT   IP rehab pending placement    Chief complaint:   Right sided weakness and speech decline    Subjective  Can not communicate    Temp:  [98.2 °F (36.8 °C)-98.8 °F (37.1 °C)] 98.3 °F (36.8 °C)  Heart Rate:  [55-83] 75  Resp:  [14-22] 16  BP: (109-170)/(48-85) 136/71    Objective:  Vital signs: (most recent): Blood pressure 136/71, pulse 75, temperature 98.3 °F (36.8 °C), temperature source Oral, resp. rate 16, height 188 cm (74\"), weight 122 kg (269 lb), SpO2 93 %.        Neurologic Exam     Mental Status   Oriented to person.   Oriented to place.   Oriented to year.   Speech: slurred   Level of consciousness: alert ,  arousable by verbal stimuli  Able to name object. Unable to read. Unable to repeat. Unable to write. Abnormal comprehension.   Speech remains slurred but clearing.  Follows commands     Cranial Nerves     CN III, IV, VI   Pupils are equal, round, and reactive to light.  Extraocular motions are normal.     CN V   Facial sensation intact.     CN VII   Facial expression full, symmetric.   Left facial weakness: central    Motor Exam   Right arm pronator drift: absent  Left arm pronator drift: absent    Strength   Right deltoid: 4/5  Left deltoid: 5/5  Right biceps: 4/5  Left biceps: 5/5  Right triceps: 4/5  Left triceps: 5/5  Right interossei: 3/5  Right iliopsoas: 2/5  Left iliopsoas: 5/5  Right quadriceps: 2/5  Left quadriceps: 5/5  Right peroneal: 3/5  Right gastroc: 3/5  Left gastroc: 5/5  Strength/movement on right improving     Sensory Exam   Light " touch normal.     Gait, Coordination, and Reflexes     Reflexes   Right : 4+  Left : 4+    Drains:  NA    Imaging Results (last 24 hours)     ** No results found for the last 24 hours. **        Lab Results (last 24 hours)     Procedure Component Value Units Date/Time    Basic Metabolic Panel [[id number]]  (Abnormal) Collected:  09/11/19 0539    Specimen:  Blood Updated:  09/11/19 0718     Glucose 108 mg/dL      BUN 24 mg/dL      Creatinine 1.55 mg/dL      Sodium 141 mmol/L      Potassium 4.0 mmol/L      Chloride 105 mmol/L      CO2 27.0 mmol/L      Calcium 8.5 mg/dL      eGFR Non African Amer 46 mL/min/1.73      BUN/Creatinine Ratio 15.5     Anion Gap 9.0 mmol/L     Narrative:       GFR Normal >60  Chronic Kidney Disease <60  Kidney Failure <15    POC Glucose Once [[id number]]  (Normal) Collected:  09/10/19 2132    Specimen:  Blood Updated:  09/10/19 2152     Glucose 110 mg/dL      Comment: : [id number] David NicoleMeter ID: [id number]       POC Glucose Once [[id number]]  (Normal) Collected:  09/10/19 1811    Specimen:  Blood Updated:  09/10/19 1822     Glucose 109 mg/dL      Comment: : [id number] Kathryn MeganMeter ID: [id number]       POC Glucose Once [[id number]]  (Normal) Collected:  09/10/19 1731    Specimen:  Blood Updated:  09/10/19 1743     Glucose 96 mg/dL      Comment: : [id number] Kathryn MeganMeter ID: [id number]       POC Glucose Once [[id number]]  (Normal) Collected:  09/10/19 1217    Specimen:  Blood Updated:  09/10/19 1228     Glucose 112 mg/dL      Comment: : [id number] Kathryn MeganMeter ID: [id number]       POC Glucose Once [[id number]]  (Normal) Collected:  09/10/19 0824    Specimen:  Blood Updated:  09/10/19 0840     Glucose 95 mg/dL      Comment: : [id number] Channing LinaresileyMeter ID: [id number]           [id number]  Heriberto Avalos APRN

## 2019-09-11 NOTE — THERAPY TREATMENT NOTE
Patient Name: Phan Eastman  : 1955    MRN: [id number]                              Today's Date: 2019       Admit Date: 2019    Visit Dx:     ICD-10-CM ICD-9-CM   1. Oropharyngeal dysphagia R13.12 787.22   2. Impaired mobility and ADLs Z74.09 799.89   3. Impaired mobility Z74.09 799.89   4. Left renal mass N28.89 593.9     Patient Active Problem List   Diagnosis   • ICH (intracerebral hemorrhage) (CMS/HCC)   • Diabetes mellitus (CMS/[facility])   • Hypertensive urgency   • Hyperlipidemia   • Cerebrovascular accident (CVA) due to embolism of left middle cerebral artery (CMS/[facility])   • Dysarthria   • CKD (chronic kidney disease) stage 3, GFR 30-59 ml/min (CMS/[facility])   • Left renal mass   • Obesity (BMI 30-39.9)   • Diastolic CHF, chronic (CMS/[facility])   • Bradycardia   • Constipation   • Hematuria     Past Medical History:   Diagnosis Date   • Diabetes mellitus (CMS/[facility])    • GERD (gastroesophageal reflux disease)    • Hyperlipidemia    • Hypertension    • Neuropathy    • Osteoarthritis    • TIA (transient ischemic attack)      Past Surgical History:   Procedure Laterality Date   • CARPAL TUNNEL RELEASE Bilateral    • EYE SURGERY     • KNEE SURGERY Bilateral    • NECK SURGERY     • RETINAL DETACHMENT SURGERY     • ROTATOR CUFF REPAIR       General Information     Row Name 19 161          PT Evaluation Time/Intention    Document Type  therapy note (daily note)  -KARRI (r) TRACIE (t) KARRI (c)     Mode of Treatment  physical therapy  -KARRI (r) TRACIE (t) KARRI (c)     Row Name 19 161          General Information    Patient Profile Reviewed?  yes  -KARRI (r) TRACIE (t) KARRI (c)     Existing Precautions/Restrictions  fall  -KARRI (r) TRACIE (t) KARRI (c)     Row Name 19 161          Cognitive Assessment/Intervention- PT/OT    Personal Safety Interventions  fall prevention program maintained;supervised activity;nonskid shoes/slippers when out of bed;muscle strengthening facilitated  -KARRI (r) TRACIE (t) KARRI (c)       User Key  (r) =  Recorded By, (t) = Taken By, (c) = Cosigned By    Initials Name Provider Type    Coco Crowell, PT Physical Therapist    Chinmay Hunter, PT Student PT Student        Mobility     Row Name 09/11/19 1610          Bed Mobility Assessment/Treatment    Bed Mobility Assessment/Treatment  rolling left;scooting/bridging;supine-sit-supine  -JE (r) DN (t) JE (c)     Rolling Left [place] (Bed Mobility)  verbal cues;2 person assist;moderate assist (50% patient effort)  -JE (r) DN (t) JE (c)     Scooting/Bridging [place] (Bed Mobility)  moderate assist (50% patient effort);verbal cues Bed was in trendelenburg position and used bed rails in order to scoot closer to HOB independently. Patient required stabilization at feet in order to bridge partially.  -JE (r) DN (t) JE (c)     Supine-Sit-Supine [place] (Bed Mobility)  maximum assist (25% patient effort);verbal cues;2 person assist;nonverbal cues (demo/gesture)  -JE (r) DN (t) JE (c)     Assistive Device (Bed Mobility)  bed rails;head of bed elevated;draw sheet  -JE (r) DN (t) JE (c)       User Key  (r) = Recorded By, (t) = Taken By, (c) = Cosigned By    Initials Name Provider Type    Coco Crowell, PT Physical Therapist    Chinmay Hunter, PT Student PT Student        Obj/Interventions     Row Name 09/11/19 1610          Therapeutic Exercise    Lower Extremity (Therapeutic Exercise)  quad sets, right;heel slides, right;LAQ (long arc quad), right  -JE (r) DN (t) JE (c)     Core Strength (Therapeutic Exercise)  bridging, bilateral lower extremities attempted, unable to raise fully off bed.  -JE (r) DN (t) JE (c)     Exercise Type (Therapeutic Exercise)  AAROM (active assistive range of motion);AROM (active range of motion)  -JE (r) DN (t) JE (c)     Position (Therapeutic Exercise)  seated;supine  -JE (r) DN (t) JE (c)     Sets/Reps (Therapeutic Exercise)  5-10 reps  -JE (r) DN (t) JE (c)     Expected Outcome (Therapeutic Exercise)  improve  functional stability;improve functional tolerance, self-care activity;improve postural control;improve neuromuscular control  -JE (r) DN (t) JE (c)     Row Name 09/11/19 1610          Static Sitting Balance    Level of [place] (Unsupported Sitting, Static Balance)  maximal assist, 25 to 49% patient effort;2 person assist  -JE (r) DN (t) JE (c)     Sitting Position (Unsupported Sitting, Static Balance)  sitting on edge of bed  -JE (r) DN (t) JE (c)     Time Able to Maintain Position (Unsupported Sitting, Static Balance)  more than 5 minutes  -JE (r) DN (t) JE (c)     Comment (Unsupported Sitting, Static Balance)  Required verbal cues to lean forward and toward midline in order to stay upright. In supported static sitting, CGA with use of therapist hand for stabilization and to pull/maintain posture.  -JE (r) DN (t) JE (c)       User Key  (r) = Recorded By, (t) = Taken By, (c) = Cosigned By    Initials Name Provider Type    Coco Crowell, PT Physical Therapist    Chinmay Hunter, PT Student PT Student        Goals/Plan    No documentation.       Clinical Impression     Row Name 09/11/19 1610          Pain Assessment    Additional Documentation  Pain Scale: Numbers Pre/Post-Treatment (Group)  -JE (r) DN (t) JE (c)     Row Name 09/11/19 1610          Pain Scale: Numbers Pre/Post-Treatment    Pain Scale: Numbers, Pretreatment  0/10 - no pain Pain 7/10 during treatment  -JE (r) DN (t) JE (c)     Pain Scale: Numbers, Post-Treatment  0/10 - no pain  -JE (r) DN (t) JE (c)     Pain Location - Side  Left  -JE (r) DN (t) JE (c)     Pain Location  abdomen  -JE (r) DN (t) JE (c)     Pre/Post Treatment Pain Comment  Left abdomen pain throughout treatment  -JE (r) DN (t) JE (c)     Pain Intervention(s)  Medication (See MAR);Ambulation/increased activity;Repositioned;Rest  -JE (r) DN (t) JE (c)     Row Name 09/11/19 1610          Positioning and Restraints    Pre-Treatment Position  in bed  -KARRI (r) DN (t) JE (c)      Post Treatment Position  bed  -KARRI (r) TRACIE (t) KARRI (c)     In Bed  side lying left;call light within reach;encouraged to call for assist;side rails up x3;SCD pump applied;pillow between legs;heels elevated  -KARRI (r) TRACIE (t) KARRI (c)       User Key  (r) = Recorded By, (t) = Taken By, (c) = Cosigned By    Initials Name Provider Type    Coco Crowell, PT Physical Therapist    Chinmay Hunter, PT Student PT Student        Outcome Measures    No documentation.       Physical Therapy Education     Title: PT OT SLP Therapies (In Progress)     Topic: Physical Therapy (In Progress)     Point: Mobility training (Done)     Learning Progress Summary           Patient Acceptance, E,D, VU by TRACIE at 9/11/2019  4:25 PM    Comment:  Pt educated on bed mobility techniques and benefits of activity. Pt instructed on performing quad sets while in bed.    Acceptance, E, VU,NR by TITUS at 9/4/2019  8:11 AM    Comment:  Rolling in bed. Bed mobility.    Acceptance, E, VU,NR by JAKE at 9/2/2019  7:20 AM    Comment:  Educated pt on progression of PT POC and benefits of activity                               User Key     Initials Effective Dates Name Provider Type Discipline    JAKE 08/02/16 -  Gilmer Medrano, PT DPT Physical Therapist PT    TITUS 08/02/16 -  Kathrine Ramsay, PTA Physical Therapy Assistant PT    TRACIE 07/26/19 -  Chinmay Mix, PT Student PT Student PT              PT Recommendation and Plan     Plan of Care Reviewed With: patient  Progress: improving  Outcome Summary: PT treatment completed this afternoon. Pt performed rolling to L and supine-sit-supine with assist of 2. Pt sat EOB x2, pt was CGA sometimes using therapist's hand to pull himself upright and lean forward. Pt remarked pain in L abdomen throughout treatment and it hurt more with leaning to the L in sitting. Nursed notified of pain.     Time Calculation:   PT Charges     Row Name 09/11/19 1631 09/11/19 1029          Time Calculation    Start Time  1525  -KARRI (r) TRACIE (t) KARRI  (c)  0910  -KJ     Stop Time  1608  -JE (r) DN (t) JE (c)  0935  -KJ     Time Calculation (min)  43 min  -JE (r) DN (t)  25 min  -KJ     PT Received On  09/11/19  -JE (r) DN (t) JE (c)  09/11/19  -KJ     PT Goal Re-Cert Due Date  09/21/19  -JE (r) DN (t) JE (c)  09/12/19  -KJ        Time Calculation- PT    Total Timed Code Minutes- PT  —  25 minute(s)  -KJ        Timed Charges    54612 - PT Therapeutic Activity Minutes  43  -JE (r) DN (t) JE (c)  —       User Key  (r) = Recorded By, (t) = Taken By, (c) = Cosigned By    Initials Name Provider Type    Debby Thompson, PTA Physical Therapy Assistant    Coco Crowell, PT Physical Therapist    Chinmay Hunter, PT Student PT Student        Therapy Charges for Today     Code Description Service Date Service Provider Modifiers Qty    [id number] HC PT THERAPEUTIC ACT EA 15 MIN 9/11/2019 Chinmay Mix, PT Student GP 2          PT G-Codes  Outcome Measure Options: (P) AM-PAC 6 Clicks Daily Activity (OT)  AM-PAC 6 Clicks Score (PT): 8  AM-PAC 6 Clicks Score (OT): (P) 10    Chinmay Mix, PT Student  9/11/2019

## 2019-09-11 NOTE — PROGRESS NOTES
Continued Stay Note   [place]     Patient Name: Phan Eastman  MRN: [id number]  Today's Date: 9/11/2019    Admit Date: 8/31/2019    Discharge Plan     Row Name 09/11/19 1047       Plan    Plan Comments  SPOKE TO ADMISSIONS WITH [facility] ([phone]) AND THEY HAVE STARTED PRECERT. AWAIT INSURANCE DECISION.         Discharge Codes    No documentation.             ANETTE Richards

## 2019-09-11 NOTE — PROGRESS NOTES
[facility]  INPATIENT PROGRESS NOTE    Patient Name: Phan Eastman  Date of Admission: 8/31/2019  Today's Date: 09/11/19  Length of Stay: 11  Primary Care Physician: Emmanuel Mclain MD    Subjective   Chief Complaint: back pain  HPI     Patient was seen and examined at bedside.  Patient indicates that he is having back pain.  Patient indicates that this is chronic back pain, but it has been worsening over the last several months, and it is especially worse since he has been stuck in bed.  Patient is noted to have some hematuria with some blood clots around his meatus.  Urology has been consulted and recommended Alejo catheter.        Review of Systems   Constitutional: Negative for activity change, chills, diaphoresis, fatigue and fever.   Respiratory: Negative for cough and shortness of breath.    Cardiovascular: Negative for chest pain and palpitations.   Gastrointestinal: Negative for abdominal distention, abdominal pain, constipation, diarrhea, nausea and vomiting.   Musculoskeletal: Positive for back pain, myalgias and neck pain.        All pertinent negatives and positives are as above. All other systems have been reviewed and are negative unless otherwise stated.     Objective    Temp:  [98.2 °F (36.8 °C)-98.8 °F (37.1 °C)] 98.3 °F (36.8 °C)  Heart Rate:  [58-83] 75  Resp:  [16-22] 16  BP: (109-166)/(48-85) 136/71  Physical Exam  Constitutional: No distress.   HENT:   Head: Normocephalic and atraumatic.   Eyes: Conjunctivae are normal. No scleral icterus.   Neck: Neck supple. No JVD present.   Cardiovascular: Intact distal pulses. Exam reveals no gallop and no friction rub.   Murmur heard. Normal rate  Pulmonary/Chest: Effort normal and breath sounds normal. No stridor. No respiratory distress. He has no wheezes.   Abdominal: Soft. Bowel sounds are normal. He exhibits no distension and no mass. There mild tenderness. There is no guarding.      Musculoskeletal: He exhibits no edema.   Neurological: He is alert.   Oriented to name and place; not time; right sided weakness    Skin: Skin is warm and dry. He is not diaphoretic. No erythema. Ecchymosis on the right side   Psychiatric: He has a normal mood and affect. His behavior is normal.   Nursing note and vitals reviewed.      Results Review:  I have reviewed the labs, radiology results, and diagnostic studies.    Laboratory Data:          Results from last 7 days   Lab Units 09/11/19  0539 09/10/19  0553 09/09/19  0658   SODIUM mmol/L 141 138 140   POTASSIUM mmol/L 4.0 4.2 4.4   CHLORIDE mmol/L 105 107 109   CO2 mmol/L 27.0 24.0 24.0   BUN mg/dL 24* 26* 28*   CREATININE mg/dL 1.55* 1.52* 1.61*   CALCIUM mg/dL 8.5* 8.6 8.8   GLUCOSE mg/dL 108* 84 95       Culture Data:        Radiology Data:   Imaging Results (last 24 hours)     ** No results found for the last 24 hours. **          I have reviewed the patient's current medications.     Assessment/Plan     Active Hospital Problems    Diagnosis   • **ICH (intracerebral hemorrhage) (CMS/[facility])   • Hematuria   • Constipation   • Obesity (BMI 30-39.9)   • Diastolic CHF, chronic (CMS/[facility])   • Bradycardia   • CKD (chronic kidney disease) stage 3, GFR 30-59 ml/min (CMS/[facility])   • Left renal mass   • Cerebrovascular accident (CVA) due to embolism of left middle cerebral artery (CMS/[facility])   • Dysarthria   • Diabetes mellitus (CMS/[facility])   • Hypertensive urgency   • Hyperlipidemia       Plan:  1.  Continue losartan at 100 mg, clonidine at 0.3 mg, amlodipine 10 mg, hydralazine 100 mg, increased HCTZ 25 mg and terazosin 10 mg  2.  PRN antihypertensives  3.  Overnight pulse oximetry showed 388 desaturations, would recommend 2L NC at night   4.  Outpatient MRI with and without contrast for left renal mass  5.  Nasal saline q4h   6.  Creatinine stable   7.  Lidoderm patch   8.  Patient successfully had BM yesterday   9.  Continue miralax and docusate senna daily  10.  AM CBC  and BMP    11.  Up to chair with meals   12.  Consult urology for hematuria and blood on meatus  13.  Discussed case with neurosurgery Heriberto KRAUS              Discharge Planning: I expect the patient to be discharged to SNF in 1-2 days per attending.    Tanner Rodgers MD   09/11/19   10:33 AM

## 2019-09-12 NOTE — THERAPY TREATMENT NOTE
"Acute Care - Speech Language Pathology   Swallow Treatment Note [facility]     Patient Name: Phan Eastman  : 1955  MRN: [id number]  Today's Date: 2019  Onset of Illness/Injury or Date of Surgery: (P) 19     Referring Physician: MD Wyatt (P)      Admit Date: 2019     Swallowing tx completed this AM following RN and OT report that pt exhibited quite excessive coughing with thin liquids during OT session this AM, with report that pt later called out saying he was \"choking\". Upon ST arrival, pt was fatigued, yet cooperative. Complained of back pain, therefore, he was unable to tolerate upright 90 degree positioning. He was noted to have laborious breathing, c/o abdominal discomfort. Pulse oximetery was obtained with initial reading at 87%, that then quickly increased to the mid 90s and was maintained until the end of the session, when it then decreased to the upper 80s. He was presented trials of nectar thick liquids via straw, as well as regular solid diet consistency trials. With nectar thick trials, pt was observed to have decreased labial seal on straw, yet functional for bolus extraction. He tolerated such trials without overt s/s of aspiration. With regular solid, pt was noted to have decreased rotary chew, with slightly prolonged oral prep. Oral residue was felt to be WFL post solid. RN was notified of SLP recommendation to downgrade to nectar thick liquids at this time, continuing his mechanical soft diet. He is ok for ice chips between meals with supervision. She was also educated on 02 readings noted during session. ST to continue to follow and tx for dysphagia. Nanette Malone, ESTEFANIA-SLP 2019 2:06 PM    Visit Dx:      ICD-10-CM ICD-9-CM   1. Oropharyngeal dysphagia R13.12 787.22   2. Impaired mobility and ADLs Z74.09 799.89   3. Impaired mobility Z74.09 799.89   4. Left renal mass N28.89 593.9     Patient Active Problem List   Diagnosis   • ICH (intracerebral hemorrhage) " (CMS/[facility])   • Diabetes mellitus (CMS/[facility])   • Hypertensive urgency   • Hyperlipidemia   • Cerebrovascular accident (CVA) due to embolism of left middle cerebral artery (CMS/[facility])   • Dysarthria   • CKD (chronic kidney disease) stage 3, GFR 30-59 ml/min (CMS/[facility])   • Left renal mass   • Obesity (BMI 30-39.9)   • Diastolic CHF, chronic (CMS/[facility])   • Bradycardia   • Constipation   • Hematuria       Therapy Treatment  Rehabilitation Treatment Summary     Row Name 09/12/19 1125 09/12/19 1109          Treatment Time/Intention    Discipline  physical therapy assistant  -KJ  speech language pathologist  -TM     Document Type  therapy note (daily note)  -KJ  therapy note (daily note)  -TM     Subjective Information  complains of;pain  -KJ2  complains of;pain  -TM     Mode of Treatment  physical therapy  -KJ2  individual therapy;speech-language pathology  -TM     Patient/Family Observations  —  No family present.  -TM     Care Plan Review  —  care plan/treatment goals reviewed;risks/benefits reviewed  -     Care Plan Review, Other Participant(s)  —  other (see comments) Imani CHEN  -TM2     Patient Effort  good  -KJ2  adequate  -TM2     Comment  keep systolic pressure  -KJ2  —     Existing Precautions/Restrictions  fall  -KJ2  —     Treatment Considerations/Comments  right side weakness  -KJ2  —     Recorded by [KJ] Debby Garcia, PTA 09/12/19 1125  [KJ2] Debby Garcia, PTA 09/12/19 1211 [TM] Nanette Malone CCC-SLP 09/12/19 1318  [TM2] Nanette Malone CCC-SLP 09/12/19 1355     Row Name 09/12/19 1125             Bed Mobility Assessment/Treatment    Supine-Sit [place] (Bed Mobility)  verbal cues;maximum assist (25% patient effort);2 person assist  -KJ      Sit-Supine [place] (Bed Mobility)  maximum assist (25% patient effort);2 person assist;verbal cues  -KJ      Bed Mobility, Safety Issues  decreased use of arms for pushing/pulling;decreased use of legs for bridging/pushing  -KJ      Comment (Bed Mobility)   sit edge of bed x 15 minutes max assistance for sitting balance  -KJ      Recorded by [KJ] Debby Garcia, [facility] 09/12/19 1211      Row Name 09/12/19 1125             Gait/Stairs Assessment/Training    Comment (Gait/Stairs)  not appropriate for standing at this time  -KJ      Recorded by [KJ] Debby Garcia, [facility] 09/12/19 1211      Row Name 09/12/19 1125             Therapeutic Exercise    Exercise Type (Therapeutic Exercise)  AAROM (active assistive range of motion)  -KJ      Position (Therapeutic Exercise)  seated  -KJ      Sets/Reps (Therapeutic Exercise)  5-10 reps   -KJ      Comment (Therapeutic Exercise)  static sitting posture activites,reaching all planes LUE, weight bearing RUE,   -KJ      Recorded by [KJ] Debby Garcia, [facility] 09/12/19 1211      Row Name 09/12/19 1125             Static Sitting Balance    Level of [place] (Unsupported Sitting, Static Balance)  maximal assist, 25 to 49% patient effort;2 person assist  -KJ      Sitting Position (Unsupported Sitting, Static Balance)  sitting on edge of bed  -KJ      Time Able to Maintain Position (Unsupported Sitting, Static Balance)  more than 5 minutes  -KJ      Recorded by [KJ] Debby Garcia, [facility] 09/12/19 1211      Row Name 09/12/19 1125             Positioning and Restraints    Pre-Treatment Position  in bed  -KJ      Post Treatment Position  bed  -KJ      Recorded by [KJ] Debby Garcia, [facility] 09/12/19 1211      Row Name 09/12/19 1109             Pain Assessment    Additional Documentation  Pain Scale: FACES Pre/Post-Treatment (Group)  -TM      Recorded by [TM] Nanette Malone CCC-SLP 09/12/19 1355      Row Name 09/12/19 1125 09/12/19 1109          Pain Scale: Numbers Pre/Post-Treatment    Pain Scale: Numbers, Pretreatment  4/10  -KJ  5/10  -TM     Pain Scale: Numbers, Post-Treatment  8/10  -KJ  8/10  -TM     Pain Location - Side  Right  -KJ  —     Pain Location - Orientation  —  lower  -TM     Pain Location  flank  -KJ  back  -TM     Recorded by  [KJ] Debby Garcia, PTA 09/12/19 1211 [TM] Nanette Malone CCC-SLP 09/12/19 1355     Row Name 09/12/19 1109             Outcome Summary/Treatment Plan (SLP)    Daily Summary of Progress (SLP)  progress towards functional goals is fair  -TM      Barriers to Overall Progress (SLP)  Medically complex  -TM      Plan for Continued Treatment (SLP)  Downgrade to nectar thick liquids. ST to continue to tx.  -TM      Anticipated Dischage Disposition  unknown  -TM      Recorded by [TM] Nanette Malone CCC-SLP 09/12/19 1355        User Key  (r) = Recorded By, (t) = Taken By, (c) = Cosigned By    Initials Name Effective Dates Discipline    KJ Debby Garcia, PTA 08/02/16 -  PT    TM Nanette Malone CCC-SLP 08/02/16 -  SLP          Outcome Summary  Outcome Summary/Treatment Plan (SLP)  Daily Summary of Progress (SLP): progress towards functional goals is fair (09/12/19 1109 : Nanette Malone, CCC-SLP)  Barriers to Overall Progress (SLP): Medically complex (09/12/19 1109 : Nanette Malone, CCC-SLP)  Plan for Continued Treatment (SLP): Downgrade to nectar thick liquids. ST to continue to tx. (09/12/19 1109 : Nanette Malone, CCC-SLP)  Anticipated Dischage Disposition: unknown (09/12/19 1109 : Nanette Malone, CCC-SLP)      SLP GOALS     Row Name 09/12/19 1109 09/11/19 1430 09/10/19 1229       Oral Nutrition/Hydration Goal 1 (SLP)    Oral Nutrition/Hydration Goal 1, SLP  LTG: Patient will tolerate LRD with no overt s/s of aspiration.  -TM  LTG: Patient will tolerate LRD with no overt s/s of aspiration.  -MM  LTG: Patient will tolerate LRD with no overt s/s of aspiration.  -MM    Time Frame (Oral Nutrition/Hydration Goal 1, SLP)  by discharge  -TM  by discharge  -MM  by discharge  -MM    Barriers (Oral Nutrition/Hydration Goal 1, SLP)  n/a  -TM  n/a  -MM  n/a  -MM    Progress/Outcomes (Oral Nutrition/Hydration Goal 1, SLP)  progress slower than expected  -TM  continuing progress toward goal  -MM  continuing progress toward  goal  -MM       Labial Strengthening Goal 1 (SLP)    Activity (Labial Strengthening Goal 1, SLP)  increase labial tone  -TM  increase labial tone  -MM  increase labial tone  -MM    Increase Labial Tone  labial resistance exercises  -TM  labial resistance exercises  -MM  labial resistance exercises  -MM    [place]/Accuracy (Labial Strengthening Goal 1, SLP)  independently (over 90% accuracy)  -TM  independently (over 90% accuracy)  -MM  independently (over 90% accuracy)  -MM    Time Frame (Labial Strengthening Goal 1, SLP)  short term goal (STG);by discharge  -TM  short term goal (STG);by discharge  -MM  short term goal (STG);by discharge  -MM    Barriers (Labial Strengthening Goal 1, SLP)  n/a  -TM  n/a  -MM  n/a  -MM    Progress/Outcomes (Labial Strengthening Goal 1, SLP)  goal ongoing  -TM  continuing progress toward goal  -MM  continuing progress toward goal  -MM       Lingual Strengthening Goal 1 (SLP)    Activity (Lingual Strengthening Goal 1, SLP)  increase lingual tone/sensation/control/coordination/movement  -TM  increase lingual tone/sensation/control/coordination/movement  -MM  increase lingual tone/sensation/control/coordination/movement  -MM    Increase Lingual Tone/Sensation/Control/Coordination/Movement  lingual movement exercises  -TM  lingual movement exercises  -MM  lingual movement exercises  -MM    [place]/Accuracy (Lingual Strengthening Goal 1, SLP)  independently (over 90% accuracy)  -TM  independently (over 90% accuracy)  -MM  independently (over 90% accuracy)  -MM    Time Frame (Lingual Strengthening Goal 1, SLP)  by discharge  -TM  by discharge  -MM  by discharge  -MM    Barriers (Lingual Strengthening Goal 1, SLP)  n/a  -TM  n/a  -MM  n/a  -MM    Progress/Outcomes (Lingual Strengthening Goal 1, SLP)  goal ongoing  -TM  continuing progress toward goal  -MM  continuing progress toward goal  -MM       Pharyngeal Strengthening Exercise Goal 1 (SLP)    Activity (Pharyngeal Strengthening  Goal 1, SLP)  increase epiglottic inversion and retroflexion;increase tongue base retraction;increase pharyngeal sensation  -TM  increase epiglottic inversion and retroflexion;increase tongue base retraction;increase pharyngeal sensation  -MM  increase epiglottic inversion and retroflexion;increase tongue base retraction;increase pharyngeal sensation  -MM    Increase Pharyngeal Sensation  gustatory stimulation (sour/cold)  -TM  gustatory stimulation (sour/cold)  -MM  gustatory stimulation (sour/cold)  -MM    Increase Epiglottic Inversion and Retroflexion  Mendelsohn;falsetto  -TM  Mendelsohn;falsetto  -MM  Mendelsohn;falsetto  -MM    Increase Tongue Base Retraction  hard effortful swallow;kaleb  -TM  hard effortful swallow;kaleb  -MM  hard effortful swallow;kaleb  -MM    [place]/Accuracy (Pharyngeal Strengthening Goal 1, SLP)  independently (over 90% accuracy)  -TM  independently (over 90% accuracy)  -MM  independently (over 90% accuracy)  -MM    Time Frame (Pharyngeal Strengthening Goal 1, SLP)  short term goal (STG);by discharge  -TM  short term goal (STG);by discharge  -MM  short term goal (STG);by discharge  -MM    Barriers (Pharyngeal Strengthening Goal 1, SLP)  n/a  -TM  n/a  -MM  n/a  -MM    Progress/Outcomes (Pharyngeal Strengthening Goal 1, SLP)  goal ongoing  -TM  continuing progress toward goal  -MM  goal ongoing  -MM    Row Name 09/09/19 1413             Oral Nutrition/Hydration Goal 1 (SLP)    Oral Nutrition/Hydration Goal 1, SLP  LTG: Patient will tolerate LRD with no overt s/s of aspiration.  -MM      Time Frame (Oral Nutrition/Hydration Goal 1, SLP)  by discharge  -MM      Barriers (Oral Nutrition/Hydration Goal 1, SLP)  n/a  -MM      Progress/Outcomes (Oral Nutrition/Hydration Goal 1, SLP)  continuing progress toward goal  -MM         Labial Strengthening Goal 1 (SLP)    Activity (Labial Strengthening Goal 1, SLP)  increase labial tone  -MM      Increase Labial Tone  labial resistance  exercises  -MM      [place]/Accuracy (Labial Strengthening Goal 1, SLP)  independently (over 90% accuracy)  -MM      Time Frame (Labial Strengthening Goal 1, SLP)  short term goal (STG);by discharge  -MM      Barriers (Labial Strengthening Goal 1, SLP)  n/a  -MM      Progress/Outcomes (Labial Strengthening Goal 1, SLP)  continuing progress toward goal  -MM         Lingual Strengthening Goal 1 (SLP)    Activity (Lingual Strengthening Goal 1, SLP)  increase lingual tone/sensation/control/coordination/movement  -MM      Increase Lingual Tone/Sensation/Control/Coordination/Movement  lingual movement exercises  -MM      [place]/Accuracy (Lingual Strengthening Goal 1, SLP)  independently (over 90% accuracy)  -MM      Time Frame (Lingual Strengthening Goal 1, SLP)  by discharge  -MM      Barriers (Lingual Strengthening Goal 1, SLP)  n/a  -MM      Progress/Outcomes (Lingual Strengthening Goal 1, SLP)  continuing progress toward goal  -MM         Pharyngeal Strengthening Exercise Goal 1 (SLP)    Activity (Pharyngeal Strengthening Goal 1, SLP)  increase epiglottic inversion and retroflexion;increase tongue base retraction;increase pharyngeal sensation  -MM      Increase Pharyngeal Sensation  gustatory stimulation (sour/cold)  -MM      Increase Epiglottic Inversion and Retroflexion  Mendelsohn;falsetto  -MM      Increase Tongue Base Retraction  hard effortful swallow;kaleb  -MM      [place]/Accuracy (Pharyngeal Strengthening Goal 1, SLP)  independently (over 90% accuracy)  -MM      Time Frame (Pharyngeal Strengthening Goal 1, SLP)  short term goal (STG);by discharge  -MM      Barriers (Pharyngeal Strengthening Goal 1, SLP)  n/a  -MM      Progress/Outcomes (Pharyngeal Strengthening Goal 1, SLP)  goal ongoing  -MM        User Key  (r) = Recorded By, (t) = Taken By, (c) = Cosigned By    Initials Name Provider Type    Nanette Lawrence, CCC-SLP Speech and Language Pathologist    MM Krystal Babcock, MS CCC-SLP  Speech and Language Pathologist          EDUCATION  The patient has been educated in the following areas:   Dysphagia (Swallowing Impairment).    SLP Recommendation and Plan  Daily Summary of Progress (SLP): progress towards functional goals is fair  Barriers to Overall Progress (SLP): Medically complex  Plan for Continued Treatment (SLP): Downgrade to nectar thick liquids. ST to continue to tx.  Anticipated Dischage Disposition: unknown                    Time Calculation:   Time Calculation- SLP     Row Name 09/12/19 1405             Time Calculation- SLP    SLP Start Time  1109  -TM      SLP Stop Time  1127  -TM      SLP Time Calculation (min)  18 min  -TM      SLP Received On  09/12/19  -        User Key  (r) = Recorded By, (t) = Taken By, (c) = Cosigned By    Initials Name Provider Type     Nanette Malone CCC-SLP Speech and Language Pathologist          Therapy Charges for Today     Code Description Service Date Service Provider Modifiers Qty    [id number]  ST TREATMENT SWALLOW 1 9/12/2019 Nanette Malone CCC-SLP GN 1                 NESTOR Hartley  9/12/2019

## 2019-09-12 NOTE — PROGRESS NOTES
Continued Stay Note   [facility]     Patient Name: Phan Eastman  MRN: [id number]  Today's Date: 9/12/2019    Admit Date: 8/31/2019    Discharge Plan     Row Name 09/12/19 1522       Plan    Plan Comments  SPOKE TO SANDRA WITH ADMISSIONS ([phone]) AND SHE STATES [facility] IS STILL PENDING. SHE DID SEND UPDATED INFO TO [facility]. AWAITING INSURANCE.         Discharge Codes    No documentation.             ANETTE Richards

## 2019-09-12 NOTE — PLAN OF CARE
"Problem: Patient Care Overview  Goal: Plan of Care Review  Outcome: Ongoing (interventions implemented as appropriate)   09/12/19 5598   Coping/Psychosocial   Plan of Care Reviewed With patient;other (see comments)  (RN, Imani)   Plan of Care Review   Progress no change   OTHER   Outcome Summary Swallowing tx completed this AM following RN and OT report that pt exhibited quite excessive coughing with thin liquids during OT session this AM, with report that pt later called out saying he was \"choking\". Upon ST arrival, pt was fatigued, yet cooperative. Complained of back pain, therefore, he was unable to tolerate upright 90 degree positioning. He was noted to have laborious breathing, c/o abdominal discomfort. Pulse oximetery was obtained with initial reading at 87%, that then quickly increased to the mid 90s and was maintained until the end of the session, when it then decreased to the upper 80s. He was presented trials of nectar thick liquids via straw, as well as regular solid diet consistency trials. With nectar thick trials, pt was observed to have decreased labial seal on straw, yet functional for bolus extraction. He tolerated such trials without overt s/s of aspiration. With regular solid, pt was noted to have decreased rotary chew, with slightly prolonged oral prep. Oral residue was felt to be WFL post solid. RN was notified of SLP recommendation to downgrade to nectar thick liquids at this time, continuing his mechanical soft diet. He is ok for ice chips between meals with supervision. She was also educated on 02 readings noted during session. ST to continue to follow and tx for dysphagia.          "

## 2019-09-12 NOTE — PLAN OF CARE
Problem: Patient Care Overview  Goal: Plan of Care Review  Outcome: Ongoing (interventions implemented as appropriate)   09/12/19 1035   Coping/Psychosocial   Plan of Care Reviewed With patient   Plan of Care Review   Progress no change   OTHER   Outcome Summary OT re-cert completed. Pt fowlers with breakfast tray upon arrival. Pt stated that he could not reach to eat. Pt was repositioned, given verbal cues throughout self-feeding, and requried Evan to finish. Pt demo'd one instance of coughing when sipping water, SLP notified. Pt demo'd increased lethargy and arousal this am. Pt completed bed mobility by scooting with use of Evan, bed rails, and bed inversion. Pt had c/o of pain at 5/10 in L abdomen and 4/10 in lower back at rest. Pt verbalized that pain increased to 7/10 in abdomen and back with movement. Repositioning was provided for pain relief and nrsg was notified. Pt demo'd LUE AROM and  WFL and RUE diminished , R shoulder AROM (0 degrees) and strength 1/5. OT indicated to address ADL, bed mobiltiy, and endurance. Recommend D/C to SNF.

## 2019-09-12 NOTE — PROGRESS NOTES
"    [facility] Medicine Services  INPATIENT PROGRESS NOTE    Patient Name: Phan Eastman  Date of Admission: 8/31/2019  Today's Date: 09/12/19  Length of Stay: 12  Primary Care Physician: Emmanuel Mclain MD    Subjective   Chief Complaint: \"not doing good today\"  HPI     Patient was seen and examined at bedside.  Patient indicates that he has \"not doing well today\".  When asked why.  He indicates that he is tired been stuck in bed.  He indicates that he would like to get up to the chair.  He was told that whenever the left team comes in today he will be able to get up in a chair.  Patient would like to do so.  Indicates that his left hand is feeling weak, but he thinks it is because he has been laying on it.  Patient tolerating p.o. intake, but he is not eating a whole lot, because indicates that he does not like the food.  Blood pressures been adequately controlled with a systolic blood pressure max of 158.  Patient has been afebrile and hemodynamically stable.  Patient appropriate for discharge when accepted.        Review of Systems   Constitutional: Positive for activity change and appetite change. Negative for chills, diaphoresis, fatigue and fever.   Respiratory: Negative for cough and shortness of breath.    Cardiovascular: Negative for chest pain and palpitations.   Gastrointestinal: Negative for abdominal distention, abdominal pain, constipation, diarrhea, nausea and vomiting.   Neurological: Negative for weakness.        All pertinent negatives and positives are as above. All other systems have been reviewed and are negative unless otherwise stated.     Objective    Temp:  [97.7 °F (36.5 °C)-98.6 °F (37 °C)] 97.7 °F (36.5 °C)  Heart Rate:  [57-61] 61  Resp:  [16-19] 18  BP: (136-158)/(52-69) 148/57  Physical Exam  Constitutional: No distress.   HENT:   Head: Normocephalic and atraumatic.   Eyes: Conjunctivae are normal. No scleral icterus.   Neck: Neck " supple. No JVD present.   Cardiovascular: Intact distal pulses. Exam reveals no gallop and no friction rub.   Murmur heard. Normal rate  Pulmonary/Chest: Effort normal and breath sounds normal. No stridor. No respiratory distress. He has no wheezes.   Abdominal: Soft. Bowel sounds are normal. He exhibits no distension and no mass. There mild tenderness. There is no guarding.   Musculoskeletal: He exhibits no edema.   Neurological: He is alert.   Oriented to name and place; not time; right sided weakness    Skin: Skin is warm and dry. He is not diaphoretic. No erythema. Ecchymosis on the right side   Psychiatric: He has a normal mood and affect. His behavior is normal.   Nursing note and vitals reviewed.      Results Review:  I have reviewed the labs, radiology results, and diagnostic studies.    Laboratory Data:   Results from last 7 days   Lab Units 09/12/19  0525   WBC 10*3/mm3 8.10   HEMOGLOBIN g/dL 10.8*   HEMATOCRIT % 33.3*   PLATELETS 10*3/mm3 233        Results from last 7 days   Lab Units 09/12/19  0525 09/11/19  0539 09/10/19  0553   SODIUM mmol/L 140 141 138   POTASSIUM mmol/L 4.0 4.0 4.2   CHLORIDE mmol/L 103 105 107   CO2 mmol/L 29.0 27.0 24.0   BUN mg/dL 22 24* 26*   CREATININE mg/dL 1.51* 1.55* 1.52*   CALCIUM mg/dL 8.7 8.5* 8.6   GLUCOSE mg/dL 113* 108* 84       Culture Data:        Radiology Data:   Imaging Results (last 24 hours)     ** No results found for the last 24 hours. **          I have reviewed the patient's current medications.     Assessment/Plan     Active Hospital Problems    Diagnosis   • **ICH (intracerebral hemorrhage) (CMS/[facility])   • Hematuria   • Constipation   • Obesity (BMI 30-39.9)   • Diastolic CHF, chronic (CMS/[facility])   • Bradycardia   • CKD (chronic kidney disease) stage 3, GFR 30-59 ml/min (CMS/[facility])   • Left renal mass   • Cerebrovascular accident (CVA) due to embolism of left middle cerebral artery (CMS/[facility])   • Dysarthria   • Diabetes mellitus (CMS/[facility])   • Hypertensive  urgency   • Hyperlipidemia     Plan:  1.  Continue losartan at 100 mg, clonidine at 0.3 mg, amlodipine 10 mg, hydralazine 100 mg, increased HCTZ 25 mg and terazosin 10 mg  2.  PRN antihypertensives  3.  Overnight pulse oximetry showed 388 desaturations, would recommend 2L NC at night   4.  Outpatient MRI with and without contrast for left renal mass  5.  Nasal saline q4h   6.  Creatinine stable   7.  Lidoderm patch   8.  Continue miralax and docusate senna daily  9.  AM CBC and BMP    10.  Up to chair with meals   11.  Consult urology for hematuria and blood on meatus  12.  Maintain obrien until he sees urology as an outpatient    Will sign off.  Please re-consult if further assistance or needed or can contact me directly for questions.    Final recommendations:  1.  Continue current BP medications at discharge  2.  Lidoderm patch on back PRN   3.  Urology outpatient follow-up  4.  Continue bowl regimen                  Discharge Planning: I expect the patient to be discharged to SNF per attending.      Tanner Rodgers MD   09/12/19   12:57 PM

## 2019-09-12 NOTE — PROGRESS NOTES
Neurosurgery Daily Progress Note    Assessment:   Phan Eastman is a 64 y.o. male with a significant comorbidity diabetes with diabetic neuropathy, intracerebral ischemic stroke x3.  He presents with a new problem of sudden onset of right-sided weakness and slurred speech. Physical exam findings of right-sided hemiparesis and right facial droop.  Their imaging shows 2.7 x 2.1 acute thalamic intracerebral hemorrhage.    DDX:     ICH (intracerebral hemorrhage) (CMS/HCC)    Diabetes mellitus (CMS/HCC)    Hypertensive urgency    Hyperlipidemia    Cerebrovascular accident (CVA) due to embolism of left middle cerebral artery (CMS/HCC)    Dysarthria    CKD (chronic kidney disease) stage 3, GFR 30-59 ml/min (CMS/HCC)    Left renal mass    Obesity (BMI 30-39.9)    Diastolic CHF, chronic (CMS/HCC)    Bradycardia    Constipation    Hematuria    Patient Active Problem List   Diagnosis   • ICH (intracerebral hemorrhage) (CMS/HCC)   • Diabetes mellitus (CMS/HCC)   • Hypertensive urgency   • Hyperlipidemia   • Cerebrovascular accident (CVA) due to embolism of left middle cerebral artery (CMS/HCC)   • Dysarthria   • CKD (chronic kidney disease) stage 3, GFR 30-59 ml/min (CMS/HCC)   • Left renal mass   • Obesity (BMI 30-39.9)   • Diastolic CHF, chronic (CMS/HCC)   • Bradycardia   • Constipation   • Hematuria     Plan:   Neuro: Stable.  Neuro unchanged.  Minor aphasia and right-sided hemiparesis   CT stable   MRI with small left ischemic stroke.     Hx of ischemic stroke; appreciate neurology   Will require outpatient follow-up with neurology    CV: Labetolol and hydralazine with marginal blood pressures.     -158   Oral antihypertensives adjusted per hospitalist:   Appreciate assistance   Echo WNL.   Carotid US; right: 50 - 69%; left: <50%  Skin: Rash on legs.  Monitor for now.   Pulm: CPAP at home.  CXR: Moderate cardiomegaly no evidence pulmonary vascular congestion.  : Renal ultrasound: 2.8 cm solid mass suspected in  "the upper pole of the level left   OP renal MRI with and without contrast to fully evaluate lesion with OP urology follow-up.  Gross hematuria now resolved with Alejo catheter replacement likely due to previous Alejo trauma.  Recommend leaving Alejo catheter in place for about a week to allow for urethral healing, continue his terazosin he is already taking, and will observe for now.  Known left small renal lesion for which continue to recommend a three-phase dedicated renal CT at some point to further characterize.   We will continue to follow.  Thank for the consultation.  Please call with any questions      Appreciate urology    FEN: Tolerating puréed diet; one-to-one supervision with meals   Appreciate speech/ nutrition  Endocrine: blood glucose well controlled  GI: + BM, Dulcolax suppositories, added MiraLAX and docusate   Appreciate hospitalist  ID: PAUL  Heme:  DVT prophylaxis held for brain bleed; SCDs  Pain: Well controlled  Dispo: PT/OT   IP rehab pending ins approval    Chief complaint:   Right sided weakness and speech decline    Subjective  Can not communicate    Temp:  [97.7 °F (36.5 °C)-98.6 °F (37 °C)] 98.1 °F (36.7 °C)  Heart Rate:  [57-64] 57  Resp:  [16-19] 16  BP: (136-158)/(52-69) 137/59    Objective:  Vital signs: (most recent): Blood pressure 137/59, pulse 57, temperature 98.1 °F (36.7 °C), temperature source Oral, resp. rate 16, height 188 cm (74\"), weight 122 kg (269 lb), SpO2 91 %.        Neurologic Exam     Mental Status   Oriented to person.   Oriented to place.   Oriented to year.   Speech: slurred   Level of consciousness: alert ,  arousable by verbal stimuli  Able to name object. Unable to read. Unable to repeat. Unable to write. Abnormal comprehension.   Speech remains slurred but clearing.  Follows commands     Cranial Nerves     CN III, IV, VI   Pupils are equal, round, and reactive to light.  Extraocular motions are normal.     CN V   Facial sensation intact.     CN VII   Facial " expression full, symmetric.   Left facial weakness: central    Motor Exam   Right arm pronator drift: absent  Left arm pronator drift: absent    Strength   Right deltoid: 4/5  Left deltoid: 5/5  Right biceps: 4/5  Left biceps: 5/5  Right triceps: 4/5  Left triceps: 5/5  Right interossei: 3/5  Right iliopsoas: 2/5  Left iliopsoas: 5/5  Right quadriceps: 2/5  Left quadriceps: 5/5  Right peroneal: 3/5  Right gastroc: 3/5  Left gastroc: 5/5  Strength/movement on right improving     Sensory Exam   Light touch normal.     Gait, Coordination, and Reflexes     Reflexes   Right : 4+  Left : 4+    Drains:  NA    Imaging Results (last 24 hours)     ** No results found for the last 24 hours. **        Lab Results (last 24 hours)     Procedure Component Value Units Date/Time    Basic Metabolic Panel [[id number]]  (Abnormal) Collected:  09/12/19 0525    Specimen:  Blood Updated:  09/12/19 0558     Glucose 113 mg/dL      BUN 22 mg/dL      Creatinine 1.51 mg/dL      Sodium 140 mmol/L      Potassium 4.0 mmol/L      Chloride 103 mmol/L      CO2 29.0 mmol/L      Calcium 8.7 mg/dL      eGFR Non African Amer 47 mL/min/1.73      BUN/Creatinine Ratio 14.6     Anion Gap 8.0 mmol/L     Narrative:       GFR Normal >60  Chronic Kidney Disease <60  Kidney Failure <15    CBC (No Diff) [[id number]]  (Abnormal) Collected:  09/12/19 0525    Specimen:  Blood Updated:  09/12/19 0547     WBC 8.10 10*3/mm3      RBC 4.00 10*6/mm3      Hemoglobin 10.8 g/dL      Hematocrit 33.3 %      MCV 83.3 fL      MCH 27.0 pg      MCHC 32.4 g/dL      RDW 12.9 %      RDW-SD 39.4 fl      MPV 11.9 fL      Platelets 233 10*3/mm3     POC Glucose Once [[id number]]  (Normal) Collected:  09/11/19 2104    Specimen:  Blood Updated:  09/11/19 2115     Glucose 105 mg/dL      Comment: : [id number] Amrit Galvan ID: [id number]       POC Glucose Once [[id number]]  (Normal) Collected:  09/11/19 1656    Specimen:  Blood Updated:  09/11/19 1707     Glucose 88 mg/dL         Comment: : [id number] Dylon EmilyMeter ID: [id number]       POC Glucose Once [[id number]]  (Normal) Collected:  09/11/19 1200    Specimen:  Blood Updated:  09/11/19 1222     Glucose 100 mg/dL      Comment: : [id number] Dylon EmilyMeter ID: [id number]       POC Glucose Once [[id number]]  (Normal) Collected:  09/11/19 0819    Specimen:  Blood Updated:  09/11/19 0842     Glucose 105 mg/dL      Comment: : [id number] Dylon EmilyMeter ID: [id number]           [id number]  Heriberto Avalos, APRN

## 2019-09-12 NOTE — THERAPY PROGRESS REPORT/RE-CERT
Acute Care - Occupational Therapy Re-Evaluation  [facility]     Patient Name: Phan Eastman  : 1955  MRN: [id number]  Today's Date: 2019  Onset of Illness/Injury or Date of Surgery: 19  Date of Referral to OT: 19  Referring Physician: MD Yoselin    Admit Date: 2019       ICD-10-CM ICD-9-CM   1. Oropharyngeal dysphagia R13.12 787.22   2. Impaired mobility and ADLs Z74.09 799.89   3. Impaired mobility Z74.09 799.89   4. Left renal mass N28.89 593.9     Patient Active Problem List   Diagnosis   • ICH (intracerebral hemorrhage) (CMS/HCC)   • Diabetes mellitus (CMS/HCC)   • Hypertensive urgency   • Hyperlipidemia   • Cerebrovascular accident (CVA) due to embolism of left middle cerebral artery (CMS/HCC)   • Dysarthria   • CKD (chronic kidney disease) stage 3, GFR 30-59 ml/min (CMS/HCC)   • Left renal mass   • Obesity (BMI 30-39.9)   • Diastolic CHF, chronic (CMS/HCC)   • Bradycardia   • Constipation   • Hematuria     Past Medical History:   Diagnosis Date   • Diabetes mellitus (CMS/HCC)    • GERD (gastroesophageal reflux disease)    • Hyperlipidemia    • Hypertension    • Neuropathy    • Osteoarthritis    • TIA (transient ischemic attack)      Past Surgical History:   Procedure Laterality Date   • CARPAL TUNNEL RELEASE Bilateral    • EYE SURGERY     • KNEE SURGERY Bilateral    • NECK SURGERY     • RETINAL DETACHMENT SURGERY     • ROTATOR CUFF REPAIR            OT ASSESSMENT FLOWSHEET (last 12 hours)      Occupational Therapy Evaluation     Row Name 19 1000 19 0900                OT Evaluation Time/Intention    Subjective Information  —  complains of;pain;weakness;fatigue  -MW (r) AB (t) MW (c)       Document Type  —  progress note/recertification  -MW (r) AB (t) MW (c)       Mode of Treatment  —  occupational therapy  -MW (r) AB (t) MW (c)       Patient Effort  —  fair  -MW (r) AB (t) MW (c)          General Information    Patient Profile Reviewed?  —  yes  -MW (r) AB (t) MW  (c)       Onset of Illness/Injury or Date of Surgery  —  09/02/19  -MW (r) AB (t) MW (c)       Referring Physician  —  MD Yoselin  -MW (r) AB (t) MW (c)       Patient Observations  —  lethargic;agree to therapy  -MW (r) AB (t) MW (c)       Patient/Family Observations  —  no family present   -MW (r) AB (t) MW (c)       General Observations of Patient  —  fowlers with breakfast tray set up, not eating  -MW (r) AB (t) MW (c)       Prior Level of Function  —  independent:;all household mobility;bed mobility;ADL's;bathing;dressing;grooming;feeding;driving;community mobility  -MW (r) AB (t) MW (c)       Equipment Currently Used at Home  —  other (see comments) CPAP  -MW (r) AB (t) MW (c)       Pertinent History of Current Functional Problem  —  Dx: ICH, DM, HTN, HLD Hx: diabetic neuropathy, intracerebral hemorrhage  -MW (r) AB (t) MW (c)       Existing Precautions/Restrictions  —  fall  -MW (r) AB (t) MW (c)       Risks Reviewed  —  patient:;nausea/vomiting;dizziness;increased discomfort;LOB;lines disloged;increased drainage;change in vital signs  -MW (r) AB (t) MW (c)       Benefits Reviewed  —  patient:;improve function;increase independence;increase strength;decrease risk of DVT;decrease pain;increase balance;increase knowledge;improve skin integrity  -MW (r) AB (t) MW (c)       Barriers to Rehab  —  medically complex  -MW (r) AB (t) MW (c)          Relationship/Environment    Primary Source of Support/Comfort  —  child(kristina)  -MW (r) AB (t) MW (c)       Lives With  —  alone  -MW (r) AB (t) MW (c)          Resource/Environmental Concerns    Current Living Arrangements  —  home/apartment/condo  -MW (r) AB (t) MW (c)       Resource/Environmental Concerns  —  none  -MW (r) AB (t) MW (c)       Transportation Concerns  —  car, none  -MW (r) AB (t) MW (c)          Cognitive Assessment/Interventions    Additional Documentation  —  Cognitive Assessment/Intervention (Group)  -MW (r) AB (t) MW (c)          Cognitive  Assessment/Intervention- PT/OT    Affect/Mental Status (Cognitive)  —  low arousal/lethargic  -MW (r) AB (t) MW (c)       Orientation Status (Cognition)  —  oriented x 3  -MW (r) AB (t) MW (c)       Follows Commands (Cognition)  —  follows one step commands;50-74% accuracy  -MW (r) AB (t) MW (c)       Personal Safety Interventions  —  fall prevention program maintained;gait belt;nonskid shoes/slippers when out of bed;muscle strengthening facilitated;supervised activity  -MW (r) AB (t) MW (c)          Safety Issues, Functional Mobility    Impairments Affecting Function (Mobility)  —  balance;coordination;endurance/activity tolerance;grasp;motor control;pain;postural/trunk control;strength;range of motion (ROM)  -MW (r) AB (t) MW (c)          Bed Mobility Assessment/Treatment    Bed Mobility Assessment/Treatment  —  scooting/bridging  -MW (r) AB (t) MW (c)       Scooting/Bridging [place] (Bed Mobility)  —  minimum assist (75% patient effort);2 person assist;verbal cues;other (see comments) bed rails and bed inverted   -MW (r) AB (t) MW (c)       Comment (Bed Mobility)  —  pt reported being in pain in abdomen and low back, deferred sitting EOB and completed repositioning   -MW (r) AB (t) MW (c)          ADL Assessment/Intervention    BADL Assessment/Intervention  —  feeding  -MW (r) AB (t) MW (c)          Self-Feeding Assessment/Training    [place] Level (Feeding)  —  verbal cues;minimum assist (75% patient effort);moderate assist (50% patient effort)  -MW (r) AB (t) MW (c)       Position (Self-Feeding)  —  sitting up in bed  -MW (r) AB (t) MW (c)          General ROM    GENERAL ROM COMMENTS  —  R shoulder 0 degrees shoulder flexion, R elbow AROM WFL, R wrist WFL, L shoulder WFL,  -MW (r) AB (t) MW (c)          MMT (Manual Muscle Testing)    General MMT Comments  —   R  diminished fucntionally, L  WFL  -MW (r) AB (t) MW (c)          Motor Assessment/Interventions    Additional Documentation  —   Balance (Group)  -MW (r) AB (t) MW (c)          Balance    Balance  —  other (see comments) balance not assessed d/t deferred bed mobility   -MW (r) AB (t) MW (c)          Sensory Assessment/Intervention    Sensory General Assessment  —  no sensation deficits identified  -MW (r) AB (t) MW (c)          Positioning and Restraints    Pre-Treatment Position  —  in bed  -MW (r) AB (t) MW (c)       Post Treatment Position  —  bed  -MW (r) AB (t) MW (c)       In Bed  —  notified nsg;supine;call light within reach;encouraged to call for assist;exit alarm on;side rails up x2;RUE elevated  -MW (r) AB (t) MW (c)          Pain Assessment    Additional Documentation  —  Pain Scale: Numbers Pre/Post-Treatment (Group);Pain Scale 2: Numbers Pre/Post-Treatment (Group)  -MW (r) AB (t) MW (c)          Pain Scale: Numbers Pre/Post-Treatment    Pain Scale: Numbers, Pretreatment  —  5/10  -MW (r) AB (t) MW (c)       Pain Scale: Numbers, Post-Treatment  —  6/10  -MW (r) AB (t) MW (c)       Pain Location - Side  —  Left  -MW (r) AB (t) MW (c)       Pain Location - Orientation  —  anterior  -MW (r) AB (t) MW (c)       Pain Location  —  abdomen  -MW (r) AB (t) MW (c)       Pre/Post Treatment Pain Comment  —  pt complained of abdomen pain at rest, increased from yesterday when at rest. Pt verbalized increasing stabbing pain in L abdomen with movement   -MW (r) AB (t) MW (c)       Pain Intervention(s)  —  Medication (See MAR);Repositioned;Ambulation/increased activity  -MW (r) AB (t) MW (c)          Pain Scale 2: Numbers Pre/Post-Treatment    Pain Scale 2: Numbers, Pretreatment  —  4/10  -MW (r) AB (t) MW (c)       Pain Scale 2: Numbers, Post-Treatment  —  6/10  -MW (r) AB (t) MW (c)       Pain Location 2 - Side  —  Bilateral  -MW (r) AB (t) MW (c)       Pain Location 2 - Orientation  —  lower  -MW (r) AB (t) MW (c)       Pain Location 2  —  back  -MW (r) AB (t) MW (c)       Pre/Post Treatment Pain 2 Comment  —  pt had c/o low back pain that  increased with mvmt, however eased with repositioning   -MW (r) AB (t) MW (c)       Pain Intervention(s) 2  —  Repositioned;Medication (See MAR)  -MW (r) AB (t) MW (c)          Plan of Care Review    Plan of Care Reviewed With  —  patient  -MW (r) AB (t) MW (c)          Clinical Impression (OT)    Date of Referral to OT  —  09/02/19  -MW (r) AB (t) MW (c)       OT Diagnosis  —  -MW (r) AB (t) MW (c)  decreased ADL  -MW (r) AB (t) MW (c)       Prognosis (OT Eval)  —  -MW (r) AB (t) MW (c)  good  -MW (r) AB (t) MW (c)       Patient/Family Goals Statement (OT Eval)  —  -MW (r) AB (t) MW (c)  to go to SNF for rehab  -MW (r) AB (t) MW (c)       Criteria for Skilled Therapeutic Interventions Met (OT Eval)  —  -MW (r) AB (t) MW (c)  yes;treatment indicated  -MW (r) AB (t) MW (c)       Rehab Potential (OT Eval)  —  -MW (r) AB (t) MW (c)  good, to achieve stated therapy goals  -MW (r) AB (t) MW (c)       Therapy Frequency (OT Eval)  —  -MW (r) AB (t) MW (c)  5 times/wk  -MW (r) AB (t) MW (c)       Predicted Duration of Therapy Intervention (Therapy Eval)  —  -MW (r) AB (t) MW (c)  until D/C  -MW (r) AB (t) MW (c)       Care Plan Review (OT)  —  -MW (r) AB (t) MW (c)  evaluation/treatment results reviewed;care plan/treatment goals reviewed;risks/benefits reviewed;current/potential barriers reviewed;patient/other agree to care plan  -MW (r) AB (t) MW (c)       Anticipated Discharge Disposition (OT)  —  -MW (r) AB (t) MW (c)  skilled nursing facility  -MW (r) AB (t) MW (c)          Planned OT Interventions    Planned Therapy Interventions (OT Eval)  —  -MW (r) AB (t) MW (c)  BADL retraining;activity tolerance training;functional balance retraining;strengthening exercise;ROM/therapeutic exercise;patient/caregiver education/training;occupation/activity based interventions;transfer/mobility retraining  -MW (r) AB (t) MW (c)          OT Goals    Bed Mobility Goal Selection (OT)  —  -MW (r) AB (t) MW (c)  bed mobility, OT goal 1   -MW (r) AB (t) MW (c)       Bathing Goal Selection (OT)  —  -MW (r) AB (t) MW (c)  bathing, OT goal 1  -MW (r) AB (t) MW (c)       Additional Documentation  —  -MW (r) AB (t) MW (c)  Grooming Goal Selection (OT) (Row)  -MW (r) AB (t) MW (c)          Bed Mobility Goal 1 (OT)    Activity/Assistive Device (Bed Mobility Goal 1, OT)  —  -MW (r) AB (t) MW (c)  —       [place] Level/Cues Needed (Bed Mobility Goal 1, OT)  —  -MW (r) AB (t) MW (c)  —       Time Frame (Bed Mobility Goal 1, OT)  —  -MW (r) AB (t) MW (c)  long term goal (LTG);by discharge  -MW (r) AB (t) MW (c)       Progress/Outcomes (Bed Mobility Goal 1, OT)  —  -MW (r) AB (t) MW (c)  —          Bathing Goal 1 (OT)    Activity/Assistive Device (Bathing Goal 1, OT)  —  -MW (r) AB (t) MW (c)  —       [place] Level/Cues Needed (Bathing Goal 1, OT)  —  -MW (r) AB (t) MW (c)  moderate assist (50-74% patient effort);1 person assist  -MW (r) AB (t) MW (c)       Time Frame (Bathing Goal 1, OT)  —  -MW (r) AB (t) MW (c)  long term goal (LTG);by discharge  -MW (r) AB (t) MW (c)       Progress/Outcomes (Bathing Goal 1, OT)  —  -MW (r) AB (t) MW (c)  goal ongoing  -MW (r) AB (t) MW (c)          Dressing Goal 1 (OT)    Activity/Assistive Device (Dressing Goal 1, OT)  — at EOB  -MW (r) AB (t) MW (c)  —       [place]/Cues Needed (Dressing Goal 1, OT)  —  -MW (r) AB (t) MW (c)  moderate assist (50-74% patient effort);1 person assist  -MW (r) AB (t) MW (c)       Time Frame (Dressing Goal 1, OT)  —  -MW (r) AB (t) MW (c)  long term goal (LTG);by discharge  -MW (r) AB (t) MW (c)       Progress/Outcome (Dressing Goal 1, OT)  —  -MW (r) AB (t) MW (c)  —          ROM Goal 1 (OT)    ROM Goal 1 (OT)  —  -MW (r) AB (t) MW (c)  Pt will complete AAROM of RUE in all planes of motion for joint protection and skin integrity.   -MW (r) AB (t) MW (c)       Time Frame (ROM Goal 1, OT)  —  -MW (r) AB (t) MW (c)  long term goal (LTG);by discharge  -MW (r) AB (t) MW (c)        Progress/Outcome (ROM Goal 1, OT)  —  -MW (r) AB (t) MW (c)  goal ongoing  -MW (r) AB (t) MW (c)          Living Environment    Home Accessibility  —  other (see comments) difficult to assess d/t lethargy and diminished speech  -MW (r) AB (t) MW (c)         User Key  (r) = Recorded By, (t) = Taken By, (c) = Cosigned By    Initials Name Effective Dates    Elisa Nolan, OTR/L 08/28/18 -     Janet Rodgers, OT Student 07/25/19 -          Occupational Therapy Education     Title: PT OT SLP Therapies (In Progress)     Topic: Occupational Therapy (Done)     Point: ADL training (Done)     Description: Instruct learner(s) on proper safety adaptation and remediation techniques during self care or transfers.   Instruct in proper use of assistive devices.    Learning Progress Summary           Patient Acceptance, E, VU by AB at 9/12/2019 10:33 AM    Comment:  OT POC, benefit of activity, self-feeding, bed mobiltiy techniques, and safety of impaired RUE    Acceptance, E, VU by AB at 9/11/2019  4:38 PM    Comment:  OT POC, benefit of activity, ADL, safety of R limb, bed mobility, pain relief techniques, not to hold breath    Acceptance, E, VU,NR by FORTINO at 9/10/2019 10:23 AM    Comment:  limb protection with mobility/adl/supine, bed mobility, ADL    Acceptance, E, VU by FORTINO at 9/9/2019  5:09 PM    Comment:  mind muscle connection for neuroplasticity, ADL, OT POC    Acceptance, E, VU by RYAN at 9/6/2019 10:30 AM    Comment:  OT POC, adls, t/fs, bed mobility, importance of positioning, neuromuscular reeducation techniques.    Acceptance, E,TB, VU,DU,NR by YUDI at 9/5/2019  2:50 PM    Comment:  Pt. takes washcloth presented to him by this orellana/l and with min. assist using Rue washes his face and hands, no issues with Lue rom with washcloth! Ue exs performed with Lue Ind'ly, min. assist with Rue!    Acceptance, E, NR by RYAN at 9/2/2019  8:35 AM    Comment:  OT POC, benefits and roles of OT, adls, t/fs, bed mobility, positioning  for prevention of skin breakdown of joint protection.                   Point: Home exercise program (Done)     Description: Instruct learner(s) on appropriate technique for monitoring, assisting and/or progressing therapeutic exercises/activities.    Learning Progress Summary           Patient Acceptance, E, VU by AB at 9/12/2019 10:33 AM    Comment:  OT POC, benefit of activity, self-feeding, bed mobiltiy techniques, and safety of impaired RUE    Acceptance, E, VU by AB at 9/11/2019  4:38 PM    Comment:  OT POC, benefit of activity, ADL, safety of R limb, bed mobility, pain relief techniques, not to hold breath    Acceptance, E, VU,NR by MW at 9/10/2019 10:23 AM    Comment:  limb protection with mobility/adl/supine, bed mobility, ADL    Acceptance, E, VU by MW at 9/9/2019  5:09 PM    Comment:  mind muscle connection for neuroplasticity, ADL, OT POC    Acceptance, E,TB, VU,DU,NR by YUDI at 9/5/2019  2:50 PM    Comment:  Pt. takes washcloth presented to him by this orellana/l and with min. assist using Rue washes his face and hands, no issues with Lue rom with washcloth! Ue exs performed with Lue Ind'ly, min. assist with Rue!                   Point: Precautions (Done)     Description: Instruct learner(s) on prescribed precautions during self-care and functional transfers.    Learning Progress Summary           Patient Acceptance, E, VU by AB at 9/12/2019 10:33 AM    Comment:  OT POC, benefit of activity, self-feeding, bed mobiltiy techniques, and safety of impaired RUE    Acceptance, E, VU by AB at 9/11/2019  4:38 PM    Comment:  OT POC, benefit of activity, ADL, safety of R limb, bed mobility, pain relief techniques, not to hold breath    Acceptance, E, VU,NR by FORTINO at 9/10/2019 10:23 AM    Comment:  limb protection with mobility/adl/supine, bed mobility, ADL    Acceptance, E, VU by MW at 9/9/2019  5:09 PM    Comment:  mind muscle connection for neuroplasticity, ADL, OT POC    Acceptance, E, VU by RYAN at 9/6/2019 10:30 AM     Comment:  OT POC, adls, t/fs, bed mobility, importance of positioning, neuromuscular reeducation techniques.    Acceptance, E,TB, VU,DU,NR by YUDI at 9/5/2019  2:50 PM    Comment:  Pt. takes washcloth presented to him by this orellana/l and with min. assist using Rue washes his face and hands, no issues with Lue rom with washcloth! Ue exs performed with Lue Ind'ly, min. assist with Rue!    Acceptance, E, NR by RYAN at 9/2/2019  8:35 AM    Comment:  OT POC, benefits and roles of OT, adls, t/fs, bed mobility, positioning for prevention of skin breakdown of joint protection.                   Point: Body mechanics (Done)     Description: Instruct learner(s) on proper positioning and spine alignment during self-care, functional mobility activities and/or exercises.    Learning Progress Summary           Patient Acceptance, E, VU by AB at 9/12/2019 10:33 AM    Comment:  OT POC, benefit of activity, self-feeding, bed mobiltiy techniques, and safety of impaired RUE    Acceptance, E, VU by AB at 9/11/2019  4:38 PM    Comment:  OT POC, benefit of activity, ADL, safety of R limb, bed mobility, pain relief techniques, not to hold breath    Acceptance, E, VU,NR by FORTINO at 9/10/2019 10:23 AM    Comment:  limb protection with mobility/adl/supine, bed mobility, ADL    Acceptance, E, VU by FORTINO at 9/9/2019  5:09 PM    Comment:  mind muscle connection for neuroplasticity, ADL, OT POC    Acceptance, E,TB, VU,DU,NR by YUDI at 9/5/2019  2:50 PM    Comment:  Pt. takes washcloth presented to him by this orellana/l and with min. assist using Rue washes his face and hands, no issues with Lue rom with washcloth! Ue exs performed with Lue Ind'ly, min. assist with Rue!                               User Key     Initials Effective Dates Name Provider Type Discipline     08/02/16 -  Emmanuel Osborne ORELLANA/L Occupational Therapy Assistant OT    FORTINO 08/28/18 -  Elisa Chung OTR/L Occupational Therapist OT    RYAN 10/12/18 -  Aster Suggs OTR/L  Occupational Therapist OT    AB 07/25/19 -  Janet Cardona OT Student OT Student OT                  OT Recommendation and Plan  Outcome Summary/Treatment Plan (OT)  Daily Summary of Progress (OT): progress toward functional goals is good  Plan for Continued Treatment (OT): follow OT POC  Anticipated Discharge Disposition (OT): skilled nursing facility  Planned Therapy Interventions (OT Eval): BADL retraining, activity tolerance training, functional balance retraining, strengthening exercise, ROM/therapeutic exercise, patient/caregiver education/training, occupation/activity based interventions, transfer/mobility retraining  Therapy Frequency (OT Eval): 5 times/wk  Daily Summary of Progress (OT): progress toward functional goals is good  Plan of Care Review  Plan of Care Reviewed With: patient  Plan of Care Reviewed With: patient  Outcome Summary: OT re-cert completed. Pt fowlers with breakfast tray upon arrival. Pt stated that he could not reach to eat. Pt was repositioned, given verbal cues throughout self-feeding, and requried Evan to finish. Pt demo'd one instance of coughing when sipping water, SLP notified. Pt demo'd increased lethargy and arousal this am. Pt completed bed mobility by scooting with use of Evan, bed rails, and bed inversion. Pt had c/o of pain at 5/10 in L abdomen and 4/10 in lower back at rest. Pt verbalized that pain increased to 7/10 in abdomen and back with movement. Repositioning was provided for pain relief and nrsg was notified. Pt demo'd LUE AROM and  WFL and RUE diminished , AROM (0 degrees), and strength 1/5 shoulder flexion. OT indicated to address ADL, bed mobiltiy, and endurance. Recommend D/C to SNF vs. Inpatient rehab.    Outcome Measures     Row Name 09/12/19 1000 09/11/19 1600 09/10/19 1000       How much help from another is currently needed...    Putting on and taking off regular lower body clothing?  1  -MW (r) AB (t) MW (c)  1  -MW (r) AB (t) MW (c)  1  -MW     Bathing (including washing, rinsing, and drying)  2  -MW (r) AB (t) MW (c)  2  -MW (r) AB (t) MW (c)  2  -MW    Toileting (which includes using toilet bed pan or urinal)  1  -MW (r) AB (t) MW (c)  1  -MW (r) AB (t) MW (c)  1  -MW    Putting on and taking off regular upper body clothing  2  -MW (r) AB (t) MW (c)  2  -MW (r) AB (t) MW (c)  2  -MW    Taking care of personal grooming (such as brushing teeth)  2  -MW (r) AB (t) MW (c)  2  -MW (r) AB (t) MW (c)  2  -MW    Eating meals  2  -MW (r) AB (t) MW (c)  2  -MW (r) AB (t) MW (c)  2  -MW    AM-PAC 6 Clicks Score (OT)  10  -MW (r) AB (t)  10  -MW (r) AB (t)  10  -MW       Functional Assessment    Outcome Measure Options  AM-PAC 6 Clicks Daily Activity (OT)  -MW (r) AB (t) MW (c)  AM-PAC 6 Clicks Daily Activity (OT)  -MW (r) AB (t) MW (c)  AM-PAC 6 Clicks Daily Activity (OT)  -MW    Row Name 09/09/19 1700             How much help from another is currently needed...    Putting on and taking off regular lower body clothing?  1  -MW      Bathing (including washing, rinsing, and drying)  2  -MW      Toileting (which includes using toilet bed pan or urinal)  1  -MW      Putting on and taking off regular upper body clothing  2  -MW      Taking care of personal grooming (such as brushing teeth)  2  -MW      Eating meals  2  -MW      AM-PAC 6 Clicks Score (OT)  10  -MW         Functional Assessment    Outcome Measure Options  AM-PAC 6 Clicks Daily Activity (OT)  -MW        User Key  (r) = Recorded By, (t) = Taken By, (c) = Cosigned By    Initials Name Provider Type    Elisa Nolan, OTR/L Occupational Therapist    AB Janet Cardona, OT Student OT Student          Time Calculation:   Time Calculation- OT     Row Name 09/12/19 0922             Time Calculation- OT    OT Start Time  0848  -MW (r) AB (t) MW (c)      OT Stop Time  0920  -MW (r) AB (t) MW (c)      OT Time Calculation (min)  32 min  -MW (r) AB (t)      OT Received On  09/12/19  -MW (r) AB (t) MW (c)      OT  Goal Re-Cert Due Date  09/22/19  -FORTINO (r) AB (t) FORTINO (c)        User Key  (r) = Recorded By, (t) = Taken By, (c) = Cosigned By    Initials Name Provider Type    Elisa Nolan, OTR/L Occupational Therapist    Janet Rodgers OT Student OT Student        Therapy Charges for Today     Code Description Service Date Service Provider Modifiers Qty    56332976285 HC OT THERAPEUTIC ACT EA 15 MIN 9/11/2019 Janet Cardona OT Student GO 1    [id number]  OT SELF CARE/MGMT/TRAIN EA 15 MIN 9/11/2019 Janet Cardona OT Student GO 1    [id number]  OT RE-EVAL 2 9/12/2019 Janet Cardona OT Student GO 1               PRATEEK Nicolas  9/12/2019

## 2019-09-12 NOTE — PLAN OF CARE
Problem: Patient Care Overview  Goal: Plan of Care Review   09/12/19 0309   Coping/Psychosocial   Plan of Care Reviewed With patient   Plan of Care Review   Progress improving   OTHER   Outcome Summary Medicated x 1 for c/o lumbar back pain with good relief. Alejo cath patent to BSD. Continue to monito r nutrition. Difficulty using right arm for any self care. Scab noted to right upper thigh and areas of petechiae.No new skin issues noted. Difficulty using upper and lower extremities. Safety maintained.

## 2019-09-12 NOTE — PLAN OF CARE
Problem: Patient Care Overview  Goal: Plan of Care Review  Outcome: Ongoing (interventions implemented as appropriate)   09/12/19 8845   Coping/Psychosocial   Plan of Care Reviewed With patient   Plan of Care Review   Progress improving   OTHER   Outcome Summary Disoriented to time. No neuro changes. Turning. PRN pain medication given upon request with some relief. Drowsy but will arouse to minor stimulation. Tearful at times, emotional support provided. F/c. Room air. Liquids are now nectar thick. Will continue to monitor.      Goal: Individualization and Mutuality  Outcome: Ongoing (interventions implemented as appropriate)    Goal: Discharge Needs Assessment  Outcome: Ongoing (interventions implemented as appropriate)    Goal: Interprofessional Rounds/Family Conf  Outcome: Ongoing (interventions implemented as appropriate)      Problem: Fall Risk (Adult)  Goal: Absence of Fall  Outcome: Ongoing (interventions implemented as appropriate)      Problem: Pain, Acute (Adult)  Goal: Acceptable Pain Control/Comfort Level  Outcome: Ongoing (interventions implemented as appropriate)      Problem: Skin Injury Risk (Adult)  Goal: Skin Health and Integrity  Outcome: Ongoing (interventions implemented as appropriate)      Problem: Nutrition, Imbalanced: Inadequate Oral Intake (Adult)  Goal: Improved Oral Intake  Outcome: Ongoing (interventions implemented as appropriate)      Problem: Stroke (Hemorrhagic) (Adult)  Goal: Signs and Symptoms of Listed Potential Problems Will be Absent, Minimized or Managed (Stroke)  Outcome: Ongoing (interventions implemented as appropriate)

## 2019-09-12 NOTE — THERAPY TREATMENT NOTE
Acute Care - Physical Therapy Treatment Note  [facility]     Patient Name: Phan Eastman  : 1955  MRN: [id number]  Today's Date: 2019  Onset of Illness/Injury or Date of Surgery: 19     Referring Physician: MD Yoseiln    Admit Date: 2019    Visit Dx:    ICD-10-CM ICD-9-CM   1. Oropharyngeal dysphagia R13.12 787.22   2. Impaired mobility and ADLs Z74.09 799.89   3. Impaired mobility Z74.09 799.89   4. Left renal mass N28.89 593.9     Patient Active Problem List   Diagnosis   • ICH (intracerebral hemorrhage) (CMS/HCC)   • Diabetes mellitus (CMS/[facility])   • Hypertensive urgency   • Hyperlipidemia   • Cerebrovascular accident (CVA) due to embolism of left middle cerebral artery (CMS/HCC)   • Dysarthria   • CKD (chronic kidney disease) stage 3, GFR 30-59 ml/min (CMS/[facility])   • Left renal mass   • Obesity (BMI 30-39.9)   • Diastolic CHF, chronic (CMS/HCC)   • Bradycardia   • Constipation   • Hematuria       Therapy Treatment    Rehabilitation Treatment Summary     Row Name 19 1601 19 1125 19 1109       Treatment Time/Intention    Discipline  physical therapy assistant  -KJ  physical therapy assistant  -KJ  speech language pathologist  -TM    Document Type  therapy note (daily note)  -KJ  therapy note (daily note)  -KJ  therapy note (daily note)  -TM    Subjective Information  complains of;pain  -KJ  complains of;pain  -KJ2  complains of;pain  -TM    Mode of Treatment  physical therapy  -KJ  physical therapy  -KJ2  individual therapy;speech-language pathology  -TM    Patient/Family Observations  —  —  No family present.  -TM    Care Plan Review  —  —  care plan/treatment goals reviewed;risks/benefits reviewed  -TM    Care Plan Review, Other Participant(s)  —  —  other (see comments) GUSTAVO, Imani  -TM2    Patient Effort  good  -KJ  good  -KJ2  adequate  -TM2    Comment  —  keep systolic pressure  -KJ2  —    Existing Precautions/Restrictions  fall  -KJ  fall  -KJ2  —    Treatment  Considerations/Comments  right side weakness  -KJ  right side weakness  -KJ2  —    Recorded by [KJ] Debby Garcia, [facility] 09/12/19 1603 [KJ] Debby Garcia, [facility] 09/12/19 1125  [KJ2] Debby Garcia, [facility] 09/12/19 1211 [TM] Nanette Malone CCC-SLP 09/12/19 1318  [TM2] Nanette Malone CCC-SLP 09/12/19 1355    Row Name 09/12/19 1601 09/12/19 1125          Bed Mobility Assessment/Treatment    Supine-Sit [place] (Bed Mobility)  verbal cues;maximum assist (25% patient effort);2 person assist  -KJ  verbal cues;maximum assist (25% patient effort);2 person assist  -KJ     Sit-Supine [place] (Bed Mobility)  maximum assist (25% patient effort);2 person assist;verbal cues  -KJ  maximum assist (25% patient effort);2 person assist;verbal cues  -KJ     Bed Mobility, Safety Issues  decreased use of arms for pushing/pulling;decreased use of legs for bridging/pushing  -KJ  decreased use of arms for pushing/pulling;decreased use of legs for bridging/pushing  -KJ     Comment (Bed Mobility)  —  sit edge of bed x 15 minutes max assistance for sitting balance  -KJ     Recorded by [KJ] Debby Garcia, [facility] 09/12/19 1603 [KJ] Debby Garcia, [facility] 09/12/19 1211     Row Name 09/12/19 1125             Gait/Stairs Assessment/Training    Comment (Gait/Stairs)  not appropriate for standing at this time  -KJ      Recorded by [KJ] Debby Garcia, [facility] 09/12/19 1211      Row Name 09/12/19 1601 09/12/19 1125          Therapeutic Exercise    Exercise Type (Therapeutic Exercise)  AAROM (active assistive range of motion);AROM (active range of motion)  -KJ  AAROM (active assistive range of motion)  -KJ     Position (Therapeutic Exercise)  seated  -KJ  seated  -KJ     Sets/Reps (Therapeutic Exercise)  15  -KJ  5-10 reps   -KJ     Comment (Therapeutic Exercise)  —  static sitting posture activites,reaching all planes LUE, weight bearing RUE,   -KJ     Recorded by [KJ] Debby Garcia, [facility] 09/12/19 1603 [KJ] Debby Garcia, [facility] 09/12/19 1211      Row Name 09/12/19 1601 09/12/19 1125          Static Sitting Balance    Level of [place] (Unsupported Sitting, Static Balance)  maximal assist, 25 to 49% patient effort  -KJ  maximal assist, 25 to 49% patient effort;2 person assist  -KJ     Sitting Position (Unsupported Sitting, Static Balance)  sitting on edge of bed  -KJ  sitting on edge of bed  -KJ     Time Able to Maintain Position (Unsupported Sitting, Static Balance)  more than 5 minutes  -KJ  more than 5 minutes  -KJ     Recorded by [KJ] Debby Garcia, [facility] 09/12/19 1603 [KJ] Debby Garcia, [facility] 09/12/19 1211     Row Name 09/12/19 1601 09/12/19 1125          Positioning and Restraints    Pre-Treatment Position  in bed  -KJ  in bed  -KJ     Post Treatment Position  bed  -KJ  bed  -KJ     Recorded by [KJ] Debby Garcia, [facility] 09/12/19 1603 [KJ] Debby Garcia, [facility] 09/12/19 1211     Row Name 09/12/19 1109             Pain Assessment    Additional Documentation  Pain Scale: FACES Pre/Post-Treatment (Group)  -TM      Recorded by [TM] Nanette Malone CCC-SLP 09/12/19 1355      Row Name 09/12/19 1601 09/12/19 1125 09/12/19 1109       Pain Scale: Numbers Pre/Post-Treatment    Pain Scale: Numbers, Pretreatment  5/10  -KJ  4/10  -KJ  5/10  -TM    Pain Scale: Numbers, Post-Treatment  8/10  -KJ  8/10  -KJ  8/10  -TM    Pain Location - Side  Right  -KJ  Right  -KJ  —    Pain Location - Orientation  lower  -KJ  —  lower  -TM    Pain Location  flank  -KJ  flank  -KJ  back  -TM    Recorded by [KJ] Debby Garcia, PTA 09/12/19 1603 [KJ] Debby Garcia, [facility] 09/12/19 1211 [TM] Nanette Malone CCC-SLP 09/12/19 1355    Row Name 09/12/19 1109             Outcome Summary/Treatment Plan (SLP)    Daily Summary of Progress (SLP)  progress towards functional goals is fair  -TM      Barriers to Overall Progress (SLP)  Medically complex  -TM      Plan for Continued Treatment (SLP)  Downgrade to nectar thick liquids. ST to continue to tx.  -TM      Anticipated Dischage  Disposition  unknown  -TM      Recorded by [TM] Nanette Malone CCC-SLP 09/12/19 8160        User Key  (r) = Recorded By, (t) = Taken By, (c) = Cosigned By    Initials Name Effective Dates Discipline    Debby Thompson, PTA 08/02/16 -  PT    TM Nanette Malone CCC-SLP 08/02/16 -  SLP               Rehab Goal Summary     Row Name 09/12/19 1109 09/12/19 1000 09/12/19 0900       Occupational Therapy Goals    Bed Mobility Goal Selection (OT)  —  —  -MW (r) AB (t) MW (c)  bed mobility, OT goal 1  -MW (r) AB (t) MW (c)    Bathing Goal Selection (OT)  —  —  -MW (r) AB (t) MW (c)  bathing, OT goal 1  -MW (r) AB (t) MW (c)       Bed Mobility Goal 1 (OT)    Activity/Assistive Device (Bed Mobility Goal 1, OT)  —  —  -MW (r) AB (t) MW (c)  rolling to left;rolling to right;sit to supine;supine to sit  -MW (r) AB (t) MW (c)    [place] Level/Cues Needed (Bed Mobility Goal 1, OT)  —  —  -MW (r) AB (t) MW (c)  moderate assist (50-74% patient effort);1 person assist  -MW (r) AB (t) MW (c)    Time Frame (Bed Mobility Goal 1, OT)  —  —  -MW (r) AB (t) MW (c)  long term goal (LTG);by discharge  -MW (r) AB (t) MW (c)    Progress/Outcomes (Bed Mobility Goal 1, OT)  —  —  -MW (r) AB (t) MW (c)  goal revised this date  -MW (r) AB (t) MW (c)       Transfer Goal 1 (OT)    Activity/Assistive Device (Transfer Goal 1, OT)  —  —  transfers, all  -MW (r) AB (t) MW (c)    [place] Level/Cues Needed (Transfer Goal 1, OT)  —  —  moderate assist (50-74% patient effort);2 person assist  -MW (r) AB (t) MW (c)    Time Frame (Transfer Goal 1, OT)  —  —  long term goal (LTG);by discharge  -MW (r) AB (t) MW (c)    Progress/Outcome (Transfer Goal 1, OT)  —  —  goal no longer appropriate  -MW (r) AB (t) MW (c)       Bathing Goal 1 (OT)    Activity/Assistive Device (Bathing Goal 1, OT)  —  —  -MW (r) AB (t) MW (c)  upper body bathing;other (see comments) with supported sitting   -MW (r) AB (t) MW (c)    [place] Level/Cues Needed (Bathing  Goal 1, OT)  —  —  -MW (r) AB (t) MW (c)  moderate assist (50-74% patient effort);1 person assist  -MW (r) AB (t) MW (c)    Time Frame (Bathing Goal 1, OT)  —  —  -MW (r) AB (t) MW (c)  long term goal (LTG);by discharge  -MW (r) AB (t) MW (c)    Progress/Outcomes (Bathing Goal 1, OT)  —  —  -MW (r) AB (t) MW (c)  goal ongoing  -MW (r) AB (t) MW (c)       Dressing Goal 1 (OT)    Activity/Assistive Device (Dressing Goal 1, OT)  —  — at EOB  -MW (r) AB (t) MW (c)  upper body dressing  -MW (r) AB (t) MW (c)    [place]/Cues Needed (Dressing Goal 1, OT)  —  —  -MW (r) AB (t) MW (c)  moderate assist (50-74% patient effort);1 person assist  -MW (r) AB (t) MW (c)    Time Frame (Dressing Goal 1, OT)  —  —  -MW (r) AB (t) MW (c)  long term goal (LTG);by discharge  -MW (r) AB (t) MW (c)    Progress/Outcome (Dressing Goal 1, OT)  —  —  -MW (r) AB (t) MW (c)  goal revised this date  -MW (r) AB (t) MW (c)       ROM Goal 1 (OT)    ROM Goal 1 (OT)  —  —  -MW (r) AB (t) MW (c)  Pt will complete AAROM of RUE in all planes of motion for joint protection and skin integrity.   -MW (r) AB (t) MW (c)    Time Frame (ROM Goal 1, OT)  —  —  -MW (r) AB (t) MW (c)  long term goal (LTG);by discharge  -MW (r) AB (t) MW (c)    Progress/Outcome (ROM Goal 1, OT)  —  —  -MW (r) AB (t) MW (c)  goal ongoing  -MW (r) AB (t) MW (c)       Strength Goal 1 (OT)    Strength Goal 1 (OT)  —  —  Pt will increased LUE strength to 4+/5 and RUE strength 2-/5 at shoulder and  for increased independence with adls, t/fs, and mobility.   -MW (r) AB (t) MW (c)    Time Frame (Strength Goal 1, OT)  —  —  long term goal (LTG);by discharge  -MW (r) AB (t) MW (c)    Progress/Outcome (Strength Goal 1, OT)  —  —  goal ongoing  -MW (r) AB (t) MW (c)       Oral Nutrition/Hydration Goal 1 (SLP)    Oral Nutrition/Hydration Goal 1, SLP  LTG: Patient will tolerate LRD with no overt s/s of aspiration.  -TM  —  —    Time Frame (Oral Nutrition/Hydration Goal 1, SLP)  by  discharge  -TM  —  —    Barriers (Oral Nutrition/Hydration Goal 1, SLP)  n/a  -TM  —  —    Progress/Outcomes (Oral Nutrition/Hydration Goal 1, SLP)  progress slower than expected  -TM  —  —       Labial Strengthening Goal 1 (SLP)    Activity (Labial Strengthening Goal 1, SLP)  increase labial tone  -TM  —  —    Increase Labial Tone  labial resistance exercises  -TM  —  —    Wounded [place]nee/Accuracy (Labial Strengthening Goal 1, SLP)  independently (over 90% accuracy)  -TM  —  —    Time Frame (Labial Strengthening Goal 1, SLP)  short term goal (STG);by discharge  -TM  —  —    Barriers (Labial Strengthening Goal 1, SLP)  n/a  -TM  —  —    Progress/Outcomes (Labial Strengthening Goal 1, SLP)  goal ongoing  -TM  —  —       Lingual Strengthening Goal 1 (SLP)    Activity (Lingual Strengthening Goal 1, SLP)  increase lingual tone/sensation/control/coordination/movement  -TM  —  —    Increase Lingual Tone/Sensation/Control/Coordination/Movement  lingual movement exercises  -TM  —  —    [place]/Accuracy (Lingual Strengthening Goal 1, SLP)  independently (over 90% accuracy)  -TM  —  —    Time Frame (Lingual Strengthening Goal 1, SLP)  by discharge  -TM  —  —    Barriers (Lingual Strengthening Goal 1, SLP)  n/a  -TM  —  —    Progress/Outcomes (Lingual Strengthening Goal 1, SLP)  goal ongoing  -TM  —  —       Pharyngeal Strengthening Exercise Goal 1 (SLP)    Activity (Pharyngeal Strengthening Goal 1, SLP)  increase epiglottic inversion and retroflexion;increase tongue base retraction;increase pharyngeal sensation  -TM  —  —    Increase Pharyngeal Sensation  gustatory stimulation (sour/cold)  -TM  —  —    Increase Epiglottic Inversion and Retroflexion  Mendelsohn;falsetto  -TM  —  —    Increase Tongue Base Retraction  hard effortful swallow;kaleb  -TM  —  —    Wounded [place]nee/Accuracy (Pharyngeal Strengthening Goal 1, SLP)  independently (over 90% accuracy)  -TM  —  —    Time Frame (Pharyngeal Strengthening Goal 1, SLP)  short  term goal (STG);by discharge  -TM  —  —    Barriers (Pharyngeal Strengthening Goal 1, SLP)  n/a  -TM  —  —    Progress/Outcomes (Pharyngeal Strengthening Goal 1, SLP)  goal ongoing  -TM  —  —      User Key  (r) = Recorded By, (t) = Taken By, (c) = Cosigned By    Initials Name Provider Type Discipline    TM Nanette Malone, CCC-SLP Speech and Language Pathologist SLP    Elisa Nolan, OTR/L Occupational Therapist OT    Janet Rodgers, OT Student OT Student OT          Physical Therapy Education     Title: PT OT SLP Therapies (In Progress)     Topic: Physical Therapy (In Progress)     Point: Mobility training (Done)     Learning Progress Summary           Patient Acceptance, E,D, VU by TRACIE at 9/11/2019  4:25 PM    Comment:  Pt educated on bed mobility techniques and benefits of activity. Pt instructed on performing quad sets while in bed.    Acceptance, E, VU,NR by TITUS at 9/4/2019  8:11 AM    Comment:  Rolling in bed. Bed mobility.    Acceptance, E, VU,NR by JAKE at 9/2/2019  7:20 AM    Comment:  Educated pt on progression of PT POC and benefits of activity                               User Key     Initials Effective Dates Name Provider Type Discipline    JAKE 08/02/16 -  Gilmer Medrano, PT DPT Physical Therapist PT    TITUS 08/02/16 -  Kathrine Ramsay, PTA Physical Therapy Assistant PT    TRACIE 07/26/19 -  Chinmay Mix, PT Student PT Student PT                PT Recommendation and Plan     Plan of Care Reviewed With: patient  Progress: no change  Outcome Summary: PT tx completed. Pt supine in bed moaning/groaning in pain. States he hurts everywhere. Rates back pn 9/10. MaxA x 2 bed mobility, MaxA for sitting balance. Unable to sit unsupported at bedside. Worked on sitting balance x 15 minutes. BLE ROM, increased assistance on RUE/LE due to weakness. Recommend SNF for rehab.  Outcome Measures     Row Name 09/12/19 1000 09/11/19 1600 09/10/19 1000       How much help from another is currently needed...    Putting  on and taking off regular lower body clothing?  1  -MW (r) AB (t) MW (c)  1  -MW (r) AB (t) MW (c)  1  -MW    Bathing (including washing, rinsing, and drying)  2  -MW (r) AB (t) MW (c)  2  -MW (r) AB (t) MW (c)  2  -MW    Toileting (which includes using toilet bed pan or urinal)  1  -MW (r) AB (t) MW (c)  1  -MW (r) AB (t) MW (c)  1  -MW    Putting on and taking off regular upper body clothing  2  -MW (r) AB (t) MW (c)  2  -MW (r) AB (t) MW (c)  2  -MW    Taking care of personal grooming (such as brushing teeth)  2  -MW (r) AB (t) MW (c)  2  -MW (r) AB (t) MW (c)  2  -MW    Eating meals  2  -MW (r) AB (t) MW (c)  2  -MW (r) AB (t) MW (c)  2  -MW    AM-PAC 6 Clicks Score (OT)  10  -MW (r) AB (t)  10  -MW (r) AB (t)  10  -MW       Functional Assessment    Outcome Measure Options  AM-PAC 6 Clicks Daily Activity (OT)  -MW (r) AB (t) MW (c)  AM-PAC 6 Clicks Daily Activity (OT)  -MW (r) AB (t) MW (c)  AM-PAC 6 Clicks Daily Activity (OT)  -MW    Row Name 09/09/19 1700             How much help from another is currently needed...    Putting on and taking off regular lower body clothing?  1  -MW      Bathing (including washing, rinsing, and drying)  2  -MW      Toileting (which includes using toilet bed pan or urinal)  1  -MW      Putting on and taking off regular upper body clothing  2  -MW      Taking care of personal grooming (such as brushing teeth)  2  -MW      Eating meals  2  -MW      AM-PAC 6 Clicks Score (OT)  10  -MW         Functional Assessment    Outcome Measure Options  AM-PAC 6 Clicks Daily Activity (OT)  -MW        User Key  (r) = Recorded By, (t) = Taken By, (c) = Cosigned By    Initials Name Provider Type    Elisa Nolan, OTR/L Occupational Therapist    AB Janet Cardona, OT Student OT Student         Time Calculation:   PT Charges     Row Name 09/12/19 1603 09/12/19 1211          Time Calculation    Start Time  1601  -KJ  1125  -KJ     Stop Time  1624  -KJ  1211  -KJ     Time Calculation (min)  23  min  -KJ  46 min  -KJ     PT Received On  09/12/19  -KJ  09/12/19  -KJ     PT Goal Re-Cert Due Date  09/21/19  -KJ  09/21/19  -KJ        Time Calculation- PT    Total Timed Code Minutes- PT  23 minute(s)  -KJ  46 minute(s)  -KJ       User Key  (r) = Recorded By, (t) = Taken By, (c) = Cosigned By    Initials Name Provider Type    Debby Thompson, RAJAN Physical Therapy Assistant        Therapy Charges for Today     Code Description Service Date Service Provider Modifiers Qty    82038431387 HC PT THERAPEUTIC ACT EA 15 MIN 9/11/2019 Debby Garcia, PTA GP 2    06476608786 HC PT THER PROC EA 15 MIN 9/12/2019 Debby Garcia, PTA GP 1    27622215455 HC PT THERAPEUTIC ACT EA 15 MIN 9/12/2019 Debby Garcia, PTA GP 2    09489516710 HC PT THER PROC EA 15 MIN 9/12/2019 Debby Garcia, PTA GP 2          PT G-Codes  Outcome Measure Options: AM-PAC 6 Clicks Daily Activity (OT)  AM-PAC 6 Clicks Score (PT): 8  AM-PAC 6 Clicks Score (OT): 10    Debby Garcia PTA  9/12/2019

## 2019-09-12 NOTE — PROGRESS NOTES
Continued Stay Note   [facility]     Patient Name: Pahn Eastman  MRN: [id number]  Today's Date: 9/12/2019    Admit Date: 8/31/2019    Discharge Plan     Row Name 09/12/19 1012       Plan    Plan Comments  LEFT MESSAGE WITH SANDRA (ADMISSIONS WITH [facility] [phone]) REGARDING PRECERT. FAXED UPDATED INFO TO SANDRA [phone]. AWAIT RETURN CALL        Discharge Codes    No documentation.             ANETTE Richards

## 2019-09-12 NOTE — PROGRESS NOTES
Continued Stay Note  [facility]     Patient Name: Phan Eastman  MRN: [id number]  Today's Date: 9/12/2019    Admit Date: 8/31/2019    Discharge Plan     Row Name 09/12/19 0046       Plan    Plan Comments  PT PRECERT IS COMPLETE AND [facility] CAN ACCEPT PT TOMORROW. DTR (RYAN) AWARE [phone]. PHYSICIAN AWARE OF BED FOR TOMORROW. CALLED [facility] AND PLACED PT ON WILL CALL ([phone]).     Row Name 09/12/19 4221       Plan    Plan Comments  SPOKE TO SANDRA WITH ADMISSIONS ([phone]) AND SHE STATES PRECERT IS STILL PENDING. SHE DID SEND UPDATED INFO TO [facility]. AWAITING INSURANCE.         Discharge Codes    No documentation.             ANETTE Richards

## 2019-09-12 NOTE — PROGRESS NOTES
Urology  Length of Stay: 12  Patient Care Team:  Emmanuel Mclain MD as PCP - General  Emmanuel Mclain MD as PCP - Family Medicine    Chief Complaint:  Hematuria    Subjective     Interval History:   Patient states he is feeling better. No interval hematuria.       Objective       Intake/Output Summary (Last 24 hours) at 9/12/2019 1728  Last data filed at 9/12/2019 1712  Gross per 24 hour   Intake 60 ml   Output 2150 ml   Net -2090 ml       Vital Signs  Temp:  [97.6 °F (36.4 °C)-98.6 °F (37 °C)] 97.6 °F (36.4 °C)  Heart Rate:  [57-65] 65  Resp:  [16-18] 18  BP: (125-158)/(57-69) 125/58    Physical Exam:  Physical Exam     GI: Abdomen is soft, non-tender  : obrien draining clear urine.        Results Review:       I reviewed the patient's new clinical results.  Lab Results (last 24 hours)     Procedure Component Value Units Date/Time    POC Glucose Once [[id number]]  (Normal) Collected:  09/12/19 1131    Specimen:  Blood Updated:  09/12/19 1144     Glucose 90 mg/dL      Comment: : [id number] [place] ID: [id number]       POC Glucose Once [[id number]]  (Normal) Collected:  09/12/19 0815    Specimen:  Blood Updated:  09/12/19 0828     Glucose 85 mg/dL      Comment: : [id number] [employer] ID: [id number]       Basic Metabolic Panel [[id number]]  (Abnormal) Collected:  09/12/19 0525    Specimen:  Blood Updated:  09/12/19 0558     Glucose 113 mg/dL      BUN 22 mg/dL      Creatinine 1.51 mg/dL      Sodium 140 mmol/L      Potassium 4.0 mmol/L      Chloride 103 mmol/L      CO2 29.0 mmol/L      Calcium 8.7 mg/dL      eGFR Non African Amer 47 mL/min/1.73      BUN/Creatinine Ratio 14.6     Anion Gap 8.0 mmol/L     Narrative:       GFR Normal >60  Chronic Kidney Disease <60  Kidney Failure <15    CBC (No Diff) [[id number]]  (Abnormal) Collected:  09/12/19 0525    Specimen:  Blood Updated:  09/12/19 0547     WBC 8.10 10*3/mm3      RBC 4.00 10*6/mm3      Hemoglobin 10.8  g/dL      Hematocrit 33.3 %      MCV 83.3 fL      MCH 27.0 pg      MCHC 32.4 g/dL      RDW 12.9 %      RDW-SD 39.4 fl      MPV 11.9 fL      Platelets 233 10*3/mm3     POC Glucose Once [[id number]]  (Normal) Collected:  09/11/19 2104    Specimen:  Blood Updated:  09/11/19 2115     Glucose 105 mg/dL      Comment: : [id number] [employer] ID: [id number]           Imaging Results (last 24 hours)     ** No results found for the last 24 hours. **          Medication Review:     Current Facility-Administered Medications:   •  acetaminophen (TYLENOL) tablet 650 mg, 650 mg, Oral, Q4H PRN, 650 mg at 09/08/19 2017 **OR** acetaminophen (TYLENOL) 160 MG/5ML solution 650 mg, 650 mg, Oral, Q4H PRN **OR** acetaminophen (TYLENOL) suppository 650 mg, 650 mg, Rectal, Q4H PRN, Raf Wyatt MD  •  amLODIPine (NORVASC) tablet 10 mg, 10 mg, Oral, Q24H, Gaston Cabrera DO, 10 mg at 09/12/19 0831  •  atorvastatin (LIPITOR) tablet 20 mg, 20 mg, Oral, Nightly, Raf Wyatt MD, 20 mg at 09/11/19 2055  •  CloNIDine (CATAPRES) tablet 0.3 mg, 0.3 mg, Oral, Q12H, Gaston Cabrera DO, 0.3 mg at 09/12/19 0831  •  dextrose (D50W) 25 g/ 50mL Intravenous Solution 25 g, 25 g, Intravenous, Q15 Min PRN, Raf Wytat MD  •  dextrose (GLUTOSE) oral gel 15 g, 15 g, Oral, Q15 Min PRN, Raf Wyatt MD  •  glipiZIDE (GLUCOTROL) tablet 5 mg, 5 mg, Oral, QAM AC, Raf Wyatt MD, 5 mg at 09/12/19 0831  •  glucagon (human recombinant) (GLUCAGEN DIAGNOSTIC) injection 1 mg, 1 mg, Subcutaneous, PRN, Raf Wyatt MD  •  HOG enema 360 mL, 360 mL, Rectal, Once, Tanner Rodgers MD  •  hydrALAZINE (APRESOLINE) injection 10 mg, 10 mg, Intravenous, Q15 Min PRN, Raf Wyatt MD, 10 mg at 09/11/19 0512  •  hydrALAZINE (APRESOLINE) tablet 100 mg, 100 mg, Oral, Q8H, Gaston Cabrera DO, 100 mg at 09/12/19 1345  •  hydrochlorothiazide (HYDRODIURIL) tablet 25 mg, 25 mg,  Oral, Daily, Tanner Rodgers MD, 25 mg at 09/12/19 0831  •  insulin lispro (humaLOG) injection 0-9 Units, 0-9 Units, Subcutaneous, 4x Daily With Meals & Nightly, Raf Wyatt MD  •  labetalol (NORMODYNE,TRANDATE) injection 10 mg, 10 mg, Intravenous, Q15 Min PRN, Tanner Rodgers MD, 10 mg at 09/07/19 1811  •  lidocaine (LIDODERM) 5 % 1 patch, 1 patch, Transdermal, Q24H, Tanner Rodgers MD, 1 patch at 09/12/19 0827  •  losartan (COZAAR) tablet 100 mg, 100 mg, Oral, Q24H, Tanner Rodgers MD, 100 mg at 09/12/19 0831  •  ondansetron (ZOFRAN) tablet 4 mg, 4 mg, Oral, Q6H PRN **OR** ondansetron (ZOFRAN) injection 4 mg, 4 mg, Intravenous, Q6H PRN, Raf Wyatt MD  •  opium-belladonna (B&O SUPPRETTES) suppository 60 mg, 60 mg, Rectal, Q8H PRN, Tanner Rodgers MD  •  oxyCODONE-acetaminophen (PERCOCET) 5-325 MG per tablet 1 tablet, 1 tablet, Oral, Q4H PRN, Raf Wyatt MD, 1 tablet at 09/12/19 1345  •  pantoprazole (PROTONIX) EC tablet 40 mg, 40 mg, Oral, Q AM, Raf Wyatt MD, 40 mg at 09/12/19 0524  •  polyethylene glycol 3350 powder (packet), 17 g, Oral, Daily, Tanner Rodgers MD, 17 g at 09/12/19 0831  •  sennosides-docusate sodium (SENOKOT-S) 8.6-50 MG tablet 1 tablet, 1 tablet, Oral, BID, Tanner Rodgers MD, 1 tablet at 09/12/19 0831  •  sodium chloride 0.9 % flush 10 mL, 10 mL, Intravenous, Q12H, Raf Wyatt MD, 10 mL at 09/12/19 0832  •  sodium chloride 0.9 % flush 10 mL, 10 mL, Intravenous, PRN, Raf Wyatt MD  •  sodium chloride nasal spray 2 spray, 2 spray, Each Nare, 4x Daily PRN, Tanner Rodgers MD  •  terazosin (HYTRIN) capsule 10 mg, 10 mg, Oral, Nightly, Gaston Cabrera, DO, 10 mg at 09/11/19 2056  •  traZODone (DESYREL) tablet 100 mg, 100 mg, Oral, Nightly, Gaston Cabrera DO, 100 mg at 09/11/19 2056    Assessment/Plan:   Hematuria resolved.  Likely related to prior Alejo trauma.  Continue Alejo  catheter to complete a 7-day period of urethral rest.  Continue terazosin          (Please note that portions of this note were completed with a voice recognition program.)  Munir Swan MD  09/12/19  5:28 PM

## 2019-09-12 NOTE — DISCHARGE PLACEMENT REQUEST
"  Updated notes for precert  Please call Porsha at [phone] after info received.   Thanks      Pritesh Alva (64 y.o. Male)     Date of Birth Social Security Number Address Home Phone MRN    [id number]  [id number]   [facility] [address] [phone] [id number]    Jain Marital Status          Shinto        Admission Date Admission Type Admitting Provider Attending Provider Department, Room/Bed    8/31/19 Urgent Raf Wyatt MD T[place], William Lee, MD [facility] 3A, 339/1    Discharge Date Discharge Disposition Discharge Destination                       Attending Provider:  Raf Wyatt MD    Allergies:  Iodine, Lisinopril, Red Dye    Isolation:  None   Infection:  None   Code Status:  CPR    Ht:  188 cm (74\")   Wt:  122 kg (269 lb)    Admission Cmt:  None   Principal Problem:  ICH (intracerebral hemorrhage) (CMS/[facility]) [I61.9]                 Active Insurance as of 8/31/2019     Primary Coverage     Payor Plan Insurance Group Employer/Plan Group    HUMANA MEDICARE REPLACEMENT HUMANA MEDICARE REPL [id number]     Payor Plan Address Payor Plan Phone Number Payor Plan Fax Number Effective Dates    [address] [address] [phone]  1/1/2018 - None Entered    [facility] [id number]       Subscriber Name Subscriber Birth Date Member ID       PRITESH ALVA 1955 [id number]                 Emergency Contacts      (Rel.) Home Phone Work Phone Mobile Phone    Yaritza Alva (Sister) [phone] -- --    Gabe Alva (Son) -- -- [phone]    LUDIN Stanley (Daughter) -- -- [phone]               Physician Progress Notes (last 24 hours) (Notes from 9/11/2019 10:11 AM through 9/12/2019 10:11 AM)      Heriberto Avalos APRN at 9/12/2019  7:59 AM          Neurosurgery Daily Progress Note    Assessment:   Pritesh Alva is a 64 y.o. male with a significant comorbidity diabetes with diabetic neuropathy, intracerebral ischemic stroke x3.  He presents " with a new problem of sudden onset of right-sided weakness and slurred speech. Physical exam findings of right-sided hemiparesis and right facial droop.  Their imaging shows 2.7 x 2.1 acute thalamic intracerebral hemorrhage.    DDX:     ICH (intracerebral hemorrhage) (CMS/HCC)    Diabetes mellitus (CMS/HCC)    Hypertensive urgency    Hyperlipidemia    Cerebrovascular accident (CVA) due to embolism of left middle cerebral artery (CMS/HCC)    Dysarthria    CKD (chronic kidney disease) stage 3, GFR 30-59 ml/min (CMS/HCC)    Left renal mass    Obesity (BMI 30-39.9)    Diastolic CHF, chronic (CMS/HCC)    Bradycardia    Constipation    Hematuria    Patient Active Problem List   Diagnosis   • ICH (intracerebral hemorrhage) (CMS/HCC)   • Diabetes mellitus (CMS/HCC)   • Hypertensive urgency   • Hyperlipidemia   • Cerebrovascular accident (CVA) due to embolism of left middle cerebral artery (CMS/HCC)   • Dysarthria   • CKD (chronic kidney disease) stage 3, GFR 30-59 ml/min (CMS/HCC)   • Left renal mass   • Obesity (BMI 30-39.9)   • Diastolic CHF, chronic (CMS/HCC)   • Bradycardia   • Constipation   • Hematuria     Plan:   Neuro: Stable.  Neuro unchanged.  Minor aphasia and right-sided hemiparesis   CT stable   MRI with small left ischemic stroke.     Hx of ischemic stroke; appreciate neurology   Will require outpatient follow-up with neurology    CV: Labetolol and hydralazine with marginal blood pressures.     -158   Oral antihypertensives adjusted per hospitalist:   Appreciate assistance   Echo WNL.   Carotid US; right: 50 - 69%; left: <50%  Skin: Rash on legs.  Monitor for now.   Pulm: CPAP at home.  CXR: Moderate cardiomegaly no evidence pulmonary vascular congestion.  : Renal ultrasound: 2.8 cm solid mass suspected in the upper pole of the level left   OP renal MRI with and without contrast to fully evaluate lesion with OP urology follow-up.  Gross hematuria now resolved with Alejo catheter replacement likely due  "to previous Alejo trauma.  Recommend leaving Alejo catheter in place for about a week to allow for urethral healing, continue his terazosin he is already taking, and will observe for now.  Known left small renal lesion for which continue to recommend a three-phase dedicated renal CT at some point to further characterize.   We will continue to follow.  Thank for the consultation.  Please call with any questions      Appreciate urology    FEN: Tolerating puréed diet; one-to-one supervision with meals   Appreciate speech/ nutrition  Endocrine: blood glucose well controlled  GI: + BM, Dulcolax suppositories, added MiraLAX and docusate   Appreciate hospitalist  ID: PAUL  Heme:  DVT prophylaxis held for brain bleed; SCDs  Pain: Well controlled  Dispo: PT/OT   IP rehab pending ins approval    Chief complaint:   Right sided weakness and speech decline    Subjective  Can not communicate    Temp:  [97.7 °F (36.5 °C)-98.6 °F (37 °C)] 98.1 °F (36.7 °C)  Heart Rate:  [57-64] 57  Resp:  [16-19] 16  BP: (136-158)/(52-69) 137/59    Objective:  Vital signs: (most recent): Blood pressure 137/59, pulse 57, temperature 98.1 °F (36.7 °C), temperature source Oral, resp. rate 16, height 188 cm (74\"), weight 122 kg (269 lb), SpO2 91 %.        Neurologic Exam     Mental Status   Oriented to person.   Oriented to place.   Oriented to year.   Speech: slurred   Level of consciousness: alert ,  arousable by verbal stimuli  Able to name object. Unable to read. Unable to repeat. Unable to write. Abnormal comprehension.   Speech remains slurred but clearing.  Follows commands     Cranial Nerves     CN III, IV, VI   Pupils are equal, round, and reactive to light.  Extraocular motions are normal.     CN V   Facial sensation intact.     CN VII   Facial expression full, symmetric.   Left facial weakness: central    Motor Exam   Right arm pronator drift: absent  Left arm pronator drift: absent    Strength   Right deltoid: 4/5  Left deltoid: 5/5  Right " biceps: 4/5  Left biceps: 5/5  Right triceps: 4/5  Left triceps: 5/5  Right interossei: 3/5  Right iliopsoas: 2/5  Left iliopsoas: 5/5  Right quadriceps: 2/5  Left quadriceps: 5/5  Right peroneal: 3/5  Right gastroc: 3/5  Left gastroc: 5/5  Strength/movement on right improving     Sensory Exam   Light touch normal.     Gait, Coordination, and Reflexes     Reflexes   Right : 4+  Left : 4+    Drains:  NA    Imaging Results (last 24 hours)     ** No results found for the last 24 hours. **        Lab Results (last 24 hours)     Procedure Component Value Units Date/Time    Basic Metabolic Panel [[id number]]  (Abnormal) Collected:  09/12/19 0525    Specimen:  Blood Updated:  09/12/19 0558     Glucose 113 mg/dL      BUN 22 mg/dL      Creatinine 1.51 mg/dL      Sodium 140 mmol/L      Potassium 4.0 mmol/L      Chloride 103 mmol/L      CO2 29.0 mmol/L      Calcium 8.7 mg/dL      eGFR Non African Amer 47 mL/min/1.73      BUN/Creatinine Ratio 14.6     Anion Gap 8.0 mmol/L     Narrative:       GFR Normal >60  Chronic Kidney Disease <60  Kidney Failure <15    CBC (No Diff) [[id number]]  (Abnormal) Collected:  09/12/19 0525    Specimen:  Blood Updated:  09/12/19 0547     WBC 8.10 10*3/mm3      RBC 4.00 10*6/mm3      Hemoglobin 10.8 g/dL      Hematocrit 33.3 %      MCV 83.3 fL      MCH 27.0 pg      MCHC 32.4 g/dL      RDW 12.9 %      RDW-SD 39.4 fl      MPV 11.9 fL      Platelets 233 10*3/mm3     POC Glucose Once [[id number]]  (Normal) Collected:  09/11/19 2104    Specimen:  Blood Updated:  09/11/19 2115     Glucose 105 mg/dL      Comment: : [id number] Amrit Galvan ID: [id number]       POC Glucose Once [[id number]]  (Normal) Collected:  09/11/19 1656    Specimen:  Blood Updated:  09/11/19 1707     Glucose 88 mg/dL      Comment: : [id number] Dylon DawnMetjuliana ID: [id number]       POC Glucose Once [[id number]]  (Normal) Collected:  09/11/19 1200    Specimen:  Blood Updated:  09/11/19 1222     Glucose 100 mg/dL        Comment: : [id number] Dylon DawnMeter ID: [id number]       POC Glucose Once [[id number]]  (Normal) Collected:  09/11/19 0819    Specimen:  Blood Updated:  09/11/19 0842     Glucose 105 mg/dL      Comment: : [id number] Dylon DawnMeter ID: [id number]           [id number]  EFRA Rodriguez      Electronically signed by Heriberto Avalos APRN at 9/12/2019  8:51 AM     Tanner Rodgers MD at 9/11/2019 10:33 AM              [facility] Medicine Services  INPATIENT PROGRESS NOTE    Patient Name: Phan Eastman  Date of Admission: 8/31/2019  Today's Date: 09/11/19  Length of Stay: 11  Primary Care Physician: Emmanuel Mclain MD    Subjective   Chief Complaint: back pain  HPI     Patient was seen and examined at bedside.  Patient indicates that he is having back pain.  Patient indicates that this is chronic back pain, but it has been worsening over the last several months, and it is especially worse since he has been stuck in bed.  Patient is noted to have some hematuria with some blood clots around his meatus.  Urology has been consulted and recommended Alejo catheter.        Review of Systems   Constitutional: Negative for activity change, chills, diaphoresis, fatigue and fever.   Respiratory: Negative for cough and shortness of breath.    Cardiovascular: Negative for chest pain and palpitations.   Gastrointestinal: Negative for abdominal distention, abdominal pain, constipation, diarrhea, nausea and vomiting.   Musculoskeletal: Positive for back pain, myalgias and neck pain.        All pertinent negatives and positives are as above. All other systems have been reviewed and are negative unless otherwise stated.     Objective    Temp:  [98.2 °F (36.8 °C)-98.8 °F (37.1 °C)] 98.3 °F (36.8 °C)  Heart Rate:  [58-83] 75  Resp:  [16-22] 16  BP: (109-166)/(48-85) 136/71  Physical Exam  Constitutional: No distress.   HENT:   Head: Normocephalic and atraumatic.    Eyes: Conjunctivae are normal. No scleral icterus.   Neck: Neck supple. No JVD present.   Cardiovascular: Intact distal pulses. Exam reveals no gallop and no friction rub.   Murmur heard. Normal rate  Pulmonary/Chest: Effort normal and breath sounds normal. No stridor. No respiratory distress. He has no wheezes.   Abdominal: Soft. Bowel sounds are normal. He exhibits no distension and no mass. There mild tenderness. There is no guarding.   Musculoskeletal: He exhibits no edema.   Neurological: He is alert.   Oriented to name and place; not time; right sided weakness    Skin: Skin is warm and dry. He is not diaphoretic. No erythema. Ecchymosis on the right side   Psychiatric: He has a normal mood and affect. His behavior is normal.   Nursing note and vitals reviewed.      Results Review:  I have reviewed the labs, radiology results, and diagnostic studies.    Laboratory Data:          Results from last 7 days   Lab Units 09/11/19  0539 09/10/19  0553 09/09/19  0658   SODIUM mmol/L 141 138 140   POTASSIUM mmol/L 4.0 4.2 4.4   CHLORIDE mmol/L 105 107 109   CO2 mmol/L 27.0 24.0 24.0   BUN mg/dL 24* 26* 28*   CREATININE mg/dL 1.55* 1.52* 1.61*   CALCIUM mg/dL 8.5* 8.6 8.8   GLUCOSE mg/dL 108* 84 95       Culture Data:        Radiology Data:   Imaging Results (last 24 hours)     ** No results found for the last 24 hours. **          I have reviewed the patient's current medications.     Assessment/Plan     Active Hospital Problems    Diagnosis   • **ICH (intracerebral hemorrhage) (CMS/[facility])   • Hematuria   • Constipation   • Obesity (BMI 30-39.9)   • Diastolic CHF, chronic (CMS/[facility])   • Bradycardia   • CKD (chronic kidney disease) stage 3, GFR 30-59 ml/min (CMS/[facility])   • Left renal mass   • Cerebrovascular accident (CVA) due to embolism of left middle cerebral artery (CMS/[facility])   • Dysarthria   • Diabetes mellitus (CMS/[facility])   • Hypertensive urgency   • Hyperlipidemia       Plan:  1.  Continue losartan at 100 mg, clonidine  at 0.3 mg, amlodipine 10 mg, hydralazine 100 mg, increased HCTZ 25 mg and terazosin 10 mg  2.  PRN antihypertensives  3.  Overnight pulse oximetry showed 388 desaturations, would recommend 2L NC at night   4.  Outpatient MRI with and without contrast for left renal mass  5.  Nasal saline q4h   6.  Creatinine stable   7.  Lidoderm patch   8.   Patient successfully had BM yesterday   9.   Continue miralax and docusate senna daily  10.   AM CBC and BMP    11.  Up to chair with meals   12.  Consult urology for hematuria and blood on meatus  13.  Discussed case with neurosurgery Heriberto KRAUS              Discharge Planning: I expect the patient to be discharged to SNF in 1-2 days per attending.    Tanner Rodgers MD   09/11/19   10:33 AM    Electronically signed by Tanner Rodgers MD at 9/11/2019  2:16 PM          Consult Notes (last 24 hours) (Notes from 9/11/2019 10:11 AM through 9/12/2019 10:11 AM)      Munir Swan MD at 9/11/2019  6:23 PM      Consult Orders    1. Inpatient Urology Consult [[id number]] ordered by Raf Wyatt MD at 09/11/19 0905                Urology    Mr. Eastman is 64 y.o. male    REASON FOR CONSULT/CHIEF COMPLAINT: Bleeding from urethra    HPI  64-year-old male with multiple comorbidities including diabetes hypertension currently admitted for intracerebral hemorrhage noted to have today to have urinary incontinence with some reported hematuria by nursing.  Patient is reporting difficulty voiding.  Dr. Cook was consulted last week regarding a small incidentally noted left renal lesion on a renal ultrasound at which time he apparently had a Alejo catheter in place.  It is unclear when his Alejo catheter was removed.  The patient is passing urine but he is complaining of difficulty with obstructive symptoms and having blood dripping from the urethra.  He is also having some abdominal pain.  No fevers.  No nausea vomiting.  Previous urological recommendations including  getting a CT with contrast at some point but he has not yet had this done.  Given the patient's difficulty voiding, recommended nursing replace the Alejo catheter.  Urinary output remained clear without any hematuria after Alejo catheter placement.    The following portions of the patient's history were reviewed and updated as appropriate: allergies, current medications, past family history, past medical history, past social history, past surgical history and problem list.    Review of Systems   Genitourinary: Positive for difficulty urinating, hematuria and urgency.   Neurological: Positive for speech difficulty.   All other systems reviewed and are negative.         Medications Prior to Admission   Medication Sig Dispense Refill Last Dose   • aspirin 81 MG EC tablet Take 81 mg by mouth Daily.   8/30/2019 at Unknown time   • CloNIDine (CATAPRES) 0.1 MG tablet Take 0.1 mg by mouth 2 (Two) Times a Day.   8/30/2019 at Unknown time   • doxazosin (CARDURA) 2 MG tablet Take 2 mg by mouth Every Night.   8/30/2019   • glimepiride (AMARYL) 2 MG tablet Take 2 mg by mouth Every Morning Before Breakfast.   8/30/2019 at Unknown time   • losartan (COZAAR) 100 MG tablet Take 100 mg by mouth Daily.   8/30/2019 at Unknown time   • metFORMIN (GLUCOPHAGE) 850 MG tablet Take 850 mg by mouth 2 (Two) Times a Day With Meals.   8/30/2019 at Unknown time   • omeprazole (priLOSEC) 20 MG capsule Take 20 mg by mouth Daily.   8/30/2019 at Unknown time   • simvastatin (ZOCOR) 40 MG tablet Take 40 mg by mouth Every Night.   8/30/2019 at Unknown time   • traZODone (DESYREL) 50 MG tablet Take 50 mg by mouth At Night As Needed for Sleep.            Current Facility-Administered Medications:   •  acetaminophen (TYLENOL) tablet 650 mg, 650 mg, Oral, Q4H PRN, 650 mg at 09/08/19 2017 **OR** acetaminophen (TYLENOL) 160 MG/5ML solution 650 mg, 650 mg, Oral, Q4H PRN **OR** acetaminophen (TYLENOL) suppository 650 mg, 650 mg, Rectal, Q4H PRN, Yoselin,  Raf Amaral MD  •  amLODIPine (NORVASC) tablet 10 mg, 10 mg, Oral, Q24H, Gaston Cabrera DO, 10 mg at 09/11/19 0930  •  atorvastatin (LIPITOR) tablet 20 mg, 20 mg, Oral, Nightly, Raf Wyatt MD, 20 mg at 09/10/19 2208  •  CloNIDine (CATAPRES) tablet 0.3 mg, 0.3 mg, Oral, Q12H, Gaston Cabrera DO, 0.3 mg at 09/11/19 0930  •  dextrose (D50W) 25 g/ 50mL Intravenous Solution 25 g, 25 g, Intravenous, Q15 Min PRN, Raf Wyatt MD  •  dextrose (GLUTOSE) oral gel 15 g, 15 g, Oral, Q15 Min PRN, Raf Wyatt MD  •  glipiZIDE (GLUCOTROL) tablet 5 mg, 5 mg, Oral, QAM AC, Raf Wyatt MD, 5 mg at 09/11/19 0930  •  glucagon (human recombinant) (GLUCAGEN DIAGNOSTIC) injection 1 mg, 1 mg, Subcutaneous, PRN, Raf Wyatt MD  •  HOG enema 360 mL, 360 mL, Rectal, Once, Tanner Rogders MD  •  hydrALAZINE (APRESOLINE) injection 10 mg, 10 mg, Intravenous, Q15 Min PRN, Raf Wyatt MD, 10 mg at 09/11/19 0512  •  hydrALAZINE (APRESOLINE) tablet 100 mg, 100 mg, Oral, Q8H, Gaston Cabrera DO, 100 mg at 09/11/19 1418  •  [START ON 9/12/2019] hydrochlorothiazide (HYDRODIURIL) tablet 25 mg, 25 mg, Oral, Daily, Tanner Rodgers MD  •  insulin lispro (humaLOG) injection 0-9 Units, 0-9 Units, Subcutaneous, 4x Daily With Meals & Nightly, Raf Wyatt MD  •  labetalol (NORMODYNE,TRANDATE) injection 10 mg, 10 mg, Intravenous, Q15 Min PRN, Tanner Rodgers MD, 10 mg at 09/07/19 1811  •  lidocaine (LIDODERM) 5 % 1 patch, 1 patch, Transdermal, Q24H, Tanner Rodgers MD, 1 patch at 09/11/19 1418  •  losartan (COZAAR) tablet 100 mg, 100 mg, Oral, Q24H, Tanner Rodgers MD, 100 mg at 09/11/19 0929  •  ondansetron (ZOFRAN) tablet 4 mg, 4 mg, Oral, Q6H PRN **OR** ondansetron (ZOFRAN) injection 4 mg, 4 mg, Intravenous, Q6H PRN, Raf Wyatt MD  •  opium-belladonna (B&O SUPPRETTES) suppository 60 mg, 60 mg, Rectal, Q8H PRN, Tanner Rodgers  MD Crow  •  oxyCODONE-acetaminophen (PERCOCET) 5-325 MG per tablet 1 tablet, 1 tablet, Oral, Q4H PRN, Raf Wyatt MD, 1 tablet at 09/11/19 1511  •  pantoprazole (PROTONIX) EC tablet 40 mg, 40 mg, Oral, Q AM, Raf Wyatt MD, 40 mg at 09/11/19 0523  •  polyethylene glycol 3350 powder (packet), 17 g, Oral, Daily, Tanner Rodgers MD, 17 g at 09/11/19 0929  •  sennosides-docusate sodium (SENOKOT-S) 8.6-50 MG tablet 1 tablet, 1 tablet, Oral, BID, Tanner Rodgers MD, 1 tablet at 09/11/19 0930  •  sodium chloride 0.9 % flush 10 mL, 10 mL, Intravenous, Q12H, Raf Wyatt MD, 10 mL at 09/11/19 0929  •  sodium chloride 0.9 % flush 10 mL, 10 mL, Intravenous, PRN, Raf Wyatt MD  •  sodium chloride nasal spray 2 spray, 2 spray, Each Nare, 4x Daily PRN, Tanner Rodgers MD  •  terazosin (HYTRIN) capsule 10 mg, 10 mg, Oral, Nightly, Gaston Cabrera DO, 10 mg at 09/10/19 2209  •  traZODone (DESYREL) tablet 100 mg, 100 mg, Oral, Nightly, Gaston Cabrera DO, 100 mg at 09/10/19 2208    Past Medical History:   Diagnosis Date   • Diabetes mellitus (CMS/HCC)    • GERD (gastroesophageal reflux disease)    • Hyperlipidemia    • Hypertension    • Neuropathy    • Osteoarthritis    • TIA (transient ischemic attack)        Past Surgical History:   Procedure Laterality Date   • CARPAL TUNNEL RELEASE Bilateral    • EYE SURGERY     • KNEE SURGERY Bilateral    • NECK SURGERY     • RETINAL DETACHMENT SURGERY     • ROTATOR CUFF REPAIR         Social History     Socioeconomic History   • Marital status:      Spouse name: Not on file   • Number of children: Not on file   • Years of education: Not on file   • Highest education level: Not on file   Tobacco Use   • Smoking status: Never Smoker   • Smokeless tobacco: Never Used   Substance and Sexual Activity   • Alcohol use: No     Frequency: Never   • Drug use: No   • Sexual activity: Defer       History reviewed. No pertinent  "family history.    /52 (BP Location: Right arm, Patient Position: Lying)   Pulse 58   Temp 97.7 °F (36.5 °C) (Oral)   Resp 19   Ht 188 cm (74\")   Wt 122 kg (269 lb)   SpO2 93%   BMI 34.54 kg/m²      Physical Exam   Constitutional: Well nourished, Well developed; No apparent distress.  His vital signs are reviewed  Psychiatric: Appropriate affect; Alert and oriented  Eyes: Unremarkable  Musculoskeletal: Normal gait and station  GI: Abdomen is soft, non-tender, obese.   Respiratory: No distress; Unlabored movement; No accessory musculature needed with symmetric movements  Skin: No pallor or diaphoresis  ; Penis and testicles are normal;    Lab Results   Component Value Date    GLUCOSE 108 (H) 09/11/2019    BUN 24 (H) 09/11/2019    CREATININE 1.55 (H) 09/11/2019    EGFRIFNONA 46 (L) 09/11/2019    BCR 15.5 09/11/2019    CO2 27.0 09/11/2019    CALCIUM 8.5 (L) 09/11/2019    ALBUMIN 3.70 09/01/2019    AST 43 09/01/2019    ALT 31 09/01/2019     Lab Results   Component Value Date    GLUCOSE 108 (H) 09/11/2019    CALCIUM 8.5 (L) 09/11/2019     09/11/2019    K 4.0 09/11/2019    CO2 27.0 09/11/2019     09/11/2019    BUN 24 (H) 09/11/2019    CREATININE 1.55 (H) 09/11/2019    EGFRIFNONA 46 (L) 09/11/2019    BCR 15.5 09/11/2019    ANIONGAP 9.0 09/11/2019     Lab Results   Component Value Date    WBC 11.95 (H) 09/01/2019    HGB 12.7 (L) 09/01/2019    HCT 39.4 09/01/2019    MCV 84.4 09/01/2019     09/01/2019     No results found for: PSA  No results found for: URINECX  Brief Urine Lab Results     None          Imaging Results (last 7 days)     Procedure Component Value Units Date/Time    XR Abdomen KUB [[id number]] Collected:  09/09/19 1600     Updated:  09/09/19 1605    Narrative:       EXAM: XR ABDOMEN KUB- - 9/9/2019 3:38 PM CDT     HISTORY: Abdominal pain; R13.12-Dysphagia, oropharyngeal phase;  Z74.09-Other reduced mobility; Z74.09-Other reduced mobility;  N28.89-Other specified disorders of " kidney and ureter       COMPARISON: None.      TECHNIQUE:  2 images.  Supine view of the abdomen.      FINDINGS:  See impression          Impression:       1. A few borderline gas-filled loops of small bowel overlying the  pelvis, which could be seen with ileus or early/partial obstruction.  2. Large amount of colonic stool.  3. No convincing dystrophic calcifications in the visualized abdomen,  but evaluation limited by portable technique.  This report was finalized on 09/09/2019 16:02 by Dr Delfina Gu MD.    US Renal Bilateral [[id number]] Collected:  09/05/19 1433     Updated:  09/05/19 1439    Narrative:       EXAMINATION:  US RENAL BILATERAL-  9/5/2019 12:55 PM CDT     HISTORY: refractory HTN; R13.12-Dysphagia, oropharyngeal phase;  Z74.09-Other reduced mobility; Z74.09-Other reduced mobility      COMPARISON: No comparison study.     TECHNIQUE: Bilateral renal ultrasound was performed.     The examination is limited due to the patient's inability to hold her  breath and be still for the examination.     The renal arteries were unable to perform at this time. The renal  arteries and aorta were obscured by bowel gas.     FINDINGS:      The left kidney measures 12.2 cm in gkmj-ww-zjag length.     The right kidney measures 12.3 cm in nzvo-yd-cpea length.     In the upper pole of the left kidney there is a 2.8 cm solid  pedunculated-appearing mass. Renal cell malignancy considered. CT  imaging with renal mass protocol suggested.     A 1 cm cyst noted in the upper pole the right kidney.     Normal echogenicity and renal cortex observed.     There is no pelvocaliectasis or obstructive uropathy changes. There are  no perinephric abnormalities.     Urinary bladder is decompressed with a Alejo catheter.     A small amount of perihepatic ascites.       Impression:       1. 2.8 cm solid mass suspected in the upper pole of the level left  kidney, further imaging characterization with CT, renal mass  protocol,  recommended.  2. Right nephrogenic cyst.  This report was finalized on 2019 14:36 by Dr. Misha Osborne MD.            Assessment and Plan  1.  Gross hematuria now resolved with Alejo catheter replacement likely due to previous Alejo trauma.  Recommend leaving Alejo catheter in place for about a week to allow for urethral healing, continue his terazosin he is already taking, and will observe for now.  2.  Known left small renal lesion for which continue to recommend a three-phase dedicated renal CT at some point to further characterize.    We will continue to follow.  Thank for the consultation.  Please call with any questions      (Please note that portions of this note were completed with a voice recognition program.)  Munir Swan MD  19  6:23 PM        Electronically signed by Munir Swan MD at 2019  6:39 PM          Physical Therapy Notes (last 24 hours) (Notes from 2019 10:11 AM through 2019 10:11 AM)      Debby Garcia PTA at 2019 10:32 AM  Version 1 of 1         Problem: Patient Care Overview  Goal: Plan of Care Review  Outcome: Ongoing (interventions implemented as appropriate)   19 1029   Coping/Psychosocial   Plan of Care Reviewed With patient   Plan of Care Review   Progress no change   OTHER   Outcome Summary PT tx completed. Pt supine in bed moaning/groaning in pain. States he hurts everywhere. Rates back pn 9/10. MaxA x 2 bed mobility, MaxA for sitting balance. Unable to sit unsupported at bedside. Worked on sitting balance x 15 minutes. BLE ROM, increased assistance on RUE/LE due to weakness. Recommend SNF for rehab.           Electronically signed by Debby Garcia PTA at 2019 10:32 AM     Debby Garcia PTA at 2019 10:32 AM  Version 1 of 1         Acute Care - Physical Therapy Treatment Note   [facility]     Patient Name: Phan Eastman  : 1955  MRN: [id number]  Today's Date: 2019  Onset of Illness/Injury or  Date of Surgery: 08/31/19     Referring Physician: Dr. Wyatt     Admit Date: 8/31/2019    Visit Dx:    ICD-10-CM ICD-9-CM   1. Oropharyngeal dysphagia R13.12 787.22   2. Impaired mobility and ADLs Z74.09 799.89   3. Impaired mobility Z74.09 799.89   4. Left renal mass N28.89 593.9     Patient Active Problem List   Diagnosis   • ICH (intracerebral hemorrhage) (CMS/[facility])   • Diabetes mellitus (CMS/[facility])   • Hypertensive urgency   • Hyperlipidemia   • Cerebrovascular accident (CVA) due to embolism of left middle cerebral artery (CMS/[facility])   • Dysarthria   • CKD (chronic kidney disease) stage 3, GFR 30-59 ml/min (CMS/[facility])   • Left renal mass   • Obesity (BMI 30-39.9)   • Diastolic CHF, chronic (CMS/[facility])   • Bradycardia   • Constipation       Therapy Treatment    Rehabilitation Treatment Summary     Row Name 09/11/19 0910             Treatment Time/Intention    Discipline  physical therapy assistant  -KJ      Document Type  therapy note (daily note)  -KJ      Subjective Information  complains of;pain  -KJ      Mode of Treatment  physical therapy  -KJ      Patient Effort  adequate  -KJ      Existing Precautions/Restrictions  fall  -KJ      Treatment Considerations/Comments  Right side weakness, increased pain everywhere this am  -KJ      Recorded by [KJ] Debby Garcia PTA 09/11/19 1028      Row Name 09/11/19 0910             Bed Mobility Assessment/Treatment    Supine-Sit [place] (Bed Mobility)  verbal cues;maximum assist (25% patient effort);2 person assist  -KJ      Sit-Supine [place] (Bed Mobility)  maximum assist (25% patient effort);2 person assist;verbal cues  -KJ      Bed Mobility, Safety Issues  decreased use of arms for pushing/pulling;decreased use of legs for bridging/pushing  -KJ      Comment (Bed Mobility)  sit edge of bed x 15 minutes Mod/MaxA 1-2 for sitting balance  -KJ      Recorded by [KJ] Debby Garcia PTA 09/11/19 1028      Row Name 09/11/19 0910             Gait/Stairs  Assessment/Training    Comment (Gait/Stairs)  not appropriate  -KJ      Recorded by [KJ] Debby Garcia, [facility] 09/11/19 1028      Row Name 09/11/19 0910             Therapeutic Exercise    Exercise Type (Therapeutic Exercise)  AROM (active range of motion) LAQ's actively x 15  -KJ      Position (Therapeutic Exercise)  seated  -KJ      Sets/Reps (Therapeutic Exercise)  15  -KJ      Comment (Therapeutic Exercise)  reaching all planes, static postural correction   -KJ      Recorded by [KJ] Debby Garcia, [facility] 09/11/19 1028      Row Name 09/11/19 0910             Static Sitting Balance    Level of [place] (Unsupported Sitting, Static Balance)  maximal assist, 25 to 49% patient effort  -KJ      Sitting Position (Unsupported Sitting, Static Balance)  sitting on edge of bed  -KJ      Time Able to Maintain Position (Unsupported Sitting, Static Balance)  more than 5 minutes  -KJ      Comment (Unsupported Sitting, Static Balance)  sitting balance max assist, unable to sit without external support  -KJ      Recorded by [KJ] Debby Garcia, [facility] 09/11/19 1028      Row Name 09/11/19 0910             Positioning and Restraints    Pre-Treatment Position  in bed  -KJ      Post Treatment Position  bed  -KJ      Recorded by [KJ] Debby Garcia, [facility] 09/11/19 1028      Row Name 09/11/19 0910             Pain Scale: Numbers Pre/Post-Treatment    Pain Scale: Numbers, Pretreatment  8/10  -KJ      Pain Scale: Numbers, Post-Treatment  8/10  -KJ      Pain Location  back  -KJ      Recorded by [KJ] Debby Garcia, [facility] 09/11/19 1028        User Key  (r) = Recorded By, (t) = Taken By, (c) = Cosigned By    Initials Name Effective Dates Discipline    KJ Debby Garcia, [facility] 08/02/16 -  PT                   Physical Therapy Education     Title: PT OT SLP Therapies (In Progress)     Topic: Physical Therapy (In Progress)     Point: Mobility training (Done)     Learning Progress Summary           Patient Acceptance, E, VU,NR by TITUS at 9/4/2019   8:11 AM    Comment:  Rolling in bed. Bed mobility.    Acceptance, E, VU,NR by JAKE at 9/2/2019  7:20 AM    Comment:  Educated pt on progression of PT POC and benefits of activity                               User Key     Initials Effective Dates Name Provider Type Discipline    JAKE 08/02/16 -  Gilmer Medrano, PT DPT Physical Therapist PT    TITUS 08/02/16 -  Kathrine Ramsay, PTA Physical Therapy Assistant PT                PT Recommendation and Plan     Plan of Care Reviewed With: patient  Progress: no change  Outcome Summary: PT tx completed. Pt supine in bed moaning/groaning in pain. States he hurts everywhere. Rates back pn 9/10. MaxA x 2 bed mobility, MaxA for sitting balance. Unable to sit unsupported at bedside. Worked on sitting balance x 15 minutes. BLE ROM, increased assistance on RUE/LE due to weakness. Recommend SNF for rehab.  Outcome Measures     Row Name 09/10/19 1000 09/09/19 1700          How much help from another is currently needed...    Putting on and taking off regular lower body clothing?  1  -MW  1  -MW     Bathing (including washing, rinsing, and drying)  2  -MW  2  -MW     Toileting (which includes using toilet bed pan or urinal)  1  -MW  1  -MW     Putting on and taking off regular upper body clothing  2  -MW  2  -MW     Taking care of personal grooming (such as brushing teeth)  2  -MW  2  -MW     Eating meals  2  -MW  2  -MW     AM-PAC 6 Clicks Score (OT)  10  -MW  10  -MW        Functional Assessment    Outcome Measure Options  AM-PAC 6 Clicks Daily Activity (OT)  -MW  AM-PAC 6 Clicks Daily Activity (OT)  -MW       User Key  (r) = Recorded By, (t) = Taken By, (c) = Cosigned By    Initials Name Provider Type    MW Elisa Chung, OTR/L Occupational Therapist         Time Calculation:   PT Charges     Row Name 09/11/19 1029             Time Calculation    Start Time  0910  -KJ      Stop Time  0935  -KJ      Time Calculation (min)  25 min  -KJ      PT Received On  09/11/19  -KJ      PT  Goal Re-Cert Due Date  19  -COLETTE         Time Calculation- PT    Total Timed Code Minutes- PT  25 minute(s)  -KJ        User Key  (r) = Recorded By, (t) = Taken By, (c) = Cosigned By    Initials Name Provider Type    Debby Thompson PTA Physical Therapy Assistant        Therapy Charges for Today     Code Description Service Date Service Provider Modifiers Qty    88807219973 HC PT THERAPEUTIC ACT EA 15 MIN 9/10/2019 Debby Garcia PTA GP 3    51161109970 HC PT THERAPEUTIC ACT EA 15 MIN 9/10/2019 Debby Garcia PTA GP 2    71458244247 HC PT THERAPEUTIC ACT EA 15 MIN 2019 Debby Garcia PTA GP 2          PT G-Codes  Outcome Measure Options: AM-PAC 6 Clicks Daily Activity (OT)  AM-PAC 6 Clicks Score (PT): 8  AM-PAC 6 Clicks Score (OT): 10    Debby Garcia PTA  2019         Electronically signed by Debby Garcia PTA at 2019 10:32 AM     Chinmay Mix, PT Student at 2019  4:51 PM  Version 1 of 1         Problem: Patient Care Overview  Goal: Plan of Care Review  Outcome: Ongoing (interventions implemented as appropriate)                        19 1639   Coping/Psychosocial   Plan of Care Reviewed With patient   Plan of Care Review   Progress improving   OTHER   Outcome Summary PT treatment completed this afternoon. Pt performed rolling to L and supine-sit-supine with assist of 2. Pt sat EOB x2, pt assistance varied from max A at times CGA sometimes using therapist's hand to pull himself upright and lean forward. Pt remarked pain in L abdomen throughout treatment and it hurt more with leaning to the L in sitting. Nursed notified of pain.               Electronically signed by Coco Ascencio PT at 2019  4:53 PM     Chinmay Mix, PT Student at 2019  4:51 PM  Version 1 of 1         Patient Name: Phan Eastman  : 1955    MRN: [id number]                              Today's Date: 2019       Admit Date: 2019    Visit Dx:     ICD-10-CM ICD-9-CM   1.  Oropharyngeal dysphagia R13.12 787.22   2. Impaired mobility and ADLs Z74.09 799.89   3. Impaired mobility Z74.09 799.89   4. Left renal mass N28.89 593.9     Patient Active Problem List   Diagnosis   • ICH (intracerebral hemorrhage) (CMS/[facility])   • Diabetes mellitus (CMS/[facility])   • Hypertensive urgency   • Hyperlipidemia   • Cerebrovascular accident (CVA) due to embolism of left middle cerebral artery (CMS/[facility])   • Dysarthria   • CKD (chronic kidney disease) stage 3, GFR 30-59 ml/min (CMS/[facility])   • Left renal mass   • Obesity (BMI 30-39.9)   • Diastolic CHF, chronic (CMS/[facility])   • Bradycardia   • Constipation   • Hematuria     Past Medical History:   Diagnosis Date   • Diabetes mellitus (CMS/[facility])    • GERD (gastroesophageal reflux disease)    • Hyperlipidemia    • Hypertension    • Neuropathy    • Osteoarthritis    • TIA (transient ischemic attack)      Past Surgical History:   Procedure Laterality Date   • CARPAL TUNNEL RELEASE Bilateral    • EYE SURGERY     • KNEE SURGERY Bilateral    • NECK SURGERY     • RETINAL DETACHMENT SURGERY     • ROTATOR CUFF REPAIR       General Information     Row Name 09/11/19 1610          PT Evaluation Time/Intention    Document Type  therapy note (daily note)  -KARRI (r) TRACIE (t) JE (c)     Mode of Treatment  physical therapy  -KARRI (r) DN (t) JE (c)     Row Name 09/11/19 1610          General Information    Patient Profile Reviewed?  yes  -KARRI (r) DN (t) JE (c)     Existing Precautions/Restrictions  fall  -JE (r) DN (t) JE (c)     Row Name 09/11/19 1610          Cognitive Assessment/Intervention- PT/OT    Personal Safety Interventions  fall prevention program maintained;supervised activity;nonskid shoes/slippers when out of bed;muscle strengthening facilitated  -KARRI (r) DN (t) JE (c)       User Key  (r) = Recorded By, (t) = Taken By, (c) = Cosigned By    Initials Name Provider Type    Coco Crowell, PT Physical Therapist    Chinmay Hunter, PT Student PT Student        Mobility     Row  Name 09/11/19 1610          Bed Mobility Assessment/Treatment    Bed Mobility Assessment/Treatment  rolling left;scooting/bridging;supine-sit-supine  -JE (r) DN (t) JE (c)     Rolling Left [place] (Bed Mobility)  verbal cues;2 person assist;moderate assist (50% patient effort)  -JE (r) DN (t) JE (c)     Scooting/Bridging [place] (Bed Mobility)  moderate assist (50% patient effort);verbal cues Bed was in trendelenburg position and used bed rails in order to scoot closer to HOB independently. Patient required stabilization at feet in order to bridge partially.  -JE (r) DN (t) JE (c)     Supine-Sit-Supine [place] (Bed Mobility)  maximum assist (25% patient effort);verbal cues;2 person assist;nonverbal cues (demo/gesture)  -JE (r) DN (t) JE (c)     Assistive Device (Bed Mobility)  bed rails;head of bed elevated;draw sheet  -JE (r) DN (t) JE (c)       User Key  (r) = Recorded By, (t) = Taken By, (c) = Cosigned By    Initials Name Provider Type    Coco Crowell, PT Physical Therapist    Chinmay Hunter, PT Student PT Student        Obj/Interventions     Row Name 09/11/19 1610          Therapeutic Exercise    Lower Extremity (Therapeutic Exercise)  quad sets, right;heel slides, right;LAQ (long arc quad), right  -JE (r) DN (t) JE (c)     Core Strength (Therapeutic Exercise)  bridging, bilateral lower extremities attempted, unable to raise fully off bed.  -JE (r) DN (t) JE (c)     Exercise Type (Therapeutic Exercise)  AAROM (active assistive range of motion);AROM (active range of motion)  -JE (r) DN (t) JE (c)     Position (Therapeutic Exercise)  seated;supine  -JE (r) DN (t) JE (c)     Sets/Reps (Therapeutic Exercise)  5-10 reps  -JE (r) DN (t) JE (c)     Expected Outcome (Therapeutic Exercise)  improve functional stability;improve functional tolerance, self-care activity;improve postural control;improve neuromuscular control  -JE (r) DN (t) JE (c)     Row Name 09/11/19 1610          Static Sitting  Balance    Level of [place] (Unsupported Sitting, Static Balance)  maximal assist, 25 to 49% patient effort;2 person assist  -JE (r) DN (t) JE (c)     Sitting Position (Unsupported Sitting, Static Balance)  sitting on edge of bed  -JE (r) DN (t) JE (c)     Time Able to Maintain Position (Unsupported Sitting, Static Balance)  more than 5 minutes  -JE (r) DN (t) JE (c)     Comment (Unsupported Sitting, Static Balance)  Required verbal cues to lean forward and toward midline in order to stay upright. In supported static sitting, CGA with use of therapist hand for stabilization and to pull/maintain posture.  -JE (r) DN (t) JE (c)       User Key  (r) = Recorded By, (t) = Taken By, (c) = Cosigned By    Initials Name Provider Type    Coco Crowell, PT Physical Therapist    Chinmay Hunter, PT Student PT Student        Goals/Plan    No documentation.       Clinical Impression     Row Name 09/11/19 1610          Pain Assessment    Additional Documentation  Pain Scale: Numbers Pre/Post-Treatment (Group)  -JE (r) DN (t) JE (c)     Row Name 09/11/19 1610          Pain Scale: Numbers Pre/Post-Treatment    Pain Scale: Numbers, Pretreatment  0/10 - no pain Pain 7/10 during treatment  -JE (r) DN (t) JE (c)     Pain Scale: Numbers, Post-Treatment  0/10 - no pain  -JE (r) DN (t) JE (c)     Pain Location - Side  Left  -JE (r) DN (t) JE (c)     Pain Location  abdomen  -JE (r) DN (t) JE (c)     Pre/Post Treatment Pain Comment  Left abdomen pain throughout treatment  -JE (r) DN (t) JE (c)     Pain Intervention(s)  Medication (See MAR);Ambulation/increased activity;Repositioned;Rest  -JE (r) DN (t) JE (c)     Row Name 09/11/19 1610          Positioning and Restraints    Pre-Treatment Position  in bed  -JE (r) DN (t) JE (c)     Post Treatment Position  bed  -JE (r) DN (t) JE (c)     In Bed  side lying left;call light within reach;encouraged to call for assist;side rails up x3;SCD pump applied;pillow between legs;heels elevated   -JE (r) DN (t) JE (c)       User Key  (r) = Recorded By, (t) = Taken By, (c) = Cosigned By    Initials Name Provider Type    Coco Crowell, PT Physical Therapist    Chinmay Hunter, PT Student PT Student        Outcome Measures    No documentation.       Physical Therapy Education     Title: PT OT SLP Therapies (In Progress)     Topic: Physical Therapy (In Progress)     Point: Mobility training (Done)     Learning Progress Summary           Patient Acceptance, E,D, VU by TRACIE at 9/11/2019  4:25 PM    Comment:  Pt educated on bed mobility techniques and benefits of activity. Pt instructed on performing quad sets while in bed.    Acceptance, E, VU,NR by TITUS at 9/4/2019  8:11 AM    Comment:  Rolling in bed. Bed mobility.    Acceptance, E, VU,NR by JAKE at 9/2/2019  7:20 AM    Comment:  Educated pt on progression of PT POC and benefits of activity                               User Key     Initials Effective Dates Name Provider Type Discipline    JAKE 08/02/16 -  Gilmer Medrano, PT DPT Physical Therapist PT    TITUS 08/02/16 -  Kathrine Ramsay, PTA Physical Therapy Assistant PT    TRACIE 07/26/19 -  Chinmay Mix, PT Student PT Student PT              PT Recommendation and Plan     Plan of Care Reviewed With: patient  Progress: improving  Outcome Summary: PT treatment completed this afternoon. Pt performed rolling to L and supine-sit-supine with assist of 2. Pt sat EOB x2, pt was CGA sometimes using therapist's hand to pull himself upright and lean forward. Pt remarked pain in L abdomen throughout treatment and it hurt more with leaning to the L in sitting. Nursed notified of pain.     Time Calculation:   PT Charges     Row Name 09/11/19 1631 09/11/19 1029          Time Calculation    Start Time  1525  -JE (r) DN (t) JE (c)  0910  -KJ     Stop Time  1608  -JE (r) DN (t) JE (c)  0935  -KJ     Time Calculation (min)  43 min  -JE (r) DN (t)  25 min  -KJ     PT Received On  09/11/19  -JE (r) DN (t) JE (c)  09/11/19  -KJ      PT Goal Re-Cert Due Date  09/21/19  -JE (r) DN (t) KARRI (c)  09/12/19  -KJ        Time Calculation- PT    Total Timed Code Minutes- PT  —  25 minute(s)  -KJ        Timed Charges    13226 - PT Therapeutic Activity Minutes  43  -JE (r) DN (t) JE (c)  —       User Key  (r) = Recorded By, (t) = Taken By, (c) = Cosigned By    Initials Name Provider Type    Debby Thompson, PTA Physical Therapy Assistant    Coco Crowell, PT Physical Therapist    Chinmay Hunter, PT Student PT Student        Therapy Charges for Today     Code Description Service Date Service Provider Modifiers Qty    38051074900 HC PT THERAPEUTIC ACT EA 15 MIN 9/11/2019 Chinmay Mix, PT Student GP 2          PT G-Codes  Outcome Measure Options: (P) AM-PAC 6 Clicks Daily Activity (OT)  AM-PAC 6 Clicks Score (PT): 8  AM-PAC 6 Clicks Score (OT): (P) 10    Chinmay Mix, PT Student  9/11/2019         Electronically signed by Jennyfer Perkins, PT at 9/12/2019  9:30 AM       Occupational Therapy Notes (last 24 hours) (Notes from 9/11/2019 10:11 AM through 9/12/2019 10:11 AM)     No notes of this type exist for this encounter.

## 2019-09-13 NOTE — PAYOR COMM NOTE
"Pritesh Alva (64 y.o. Male)     Date of Birth Social Security Number Address Home Phone MRN    [id number]  [id number]   [facility] [address] [phone] [id number]    Taoist Marital Status          Confucianism        Admission Date Admission Type Admitting Provider Attending Provider Department, Room/Bed    8/31/19 Urgent Raf Wyatt MD  [facility] 3A, 339/1    Discharge Date Discharge Disposition Discharge Destination        9/13/2019 Rehab Facility or Unit (DC - External)              Attending Provider:  (none)   Allergies:  Iodine, Lisinopril, Red Dye    Isolation:  None   Infection:  None   Code Status:  CPR    Ht:  188 cm (74\")   Wt:  122 kg (269 lb)    Admission Cmt:  None   Principal Problem:  ICH (intracerebral hemorrhage) (CMS/HCC) [I61.9]                 Active Insurance as of 8/31/2019     Primary Coverage     Payor Plan Insurance Group Employer/Plan Group    HUMANA MEDICARE REPLACEMENT HUMANA MEDICARE REPL [id number]     Payor Plan Address Payor Plan Phone Number Payor Plan Fax Number Effective Dates    [address] [address] [phone]  1/1/2018 - None Entered    [facility] [id number]       Subscriber Name Subscriber Birth Date Member ID       PRITESH ALVA 1955 [id number]                 Emergency Contacts      (Rel.) Home Phone Work Phone Mobile Phone    Yaritza Alva (Sister) [phone] -- --    Gabe Alva (Son) -- -- [phone]    LUDIN Stanley (Daughter) -- -- [phone]               Discharge Summary      Heriberto Avalos, APRN at 9/13/2019  8:48 AM          Date of Discharge:  9/13/2019    Discharge Diagnosis:   Patient Active Problem List   Diagnosis   • ICH (intracerebral hemorrhage) (CMS/HCC)   • Diabetes mellitus (CMS/HCC)   • Hypertensive urgency   • Hyperlipidemia   • Cerebrovascular accident (CVA) due to embolism of left middle cerebral artery (CMS/HCC)   • Dysarthria   • CKD (chronic kidney disease) stage 3, GFR 30-59 " ml/min (CMS/HCC)   • Left renal mass   • Obesity (BMI 30-39.9)   • Diastolic CHF, chronic (CMS/HCC)   • Bradycardia   • Constipation   • Hematuria     1. Oropharyngeal dysphagia    2. Impaired mobility and ADLs    3. Impaired mobility    4. Left renal mass    5. Nontraumatic subcortical hemorrhage of left cerebral hemisphere (CMS/HCC)    6. Type 1 diabetes mellitus with complication (CMS/HCC)    7. Hypertensive urgency    8. Hyperlipidemia, unspecified hyperlipidemia type    9. Cerebrovascular accident (CVA) due to embolism of left middle cerebral artery (CMS/HCC)    10. Dysarthria    11. CKD (chronic kidney disease) stage 3, GFR 30-59 ml/min (CMS/HCC)    12. Obesity (BMI 30-39.9)    13. Diastolic CHF, chronic (CMS/HCC)    14. Bradycardia    15. Drug-induced constipation    16. Gross hematuria      Presenting Problem/History of Present Illness  ICH (intracerebral hemorrhage) (CMS/HCC) [I61.9]  ICH (intracerebral hemorrhage) (CMS/[facility]) [I61.9]   1. Oropharyngeal dysphagia    2. Impaired mobility and ADLs    3. Impaired mobility    4. Left renal mass    5. Nontraumatic subcortical hemorrhage of left cerebral hemisphere (CMS/HCC)    6. Type 1 diabetes mellitus with complication (CMS/HCC)    7. Hypertensive urgency    8. Hyperlipidemia, unspecified hyperlipidemia type    9. Cerebrovascular accident (CVA) due to embolism of left middle cerebral artery (CMS/HCC)    10. Dysarthria    11. CKD (chronic kidney disease) stage 3, GFR 30-59 ml/min (CMS/HCC)    12. Obesity (BMI 30-39.9)    13. Diastolic CHF, chronic (CMS/HCC)    14. Bradycardia    15. Drug-induced constipation    16. Gross hematuria      Hospital Course  Patient is a 64 y.o. male presented with a significant comorbidity diabetes with diabetic neuropathy, intracerebral ischemic stroke x3.  He presents with a new problem of sudden onset of right-sided weakness and slurred speech. Physical exam findings of right-sided hemiparesis and right facial droop.  Their  imaging shows 2.7 x 2.1 acute thalamic intracerebral hemorrhage.    Initially, upon admission Mr. Eastman was placed in the intensive care unit for close neurologic and blood pressure monitoring.  With the assistance of hospital services, Mr. Myrick blood pressure has been regulated and currently within reasonable limits.  A renal ultrasound was obtained and showed a 2.8 cm solid appearing mass in the upper pole of the left kidney.  Mr. Nair additionally had an episode of gross hematuria.  Urology was consulted and a indwelling Laejo catheter was placed.  Per urology recommendations, Mr. Eastman should have an outpatient renal MRI with and without contrast to fully evaluate the lesion.  In regards to hematuria, the recommendation was made to leave the Alejo catheter in place for approximately 1 week allowing the urethra to heal, as well as continue terazosin.  In regards to his previous ischemic strokes, Mr. Eastman was evaluated by neurology.  A cardiac echo was obtained and within normal limits.  Carotid ultrasound showed 50- 69% stenosis on the right and less than 50% stenosis on the left.      Neurologically, Mr. Myrick right-sided weakness has continued to slowly improve.  He has been able to tolerate a mechanical soft diet and nectar thick liquids with meals supervision, oral medications, and void.  He has been evaluated by physical therapy and Occupational Therapy and deemed safe for discharge to a skilled nursing facility to continue inpatient PT and OT.  As previously alluded to, Mr. Eastman will require strict blood pressure management, and for continuity of care routine outpatient follow-ups with neurology, neurosurgery, and urology.  Mr. Eastman will be discharged today and his care will be transferred to [facility] in [place], [place] where he will reside closer to his family.  He is to be transported by EMS.  Additional instructions provided.    Procedures Performed  None      Consults:   Consults     Date and Time Order Name Status Description    9/11/2019 0905 Inpatient Urology Consult Completed     9/6/2019 1017 Inpatient Urology Consult      9/3/2019 0906 Inpatient Neurology Consult Stroke      9/1/2019 1359 Inpatient Hospitalist Consult          Pertinent Test Results: Ct Head Without Contrast    Result Date: 9/1/2019  Stable head CT with compared with yesterday, with no significant change in the left basal ganglia parenchymal hematoma, likely related to hypertensive type hemorrhage.  This report was finalized on 09/01/2019 12:34 by Dr. Figueroa Golden MD.    Ct Head Without Contrast    Result Date: 8/31/2019  1. Acute intraparenchymal hematoma centered within the left basal ganglia, measuring approximately 2.1 x 2.7 cm, probably hypertensive type bleed. 2. Moderate diffuse cortical atrophy. Chronic lacunar infarction noted in the basal ganglia. This also small chronic appearing infarct in the medial aspect of the posterior superior cerebellum. 3. Mild ventriculomegaly, no more than expected for the degree of atrophy identified. There is no midline shift.  This report was finalized on 08/31/2019 08:24 by Dr. Figueroa Golden MD.    Mri Brain With & Without Contrast    Result Date: 9/1/2019  1. Small, approximately 5 mm focal acute lacunar white matter infarct in the left centrum semiovale without associated hemorrhage. 2. Stable 2.4 cm intraparenchymal hematoma in the left basal ganglia. This shows no abnormal enhancement. There is no evidence of intracranial neoplasm. 3. Diffuse atrophy and advanced chronic small vessel white matter ischemic disease. There is a small chronic lacunar infarct in the anterior left centrum semiovale and also within the basal ganglia bilaterally. This report was finalized on 09/01/2019 15:52 by Dr. Figueroa Golden MD.    Xr Chest 1 View    Result Date: 9/4/2019  1. Moderate cardiomegaly no evidence pulmonary vascular congestion.   This report was  finalized on 09/04/2019 10:08 by Dr. Karl Cohen MD.    Us Carotid Bilateral    Result Date: 9/3/2019  Impression: 1. There is 50-69% stenosis of the right internal carotid artery. 2. There is less than 50% stenosis of the left internal carotid artery. 3. Antegrade flow is demonstrated in bilateral vertebral arteries.  Comments: Bilateral carotid vertebral arterial duplex scan was performed.  Grayscale imaging shows intimal thickening and calcified elements at the carotid bifurcation. The right internal carotid artery peak systolic velocity is 152 cm/sec. The end-diastolic velocity is 22.0 cm/sec. The right ICA/CCA ratio is approximately 2.13 . These findings correlate with 50-69% stenosis of the right internal carotid artery.  Grayscale imaging shows intimal thickening and calcified elements at the carotid bifurcation. The left internal carotid artery peak systolic velocity is 160 cm/sec. The end-diastolic velocity is 11.8 cm/sec. The left ICA/CCA ratio is approximately 1.18 . These findings correlate with less than 50% stenosis of the left internal carotid artery.  Antegrade flow is demonstrated in bilateral vertebral arteries. Greater than 50% stenosis of the proximal bilateral external carotid arteries. This report was finalized on 09/03/2019 14:56 by Dr. Heriberto Levin MD.    Us Renal Bilateral    Result Date: 9/5/2019  1. 2.8 cm solid mass suspected in the upper pole of the level left kidney, further imaging characterization with CT, renal mass protocol, recommended. 2. Right nephrogenic cyst. This report was finalized on 09/05/2019 14:36 by Dr. Misha Osborne MD.    Xr Abdomen Kub    Result Date: 9/9/2019  1. A few borderline gas-filled loops of small bowel overlying the pelvis, which could be seen with ileus or early/partial obstruction. 2. Large amount of colonic stool. 3. No convincing dystrophic calcifications in the visualized abdomen, but evaluation limited by portable technique. This report was  finalized on 09/09/2019 16:02 by Dr Delfina Gu MD.    Lab Results   Component Value Date    WBC 8.10 09/12/2019    HGB 10.8 (L) 09/12/2019    HCT 33.3 (L) 09/12/2019    MCV 83.3 09/12/2019     09/12/2019     Lab Results   Component Value Date    GLUCOSE 107 (H) 09/13/2019    BUN 26 (H) 09/13/2019    CREATININE 1.55 (H) 09/13/2019    EGFRIFNONA 46 (L) 09/13/2019    BCR 16.8 09/13/2019    K 4.2 09/13/2019    CO2 29.0 09/13/2019    CALCIUM 8.8 09/13/2019    ALBUMIN 3.70 09/01/2019    AST 43 09/01/2019    ALT 31 09/01/2019     Lab Results   Component Value Date    HGBA1C 5.0 08/31/2019     Condition on Discharge: Stable    Vital Signs  Temp:  [97.6 °F (36.4 °C)-98.6 °F (37 °C)] 98 °F (36.7 °C)  Heart Rate:  [58-75] 70  Resp:  [18-20] 18  BP: (125-167)/(58-86) 133/60    Physical Exam:   Physical Exam   Constitutional: He appears well-developed and well-nourished.  Non-toxic appearance. He does not have a sickly appearance. He does not appear ill. No distress.   BMI 34.54   HENT:   Head: Normocephalic and atraumatic.   Right Ear: Hearing normal.   Left Ear: Hearing normal.   Mouth/Throat: Mucous membranes are normal.   Eyes: Conjunctivae and EOM are normal. Pupils are equal, round, and reactive to light.   Neck: Trachea normal and full passive range of motion without pain. Neck supple.   Cardiovascular: Normal rate and regular rhythm.   Pulmonary/Chest: Effort normal. No accessory muscle usage. No apnea, no tachypnea and no bradypnea. No respiratory distress.   Abdominal: Soft. Normal appearance.   Genitourinary:   Genitourinary Comments: Alejo catheter in place.  Hematuria resolved.   Neurological: He is alert. GCS eye subscore is 4. GCS verbal subscore is 5. GCS motor subscore is 6.   Skin: Skin is warm, dry and intact.   Psychiatric: He has a normal mood and affect. His speech is normal and behavior is normal.   Nursing note and vitals reviewed.       Neurologic Exam     Mental Status   Oriented to person.    Oriented to place.   Oriented to year.   Speech: speech is normal   Level of consciousness: alert ,  arousable by verbal stimuli  Able to name object. Unable to read. Unable to repeat. Unable to write. Abnormal comprehension.   Speech remains slurred but clearing.  Follows commands.  Shows thumbs up into fingers bilaterally.     Cranial Nerves     CN III, IV, VI   Pupils are equal, round, and reactive to light.  Extraocular motions are normal.     CN V   Facial sensation intact.     CN VII   Facial expression full, symmetric.   Left facial weakness: central    Motor Exam   Right arm pronator drift: absent  Left arm pronator drift: absent    Strength   Right deltoid: 3/5  Left deltoid: 5/5  Right biceps: 4/5  Left biceps: 5/5  Right triceps: 4/5  Left triceps: 5/5  Right interossei: 3/5  Right iliopsoas: 2/5  Left iliopsoas: 5/5  Right quadriceps: 2/5  Left quadriceps: 5/5  Right peroneal: 3/5  Right gastroc: 3/5  Left gastroc: 5/5  Strength/movement on right improving     Sensory Exam   Light touch normal.     Discharge Disposition  Rehab Facility or Unit (DC - External)    Discharge Medications     Discharge Medications      New Medications      Instructions Start Date   amLODIPine 10 MG tablet  Commonly known as:  NORVASC   10 mg, Oral, Every 24 Hours Scheduled   Start Date:  9/14/2019     glucagon (human recombinant) 1 MG injection  Commonly known as:  GLUCAGEN DIAGNOSTIC   1 mg, Subcutaneous, As Needed      hydrALAZINE 100 MG tablet  Commonly known as:  APRESOLINE   100 mg, Oral, Every 8 Hours Scheduled      hydrochlorothiazide 25 MG tablet  Commonly known as:  HYDRODIURIL   25 mg, Oral, Daily   Start Date:  9/14/2019     insulin lispro 100 UNIT/ML injection  Commonly known as:  humaLOG   0-9 Units, Subcutaneous, 4 Times Daily With Meals & Nightly      lidocaine 5 %  Commonly known as:  LIDODERM   1 patch, Transdermal, Every 24 Hours Scheduled, Remove & Discard patch within 12 hours or as directed by MD    Start Date:  9/14/2019     oxyCODONE-acetaminophen 5-325 MG per tablet  Commonly known as:  PERCOCET   1 tablet, Oral, Every 4 Hours PRN      sennosides-docusate sodium 8.6-50 MG tablet  Commonly known as:  SENOKOT-S   1 tablet, Oral, 2 Times Daily         Changes to Medications      Instructions Start Date   CloNIDine 0.3 MG tablet  Commonly known as:  CATAPRES  What changed:    · medication strength  · how much to take  · when to take this   0.3 mg, Oral, Every 12 Hours Scheduled      traZODone 50 MG tablet  Commonly known as:  DESYREL  What changed:  Another medication with the same name was added. Make sure you understand how and when to take each.   50 mg, Oral, Nightly PRN      traZODone 100 MG tablet  Commonly known as:  DESYREL  What changed:  You were already taking a medication with the same name, and this prescription was added. Make sure you understand how and when to take each.   100 mg, Oral, Nightly         Continue These Medications      Instructions Start Date   doxazosin 2 MG tablet  Commonly known as:  CARDURA   2 mg, Oral, Nightly      glimepiride 2 MG tablet  Commonly known as:  AMARYL   2 mg, Oral, Every Morning Before Breakfast      losartan 100 MG tablet  Commonly known as:  COZAAR   100 mg, Oral, Daily      metFORMIN 850 MG tablet  Commonly known as:  GLUCOPHAGE   850 mg, Oral, 2 Times Daily With Meals      omeprazole 20 MG capsule  Commonly known as:  priLOSEC   20 mg, Oral, Daily      simvastatin 40 MG tablet  Commonly known as:  ZOCOR   40 mg, Oral, Nightly         Stop These Medications    aspirin 81 MG EC tablet          Discharge Diet:   Diet Instructions     Advance Diet As Tolerated          Following Clinical Bedside Swallow Evaluation on this date, a diet consistency of pureed with honey thick liquids was recommended by SLP.  Cues to swallow needed.  Alessandra Bright, MS CCC-SLP 9/2/2019 11:20 AM                       Activity at Discharge:   Activity Instructions     Activity as  Tolerated      Gradually Increase Activity Until at Pre-Hospitalization Level      Up WIth Assist           Follow-up Appointments  No future appointments.  Additional Instructions for the Follow-ups that You Need to Schedule     Discharge Follow-up with Specified Provider: Dr. Wyatt; 2 Months   As directed      To:  Dr. Wyatt    Follow Up:  2 Months    Follow Up Details:  CT head prior to appointment         MRI Abdomen With & Without Contrast   Sep 22, 2019      Renal Mass        Test Results Pending at Discharge   Order Current Status    Catecholamine+VMA, 24-Hr Urine - Urine, Clean Catch In process         EFRA Rodriguez  09/13/19  8:48 AM    Time: Discharge 40 min      Electronically signed by Heriberto Avalos APRN at 9/13/2019  9:34 AM

## 2019-09-13 NOTE — PLAN OF CARE
"Problem: Patient Care Overview  Goal: Plan of Care Review  Outcome: Ongoing (interventions implemented as appropriate)   09/13/19 0307   Coping/Psychosocial   Plan of Care Reviewed With patient   Plan of Care Review   Progress no change   OTHER   Outcome Summary No neuro changes this shift. PRN pain med given with relief. PRN labetolol given. [employer] cath patent. Pt continues to yell \"Help\" from room seeking nurse attention constantly.        Problem: Fall Risk (Adult)  Goal: Absence of Fall  Outcome: Ongoing (interventions implemented as appropriate)      Problem: Pain, Acute (Adult)  Goal: Acceptable Pain Control/Comfort Level  Outcome: Ongoing (interventions implemented as appropriate)      Problem: Skin Injury Risk (Adult)  Goal: Skin Health and Integrity  Outcome: Ongoing (interventions implemented as appropriate)      Problem: Nutrition, Imbalanced: Inadequate Oral Intake (Adult)  Goal: Identify Related Risk Factors and Signs and Symptoms  Outcome: Ongoing (interventions implemented as appropriate)    Goal: Improved Oral Intake  Outcome: Ongoing (interventions implemented as appropriate)      Problem: Stroke (Hemorrhagic) (Adult)  Goal: Signs and Symptoms of Listed Potential Problems Will be Absent, Minimized or Managed (Stroke)  Outcome: Ongoing (interventions implemented as appropriate)        "

## 2019-09-13 NOTE — DISCHARGE INSTRUCTIONS
[facility] Neurosurgery    Dear Patient,  You recently were admitted to the hospital for an acute thalamic intracerebral hemorrhage and are now ready to be discharged. These written instructions are intended to help you to recover quickly.  • If you have ANY QUESTIONS about your condition prior to discharge please ask Dr. Wyatt. In particular, if you have concerns about going home discuss them now. We do not want you to go until you are completely comfortable leaving the hospital.   • If you have ANY QUESTIONS about your condition after you go home call your doctor. The number is [phone] which is answered 24 hours a day. During regular working hours a  will connect you to your doctor, one of his partners, or one of our nurses. At night or on weekends the answering service will connect you with the physicianon call. DO NOT HESITATE to call. We want to help you with any problems.     Seizures  Anyone who has brain injury has a small risk of seizures. Seizures may involve involuntary shaking of the arms and legs, loss of consciousness, loss of urinary or bowel control. They typically last a few minutes, then the patient returns to normal. Seizures are frightening to watch, but usually resolve without long-term problems. Your doctor will often attempt to prevent seizures after surgery by putting you on anti-seizure medicine like Dilantin or Keppra. Sometimes seizures occur even when these medicines are used  What to do if a seizure occurs:  • If a seizure occurs and the patient does not return to normal in 10 minutes, call your Dr. Wyatt or go to the local emergency room.   • If multiple seizures occur, call 911.     Neurological Deficit  Neurological deficits are problems with brain function like speech difficulty, weakness, numbness, imbalance, etc. These deficits may be present before or after brain injury. Prior to discharge Dr. Wyatt will make sure that all treatment needed to help you  recover from such deficits has been instituted. He will also make sure that these deficits are stable or improving. After you go home, if you think any of your brain problems are getting worse, not better, it may be a sign of bleeding, infection, or other problems. Call Dr. Wyatt. He will order tests and prescribe treatment as needed.    Deep vein thrombosis/ pulmonary embolus  Some patients who are admitted to the hospital develop blood clots in the veins of the legs. These are caused by decreased walking and laying in bed.  These clots can cause pain or swelling in the legs, or may cause no obvious problem. They can break free from the legs and travel to the lungs causing shortness of breath and/or chest pain. If you develop pain or swelling in your legs after surgery, call your doctor. If you develop breathing problems or chest pain after surgery, call 911.    Medication  It is important to take your medication EXACTLY as prescribed. Some patients are reluctant to take pain medication. It is perfectly fine to take pain medication for several weeks after injury. We want to eliminate pain whenever possible. Many pain medications can cause nausea (sick to your stomach), constipation (inability to poop), or itching. Nausea may be minimized by taking the medication with food. Constipation can be relieved by taking stool softeners and/ or laxatives that you can purchase over the counter as needed. Some medications, like steroids or seizure medications, should not be stopped abruptly. They need to be weaned off over time. For instructions on weaning schedules call your doctor. Sometimes patients develop an allergy to a medication that was started during hospitalization. Most frequently an allergy will cause an itchy rash. Call your doctor if you think you might be having an allergic reaction.    Hydrocephalus  Your brain manufactures about one quart of clear fluid (called cerebrospinal fluid or CSF) every day.  Normally this fluid is reabsorbed into the blood stream. After brain injury the flow of fluid and the reabsorption process may be impaired. This leads to a buildup of water on the brain that is called hydrocephalus. Hydrocephalus can cause headache, somnolence, difficulty thinking, imbalance and loss of urinary control. If you think that the patient may have hydrocephalus, call your doctor. She/He will order a brain scan. If it shows water buildup a simple operation called a shunt may be needed to fix the problem.    How to contact your doctor  The neurosurgeon, Dr. Wyatt, and her/his team did your surgery and, therefore, are likely to know more about your condition than any other physicians. We are immediately available to help you with any problems after surgery. Please call us for any concerns at the following numbers:   • Doctor’s office [phone] (answered 24 hours a day)   • [facility]'s  [phone] (alternative emergency number for on-call neurosurgeon)     Specific instructions related to your surgery  Diet: no restrictions, eat a heart healthy diet.   Activity: as tolerated, no heavy lifting or strenuous exercise for at least 2 weeks.  ?  Follow up:   You should call as soon as possible for follow up with Dr. Wyatt in the neurosurgery clinic ([phone]) in 4-6 weeks.

## 2019-09-13 NOTE — THERAPY DISCHARGE NOTE
Acute Care - Physical Therapy Discharge Summary  [facility]       Patient Name: Phan Eastman  : 1955  MRN: [id number]    Today's Date: 2019  Onset of Illness/Injury or Date of Surgery: 19       Referring Physician: MD Yoselin      Admit Date: 2019      PT Recommendation and Plan    Visit Dx:    ICD-10-CM ICD-9-CM   1. Oropharyngeal dysphagia R13.12 787.22   2. Impaired mobility and ADLs Z74.09 799.89   3. Impaired mobility Z74.09 799.89   4. Left renal mass N28.89 593.9   5. Nontraumatic subcortical hemorrhage of left cerebral hemisphere (CMS/[facility]) I61.0 431   6. Type 1 diabetes mellitus with complication (CMS/[facility]) E10.8 250.91   7. Hypertensive urgency I16.0 401.9   8. Hyperlipidemia, unspecified hyperlipidemia type E78.5 272.4   9. Cerebrovascular accident (CVA) due to embolism of left middle cerebral artery (CMS/[facility]) I63.412 434.11   10. Dysarthria R47.1 784.51   11. CKD (chronic kidney disease) stage 3, GFR 30-59 ml/min (CMS/[facility]) N18.3 585.3   12. Obesity (BMI 30-39.9) E66.9 278.00   13. Diastolic CHF, chronic (CMS/[facility]) I50.32 428.32     428.0   14. Bradycardia R00.1 427.89   15. Drug-induced constipation K59.03 564.09     E980.5   16. Gross hematuria R31.0 599.71       Outcome Measures     Row Name 19 1000 19 1600          How much help from another is currently needed...    Putting on and taking off regular lower body clothing?  1  -MW (r) AB (t) MW (c)  1  -MW (r) AB (t) MW (c)     Bathing (including washing, rinsing, and drying)  2  -MW (r) AB (t) MW (c)  2  -MW (r) AB (t) MW (c)     Toileting (which includes using toilet bed pan or urinal)  1  -MW (r) AB (t) MW (c)  1  -MW (r) AB (t) MW (c)     Putting on and taking off regular upper body clothing  2  -MW (r) AB (t) MW (c)  2  -MW (r) AB (t) MW (c)     Taking care of personal grooming (such as brushing teeth)  2  -MW (r) AB (t) MW (c)  2  -MW (r) AB (t) MW (c)     Eating meals  2  -MW (r) AB (t) MW (c)  2  -MW (r) AB  (t) MW (c)     AM-PAC 6 Clicks Score (OT)  10  -MW (r) AB (t)  10  -MW (r) AB (t)        Functional Assessment    Outcome Measure Options  AM-PAC 6 Clicks Daily Activity (OT)  -MW (r) AB (t) MW (c)  AM-PAC 6 Clicks Daily Activity (OT)  -MW (r) AB (t) MW (c)       User Key  (r) = Recorded By, (t) = Taken By, (c) = Cosigned By    Initials Name Provider Type    MW Elisa Chnug, OTR/L Occupational Therapist    AB Janet Cardona, OT Student OT Student              Rehab Goal Summary     Row Name 09/13/19 1545             Bed Mobility Goal 1 (PT)    Activity/Assistive Device (Bed Mobility Goal 1, PT)  sit to supine/supine to sit  -NW      [place] Level/Cues Needed (Bed Mobility Goal 1, PT)  moderate assist (50-74% patient effort)  -NW      Time Frame (Bed Mobility Goal 1, PT)  long term goal (LTG);10 days  -NW      Progress/Outcomes (Bed Mobility Goal 1, PT)  goal not met  -NW         Transfer Goal 1 (PT)    Activity/Assistive Device (Transfer Goal 1, PT)  sit-to-stand/stand-to-sit;bed-to-chair/chair-to-bed  -NW      [place] Level/Cues Needed (Transfer Goal 1, PT)  moderate assist (50-74% patient effort);2 person assist  -NW      Time Frame (Transfer Goal 1, PT)  long term goal (LTG);10 days  -NW      Progress/Outcome (Transfer Goal 1, PT)  goal not met  -NW        User Key  (r) = Recorded By, (t) = Taken By, (c) = Cosigned By    Initials Name Provider Type Discipline    NW Haley Bishop PTA Physical Therapy Assistant PT              PT Discharge Summary  Anticipated Discharge Disposition (PT): inpatient rehabilitation facility  Reason for Discharge: Discharge from facility  Outcomes Achieved: Refer to plan of care for updates on goals achieved  Discharge Destination: Inpatient rehabilitation facility      Haley Bishop PTA   9/13/2019

## 2019-09-13 NOTE — DISCHARGE PLACEMENT REQUEST
"DC summary and orders. Please call Porsha at [phone] if you have any questions.   Thanks!      Pritesh Alva (64 y.o. Male)     Date of Birth Social Security Number Address Home Phone MRN    1955  [id number]   [facility] [address] [phone] [id number]    [employer]ah[employer] Marital Status          Latter-day        Admission Date Admission Type Admitting Provider Attending Provider Department, Room/Bed    8/31/19 Urgent Raf Wyatt MD [place], William Lee, MD [facility] 3A, 339/1    Discharge Date Discharge Disposition Discharge Destination         Rehab Facility or Unit (DC - External)              Attending Provider:  Raf Wyatt MD    Allergies:  Iodine, Lisinopril, Red Dye    Isolation:  None   Infection:  None   Code Status:  CPR    Ht:  188 cm (74\")   Wt:  122 kg (269 lb)    Admission Cmt:  None   Principal Problem:  ICH (intracerebral hemorrhage) (CMS/HCC) [I61.9]                 Active Insurance as of 8/31/2019     Primary Coverage     Payor Plan Insurance Group Employer/Plan Group    HUMANA MEDICARE REPLACEMENT HUMANA MEDICARE REPL [id number]     Payor Plan Address Payor Plan Phone Number Payor Plan Fax Number Effective Dates    [address] [phone]  1/1/2018 - None Entered    [facility] [id number]       Subscriber Name Subscriber Birth Date Member ID       PRITESH ALVA 1955 [id number]                 Emergency Contacts      (Rel.) Home Phone Work Phone Mobile Phone    Yaritza Alva (Sister) [phone] -- --    RhodaGabe barrett (Son) -- -- [phone]    LUDIN Stanley (Daughter) -- -- [phone]                 Discharge Summary      Heriberto Avalos, APRN at 9/13/2019  8:48 AM          Date of Discharge:  9/13/2019    Discharge Diagnosis:   Patient Active Problem List   Diagnosis   • ICH (intracerebral hemorrhage) (CMS/HCC)   • Diabetes mellitus (CMS/HCC)   • Hypertensive urgency   • Hyperlipidemia   • Cerebrovascular " accident (CVA) due to embolism of left middle cerebral artery (CMS/HCC)   • Dysarthria   • CKD (chronic kidney disease) stage 3, GFR 30-59 ml/min (CMS/HCC)   • Left renal mass   • Obesity (BMI 30-39.9)   • Diastolic CHF, chronic (CMS/HCC)   • Bradycardia   • Constipation   • Hematuria     1. Oropharyngeal dysphagia    2. Impaired mobility and ADLs    3. Impaired mobility    4. Left renal mass    5. Nontraumatic subcortical hemorrhage of left cerebral hemisphere (CMS/HCC)    6. Type 1 diabetes mellitus with complication (CMS/HCC)    7. Hypertensive urgency    8. Hyperlipidemia, unspecified hyperlipidemia type    9. Cerebrovascular accident (CVA) due to embolism of left middle cerebral artery (CMS/HCC)    10. Dysarthria    11. CKD (chronic kidney disease) stage 3, GFR 30-59 ml/min (CMS/HCC)    12. Obesity (BMI 30-39.9)    13. Diastolic CHF, chronic (CMS/HCC)    14. Bradycardia    15. Drug-induced constipation    16. Gross hematuria      Presenting Problem/History of Present Illness  ICH (intracerebral hemorrhage) (CMS/[facility]) [I61.9]  ICH (intracerebral hemorrhage) (CMS/HCC) [I61.9]   1. Oropharyngeal dysphagia    2. Impaired mobility and ADLs    3. Impaired mobility    4. Left renal mass    5. Nontraumatic subcortical hemorrhage of left cerebral hemisphere (CMS/HCC)    6. Type 1 diabetes mellitus with complication (CMS/HCC)    7. Hypertensive urgency    8. Hyperlipidemia, unspecified hyperlipidemia type    9. Cerebrovascular accident (CVA) due to embolism of left middle cerebral artery (CMS/HCC)    10. Dysarthria    11. CKD (chronic kidney disease) stage 3, GFR 30-59 ml/min (CMS/HCC)    12. Obesity (BMI 30-39.9)    13. Diastolic CHF, chronic (CMS/HCC)    14. Bradycardia    15. Drug-induced constipation    16. Gross hematuria      Hospital Course  Patient is a 64 y.o. male presented with a significant comorbidity diabetes with diabetic neuropathy, intracerebral ischemic stroke x3.  He presents with a new problem  of sudden onset of right-sided weakness and slurred speech. Physical exam findings of right-sided hemiparesis and right facial droop.  Their imaging shows 2.7 x 2.1 acute thalamic intracerebral hemorrhage.    Initially, upon admission Mr. Eastman was placed in the intensive care unit for close neurologic and blood pressure monitoring.  With the assistance of hospital services, Mr. Myrick blood pressure has been regulated and currently within reasonable limits.  A renal ultrasound was obtained and showed a 2.8 cm solid appearing mass in the upper pole of the left kidney.  Mr. Nair additionally had an episode of gross hematuria.  Urology was consulted and a indwelling Alejo catheter was placed.  Per urology recommendations, Mr. Eastman should have an outpatient renal MRI with and without contrast to fully evaluate the lesion.  In regards to hematuria, the recommendation was made to leave the Alejo catheter in place for approximately 1 week allowing the urethra to heal, as well as continue terazosin.  In regards to his previous ischemic strokes, Mr. Eastman was evaluated by neurology.  A cardiac echo was obtained and within normal limits.  Carotid ultrasound showed 50- 69% stenosis on the right and less than 50% stenosis on the left.      Neurologically, Mr. Myrick right-sided weakness has continued to slowly improve.  He has been able to tolerate a mechanical soft diet and nectar thick liquids with meals supervision, oral medications, and void.  He has been evaluated by physical therapy and Occupational Therapy and deemed safe for discharge to a skilled nursing facility to continue inpatient PT and OT.  As previously alluded to, Mr. Eastman will require strict blood pressure management, and for continuity of care routine outpatient follow-ups with neurology, neurosurgery, and urology.  Mr. Eastman will be discharged today and his care will be transferred to [facility] in [place], [place] where  he will reside closer to his family.  He is to be transported by EMS.  Additional instructions provided.    Procedures Performed  None     Consults:   Consults     Date and Time Order Name Status Description    9/11/2019 0905 Inpatient Urology Consult Completed     9/6/2019 1017 Inpatient Urology Consult      9/3/2019 0906 Inpatient Neurology Consult Stroke      9/1/2019 1359 Inpatient Hospitalist Consult          Pertinent Test Results: Ct Head Without Contrast    Result Date: 9/1/2019  Stable head CT with compared with yesterday, with no significant change in the left basal ganglia parenchymal hematoma, likely related to hypertensive type hemorrhage.  This report was finalized on 09/01/2019 12:34 by Dr. Figueroa Golden MD.    Ct Head Without Contrast    Result Date: 8/31/2019  1. Acute intraparenchymal hematoma centered within the left basal ganglia, measuring approximately 2.1 x 2.7 cm, probably hypertensive type bleed. 2. Moderate diffuse cortical atrophy. Chronic lacunar infarction noted in the basal ganglia. This also small chronic appearing infarct in the medial aspect of the posterior superior cerebellum. 3. Mild ventriculomegaly, no more than expected for the degree of atrophy identified. There is no midline shift.  This report was finalized on 08/31/2019 08:24 by Dr. Figueroa Golden MD.    Mri Brain With & Without Contrast    Result Date: 9/1/2019  1. Small, approximately 5 mm focal acute lacunar white matter infarct in the left centrum semiovale without associated hemorrhage. 2. Stable 2.4 cm intraparenchymal hematoma in the left basal ganglia. This shows no abnormal enhancement. There is no evidence of intracranial neoplasm. 3. Diffuse atrophy and advanced chronic small vessel white matter ischemic disease. There is a small chronic lacunar infarct in the anterior left centrum semiovale and also within the basal ganglia bilaterally. This report was finalized on 09/01/2019 15:52 by Dr. Figueroa Golden,  MD.    Xr Chest 1 View    Result Date: 9/4/2019  1. Moderate cardiomegaly no evidence pulmonary vascular congestion.   This report was finalized on 09/04/2019 10:08 by Dr. Karl Cohen MD.    Us Carotid Bilateral    Result Date: 9/3/2019  Impression: 1. There is 50-69% stenosis of the right internal carotid artery. 2. There is less than 50% stenosis of the left internal carotid artery. 3. Antegrade flow is demonstrated in bilateral vertebral arteries.  Comments: Bilateral carotid vertebral arterial duplex scan was performed.  Grayscale imaging shows intimal thickening and calcified elements at the carotid bifurcation. The right internal carotid artery peak systolic velocity is 152 cm/sec. The end-diastolic velocity is 22.0 cm/sec. The right ICA/CCA ratio is approximately 2.13 . These findings correlate with 50-69% stenosis of the right internal carotid artery.  Grayscale imaging shows intimal thickening and calcified elements at the carotid bifurcation. The left internal carotid artery peak systolic velocity is 160 cm/sec. The end-diastolic velocity is 11.8 cm/sec. The left ICA/CCA ratio is approximately 1.18 . These findings correlate with less than 50% stenosis of the left internal carotid artery.  Antegrade flow is demonstrated in bilateral vertebral arteries. Greater than 50% stenosis of the proximal bilateral external carotid arteries. This report was finalized on 09/03/2019 14:56 by Dr. Heriberto Levin MD.    Us Renal Bilateral    Result Date: 9/5/2019  1. 2.8 cm solid mass suspected in the upper pole of the level left kidney, further imaging characterization with CT, renal mass protocol, recommended. 2. Right nephrogenic cyst. This report was finalized on 09/05/2019 14:36 by Dr. Misha Osborne MD.    Xr Abdomen Kub    Result Date: 9/9/2019  1. A few borderline gas-filled loops of small bowel overlying the pelvis, which could be seen with ileus or early/partial obstruction. 2. Large amount of colonic stool. 3.  No convincing dystrophic calcifications in the visualized abdomen, but evaluation limited by portable technique. This report was finalized on 09/09/2019 16:02 by Dr Delfina Gu MD.    Lab Results   Component Value Date    WBC 8.10 09/12/2019    HGB 10.8 (L) 09/12/2019    HCT 33.3 (L) 09/12/2019    MCV 83.3 09/12/2019     09/12/2019     Lab Results   Component Value Date    GLUCOSE 107 (H) 09/13/2019    BUN 26 (H) 09/13/2019    CREATININE 1.55 (H) 09/13/2019    EGFRIFNONA 46 (L) 09/13/2019    BCR 16.8 09/13/2019    K 4.2 09/13/2019    CO2 29.0 09/13/2019    CALCIUM 8.8 09/13/2019    ALBUMIN 3.70 09/01/2019    AST 43 09/01/2019    ALT 31 09/01/2019     Lab Results   Component Value Date    HGBA1C 5.0 08/31/2019     Condition on Discharge: Stable    Vital Signs  Temp:  [97.6 °F (36.4 °C)-98.6 °F (37 °C)] 98 °F (36.7 °C)  Heart Rate:  [58-75] 70  Resp:  [18-20] 18  BP: (125-167)/(58-86) 133/60    Physical Exam:   Physical Exam   Constitutional: He appears well-developed and well-nourished.  Non-toxic appearance. He does not have a sickly appearance. He does not appear ill. No distress.   BMI 34.54   HENT:   Head: Normocephalic and atraumatic.   Right Ear: Hearing normal.   Left Ear: Hearing normal.   Mouth/Throat: Mucous membranes are normal.   Eyes: Conjunctivae and EOM are normal. Pupils are equal, round, and reactive to light.   Neck: Trachea normal and full passive range of motion without pain. Neck supple.   Cardiovascular: Normal rate and regular rhythm.   Pulmonary/Chest: Effort normal. No accessory muscle usage. No apnea, no tachypnea and no bradypnea. No respiratory distress.   Abdominal: Soft. Normal appearance.   Genitourinary:   Genitourinary Comments: Alejo catheter in place.  Hematuria resolved.   Neurological: He is alert. GCS eye subscore is 4. GCS verbal subscore is 5. GCS motor subscore is 6.   Skin: Skin is warm, dry and intact.   Psychiatric: He has a normal mood and affect. His speech is  normal and behavior is normal.   Nursing note and vitals reviewed.       Neurologic Exam     Mental Status   Oriented to person.   Oriented to place.   Oriented to year.   Speech: speech is normal   Level of consciousness: alert ,  arousable by verbal stimuli  Able to name object. Unable to read. Unable to repeat. Unable to write. Abnormal comprehension.   Speech remains slurred but clearing.  Follows commands.  Shows thumbs up into fingers bilaterally.     Cranial Nerves     CN III, IV, VI   Pupils are equal, round, and reactive to light.  Extraocular motions are normal.     CN V   Facial sensation intact.     CN VII   Facial expression full, symmetric.   Left facial weakness: central    Motor Exam   Right arm pronator drift: absent  Left arm pronator drift: absent    Strength   Right deltoid: 3/5  Left deltoid: 5/5  Right biceps: 4/5  Left biceps: 5/5  Right triceps: 4/5  Left triceps: 5/5  Right interossei: 3/5  Right iliopsoas: 2/5  Left iliopsoas: 5/5  Right quadriceps: 2/5  Left quadriceps: 5/5  Right peroneal: 3/5  Right gastroc: 3/5  Left gastroc: 5/5  Strength/movement on right improving     Sensory Exam   Light touch normal.     Discharge Disposition  Rehab Facility or Unit (DC - External)    Discharge Medications     Discharge Medications      New Medications      Instructions Start Date   amLODIPine 10 MG tablet  Commonly known as:  NORVASC   10 mg, Oral, Every 24 Hours Scheduled   Start Date:  9/14/2019     glucagon (human recombinant) 1 MG injection  Commonly known as:  GLUCAGEN DIAGNOSTIC   1 mg, Subcutaneous, As Needed      hydrALAZINE 100 MG tablet  Commonly known as:  APRESOLINE   100 mg, Oral, Every 8 Hours Scheduled      hydrochlorothiazide 25 MG tablet  Commonly known as:  HYDRODIURIL   25 mg, Oral, Daily   Start Date:  9/14/2019     insulin lispro 100 UNIT/ML injection  Commonly known as:  humaLOG   0-9 Units, Subcutaneous, 4 Times Daily With Meals & Nightly      lidocaine 5 %  Commonly known  as:  LIDODERM   1 patch, Transdermal, Every 24 Hours Scheduled, Remove & Discard patch within 12 hours or as directed by MD   Start Date:  9/14/2019     oxyCODONE-acetaminophen 5-325 MG per tablet  Commonly known as:  PERCOCET   1 tablet, Oral, Every 4 Hours PRN      sennosides-docusate sodium 8.6-50 MG tablet  Commonly known as:  SENOKOT-S   1 tablet, Oral, 2 Times Daily         Changes to Medications      Instructions Start Date   CloNIDine 0.3 MG tablet  Commonly known as:  CATAPRES  What changed:    · medication strength  · how much to take  · when to take this   0.3 mg, Oral, Every 12 Hours Scheduled      traZODone 50 MG tablet  Commonly known as:  DESYREL  What changed:  Another medication with the same name was added. Make sure you understand how and when to take each.   50 mg, Oral, Nightly PRN      traZODone 100 MG tablet  Commonly known as:  DESYREL  What changed:  You were already taking a medication with the same name, and this prescription was added. Make sure you understand how and when to take each.   100 mg, Oral, Nightly         Continue These Medications      Instructions Start Date   doxazosin 2 MG tablet  Commonly known as:  CARDURA   2 mg, Oral, Nightly      glimepiride 2 MG tablet  Commonly known as:  AMARYL   2 mg, Oral, Every Morning Before Breakfast      losartan 100 MG tablet  Commonly known as:  COZAAR   100 mg, Oral, Daily      metFORMIN 850 MG tablet  Commonly known as:  GLUCOPHAGE   850 mg, Oral, 2 Times Daily With Meals      omeprazole 20 MG capsule  Commonly known as:  priLOSEC   20 mg, Oral, Daily      simvastatin 40 MG tablet  Commonly known as:  ZOCOR   40 mg, Oral, Nightly         Stop These Medications    aspirin 81 MG EC tablet          Discharge Diet:   Diet Instructions     Advance Diet As Tolerated          Following Clinical Bedside Swallow Evaluation on this date, a diet consistency of pureed with honey thick liquids was recommended by SLP.  Cues to swallow needed.  Alessandra  VIC Bright, MS CCC-SLP 9/2/2019 11:20 AM                       Activity at Discharge:   Activity Instructions     Activity as Tolerated      Gradually Increase Activity Until at Pre-Hospitalization Level      Up WIth Assist           Follow-up Appointments  No future appointments.  Additional Instructions for the Follow-ups that You Need to Schedule     Discharge Follow-up with Specified Provider: Dr. Wyatt; 2 Months   As directed      To:  Dr. Wyatt    Follow Up:  2 Months    Follow Up Details:  CT head prior to appointment         MRI Abdomen With & Without Contrast   Sep 22, 2019      Renal Mass        Test Results Pending at Discharge   Order Current Status    Catecholamine+VMA, 24-Hr Urine - Urine, Clean Catch In process         EFRA Rodriguez  09/13/19  8:48 AM    Time: Discharge 40 min      Electronically signed by Heriberto Avalos APRN at 9/13/2019  9:34 AM       Discharge Order (From admission, onward)    Start     Ordered    09/13/19 0835  Discharge patient  Once     Expected Discharge Date:  09/13/19    Expected Discharge Time:  Morning    Discharge Disposition:  Rehab Facility or Unit (DC - External)    Physician of Record for Attribution - Please select from Treatment Team:  TIM WYATT [[id number]]    Review needed by CMO to determine Physician of Record:  No       Question Answer Comment   Physician of Record for Attribution - Please select from Treatment Team TIM WYATT    Review needed by CMO to determine Physician of Record No        09/13/19 0845

## 2019-09-13 NOTE — DISCHARGE SUMMARY
Date of Discharge:  9/13/2019    Discharge Diagnosis:   Patient Active Problem List   Diagnosis   • ICH (intracerebral hemorrhage) (CMS/HCC)   • Diabetes mellitus (CMS/HCC)   • Hypertensive urgency   • Hyperlipidemia   • Cerebrovascular accident (CVA) due to embolism of left middle cerebral artery (CMS/HCC)   • Dysarthria   • CKD (chronic kidney disease) stage 3, GFR 30-59 ml/min (CMS/HCC)   • Left renal mass   • Obesity (BMI 30-39.9)   • Diastolic CHF, chronic (CMS/HCC)   • Bradycardia   • Constipation   • Hematuria     1. Oropharyngeal dysphagia    2. Impaired mobility and ADLs    3. Impaired mobility    4. Left renal mass    5. Nontraumatic subcortical hemorrhage of left cerebral hemisphere (CMS/HCC)    6. Type 1 diabetes mellitus with complication (CMS/HCC)    7. Hypertensive urgency    8. Hyperlipidemia, unspecified hyperlipidemia type    9. Cerebrovascular accident (CVA) due to embolism of left middle cerebral artery (CMS/HCC)    10. Dysarthria    11. CKD (chronic kidney disease) stage 3, GFR 30-59 ml/min (CMS/HCC)    12. Obesity (BMI 30-39.9)    13. Diastolic CHF, chronic (CMS/HCC)    14. Bradycardia    15. Drug-induced constipation    16. Gross hematuria      Presenting Problem/History of Present Illness  ICH (intracerebral hemorrhage) (CMS/HCC) [I61.9]  ICH (intracerebral hemorrhage) (CMS/HCC) [I61.9]   1. Oropharyngeal dysphagia    2. Impaired mobility and ADLs    3. Impaired mobility    4. Left renal mass    5. Nontraumatic subcortical hemorrhage of left cerebral hemisphere (CMS/HCC)    6. Type 1 diabetes mellitus with complication (CMS/HCC)    7. Hypertensive urgency    8. Hyperlipidemia, unspecified hyperlipidemia type    9. Cerebrovascular accident (CVA) due to embolism of left middle cerebral artery (CMS/HCC)    10. Dysarthria    11. CKD (chronic kidney disease) stage 3, GFR 30-59 ml/min (CMS/HCC)    12. Obesity (BMI 30-39.9)    13. Diastolic CHF, chronic (CMS/HCC)    14. Bradycardia    15.  Drug-induced constipation    16. Gross hematuria      Hospital Course  Patient is a 64 y.o. male presented with a significant comorbidity diabetes with diabetic neuropathy, intracerebral ischemic stroke x3.  He presents with a new problem of sudden onset of right-sided weakness and slurred speech. Physical exam findings of right-sided hemiparesis and right facial droop.  Their imaging shows 2.7 x 2.1 acute thalamic intracerebral hemorrhage.    Initially, upon admission Mr. Eastman was placed in the intensive care unit for close neurologic and blood pressure monitoring.  With the assistance of hospital services, Mr. Myrick blood pressure has been regulated and currently within reasonable limits.  A renal ultrasound was obtained and showed a 2.8 cm solid appearing mass in the upper pole of the left kidney.  Mr. Nair additionally had an episode of gross hematuria.  Urology was consulted and a indwelling Alejo catheter was placed.  Per urology recommendations, Mr. Eastman should have an outpatient renal MRI with and without contrast to fully evaluate the lesion.  In regards to hematuria, the recommendation was made to leave the Alejo catheter in place for approximately 1 week allowing the urethra to heal, as well as continue terazosin.  In regards to his previous ischemic strokes, Mr. Eastman was evaluated by neurology.  A cardiac echo was obtained and within normal limits.  Carotid ultrasound showed 50- 69% stenosis on the right and less than 50% stenosis on the left.      Neurologically, Mr. Myrick right-sided weakness has continued to slowly improve.  He has been able to tolerate a mechanical soft diet and nectar thick liquids with meals supervision, oral medications, and void.  He has been evaluated by physical therapy and Occupational Therapy and deemed safe for discharge to a skilled nursing facility to continue inpatient PT and OT.  As previously alluded to, Mr. Eastman will require strict blood pressure  management, and for continuity of care routine outpatient follow-ups with neurology, neurosurgery, and urology.  Mr. Eastman will be discharged today and his care will be transferred to [facility] in [place], [place] where he will reside closer to his family.  He is to be transported by EMS.  Additional instructions provided.    Procedures Performed  None     Consults:   Consults     Date and Time Order Name Status Description    9/11/2019 0905 Inpatient Urology Consult Completed     9/6/2019 1017 Inpatient Urology Consult      9/3/2019 0906 Inpatient Neurology Consult Stroke      9/1/2019 1359 Inpatient Hospitalist Consult          Pertinent Test Results: Ct Head Without Contrast    Result Date: 9/1/2019  Stable head CT with compared with yesterday, with no significant change in the left basal ganglia parenchymal hematoma, likely related to hypertensive type hemorrhage.  This report was finalized on 09/01/2019 12:34 by Dr. Figueroa Golden MD.    Ct Head Without Contrast    Result Date: 8/31/2019  1. Acute intraparenchymal hematoma centered within the left basal ganglia, measuring approximately 2.1 x 2.7 cm, probably hypertensive type bleed. 2. Moderate diffuse cortical atrophy. Chronic lacunar infarction noted in the basal ganglia. This also small chronic appearing infarct in the medial aspect of the posterior superior cerebellum. 3. Mild ventriculomegaly, no more than expected for the degree of atrophy identified. There is no midline shift.  This report was finalized on 08/31/2019 08:24 by Dr. Figueroa Golden MD.    Mri Brain With & Without Contrast    Result Date: 9/1/2019  1. Small, approximately 5 mm focal acute lacunar white matter infarct in the left centrum semiovale without associated hemorrhage. 2. Stable 2.4 cm intraparenchymal hematoma in the left basal ganglia. This shows no abnormal enhancement. There is no evidence of intracranial neoplasm. 3. Diffuse atrophy and advanced chronic  small vessel white matter ischemic disease. There is a small chronic lacunar infarct in the anterior left centrum semiovale and also within the basal ganglia bilaterally. This report was finalized on 09/01/2019 15:52 by Dr. Figueroa Golden MD.    Xr Chest 1 View    Result Date: 9/4/2019  1. Moderate cardiomegaly no evidence pulmonary vascular congestion.   This report was finalized on 09/04/2019 10:08 by Dr. Karl Cohen MD.    Us Carotid Bilateral    Result Date: 9/3/2019  Impression: 1. There is 50-69% stenosis of the right internal carotid artery. 2. There is less than 50% stenosis of the left internal carotid artery. 3. Antegrade flow is demonstrated in bilateral vertebral arteries.  Comments: Bilateral carotid vertebral arterial duplex scan was performed.  Grayscale imaging shows intimal thickening and calcified elements at the carotid bifurcation. The right internal carotid artery peak systolic velocity is 152 cm/sec. The end-diastolic velocity is 22.0 cm/sec. The right ICA/CCA ratio is approximately 2.13 . These findings correlate with 50-69% stenosis of the right internal carotid artery.  Grayscale imaging shows intimal thickening and calcified elements at the carotid bifurcation. The left internal carotid artery peak systolic velocity is 160 cm/sec. The end-diastolic velocity is 11.8 cm/sec. The left ICA/CCA ratio is approximately 1.18 . These findings correlate with less than 50% stenosis of the left internal carotid artery.  Antegrade flow is demonstrated in bilateral vertebral arteries. Greater than 50% stenosis of the proximal bilateral external carotid arteries. This report was finalized on 09/03/2019 14:56 by Dr. Heriberto Levin MD.    Us Renal Bilateral    Result Date: 9/5/2019  1. 2.8 cm solid mass suspected in the upper pole of the level left kidney, further imaging characterization with CT, renal mass protocol, recommended. 2. Right nephrogenic cyst. This report was finalized on 09/05/2019 14:36  by Dr. Misha Osborne MD.    Xr Abdomen Kub    Result Date: 9/9/2019  1. A few borderline gas-filled loops of small bowel overlying the pelvis, which could be seen with ileus or early/partial obstruction. 2. Large amount of colonic stool. 3. No convincing dystrophic calcifications in the visualized abdomen, but evaluation limited by portable technique. This report was finalized on 09/09/2019 16:02 by Dr Delfina Gu MD.    Lab Results   Component Value Date    WBC 8.10 09/12/2019    HGB 10.8 (L) 09/12/2019    HCT 33.3 (L) 09/12/2019    MCV 83.3 09/12/2019     09/12/2019     Lab Results   Component Value Date    GLUCOSE 107 (H) 09/13/2019    BUN 26 (H) 09/13/2019    CREATININE 1.55 (H) 09/13/2019    EGFRIFNONA 46 (L) 09/13/2019    BCR 16.8 09/13/2019    K 4.2 09/13/2019    CO2 29.0 09/13/2019    CALCIUM 8.8 09/13/2019    ALBUMIN 3.70 09/01/2019    AST 43 09/01/2019    ALT 31 09/01/2019     Lab Results   Component Value Date    HGBA1C 5.0 08/31/2019     Condition on Discharge: Stable    Vital Signs  Temp:  [97.6 °F (36.4 °C)-98.6 °F (37 °C)] 98 °F (36.7 °C)  Heart Rate:  [58-75] 70  Resp:  [18-20] 18  BP: (125-167)/(58-86) 133/60    Physical Exam:   Physical Exam   Constitutional: He appears well-developed and well-nourished.  Non-toxic appearance. He does not have a sickly appearance. He does not appear ill. No distress.   BMI 34.54   HENT:   Head: Normocephalic and atraumatic.   Right Ear: Hearing normal.   Left Ear: Hearing normal.   Mouth/Throat: Mucous membranes are normal.   Eyes: Conjunctivae and EOM are normal. Pupils are equal, round, and reactive to light.   Neck: Trachea normal and full passive range of motion without pain. Neck supple.   Cardiovascular: Normal rate and regular rhythm.   Pulmonary/Chest: Effort normal. No accessory muscle usage. No apnea, no tachypnea and no bradypnea. No respiratory distress.   Abdominal: Soft. Normal appearance.   Genitourinary:   Genitourinary Comments: Melo  catheter in place.  Hematuria resolved.   Neurological: He is alert. GCS eye subscore is 4. GCS verbal subscore is 5. GCS motor subscore is 6.   Skin: Skin is warm, dry and intact.   Psychiatric: He has a normal mood and affect. His speech is normal and behavior is normal.   Nursing note and vitals reviewed.       Neurologic Exam     Mental Status   Oriented to person.   Oriented to place.   Oriented to year.   Speech: speech is normal   Level of consciousness: alert ,  arousable by verbal stimuli  Able to name object. Unable to read. Unable to repeat. Unable to write. Abnormal comprehension.   Speech remains slurred but clearing.  Follows commands.  Shows thumbs up into fingers bilaterally.     Cranial Nerves     CN III, IV, VI   Pupils are equal, round, and reactive to light.  Extraocular motions are normal.     CN V   Facial sensation intact.     CN VII   Facial expression full, symmetric.   Left facial weakness: central    Motor Exam   Right arm pronator drift: absent  Left arm pronator drift: absent    Strength   Right deltoid: 3/5  Left deltoid: 5/5  Right biceps: 4/5  Left biceps: 5/5  Right triceps: 4/5  Left triceps: 5/5  Right interossei: 3/5  Right iliopsoas: 2/5  Left iliopsoas: 5/5  Right quadriceps: 2/5  Left quadriceps: 5/5  Right peroneal: 3/5  Right gastroc: 3/5  Left gastroc: 5/5  Strength/movement on right improving     Sensory Exam   Light touch normal.     Discharge Disposition  Rehab Facility or Unit (DC - External)    Discharge Medications     Discharge Medications      New Medications      Instructions Start Date   amLODIPine 10 MG tablet  Commonly known as:  NORVASC   10 mg, Oral, Every 24 Hours Scheduled   Start Date:  9/14/2019     glucagon (human recombinant) 1 MG injection  Commonly known as:  GLUCAGEN DIAGNOSTIC   1 mg, Subcutaneous, As Needed      hydrALAZINE 100 MG tablet  Commonly known as:  APRESOLINE   100 mg, Oral, Every 8 Hours Scheduled      hydrochlorothiazide 25 MG  tablet  Commonly known as:  HYDRODIURIL   25 mg, Oral, Daily   Start Date:  9/14/2019     insulin lispro 100 UNIT/ML injection  Commonly known as:  humaLOG   0-9 Units, Subcutaneous, 4 Times Daily With Meals & Nightly      lidocaine 5 %  Commonly known as:  LIDODERM   1 patch, Transdermal, Every 24 Hours Scheduled, Remove & Discard patch within 12 hours or as directed by MD   Start Date:  9/14/2019     oxyCODONE-acetaminophen 5-325 MG per tablet  Commonly known as:  PERCOCET   1 tablet, Oral, Every 4 Hours PRN      sennosides-docusate sodium 8.6-50 MG tablet  Commonly known as:  SENOKOT-S   1 tablet, Oral, 2 Times Daily         Changes to Medications      Instructions Start Date   CloNIDine 0.3 MG tablet  Commonly known as:  CATAPRES  What changed:    · medication strength  · how much to take  · when to take this   0.3 mg, Oral, Every 12 Hours Scheduled      traZODone 50 MG tablet  Commonly known as:  DESYREL  What changed:  Another medication with the same name was added. Make sure you understand how and when to take each.   50 mg, Oral, Nightly PRN      traZODone 100 MG tablet  Commonly known as:  DESYREL  What changed:  You were already taking a medication with the same name, and this prescription was added. Make sure you understand how and when to take each.   100 mg, Oral, Nightly         Continue These Medications      Instructions Start Date   doxazosin 2 MG tablet  Commonly known as:  CARDURA   2 mg, Oral, Nightly      glimepiride 2 MG tablet  Commonly known as:  AMARYL   2 mg, Oral, Every Morning Before Breakfast      losartan 100 MG tablet  Commonly known as:  COZAAR   100 mg, Oral, Daily      metFORMIN 850 MG tablet  Commonly known as:  GLUCOPHAGE   850 mg, Oral, 2 Times Daily With Meals      omeprazole 20 MG capsule  Commonly known as:  priLOSEC   20 mg, Oral, Daily      simvastatin 40 MG tablet  Commonly known as:  ZOCOR   40 mg, Oral, Nightly         Stop These Medications    aspirin 81 MG EC tablet           Discharge Diet:   Diet Instructions     Advance Diet As Tolerated          Following Clinical Bedside Swallow Evaluation on this date, a diet consistency of pureed with honey thick liquids was recommended by SLP.  Cues to swallow needed.  Alessandra Bright, MS CCC-SLP 9/2/2019 11:20 AM                       Activity at Discharge:   Activity Instructions     Activity as Tolerated      Gradually Increase Activity Until at Pre-Hospitalization Level      Up WIth Assist           Follow-up Appointments  No future appointments.  Additional Instructions for the Follow-ups that You Need to Schedule     Discharge Follow-up with Specified Provider: Dr. Wyatt; 2 Months   As directed      To:  Dr. Wyatt    Follow Up:  2 Months    Follow Up Details:  CT head prior to appointment         MRI Abdomen With & Without Contrast   Sep 22, 2019      Renal Mass        Test Results Pending at Discharge   Order Current Status    Catecholamine+VMA, 24-Hr Urine - Urine, Clean Catch In process         Heriberto Avalos, EFRA  09/13/19  8:48 AM    Time: Discharge 40 min

## 2019-09-13 NOTE — PLAN OF CARE
Problem: Patient Care Overview  Goal: Plan of Care Review  Outcome: Unable to achieve outcome(s) by discharge Date Met: 09/13/19 09/13/19 5083   Coping/Psychosocial   Plan of Care Reviewed With patient;family   Plan of Care Review   Progress no change   OTHER   Outcome Summary Patient stable and ready for DC to rehab facility in [place], alert to self and situation, often calls for asst to move as difficulty repositioning and chronic back pain, tolerating PO pain meds and pain patch to lumbar, obrien in place, lungs diminished but able to keep SATS on RA, VSS, bilat feet edema and red but blanchable, family notified of transfer

## 2019-09-14 NOTE — THERAPY DISCHARGE NOTE
Acute Care - Speech Language Pathology Discharge Summary  [facility]       Patient Name: Phan Eastman  : 1955  MRN: [id number]    Today's Date: 2019  Onset of Illness/Injury or Date of Surgery: 19       Referring Physician: MD Yoselin        Admit Date: 2019      SLP Recommendation and Plan  Mechanical soft diet with nectar thick liquids.  Santiago Fonseca MS CCC-SLP 2019 4:04 PM    Visit Dx:    ICD-10-CM ICD-9-CM   1. Oropharyngeal dysphagia R13.12 787.22   2. Impaired mobility and ADLs Z74.09 799.89   3. Impaired mobility Z74.09 799.89   4. Left renal mass N28.89 593.9   5. Nontraumatic subcortical hemorrhage of left cerebral hemisphere (CMS/HCC) I61.0 431   6. Type 1 diabetes mellitus with complication (CMS/[facility]) E10.8 250.91   7. Hypertensive urgency I16.0 401.9   8. Hyperlipidemia, unspecified hyperlipidemia type E78.5 272.4   9. Cerebrovascular accident (CVA) due to embolism of left middle cerebral artery (CMS/[facility]) I63.412 434.11   10. Dysarthria R47.1 784.51   11. CKD (chronic kidney disease) stage 3, GFR 30-59 ml/min (CMS/[facility]) N18.3 585.3   12. Obesity (BMI 30-39.9) E66.9 278.00   13. Diastolic CHF, chronic (CMS/[facility]) I50.32 428.32     428.0   14. Bradycardia R00.1 427.89   15. Drug-induced constipation K59.03 564.09     E980.5   16. Gross hematuria R31.0 599.71               SLP GOALS     Row Name 19 1600 19 1109          Oral Nutrition/Hydration Goal 1 (SLP)    Oral Nutrition/Hydration Goal 1, SLP  —  LTG: Patient will tolerate LRD with no overt s/s of aspiration.  -TM     Time Frame (Oral Nutrition/Hydration Goal 1, SLP)  —  by discharge  -TM     Barriers (Oral Nutrition/Hydration Goal 1, SLP)  —  n/a  -TM     Progress/Outcomes (Oral Nutrition/Hydration Goal 1, SLP)  goal not met;discharged from facility  -  progress slower than expected  -TM        Labial Strengthening Goal 1 (SLP)    Activity (Labial Strengthening Goal 1, SLP)  —  increase labial tone  -TM      Increase Labial Tone  —  labial resistance exercises  -TM     [place]/Accuracy (Labial Strengthening Goal 1, SLP)  —  independently (over 90% accuracy)  -TM     Time Frame (Labial Strengthening Goal 1, SLP)  —  short term goal (STG);by discharge  -TM     Barriers (Labial Strengthening Goal 1, SLP)  —  n/a  -TM     Progress/Outcomes (Labial Strengthening Goal 1, SLP)  goal not met;discharged from [facility] ongoing  -TM        Lingual Strengthening Goal 1 (SLP)    Activity (Lingual Strengthening Goal 1, SLP)  —  increase lingual tone/sensation/control/coordination/movement  -TM     Increase Lingual Tone/Sensation/Control/Coordination/Movement  —  lingual movement exercises  -TM     [place]/Accuracy (Lingual Strengthening Goal 1, SLP)  —  independently (over 90% accuracy)  -TM     Time Frame (Lingual Strengthening Goal 1, SLP)  —  by discharge  -TM     Barriers (Lingual Strengthening Goal 1, SLP)  —  n/a  -TM     Progress/Outcomes (Lingual Strengthening Goal 1, SLP)  goal not met;discharged from [facility] ongoing  -TM        Pharyngeal Strengthening Exercise Goal 1 (SLP)    Activity (Pharyngeal Strengthening Goal 1, SLP)  —  increase epiglottic inversion and retroflexion;increase tongue base retraction;increase pharyngeal sensation  -TM     Increase Pharyngeal Sensation  —  gustatory stimulation (sour/cold)  -TM     Increase Epiglottic Inversion and Retroflexion  —  Mendelsohn;falsetto  -TM     Increase Tongue Base Retraction  —  hard effortful swallow;kaleb  -TM     [place]/Accuracy (Pharyngeal Strengthening Goal 1, SLP)  —  independently (over 90% accuracy)  -TM     Time Frame (Pharyngeal Strengthening Goal 1, SLP)  —  short term goal (STG);by discharge  -TM     Barriers (Pharyngeal Strengthening Goal 1, SLP)  —  n/a  -TM     Progress/Outcomes (Pharyngeal Strengthening Goal 1, SLP)  goal not met;discharged from [facility]  goal ongoing  -TM       User Key  (r) = Recorded By,  (t) = Taken By, (c) = Cosigned By    Initials Name Provider Type    Nanette Lawrence, CCC-SLP Speech and Language Pathologist    Santiago Orta MS CCC-SLP Speech and Language Pathologist                  SLP Discharge Summary  Anticipated Dischage Disposition: unknown  Reason for Discharge: discharge from this facility  Progress Toward Achieving Short/long Term Goals: goals not met within established timelines  Discharge Destination: IRF      Santiago Fonseca MS CCC-SLP  9/14/2019

## 2019-09-16 NOTE — THERAPY DISCHARGE NOTE
Acute Care - Occupational Therapy Discharge Summary  [facility]     Patient Name: Phan Eastman  : 1955  MRN: [id number]    Today's Date: 2019  Onset of Illness/Injury or Date of Surgery: 19    Date of Referral to OT: 19  Referring Physician: MD Yoselin      Admit Date: 2019        OT Recommendation and Plan    Visit Dx:    ICD-10-CM ICD-9-CM   1. Oropharyngeal dysphagia R13.12 787.22   2. Impaired mobility and ADLs Z74.09 799.89   3. Impaired mobility Z74.09 799.89   4. Left renal mass N28.89 593.9   5. Nontraumatic subcortical hemorrhage of left cerebral hemisphere (CMS/[facility]) I61.0 431   6. Type 1 diabetes mellitus with complication (CMS/[facility]) E10.8 250.91   7. Hypertensive urgency I16.0 401.9   8. Hyperlipidemia, unspecified hyperlipidemia type E78.5 272.4   9. Cerebrovascular accident (CVA) due to embolism of left middle cerebral artery (CMS/[facility]) I63.412 434.11   10. Dysarthria R47.1 784.51   11. CKD (chronic kidney disease) stage 3, GFR 30-59 ml/min (CMS/[facility]) N18.3 585.3   12. Obesity (BMI 30-39.9) E66.9 278.00   13. Diastolic CHF, chronic (CMS/[facility]) I50.32 428.32     428.0   14. Bradycardia R00.1 427.89   15. Drug-induced constipation K59.03 564.09     E980.5   16. Gross hematuria R31.0 599.71               Rehab Goal Summary     Row Name 19 0700             Bed Mobility Goal 1 (OT)    Activity/Assistive Device (Bed Mobility Goal 1, OT)  rolling to left;rolling to right;sit to supine;supine to sit  -TS      [place] Level/Cues Needed (Bed Mobility Goal 1, OT)  moderate assist (50-74% patient effort);1 person assist  -TS      Time Frame (Bed Mobility Goal 1, OT)  long term goal (LTG);by discharge  -TS      Progress/Outcomes (Bed Mobility Goal 1, OT)  goal not met  -TS         Transfer Goal 1 (OT)    Activity/Assistive Device (Transfer Goal 1, OT)  transfers, all  -TS      [place] Level/Cues Needed (Transfer Goal 1, OT)  moderate assist (50-74% patient effort);2  person assist  -TS      Time Frame (Transfer Goal 1, OT)  long term goal (LTG);by discharge  -TS      Progress/Outcome (Transfer Goal 1, OT)  goal not met  -TS         Bathing Goal 1 (OT)    Activity/Assistive Device (Bathing Goal 1, OT)  upper body bathing;other (see comments)  -TS      [place] Level/Cues Needed (Bathing Goal 1, OT)  moderate assist (50-74% patient effort);1 person assist  -TS      Time Frame (Bathing Goal 1, OT)  long term goal (LTG);by discharge  -TS      Progress/Outcomes (Bathing Goal 1, OT)  goal not met  -TS         Dressing Goal 1 (OT)    Activity/Assistive Device (Dressing Goal 1, OT)  upper body dressing  -TS      [place]/Cues Needed (Dressing Goal 1, OT)  moderate assist (50-74% patient effort);1 person assist  -TS      Time Frame (Dressing Goal 1, OT)  long term goal (LTG);by discharge  -TS      Progress/Outcome (Dressing Goal 1, OT)  goal not met  -TS         ROM Goal 1 (OT)    ROM Goal 1 (OT)  Pt will complete AAROM of RUE in all planes of motion for joint protection and skin integrity.   -TS      Time Frame (ROM Goal 1, OT)  long term goal (LTG);by discharge  -TS      Progress/Outcome (ROM Goal 1, OT)  goal not met  -TS         Strength Goal 1 (OT)    Strength Goal 1 (OT)  Pt will increased LUE strength to 4+/5 and RUE strength 2-/5 at shoulder and  for increased independence with adls, t/fs, and mobility.   -TS      Time Frame (Strength Goal 1, OT)  long term goal (LTG);by discharge  -TS      Progress/Outcome (Strength Goal 1, OT)  goal not met  -TS        User Key  (r) = Recorded By, (t) = Taken By, (c) = Cosigned By    Initials Name Provider Type Discipline    Elizabeth Rhodes, DOS SANTOS/L Occupational Therapy Assistant OT              Therapy Suggested Charges     Code   Minutes Charges    19513 (CPT®) Hc Ot Neuromusc Re Education Ea 15 Min      05695 (CPT®) Hc Ot Ther Proc Ea 15 Min 15 1    89443 (CPT®) Hc Ot Therapeutic Act Ea 15 Min      57649 (CPT®) Hc Ot  Manual Therapy Ea 15 Min      70055 (CPT®) Hc Ot Iontophoresis Ea 15 Min      00470 (CPT®) Hc Ot Elec Stim Ea-Per 15 Min      78330 (CPT®) Hc Ot Ultrasound Ea 15 Min      88753 (CPT®) Hc Ot Self Care/Mgmt/Train Ea 15 Min 15 1    Total  30 2              OT Discharge Summary  Reason for Discharge: Discharge from facility  Outcomes Achieved: Refer to plan of care for updates on goals achieved  Discharge Destination: [facility]      LEVON Hylton/JIMENA  9/16/2019

## 2019-11-13 ENCOUNTER — APPOINTMENT (OUTPATIENT)
Dept: CT IMAGING | Facility: HOSPITAL | Age: 64
End: 2019-11-13

## 2021-07-12 NOTE — PLAN OF CARE
Problem: Patient Care Overview  Goal: Plan of Care Review  Outcome: Ongoing (interventions implemented as appropriate)   09/11/19 1533   Plan of Care Review   Progress no change   OTHER   Outcome Summary RDN follow up note. Pt is total feeder with meals, oral intake inadequate at this time. Diet upgrade to Cardiac/CCHO, Soft texture, ground meats, thin liquids per SLP. Oral itnake 50% of 5 meals ranging from 9/7-9/10 of available meals. Adjusted oral supplement to Boost Glucose Control BID. Con to follow.       Problem: Nutrition, Imbalanced: Inadequate Oral Intake (Adult)  Goal: Identify Related Risk Factors and Signs and Symptoms  Outcome: Ongoing (interventions implemented as appropriate)    Goal: Improved Oral Intake  Outcome: Ongoing (interventions implemented as appropriate)    Goal: Prevent Further Weight Loss  Outcome: Outcome(s) achieved Date Met: 09/11/19         Refill sent

## 2021-08-02 NOTE — PROGRESS NOTES
Continued Stay Note   [place]     Patient Name: Phan Eastman  MRN: [id number]  Today's Date: 9/13/2019    Admit Date: 8/31/2019    Discharge Plan     Row Name 09/13/19 1034       Plan    Final Discharge Disposition Code  03 - skilled nursing facility (SNF)    Final Note  PT IS BEING DCD TO [facility], SKILLED LEVEL. DC SUMMARY AND ORDERS HAVE BEEN FAXED [phone]. CALL REPORT NUMBER [phone]. [facility] HAS BEEN PLACED ON WILL CALL AND WILL NEED TO BE CALLED WHEN READY FOR TRANSPORT ([phone])        Discharge Codes    No documentation.       Expected Discharge Date and Time     Expected Discharge Date Expected Discharge Time    Sep 13, 2019             ANETTE Richards     Statement Selected